# Patient Record
Sex: MALE | Race: WHITE | NOT HISPANIC OR LATINO | Employment: OTHER | URBAN - NONMETROPOLITAN AREA
[De-identification: names, ages, dates, MRNs, and addresses within clinical notes are randomized per-mention and may not be internally consistent; named-entity substitution may affect disease eponyms.]

---

## 2017-01-23 PROBLEM — G35 MULTIPLE SCLEROSIS (HCC): Status: ACTIVE | Noted: 2017-01-23

## 2017-01-23 PROBLEM — D72.819 LEUKOPENIA: Status: ACTIVE | Noted: 2017-01-23

## 2017-02-25 ENCOUNTER — APPOINTMENT (OUTPATIENT)
Dept: GENERAL RADIOLOGY | Facility: HOSPITAL | Age: 52
End: 2017-02-25

## 2017-02-25 ENCOUNTER — APPOINTMENT (OUTPATIENT)
Dept: CT IMAGING | Facility: HOSPITAL | Age: 52
End: 2017-02-25

## 2017-02-25 ENCOUNTER — HOSPITAL ENCOUNTER (EMERGENCY)
Facility: HOSPITAL | Age: 52
Discharge: HOME | End: 2017-02-26
Attending: EMERGENCY MEDICINE | Admitting: EMERGENCY MEDICINE

## 2017-02-25 DIAGNOSIS — G35 MULTIPLE SCLEROSIS EXACERBATION (HCC): ICD-10-CM

## 2017-02-25 DIAGNOSIS — R20.0 NUMBNESS AND TINGLING: Primary | ICD-10-CM

## 2017-02-25 DIAGNOSIS — R20.2 NUMBNESS AND TINGLING: Primary | ICD-10-CM

## 2017-02-25 LAB
ALBUMIN SERPL-MCNC: 4.3 G/DL (ref 3.5–5)
ALBUMIN/GLOB SERPL: 1.6 G/DL (ref 1–2)
ALP SERPL-CCNC: 80 U/L (ref 38–126)
ALT SERPL W P-5'-P-CCNC: 135 U/L (ref 13–69)
ANION GAP SERPL CALCULATED.3IONS-SCNC: 11.9 MMOL/L
AST SERPL-CCNC: 66 U/L (ref 15–46)
BASOPHILS # BLD AUTO: 0 10*3/MM3 (ref 0–0.2)
BASOPHILS NFR BLD AUTO: 0 % (ref 0–2.5)
BILIRUB SERPL-MCNC: 0.6 MG/DL (ref 0.2–1.3)
BUN BLD-MCNC: 20 MG/DL (ref 7–20)
BUN/CREAT SERPL: 14.3 (ref 6.3–21.9)
CALCIUM SPEC-SCNC: 9.6 MG/DL (ref 8.4–10.2)
CHLORIDE SERPL-SCNC: 107 MMOL/L (ref 98–107)
CO2 SERPL-SCNC: 28 MMOL/L (ref 26–30)
CREAT BLD-MCNC: 1.4 MG/DL (ref 0.6–1.3)
DEPRECATED RDW RBC AUTO: 39.4 FL (ref 37–54)
EOSINOPHIL # BLD AUTO: 0.03 10*3/MM3 (ref 0–0.7)
EOSINOPHIL NFR BLD AUTO: 0.8 % (ref 0–7)
ERYTHROCYTE [DISTWIDTH] IN BLOOD BY AUTOMATED COUNT: 12.5 % (ref 11.5–14.5)
GFR SERPL CREATININE-BSD FRML MDRD: 54 ML/MIN/1.73
GLOBULIN UR ELPH-MCNC: 2.7 GM/DL
GLUCOSE BLD-MCNC: 120 MG/DL (ref 74–98)
HCT VFR BLD AUTO: 40.8 % (ref 42–52)
HGB BLD-MCNC: 14 G/DL (ref 14–18)
IMM GRANULOCYTES # BLD: 0.01 10*3/MM3 (ref 0–0.06)
IMM GRANULOCYTES NFR BLD: 0.3 % (ref 0–0.6)
LYMPHOCYTES # BLD AUTO: 0.35 10*3/MM3 (ref 0.6–3.4)
LYMPHOCYTES NFR BLD AUTO: 9.5 % (ref 10–50)
MCH RBC QN AUTO: 29.7 PG (ref 27–31)
MCHC RBC AUTO-ENTMCNC: 34.3 G/DL (ref 30–37)
MCV RBC AUTO: 86.6 FL (ref 80–94)
MONOCYTES # BLD AUTO: 0.35 10*3/MM3 (ref 0–0.9)
MONOCYTES NFR BLD AUTO: 9.5 % (ref 0–12)
NEUTROPHILS # BLD AUTO: 2.93 10*3/MM3 (ref 2–6.9)
NEUTROPHILS NFR BLD AUTO: 79.9 % (ref 37–80)
NRBC BLD MANUAL-RTO: 0 /100 WBC (ref 0–0)
PLATELET # BLD AUTO: 165 10*3/MM3 (ref 130–400)
PMV BLD AUTO: 8.9 FL (ref 6–12)
POTASSIUM BLD-SCNC: 3.9 MMOL/L (ref 3.5–5.1)
PROT SERPL-MCNC: 7 G/DL (ref 6.3–8.2)
RBC # BLD AUTO: 4.71 10*6/MM3 (ref 4.7–6.1)
SODIUM BLD-SCNC: 143 MMOL/L (ref 137–145)
TROPONIN I SERPL-MCNC: 0 NG/ML (ref 0–0.05)
TROPONIN I SERPL-MCNC: <0.012 NG/ML (ref 0–0.03)
WBC NRBC COR # BLD: 3.67 10*3/MM3 (ref 4.8–10.8)

## 2017-02-25 PROCEDURE — 85025 COMPLETE CBC W/AUTO DIFF WBC: CPT | Performed by: EMERGENCY MEDICINE

## 2017-02-25 PROCEDURE — 99284 EMERGENCY DEPT VISIT MOD MDM: CPT

## 2017-02-25 PROCEDURE — 70450 CT HEAD/BRAIN W/O DYE: CPT

## 2017-02-25 PROCEDURE — 71010 HC CHEST PA OR AP: CPT

## 2017-02-25 PROCEDURE — 93005 ELECTROCARDIOGRAM TRACING: CPT | Performed by: EMERGENCY MEDICINE

## 2017-02-25 PROCEDURE — 80053 COMPREHEN METABOLIC PANEL: CPT | Performed by: EMERGENCY MEDICINE

## 2017-02-25 PROCEDURE — 84484 ASSAY OF TROPONIN QUANT: CPT | Performed by: EMERGENCY MEDICINE

## 2017-02-25 RX ORDER — PRAMIPEXOLE DIHYDROCHLORIDE 0.5 MG/1
TABLET ORAL
COMMUNITY
Start: 2015-04-08 | End: 2017-05-15

## 2017-02-25 RX ORDER — CITALOPRAM 20 MG/1
TABLET ORAL DAILY
COMMUNITY
Start: 2015-04-08 | End: 2018-01-03 | Stop reason: SDUPTHER

## 2017-02-25 RX ORDER — ASPIRIN 325 MG
325 TABLET ORAL ONCE
Status: COMPLETED | OUTPATIENT
Start: 2017-02-25 | End: 2017-02-26

## 2017-02-25 RX ORDER — FINGOLIMOD HYDROCHLORIDE 0.5 MG/1
0.5 CAPSULE ORAL DAILY
COMMUNITY
End: 2017-11-06 | Stop reason: SDUPTHER

## 2017-02-25 RX ORDER — TIZANIDINE HYDROCHLORIDE 4 MG/1
CAPSULE, GELATIN COATED ORAL
COMMUNITY
Start: 2015-04-08 | End: 2017-11-29 | Stop reason: SDUPTHER

## 2017-02-26 VITALS
BODY MASS INDEX: 23.86 KG/M2 | DIASTOLIC BLOOD PRESSURE: 72 MMHG | RESPIRATION RATE: 18 BRPM | HEART RATE: 75 BPM | WEIGHT: 180 LBS | TEMPERATURE: 97.8 F | OXYGEN SATURATION: 96 % | HEIGHT: 73 IN | SYSTOLIC BLOOD PRESSURE: 113 MMHG

## 2017-02-26 LAB
HOLD SPECIMEN: NORMAL
WHOLE BLOOD HOLD SPECIMEN: NORMAL

## 2017-02-26 PROCEDURE — 96365 THER/PROPH/DIAG IV INF INIT: CPT

## 2017-02-26 PROCEDURE — 25010000002 METHYLPREDNISOLONE: Performed by: EMERGENCY MEDICINE

## 2017-02-26 PROCEDURE — 96375 TX/PRO/DX INJ NEW DRUG ADDON: CPT

## 2017-02-26 PROCEDURE — 25010000002 ONDANSETRON PER 1 MG: Performed by: EMERGENCY MEDICINE

## 2017-02-26 RX ORDER — METHYLPREDNISOLONE 4 MG/1
TABLET ORAL
Qty: 21 TABLET | Refills: 0 | Status: SHIPPED | OUTPATIENT
Start: 2017-02-26 | End: 2017-05-15

## 2017-02-26 RX ORDER — ONDANSETRON 2 MG/ML
4 INJECTION INTRAMUSCULAR; INTRAVENOUS ONCE
Status: COMPLETED | OUTPATIENT
Start: 2017-02-26 | End: 2017-02-26

## 2017-02-26 RX ADMIN — METHYLPREDNISOLONE SODIUM SUCCINATE 1 G: 1 INJECTION, POWDER, FOR SOLUTION INTRAMUSCULAR; INTRAVENOUS at 02:47

## 2017-02-26 RX ADMIN — ASPIRIN 325 MG ORAL TABLET 325 MG: 325 PILL ORAL at 00:09

## 2017-02-26 RX ADMIN — ONDANSETRON 4 MG: 2 INJECTION INTRAMUSCULAR; INTRAVENOUS at 00:12

## 2017-02-28 ENCOUNTER — TELEPHONE (OUTPATIENT)
Dept: INTERNAL MEDICINE | Facility: CLINIC | Age: 52
End: 2017-02-28

## 2017-02-28 DIAGNOSIS — G35 MULTIPLE SCLEROSIS (HCC): Primary | ICD-10-CM

## 2017-03-16 ENCOUNTER — TELEPHONE (OUTPATIENT)
Dept: INTERNAL MEDICINE | Facility: CLINIC | Age: 52
End: 2017-03-16

## 2017-03-16 NOTE — TELEPHONE ENCOUNTER
[place] [facility] [place] KNOW THAT KEESHA HAS MET HIS GOALS FOR HOME HEALTH AND HE IS BEING DISCHARGED     TYLER [phone]

## 2017-03-21 ENCOUNTER — TRANSCRIBE ORDERS (OUTPATIENT)
Dept: MRI IMAGING | Facility: HOSPITAL | Age: 52
End: 2017-03-21

## 2017-03-21 DIAGNOSIS — G35 MULTIPLE SCLEROSIS (HCC): Primary | ICD-10-CM

## 2017-03-22 ENCOUNTER — HOSPITAL ENCOUNTER (OUTPATIENT)
Dept: MRI IMAGING | Facility: HOSPITAL | Age: 52
Discharge: HOME | End: 2017-03-22

## 2017-03-22 ENCOUNTER — HOSPITAL ENCOUNTER (OUTPATIENT)
Dept: MRI IMAGING | Facility: HOSPITAL | Age: 52
Discharge: HOME | End: 2017-03-22
Admitting: PSYCHIATRY & NEUROLOGY

## 2017-03-22 DIAGNOSIS — G35 MULTIPLE SCLEROSIS (HCC): ICD-10-CM

## 2017-03-22 PROCEDURE — 0 GADOBENATE DIMEGLUMINE 529 MG/ML SOLUTION: Performed by: PSYCHIATRY & NEUROLOGY

## 2017-03-22 PROCEDURE — 72156 MRI NECK SPINE W/O & W/DYE: CPT

## 2017-03-22 PROCEDURE — A9577 INJ MULTIHANCE: HCPCS | Performed by: PSYCHIATRY & NEUROLOGY

## 2017-03-22 PROCEDURE — 70553 MRI BRAIN STEM W/O & W/DYE: CPT

## 2017-03-22 RX ADMIN — GADOBENATE DIMEGLUMINE 15 ML: 529 INJECTION, SOLUTION INTRAVENOUS at 14:30

## 2017-03-22 RX ADMIN — GADOBENATE DIMEGLUMINE 1 ML: 529 INJECTION, SOLUTION INTRAVENOUS at 14:30

## 2017-03-24 ENCOUNTER — OUTSIDE FACILITY SERVICE (OUTPATIENT)
Dept: INTERNAL MEDICINE | Facility: CLINIC | Age: 52
End: 2017-03-24

## 2017-03-28 ENCOUNTER — TELEPHONE (OUTPATIENT)
Dept: ONCOLOGY | Facility: CLINIC | Age: 52
End: 2017-03-28

## 2017-03-28 ENCOUNTER — OFFICE VISIT (OUTPATIENT)
Dept: ONCOLOGY | Facility: CLINIC | Age: 52
End: 2017-03-28

## 2017-03-28 VITALS
SYSTOLIC BLOOD PRESSURE: 136 MMHG | WEIGHT: 197 LBS | BODY MASS INDEX: 25.99 KG/M2 | DIASTOLIC BLOOD PRESSURE: 92 MMHG | HEART RATE: 71 BPM | TEMPERATURE: 97 F | RESPIRATION RATE: 15 BRPM

## 2017-03-28 DIAGNOSIS — D70.8 OTHER NEUTROPENIA (HCC): Primary | ICD-10-CM

## 2017-03-28 PROCEDURE — 99213 OFFICE O/P EST LOW 20 MIN: CPT | Performed by: NURSE PRACTITIONER

## 2017-03-28 NOTE — TELEPHONE ENCOUNTER
----- Message from Ruby Sky sent at 3/28/2017  1:16 PM EDT -----  Regarding: [facility] ORDER  Contact: [phone]  Bianca with [facility] in [place] called and she needs an end date on the standing order the most can use is 6 months this must be on this order. Please fix and fax to [phone].

## 2017-03-28 NOTE — PROGRESS NOTES
PROBLEM LIST:  1. Mild leukopenia.     Subjective     HISTORY OF PRESENT ILLNESS:   Mr. Whitley is here for follow-up evaluation of mild leukopenia.  Approximately 6 months ago he had progression of his multiple sclerosis and Tecfidera was discontinued and he was started on Gilenya.  He is tolerating the new medication relatively well.  About 1 month ago he went to the emergency department for severe headache with left sided weakness.  The headache resolved in the emergency department and the left-sided weakness is slowly improving. He denies any recurring infections or fevers. He continues to have persistent fatigue that is related to his multiple sclerosis.     Past Medical History, Past Surgical History, Social History, Family History have been reviewed and are without significant changes except as mentioned.    Review of Systems   A comprehensive 14 point review of systems was performed and was negative except as mentioned.    Medications:  The current medication list was reviewed in the EMR    ALLERGIES:  No Known Allergies    Objective      /92  Pulse 71  Temp 97 °F (36.1 °C) (Temporal Artery )   Resp 15  Wt 197 lb (89.4 kg)  BMI 25.99 kg/m2         General: well appearing, in no acute distress   HEENT: sclera anicteric, oropharynx clear  Lymphatics: no cervical, supraclavicular, or axillary adenopathy  Cardiovascular: regular rate and rhythm, no murmurs  Lungs: clear to auscultation bilaterally  Abdomen: soft, nontender, nondistended.  No palpable masses or organomegaly  Extremeties: no lower extremity edema, cords or calf tenderness  Skin: no rashes, lesions, bruising, or petechiae    RECENT LABS:  Hematology WBC   Date Value Ref Range Status   02/25/2017 3.67 (L) 4.80 - 10.80 10*3/mm3 Final     Hemoglobin   Date Value Ref Range Status   02/25/2017 14.0 14.0 - 18.0 g/dL Final     Hematocrit   Date Value Ref Range Status   02/25/2017 40.8 (L) 42.0 - 52.0 % Final     MCV   Date Value Ref Range  Status   02/25/2017 86.6 80.0 - 94.0 fL Final     RDW   Date Value Ref Range Status   02/25/2017 12.5 11.5 - 14.5 % Final     MPV   Date Value Ref Range Status   02/25/2017 8.9 6.0 - 12.0 fL Final     Platelets   Date Value Ref Range Status   02/25/2017 165 130 - 400 10*3/mm3 Final     Immature Grans %   Date Value Ref Range Status   02/25/2017 0.3 0.0 - 0.6 % Final     Neutrophils, Absolute   Date Value Ref Range Status   02/25/2017 2.93 2.00 - 6.90 10*3/mm3 Final     Lymphocytes, Absolute   Date Value Ref Range Status   02/25/2017 0.35 (L) 0.60 - 3.40 10*3/mm3 Final     Monocytes, Absolute   Date Value Ref Range Status   02/25/2017 0.35 0.00 - 0.90 10*3/mm3 Final     Eosinophils, Absolute   Date Value Ref Range Status   02/25/2017 0.03 0.00 - 0.70 10*3/mm3 Final     Basophils, Absolute   Date Value Ref Range Status   02/25/2017 0.00 0.00 - 0.20 10*3/mm3 Final     Immature Grans, Absolute   Date Value Ref Range Status   02/25/2017 0.01 0.00 - 0.06 10*3/mm3 Final     nRBC   Date Value Ref Range Status   02/25/2017 0.0 0.0 - 0.0 /100 WBC Final       Glucose   Date Value Ref Range Status   02/25/2017 120 (H) 74 - 98 mg/dL Final     Sodium   Date Value Ref Range Status   02/25/2017 143 137 - 145 mmol/L Final     Potassium   Date Value Ref Range Status   02/25/2017 3.9 3.5 - 5.1 mmol/L Final     CO2   Date Value Ref Range Status   02/25/2017 28.0 26.0 - 30.0 mmol/L Final     Chloride   Date Value Ref Range Status   02/25/2017 107 98 - 107 mmol/L Final     Anion Gap   Date Value Ref Range Status   02/25/2017 11.9 mmol/L Final     Creatinine   Date Value Ref Range Status   02/25/2017 1.40 (H) 0.60 - 1.30 mg/dL Final     BUN   Date Value Ref Range Status   02/25/2017 20 7 - 20 mg/dL Final     BUN/Creatinine Ratio   Date Value Ref Range Status   02/25/2017 14.3 6.3 - 21.9 Final     Calcium   Date Value Ref Range Status   02/25/2017 9.6 8.4 - 10.2 mg/dL Final     eGFR Non  Amer   Date Value Ref Range Status    02/25/2017 54 (L) >60 mL/min/1.73 Final     Alkaline Phosphatase   Date Value Ref Range Status   02/25/2017 80 38 - 126 U/L Final     Total Protein   Date Value Ref Range Status   02/25/2017 7.0 6.3 - 8.2 g/dL Final     ALT (SGPT)   Date Value Ref Range Status   02/25/2017 135 (H) 13 - 69 U/L Final     AST (SGOT)   Date Value Ref Range Status   02/25/2017 66 (H) 15 - 46 U/L Final     Total Bilirubin   Date Value Ref Range Status   02/25/2017 0.6 0.2 - 1.3 mg/dL Final     Albumin   Date Value Ref Range Status   02/25/2017 4.30 3.50 - 5.00 g/dL Final     Globulin   Date Value Ref Range Status   02/25/2017 2.7 gm/dL Final     A/G Ratio   Date Value Ref Range Status   02/25/2017 1.6 1.0 - 2.0 g/dL Final       LDH   Date Value Ref Range Status   11/24/2015 135 120 - 246 Units/L Final          Assessment/Plan   IMPRESSION:   1. Mild leukopenia. Over the past year he has had very mild leukopenia but only once did he have a mild neutropenia with absolute neutrophils only 1.79 in May 2016. His neutrophil count in February 2017 had returned to normal at 2.93.  Most recently his absolute lymphocytes count was 0.35. The Gilenya can cause lymphocytopenia in approximately 4-7% of patients. We will need to monitor him closely while taking Gilenya. If he develops an infection we will need to hold the Gilenya or if his lymphocytes drop further we may need to talk with his neurologist regarding possible dose adjustment. Currently he has no lymphadenopathy or recurring infections to suggest common variable immunodeficiency.      PLAN:  1.  We will monitor his labs closely. We would like for him to have a CBC with diff monthly.   2.  We would like to see him back in 6 months for follow-up evaluation. If we do see a trend or progressive fall in the lymphocytopenia or with neutropenia we can certainly see him sooner. He has been instructed to contact us in the interim if any new symptoms arise.               Yeimi Doyle,  [place]  [facility]    3/28/2017

## 2017-05-15 ENCOUNTER — OFFICE VISIT (OUTPATIENT)
Dept: NEUROLOGY | Facility: CLINIC | Age: 52
End: 2017-05-15

## 2017-05-15 VITALS
DIASTOLIC BLOOD PRESSURE: 76 MMHG | WEIGHT: 195 LBS | HEIGHT: 72 IN | BODY MASS INDEX: 26.41 KG/M2 | HEART RATE: 80 BPM | OXYGEN SATURATION: 98 % | SYSTOLIC BLOOD PRESSURE: 118 MMHG

## 2017-05-15 DIAGNOSIS — G35 MS (MULTIPLE SCLEROSIS) (HCC): Primary | ICD-10-CM

## 2017-05-15 PROCEDURE — 99244 OFF/OP CNSLTJ NEW/EST MOD 40: CPT | Performed by: PSYCHIATRY & NEUROLOGY

## 2017-05-15 RX ORDER — NAPROXEN 500 MG/1
500 TABLET ORAL 2 TIMES DAILY WITH MEALS
COMMUNITY
End: 2018-01-31 | Stop reason: SDUPTHER

## 2017-05-22 PROBLEM — G89.4 CHRONIC PAIN SYNDROME: Status: ACTIVE | Noted: 2017-05-22

## 2017-05-22 PROBLEM — F32.A DEPRESSION: Status: ACTIVE | Noted: 2017-05-22

## 2017-05-22 PROBLEM — M79.7 FIBROMYALGIA: Status: ACTIVE | Noted: 2017-05-22

## 2017-05-22 PROBLEM — E78.5 HYPERLIPIDEMIA: Status: ACTIVE | Noted: 2017-05-22

## 2017-05-22 PROBLEM — G25.81 RESTLESS LEGS SYNDROME (RLS): Status: ACTIVE | Noted: 2017-05-22

## 2017-05-22 PROBLEM — Z87.19 H/O ESOPHAGEAL REFLUX: Status: ACTIVE | Noted: 2017-05-22

## 2017-05-22 PROBLEM — R73.03 PREDIABETES: Status: ACTIVE | Noted: 2017-05-22

## 2017-05-22 PROBLEM — Z86.69: Status: ACTIVE | Noted: 2017-05-22

## 2017-06-28 ENCOUNTER — OFFICE VISIT (OUTPATIENT)
Dept: NEUROLOGY | Facility: CLINIC | Age: 52
End: 2017-06-28

## 2017-06-28 VITALS
HEIGHT: 72 IN | SYSTOLIC BLOOD PRESSURE: 130 MMHG | DIASTOLIC BLOOD PRESSURE: 80 MMHG | OXYGEN SATURATION: 98 % | BODY MASS INDEX: 26.41 KG/M2 | HEART RATE: 88 BPM | WEIGHT: 195 LBS

## 2017-06-28 DIAGNOSIS — R25.2 SPASTICITY: ICD-10-CM

## 2017-06-28 DIAGNOSIS — F32.9 REACTIVE DEPRESSION: ICD-10-CM

## 2017-06-28 DIAGNOSIS — G35 MS (MULTIPLE SCLEROSIS) (HCC): Primary | ICD-10-CM

## 2017-06-28 PROCEDURE — 99214 OFFICE O/P EST MOD 30 MIN: CPT | Performed by: PSYCHIATRY & NEUROLOGY

## 2017-06-28 NOTE — PROGRESS NOTES
[facility] NEUROLOGY [place] PROGRESS NOTE  History of Present Illness     Date: 6/28/2017    Patient Identification  Vinnie Whitley III is a 51 y.o. male.    Patient information was obtained from patient and spouse/SO.  History/Exam limitations: none.    Original consultation requested by: Clifford Kirkland D.O.      Chief Complaint   Multiple Sclerosis (Patient c/o more dizziness per 2 months. )      History of Present Illness   Patient is a delightful 51-year-old gentleman who has been diagnosed in multiple sclerosis patients reported that he was originally started on Avonex.  However patient was unable to tolerate the side effects of flulike symptoms he was subsequently switched to Copaxone.  Patient tolerated Copaxone well however patient have recurrent exacerbation.  He was then switched to Tecfidera.  Patient still continued to have frequent episodes of exacerbation.  He was finally switched to Gilenya.  During the first episode he was monitored closely the cardiology office he does have asymptomatic bradycardic episode.  Recent ophthalmologic evaluation did not show any medical swelling.  Repeat MRI of the brain and cervical spine did not show any active demyelinating lesion.      PMH:   Past Medical History:   Diagnosis Date   • Abdominal pain     H/o   • Bilateral flank pain    • Chest pain     H/o   • Collapse of both lungs    • Constipation     H/o   • Diarrhea     H/o   • Elevated LFTs    • Encounter for long-term (current) use of medications    • Infection of kidney     H/o   • Kidney stones    • MS (multiple sclerosis)    • Nausea     H/o   • Nephrolithiasis     H/o   • Pneumothorax     H/o   • Thoracolumbar back pain    • Varicella     H/o   • Vomiting     H/o       Past Surgical History:   Past Surgical History:   Procedure Laterality Date   • KIDNEY STONE SURGERY  2005    Lithotomy   • LUNG SURGERY         Family Hisotry:   Family History   Problem Relation Age of Onset   • Dementia Mother    •  Liver disease Mother      chronic   • Liver disease Other      Chronic   • Liver disease Other      Chronic   • Kidney disease Other      Renal disorder       Social History:   Social History     Social History   • Marital status:      Spouse name: N/A   • Number of children: N/A   • Years of education: N/A     Occupational History   • Not on file.     Social History Main Topics   • Smoking status: Former Smoker     Packs/day: 1.00     Types: Cigarettes     Quit date: 1/1/2001   • Smokeless tobacco: Never Used   • Alcohol use No   • Drug use: No   • Sexual activity: Not on file     Other Topics Concern   • Not on file     Social History Narrative       Medications:   Current Outpatient Prescriptions   Medication Sig Dispense Refill   • citalopram (CeleXA) 20 MG tablet Take  by mouth Daily.     • fingolimod (GILENYA) 0.5 MG capsule Take 0.5 mg by mouth Daily.     • naproxen (NAPROSYN) 500 MG tablet Take 500 mg by mouth 2 (Two) Times a Day With Meals.     • TiZANidine (ZANAFLEX) 4 MG capsule Take  by mouth.       No current facility-administered medications for this visit.        Allergy: No Known Allergies    Review of Systems:  Review of Systems   Constitutional: Negative for chills and fever.   HENT: Negative for congestion, ear pain, hearing loss, rhinorrhea and sore throat.    Eyes: Positive for visual disturbance. Negative for pain, discharge and redness.   Respiratory: Negative for cough, shortness of breath, wheezing and stridor.    Cardiovascular: Negative for chest pain, palpitations and leg swelling.   Gastrointestinal: Negative for abdominal pain, constipation, nausea and vomiting.   Endocrine: Negative for cold intolerance, heat intolerance and polyphagia.   Genitourinary: Negative for dysuria, flank pain, frequency and urgency.   Musculoskeletal: Positive for gait problem. Negative for joint swelling, myalgias, neck pain and neck stiffness.   Skin: Negative for pallor, rash and wound.  "  Allergic/Immunologic: Negative for environmental allergies.   Neurological: Positive for weakness and numbness. Negative for dizziness, tremors, seizures, syncope, facial asymmetry, speech difficulty, light-headedness and headaches.   Hematological: Negative for adenopathy.   Psychiatric/Behavioral: Negative for confusion and hallucinations. The patient is not nervous/anxious.        Physical Exam     Vitals:    06/28/17 1000   BP: 130/80   Pulse: 88   SpO2: 98%   Weight: 195 lb (88.5 kg)   Height: 72\" (182.9 cm)     GENERAL: Patient is pleasant, cooperative, appears to be stated age.  Body habitus is endomorphic.  SKIN AND EXTREMITIES:  No skin rashes or lesions are noted.  No cyanosis, clubbing or edema of the extremities.    HEAD:  Head is normocephalic and atraumatic.    NECK: Neck are non-tender without thyromegaly or adenopathy.  Carotic upstrokes are 1+/4.  No cranial or cervical bruits.  The neck is supple with a full range of motion.   ENT: palate elevate symmetrically, no evidence of high arch palate, tongue midline erythema in posterior pharynx, Mallampati Classification Class III   CARDIOVASCULAR:  Regular rate and rhythm with normal S1 and S2 without rub or gallop.  RESPIRATORY:  Clear to auscultation without wheezes or crackle   ABDOMEN:  Soft and non-tender, positive bowel sound without hepatosplenomegaly  BACK:  Back is straight without midline defect.    PSYCH:  Higher cortical function/mental status:  The patient is alert.  She is oriented x3 to time, place and person.  Recent and the remote memory appear normal.  The patient has a good fund of knowledge.  There is no visual or auditory hallucination or suicidal or homicidal ideation.  SPEECH:There is no gross evidence of aphasia, dysarthria or agnosia.      CRANIAL NERVES:  Pupils are 4mm, equal round reactive to light, reacting briskly to 2mm without afferent pupillary defect.  Visual fields are intact to confrontation testing.  Fundoscopic " examination reveals sharp disk margins with normal vasculature.  No papilledema, hemorrhages or exudates.  Extraocular movements are full and smooth with normal pursuits and saccades.  No nystagmus noted.  The face is symmetric. palate elevate symmetrically, Tongue midline, positive gag reflex. The remainder of the cranial nerves are intact and symmetrical.    MOTOR: Strength is 5/5 throughout with normal tone and bulk with the following exceptions, 4/5 intrinsic muscles of the hands and feet.  No involuntary movements noted.    Deep Tendon Reflexes: are 2/4 and symmetrical in the upper extremities, 2/4 and symmetrical at the knees and 1/4 and symmetrical at the Achilles tendon.  Plantar responses were down-going bilaterally.    SENSATION:  Intact to pinprick, light touch, vibration and proprioception.  Coordination:  The patient normally performs finger-nose-finger, heel-to-knee-to-shin and rapid alternating movements in symmetrical fashion.    COORDINATION AND GAIT:  The patient walks with a narrow-based gait.  Patient is able to heel-toe and tandem walk forward and backwards without difficulty.  Romberg and monopedal  Romberg are negative.    MUSCULOSKELETAL: Range of motion normal, no clubbing, cyanosis, or edema.  No joint swelling.            Studies: I have personally reviewed the following and discussed with the patient.  Results for orders placed or performed during the hospital encounter of 02/25/17   Comprehensive Metabolic Panel   Result Value Ref Range    Glucose 120 (H) 74 - 98 mg/dL    BUN 20 7 - 20 mg/dL    Creatinine 1.40 (H) 0.60 - 1.30 mg/dL    Sodium 143 137 - 145 mmol/L    Potassium 3.9 3.5 - 5.1 mmol/L    Chloride 107 98 - 107 mmol/L    CO2 28.0 26.0 - 30.0 mmol/L    Calcium 9.6 8.4 - 10.2 mg/dL    Total Protein 7.0 6.3 - 8.2 g/dL    Albumin 4.30 3.50 - 5.00 g/dL    ALT (SGPT) 135 (H) 13 - 69 U/L    AST (SGOT) 66 (H) 15 - 46 U/L    Alkaline Phosphatase 80 38 - 126 U/L    Total Bilirubin 0.6 0.2  - 1.3 mg/dL    eGFR Non African Amer 54 (L) >60 mL/min/1.73    Globulin 2.7 gm/dL    A/G Ratio 1.6 1.0 - 2.0 g/dL    BUN/Creatinine Ratio 14.3 6.3 - 21.9    Anion Gap 11.9 mmol/L   Troponin I-Stat   Result Value Ref Range    Troponin I 0.000 0.000 - 0.050 ng/mL   Troponin   Result Value Ref Range    Troponin I <0.012 0.000 - 0.034 ng/mL   CBC Auto Differential   Result Value Ref Range    WBC 3.67 (L) 4.80 - 10.80 10*3/mm3    RBC 4.71 4.70 - 6.10 10*6/mm3    Hemoglobin 14.0 14.0 - 18.0 g/dL    Hematocrit 40.8 (L) 42.0 - 52.0 %    MCV 86.6 80.0 - 94.0 fL    MCH 29.7 27.0 - 31.0 pg    MCHC 34.3 30.0 - 37.0 g/dL    RDW 12.5 11.5 - 14.5 %    RDW-SD 39.4 37.0 - 54.0 fl    MPV 8.9 6.0 - 12.0 fL    Platelets 165 130 - 400 10*3/mm3    Neutrophil % 79.9 37.0 - 80.0 %    Lymphocyte % 9.5 (L) 10.0 - 50.0 %    Monocyte % 9.5 0.0 - 12.0 %    Eosinophil % 0.8 0.0 - 7.0 %    Basophil % 0.0 0.0 - 2.5 %    Immature Grans % 0.3 0.0 - 0.6 %    Neutrophils, Absolute 2.93 2.00 - 6.90 10*3/mm3    Lymphocytes, Absolute 0.35 (L) 0.60 - 3.40 10*3/mm3    Monocytes, Absolute 0.35 0.00 - 0.90 10*3/mm3    Eosinophils, Absolute 0.03 0.00 - 0.70 10*3/mm3    Basophils, Absolute 0.00 0.00 - 0.20 10*3/mm3    Immature Grans, Absolute 0.01 0.00 - 0.06 10*3/mm3    nRBC 0.0 0.0 - 0.0 /100 WBC   Light Blue Top   Result Value Ref Range    Extra Tube hold for add-on    Gold Top - SST   Result Value Ref Range    Extra Tube Hold for add-ons.      Records Reviewed: I have personally reviewed his previous medical record.    Vinnie was seen today for multiple sclerosis.    Diagnoses and all orders for this visit:    MS (multiple sclerosis)      Treatments:  1.  Continue patient on Gilenya   2.  Counseled patient extensively how Gilenya works on Shingosine receptors  3.  Continue patient on Zanaflex for spasticity  4.  Continue patient on Celexa for depression.    This Document is signed by Alirio Jerez MD, FAAN, FAASM   June 28, 20172:17 PM

## 2017-08-29 ENCOUNTER — LAB (OUTPATIENT)
Dept: LAB | Facility: HOSPITAL | Age: 52
End: 2017-08-29
Attending: PSYCHIATRY & NEUROLOGY

## 2017-08-29 ENCOUNTER — OFFICE VISIT (OUTPATIENT)
Dept: NEUROLOGY | Facility: CLINIC | Age: 52
End: 2017-08-29

## 2017-08-29 ENCOUNTER — HOSPITAL ENCOUNTER (OUTPATIENT)
Dept: INFUSION THERAPY | Facility: HOSPITAL | Age: 52
Setting detail: INFUSION SERIES
Discharge: HOME | End: 2017-08-29

## 2017-08-29 ENCOUNTER — APPOINTMENT (OUTPATIENT)
Dept: INFUSION THERAPY | Facility: HOSPITAL | Age: 52
End: 2017-08-29

## 2017-08-29 VITALS
OXYGEN SATURATION: 99 % | DIASTOLIC BLOOD PRESSURE: 81 MMHG | HEART RATE: 69 BPM | SYSTOLIC BLOOD PRESSURE: 136 MMHG | TEMPERATURE: 96 F | RESPIRATION RATE: 18 BRPM

## 2017-08-29 VITALS
OXYGEN SATURATION: 98 % | DIASTOLIC BLOOD PRESSURE: 82 MMHG | HEART RATE: 74 BPM | SYSTOLIC BLOOD PRESSURE: 134 MMHG | HEIGHT: 72 IN | WEIGHT: 192.2 LBS | BODY MASS INDEX: 26.03 KG/M2

## 2017-08-29 DIAGNOSIS — G35 MULTIPLE SCLEROSIS (HCC): ICD-10-CM

## 2017-08-29 DIAGNOSIS — G35 MS (MULTIPLE SCLEROSIS) (HCC): ICD-10-CM

## 2017-08-29 DIAGNOSIS — G35 MS (MULTIPLE SCLEROSIS) (HCC): Primary | ICD-10-CM

## 2017-08-29 LAB
BASOPHILS # BLD AUTO: 0 10*3/MM3 (ref 0–0.2)
BASOPHILS NFR BLD AUTO: 0 % (ref 0–2.5)
BILIRUB UR QL STRIP: NEGATIVE
CLARITY UR: CLEAR
COLOR UR: YELLOW
DEPRECATED RDW RBC AUTO: 42.8 FL (ref 37–54)
EOSINOPHIL # BLD AUTO: 0.03 10*3/MM3 (ref 0–0.7)
EOSINOPHIL NFR BLD AUTO: 1.4 % (ref 0–7)
ERYTHROCYTE [DISTWIDTH] IN BLOOD BY AUTOMATED COUNT: 13.1 % (ref 11.5–14.5)
GLUCOSE UR STRIP-MCNC: NEGATIVE MG/DL
HCT VFR BLD AUTO: 42.4 % (ref 42–52)
HGB BLD-MCNC: 14.5 G/DL (ref 14–18)
HGB UR QL STRIP.AUTO: NEGATIVE
IMM GRANULOCYTES # BLD: 0.02 10*3/MM3 (ref 0–0.06)
IMM GRANULOCYTES NFR BLD: 0.9 % (ref 0–0.6)
KETONES UR QL STRIP: NEGATIVE
LEUKOCYTE ESTERASE UR QL STRIP.AUTO: NEGATIVE
LYMPHOCYTES # BLD AUTO: 0.23 10*3/MM3 (ref 0.6–3.4)
LYMPHOCYTES NFR BLD AUTO: 10.7 % (ref 10–50)
MCH RBC QN AUTO: 30.5 PG (ref 27–31)
MCHC RBC AUTO-ENTMCNC: 34.2 G/DL (ref 30–37)
MCV RBC AUTO: 89.1 FL (ref 80–94)
MONOCYTES # BLD AUTO: 0.35 10*3/MM3 (ref 0–0.9)
MONOCYTES NFR BLD AUTO: 16.4 % (ref 0–12)
NEUTROPHILS # BLD AUTO: 1.51 10*3/MM3 (ref 2–6.9)
NEUTROPHILS NFR BLD AUTO: 70.6 % (ref 37–80)
NITRITE UR QL STRIP: NEGATIVE
NRBC BLD MANUAL-RTO: 0 /100 WBC (ref 0–0)
PH UR STRIP.AUTO: 5.5 [PH] (ref 5–8)
PLATELET # BLD AUTO: 155 10*3/MM3 (ref 130–400)
PMV BLD AUTO: 8.7 FL (ref 6–12)
PROT UR QL STRIP: NEGATIVE
RBC # BLD AUTO: 4.76 10*6/MM3 (ref 4.7–6.1)
SP GR UR STRIP: >=1.03 (ref 1–1.03)
UROBILINOGEN UR QL STRIP: NORMAL
WBC NRBC COR # BLD: 2.14 10*3/MM3 (ref 4.8–10.8)

## 2017-08-29 PROCEDURE — 99214 OFFICE O/P EST MOD 30 MIN: CPT | Performed by: PSYCHIATRY & NEUROLOGY

## 2017-08-29 PROCEDURE — 81003 URINALYSIS AUTO W/O SCOPE: CPT | Performed by: PSYCHIATRY & NEUROLOGY

## 2017-08-29 PROCEDURE — 36415 COLL VENOUS BLD VENIPUNCTURE: CPT

## 2017-08-29 PROCEDURE — 25010000002 METHYLPREDNISOLONE: Performed by: PSYCHIATRY & NEUROLOGY

## 2017-08-29 PROCEDURE — 85025 COMPLETE CBC W/AUTO DIFF WBC: CPT | Performed by: PSYCHIATRY & NEUROLOGY

## 2017-08-29 PROCEDURE — 96365 THER/PROPH/DIAG IV INF INIT: CPT

## 2017-08-29 RX ORDER — SODIUM CHLORIDE 9 MG/ML
250 INJECTION, SOLUTION INTRAVENOUS ONCE
Status: CANCELLED | OUTPATIENT
Start: 2017-08-30

## 2017-08-29 RX ORDER — SODIUM CHLORIDE 9 MG/ML
250 INJECTION, SOLUTION INTRAVENOUS ONCE
Status: COMPLETED | OUTPATIENT
Start: 2017-08-29 | End: 2017-08-29

## 2017-08-29 RX ADMIN — METHYLPREDNISOLONE SODIUM SUCCINATE 1 G: 1 INJECTION, POWDER, FOR SOLUTION INTRAMUSCULAR; INTRAVENOUS at 13:07

## 2017-08-29 RX ADMIN — SODIUM CHLORIDE 250 ML: 9 INJECTION, SOLUTION INTRAVENOUS at 13:07

## 2017-08-30 ENCOUNTER — HOSPITAL ENCOUNTER (OUTPATIENT)
Dept: INFUSION THERAPY | Facility: HOSPITAL | Age: 52
Setting detail: INFUSION SERIES
Discharge: HOME | End: 2017-08-30

## 2017-08-30 VITALS
OXYGEN SATURATION: 97 % | TEMPERATURE: 97.6 F | HEART RATE: 75 BPM | SYSTOLIC BLOOD PRESSURE: 137 MMHG | RESPIRATION RATE: 18 BRPM | DIASTOLIC BLOOD PRESSURE: 82 MMHG

## 2017-08-30 DIAGNOSIS — G35 MULTIPLE SCLEROSIS (HCC): ICD-10-CM

## 2017-08-30 PROCEDURE — 25010000002 METHYLPREDNISOLONE: Performed by: PSYCHIATRY & NEUROLOGY

## 2017-08-30 PROCEDURE — 96365 THER/PROPH/DIAG IV INF INIT: CPT

## 2017-08-30 RX ORDER — SODIUM CHLORIDE 9 MG/ML
250 INJECTION, SOLUTION INTRAVENOUS ONCE
Status: COMPLETED | OUTPATIENT
Start: 2017-08-30 | End: 2017-08-30

## 2017-08-30 RX ORDER — SODIUM CHLORIDE 9 MG/ML
250 INJECTION, SOLUTION INTRAVENOUS ONCE
Status: CANCELLED | OUTPATIENT
Start: 2017-08-30

## 2017-08-30 RX ADMIN — SODIUM CHLORIDE 250 ML: 900 INJECTION, SOLUTION INTRAVENOUS at 08:51

## 2017-08-30 RX ADMIN — METHYLPREDNISOLONE SODIUM SUCCINATE 1 G: 1 INJECTION, POWDER, FOR SOLUTION INTRAMUSCULAR; INTRAVENOUS at 08:51

## 2017-08-31 ENCOUNTER — HOSPITAL ENCOUNTER (OUTPATIENT)
Dept: INFUSION THERAPY | Facility: HOSPITAL | Age: 52
Setting detail: INFUSION SERIES
Discharge: HOME | End: 2017-08-31

## 2017-08-31 VITALS
HEART RATE: 75 BPM | SYSTOLIC BLOOD PRESSURE: 129 MMHG | RESPIRATION RATE: 18 BRPM | OXYGEN SATURATION: 95 % | DIASTOLIC BLOOD PRESSURE: 80 MMHG | TEMPERATURE: 97.7 F

## 2017-08-31 DIAGNOSIS — G35 MULTIPLE SCLEROSIS (HCC): ICD-10-CM

## 2017-08-31 PROCEDURE — 25010000002 METHYLPREDNISOLONE: Performed by: PSYCHIATRY & NEUROLOGY

## 2017-08-31 PROCEDURE — 96365 THER/PROPH/DIAG IV INF INIT: CPT

## 2017-08-31 RX ORDER — SODIUM CHLORIDE 9 MG/ML
250 INJECTION, SOLUTION INTRAVENOUS ONCE
Status: COMPLETED | OUTPATIENT
Start: 2017-08-31 | End: 2017-08-31

## 2017-08-31 RX ORDER — SODIUM CHLORIDE 9 MG/ML
250 INJECTION, SOLUTION INTRAVENOUS ONCE
Status: CANCELLED | OUTPATIENT
Start: 2017-08-31

## 2017-08-31 RX ADMIN — SODIUM CHLORIDE 250 ML: 900 INJECTION, SOLUTION INTRAVENOUS at 08:46

## 2017-08-31 RX ADMIN — METHYLPREDNISOLONE SODIUM SUCCINATE 1 G: 1 INJECTION, POWDER, FOR SOLUTION INTRAMUSCULAR; INTRAVENOUS at 08:47

## 2017-09-01 ENCOUNTER — APPOINTMENT (OUTPATIENT)
Dept: INFUSION THERAPY | Facility: HOSPITAL | Age: 52
End: 2017-09-01

## 2017-09-02 NOTE — PROGRESS NOTES
[facility] NEUROLOGY [place] PROGRESS NOTE  History of Present Illness     Date:   Patient Identification  Vinnie Whitley III is a 52 y.o. male.    Patient information was obtained from patient.  History/Exam limitations: none.    Original consultation requested by: Clifford Kirkland D.O.      Chief Complaint   Multiple Sclerosis (Pt c/o worsening sx. Pt states he woke up Saturday with numbness digits in left hand, today in Left side of face )      History of Present Illness   Patient is a pleasant 52-year-old gentleman with the diagnosis of Multiple Sclerosis.  Patient presented to the office at [facility] on August 29, 2017 for worsening of left-sided weakness involving face arm and leg over the last 3 days.  Patient denies any dysuria.  Denies any fever or chill shortness of breath cough or any signs of infection.  Patient has been treated with Gilenya for relapsing remitting multiple sclerosis.    PMH:   Past Medical History:   Diagnosis Date   • Abdominal pain     H/o   • Bilateral flank pain    • Chest pain     H/o   • Collapse of both lungs    • Constipation     H/o   • Diarrhea     H/o   • Elevated LFTs    • Encounter for long-term (current) use of medications    • Infection of kidney     H/o   • Kidney stones    • MS (multiple sclerosis)    • Nausea     H/o   • Nephrolithiasis     H/o   • Pneumothorax     H/o   • Thoracolumbar back pain    • Varicella     H/o   • Vomiting     H/o       Past Surgical History:   Past Surgical History:   Procedure Laterality Date   • KIDNEY STONE SURGERY  2005    Lithotomy   • LUNG SURGERY         Family Hisotry:   Family History   Problem Relation Age of Onset   • Dementia Mother    • Liver disease Mother      chronic   • Liver disease Other      Chronic   • Liver disease Other      Chronic   • Kidney disease Other      Renal disorder       Social History:   Social History     Social History   • Marital status:      Spouse name: N/A   • Number  of children: N/A   • Years of education: N/A     Occupational History   • Not on file.     Social History Main Topics   • Smoking status: Former Smoker     Packs/day: 1.00     Types: Cigarettes     Quit date: 1/1/2001   • Smokeless tobacco: Never Used   • Alcohol use No   • Drug use: No   • Sexual activity: Not on file     Other Topics Concern   • Not on file     Social History Narrative       Medications:   Current Outpatient Prescriptions   Medication Sig Dispense Refill   • citalopram (CeleXA) 20 MG tablet Take  by mouth Daily.     • fingolimod (GILENYA) 0.5 MG capsule Take 0.5 mg by mouth Daily.     • naproxen (NAPROSYN) 500 MG tablet Take 500 mg by mouth 2 (Two) Times a Day With Meals.     • TiZANidine (ZANAFLEX) 4 MG capsule Take  by mouth.       No current facility-administered medications for this visit.        Allergy: No Known Allergies    Review of Systems:  Review of Systems   Constitutional: Positive for fatigue. Negative for chills and fever.   HENT: Negative for congestion, ear pain, hearing loss, rhinorrhea and sore throat.    Eyes: Negative for pain, discharge and redness.   Respiratory: Negative for cough, shortness of breath, wheezing and stridor.    Cardiovascular: Negative for chest pain, palpitations and leg swelling.   Gastrointestinal: Negative for abdominal pain, constipation, nausea and vomiting.   Endocrine: Negative for cold intolerance, heat intolerance and polyphagia.   Genitourinary: Negative for dysuria, flank pain, frequency and urgency.   Musculoskeletal: Negative for joint swelling, myalgias, neck pain and neck stiffness.   Skin: Negative for pallor, rash and wound.   Allergic/Immunologic: Negative for environmental allergies.   Neurological: Positive for weakness and numbness. Negative for dizziness, tremors, seizures, syncope, facial asymmetry, speech difficulty, light-headedness and headaches.   Hematological: Negative for adenopathy.   Psychiatric/Behavioral: Positive for sleep  "disturbance. Negative for confusion and hallucinations. The patient is not nervous/anxious.        Physical Exam     Vitals:    08/29/17 1011   BP: 134/82   Pulse: 74   SpO2: 98%   Weight: 192 lb 3.2 oz (87.2 kg)   Height: 72\" (182.9 cm)     GENERAL: Patient is pleasant, cooperative, appears to be stated age.  Body habitus is endomorphic.  SKIN AND EXTREMITIES:  No skin rashes or lesions are noted.  No cyanosis, clubbing or edema of the extremities.    HEAD:  Head is normocephalic and atraumatic.    NECK: Neck are non-tender without thyromegaly or adenopathy.  Carotic upstrokes are 1+/4.  No cranial or cervical bruits.  The neck is supple with a full range of motion.   ENT: palate elevate symmetrically, no evidence of high arch palate, tongue midline erythema in posterior pharynx, Mallampati Classification Class III   CARDIOVASCULAR:  Regular rate and rhythm with normal S1 and S2 without rub or gallop.  RESPIRATORY:  Clear to auscultation without wheezes or crackle   ABDOMEN:  Soft and non-tender, positive bowel sound without hepatosplenomegaly  BACK:  Back is straight without midline defect.    PSYCH:  Higher cortical function/mental status:  The patient is alert.  She is oriented x3 to time, place and person.  Recent and the remote memory appear normal.  The patient has a good fund of knowledge.  There is no visual or auditory hallucination or suicidal or homicidal ideation.  SPEECH:There is no gross evidence of aphasia, dysarthria or agnosia.      CRANIAL NERVES:  Pupils are 4mm, equal round reactive to light, reacting briskly to 2mm without afferent pupillary defect.  Visual fields are intact to confrontation testing.  Fundoscopic examination reveals sharp disk margins with normal vasculature.  No papilledema, hemorrhages or exudates.  Extraocular movements are full and smooth with normal pursuits and saccades.  No nystagmus noted.  Facial weakness involving the upper and lower face on the left. palate elevate " symmetrically, Tongue midline, positive gag reflex. The remainder of the cranial nerves are intact and symmetrical.    MOTOR: 4-5 strength in the left upper and lower extremities Deep Tendon Reflexes: are 2/4 and symmetrical in the upper extremities, 2/4 and symmetrical at the knees and 1/4 and symmetrical at the Achilles tendon.  Plantar responses were down-going bilaterally.    SENSATION: Decreased temperature sensation in the left face arms and leg   Coordination:  The patient normally performs finger-nose-finger, heel-to-knee-to-shin and rapid alternating movements in symmetrical fashion.    COORDINATION AND GAIT:  The patient walks with a narrow-based gait.  Patient is able to heel-toe and tandem walk forward and backwards without difficulty.  Romberg and monopedal  Romberg are negative.    MUSCULOSKELETAL: Range of motion normal, no clubbing, cyanosis, or edema.  No joint swelling.              Records Reviewed: I have personally reviewed his previous medical record.    Vinnie was seen today for multiple sclerosis.    Diagnoses and all orders for this visit:    MS (multiple sclerosis)  -     CBC & Differential; Future  -     Urinalysis; Future  -     MRI Brain With & Without Contrast; Future    Multiple sclerosis  -     Cancel: sodium chloride 0.9 % infusion 250 mL; Infuse 250 mL into a venous catheter 1 (One) Time.  -     Cancel: methylPREDNISolone sodium succinate (SOLU-Medrol) 1,000 mg in sodium chloride 0.9 % 100 mL IVPB; Infuse 1,000 mg into a venous catheter 1 (One) Time.        Treatments:  1.  MS exacerbation I would like to schedule this patient to have IV Solu-Medrol treatment as an outpatient infusion every day for 5 days  2.  We'll check UA CBC an MRI of the brain with and without contrast to evaluate for MS exacerbation versus pseudo-exacerbation secondary to infection  3.  Counseled patient on multiple sclerosis as follow  I have  extensively about the current understanding of the  pathophysiology of Multiple Sclerosis.  I have explained to patient that Multiple Sclerosis is common among young individuals.  It is thought to be due to T-cell entering into the brain through adhesion molecules along the blood brain barrier. T helper cell 1 giving rise to inflammatory response and subsequent demyelinating process taken place in subcortical white matter region resulting in Multiple Sclerosis symptoms.  The management of multiple sclerosis (MS) has been substantially advanced by the availability of the disease-modifying agents glatiramer acetate and interferon beta 1a and 1b, mitoxantrone, and natalizumab. A number of positive outcomes have been demonstrated in people with relapsing disease: reduction in the frequency of relapses ,reduction of brain lesion development, as evidenced by magnetic resonance  imaging (MRI) and the possible reduction of disability progression in patients using  Betaseron; Avonex; Copaxone; Rebif; Novantrone; and Tysabri.     Recently, oral agents have been approved by FDA for the management of relapsing remitting MS, including Gilenya, Aubagio and Tacfidera.  I have explained to patient extensively on the efficacy and side effect profile of each treatment options and patient has expressed understanding.    This Document is signed by Alirio Jerez MD, FAAN, FAASM

## 2017-09-08 ENCOUNTER — HOSPITAL ENCOUNTER (OUTPATIENT)
Dept: MRI IMAGING | Facility: HOSPITAL | Age: 52
Discharge: HOME | End: 2017-09-08
Attending: PSYCHIATRY & NEUROLOGY | Admitting: PSYCHIATRY & NEUROLOGY

## 2017-09-08 DIAGNOSIS — G35 MS (MULTIPLE SCLEROSIS) (HCC): ICD-10-CM

## 2017-09-08 PROCEDURE — A9577 INJ MULTIHANCE: HCPCS | Performed by: PSYCHIATRY & NEUROLOGY

## 2017-09-08 PROCEDURE — 70553 MRI BRAIN STEM W/O & W/DYE: CPT

## 2017-09-08 PROCEDURE — 0 GADOBENATE DIMEGLUMINE 529 MG/ML SOLUTION: Performed by: PSYCHIATRY & NEUROLOGY

## 2017-09-08 RX ADMIN — GADOBENATE DIMEGLUMINE 3 ML: 529 INJECTION, SOLUTION INTRAVENOUS at 13:45

## 2017-09-08 RX ADMIN — GADOBENATE DIMEGLUMINE 15 ML: 529 INJECTION, SOLUTION INTRAVENOUS at 13:45

## 2017-09-14 ENCOUNTER — OFFICE VISIT (OUTPATIENT)
Dept: NEUROLOGY | Facility: CLINIC | Age: 52
End: 2017-09-14

## 2017-09-14 VITALS
OXYGEN SATURATION: 98 % | DIASTOLIC BLOOD PRESSURE: 80 MMHG | SYSTOLIC BLOOD PRESSURE: 136 MMHG | WEIGHT: 192 LBS | HEIGHT: 72 IN | BODY MASS INDEX: 26.01 KG/M2 | HEART RATE: 90 BPM

## 2017-09-14 DIAGNOSIS — G35 MULTIPLE SCLEROSIS (HCC): Primary | ICD-10-CM

## 2017-09-14 DIAGNOSIS — G51.0 BELL PALSY: ICD-10-CM

## 2017-09-14 PROCEDURE — 99213 OFFICE O/P EST LOW 20 MIN: CPT | Performed by: PSYCHIATRY & NEUROLOGY

## 2017-09-14 RX ORDER — PRAMIPEXOLE DIHYDROCHLORIDE 0.5 MG/1
0.5 TABLET ORAL NIGHTLY
COMMUNITY
End: 2017-12-29 | Stop reason: SDUPTHER

## 2017-09-14 RX ORDER — MINERAL OIL AND WHITE PETROLATUM 150; 830 MG/G; MG/G
OINTMENT OPHTHALMIC SEE ADMIN INSTRUCTIONS
Qty: 1 TUBE | Refills: 3 | Status: SHIPPED | OUTPATIENT
Start: 2017-09-14 | End: 2018-03-12

## 2017-09-15 NOTE — PROGRESS NOTES
"[facility] NEUROLOGY [place] PROGRESS NOTE  History of Present Illness     Date: 9/15/2017    Patient Identification  Vinnie Whitley III is a 52 y.o. male.    Patient information was obtained from patient.  History/Exam limitations: none.    Original consultation requested by: Dr. Kirkland      Chief Complaint   Multiple Sclerosis (Pt states sx seemed to be improving while taking steroids, completed infusion therapy. Now that pt is no longer taking them sx are \"about the same\". ) and Results (Pt in office today to review results of MRI)      History of Present Illness   Patient is a delightful 52-year-old gentleman with been followed in neurology clinic for multiple sclerosis interval history since last visit patient underwent MRI of the brain with contrast.  MRI of the brain which show periventricular white matter lucency and there is no contrast enhancement to suggest acute lesion.  Patient continue to have left facial weakness involving upper and lower face patient ambulates with a spastic gait.    PMH:   Past Medical History:   Diagnosis Date   • Abdominal pain     H/o   • Bilateral flank pain    • Chest pain     H/o   • Collapse of both lungs    • Constipation     H/o   • Diarrhea     H/o   • Elevated LFTs    • Encounter for long-term (current) use of medications    • Infection of kidney     H/o   • Kidney stones    • MS (multiple sclerosis)    • Nausea     H/o   • Nephrolithiasis     H/o   • Pneumothorax     H/o   • Thoracolumbar back pain    • Varicella     H/o   • Vomiting     H/o       Past Surgical History:   Past Surgical History:   Procedure Laterality Date   • KIDNEY STONE SURGERY  2005    Lithotomy   • LUNG SURGERY         Family Hisotry:   Family History   Problem Relation Age of Onset   • Dementia Mother    • Liver disease Mother      chronic   • Liver disease Other      Chronic   • Liver disease Other      Chronic   • Kidney disease Other      Renal disorder       Social History:   Social History "     Social History   • Marital status:      Spouse name: N/A   • Number of children: N/A   • Years of education: N/A     Occupational History   • Not on file.     Social History Main Topics   • Smoking status: Former Smoker     Packs/day: 1.00     Types: Cigarettes     Quit date: 1/1/2001   • Smokeless tobacco: Never Used   • Alcohol use No   • Drug use: No   • Sexual activity: Not on file     Other Topics Concern   • Not on file     Social History Narrative       Medications:   Current Outpatient Prescriptions   Medication Sig Dispense Refill   • citalopram (CeleXA) 20 MG tablet Take  by mouth Daily.     • fingolimod (GILENYA) 0.5 MG capsule Take 0.5 mg by mouth Daily.     • naproxen (NAPROSYN) 500 MG tablet Take 500 mg by mouth 2 (Two) Times a Day With Meals.     • pramipexole (MIRAPEX) 0.5 MG tablet Take 0.5 mg by mouth Every Night.     • TiZANidine (ZANAFLEX) 4 MG capsule Take  by mouth.     • artificial tears (LUBRIFRESH P.M.) ointment ophthalmic ointment Administer  into the left eye See Admin Instructions. Apply to left eye QHS 1 tube 3     No current facility-administered medications for this visit.        Allergy: No Known Allergies    Review of Systems:  Review of Systems   Constitutional: Negative for chills and fever.   HENT: Negative for congestion, ear pain, hearing loss, rhinorrhea and sore throat.    Eyes: Negative for pain, discharge and redness.   Respiratory: Negative for cough, shortness of breath, wheezing and stridor.    Cardiovascular: Negative for chest pain, palpitations and leg swelling.   Gastrointestinal: Negative for abdominal pain, constipation, nausea and vomiting.   Endocrine: Negative for cold intolerance, heat intolerance and polyphagia.   Genitourinary: Negative for dysuria, flank pain, frequency and urgency.   Musculoskeletal: Positive for gait problem. Negative for joint swelling, myalgias, neck pain and neck stiffness.   Skin: Negative for pallor, rash and wound.  "  Allergic/Immunologic: Negative for environmental allergies.   Neurological: Positive for facial asymmetry, weakness and numbness. Negative for dizziness, tremors, seizures, syncope, speech difficulty, light-headedness and headaches.   Hematological: Negative for adenopathy.   Psychiatric/Behavioral: Negative for confusion and hallucinations. The patient is not nervous/anxious.        Physical Exam     Vitals:    09/14/17 1202   BP: 136/80   Pulse: 90   SpO2: 98%   Weight: 192 lb (87.1 kg)   Height: 72\" (182.9 cm)     GENERAL: Patient is pleasant, cooperative, appears to be stated age.  Body habitus is endomorphic.  SKIN AND EXTREMITIES:  No skin rashes or lesions are noted.  No cyanosis, clubbing or edema of the extremities.    HEAD:  Head is normocephalic and atraumatic.    NECK: Neck are non-tender without thyromegaly or adenopathy.  Carotic upstrokes are 1+/4.  No cranial or cervical bruits.  The neck is supple with a full range of motion.   ENT: palate elevate symmetrically, no evidence of high arch palate, tongue midline erythema in posterior pharynx, Mallampati Classification Class III   CARDIOVASCULAR:  Regular rate and rhythm with normal S1 and S2 without rub or gallop.  RESPIRATORY:  Clear to auscultation without wheezes or crackle   ABDOMEN:  Soft and non-tender, positive bowel sound without hepatosplenomegaly  BACK:  Back is straight without midline defect.    PSYCH:  Higher cortical function/mental status:  The patient is alert.  She is oriented x3 to time, place and person.  Recent and the remote memory appear normal.  The patient has a good fund of knowledge.  There is no visual or auditory hallucination or suicidal or homicidal ideation.  SPEECH:There is no gross evidence of aphasia, dysarthria or agnosia.      CRANIAL NERVES:  Pupils are 4mm, equal round reactive to light, reacting briskly to 2mm without afferent pupillary defect.  Visual fields are intact to confrontation testing.  Fundoscopic " examination reveals sharp disk margins with normal vasculature.  No papilledema, hemorrhages or exudates.  Extraocular movements are full and smooth with normal pursuits and saccades.  No nystagmus noted.  The face left facial weakness involving upper and lower face. palate elevate symmetrically, Tongue midline, positive gag reflex. The remainder of the cranial nerves are intact and symmetrical.    MOTOR: Strength is 5/5 throughout with normal tone and bulk with the following exceptions, 4/5 intrinsic muscles of the hands and feet.  No involuntary movements noted.    Deep Tendon Reflexes: are 2/4 and symmetrical in the upper extremities, 2/4 and symmetrical at the knees and 1/4 and symmetrical at the Achilles tendon.  Plantar responses were down-going bilaterally.    SENSATION:  Intact to pinprick, light touch, vibration and proprioception.  Coordination:  The patient normally performs finger-nose-finger, heel-to-knee-to-shin and rapid alternating movements in symmetrical fashion.    COORDINATION AND GAIT:  Ambulate with a spastic gait on the left    MUSCULOSKELETAL: Range of motion normal, no clubbing, cyanosis, or edema.  No joint swelling.            Studies: I have personally reviewed the following and discussed with the patient.  Results for orders placed or performed in visit on 08/29/17   Urinalysis   Result Value Ref Range    Color, UA Yellow Yellow, Straw    Appearance, UA Clear Clear    pH, UA 5.5 5.0 - 8.0    Specific Gravity, UA >=1.030 1.005 - 1.030    Glucose, UA Negative Negative    Ketones, UA Negative Negative    Bilirubin, UA Negative Negative    Blood, UA Negative Negative    Protein, UA Negative Negative    Leuk Esterase, UA Negative Negative    Nitrite, UA Negative Negative    Urobilinogen, UA 0.2 E.U./dL 0.2 - 1.0 E.U./dL       Review of Imaging: I have personally reviewed the following images and discussed with the patient.  MRI of the brain showed subcortical white matter lesion but there is  no contrast enhancement noted    Records Reviewed: I have personally reviewed his previous medical record.    Vinnie was seen today for multiple sclerosis and results.    Diagnoses and all orders for this visit:    Multiple sclerosis    Bell palsy    Other orders  -     artificial tears (LUBRIFRESH P.M.) ointment ophthalmic ointment; Administer  into the left eye See Admin Instructions. Apply to left eye QHS      Treatments:  1.  Provide this patient with the artificial tear  2.  We'll provide this patient with Lacri-Lube ointment to protect his cornea  3.  Continue patient on Gilenyia    This Document is signed by Alirio Jerez MD, FAAN, FAASM   September 15, [id number] AM

## 2017-10-03 ENCOUNTER — OFFICE VISIT (OUTPATIENT)
Dept: NEUROLOGY | Facility: CLINIC | Age: 52
End: 2017-10-03

## 2017-10-03 VITALS
SYSTOLIC BLOOD PRESSURE: 128 MMHG | BODY MASS INDEX: 26.01 KG/M2 | OXYGEN SATURATION: 98 % | HEART RATE: 79 BPM | WEIGHT: 192 LBS | DIASTOLIC BLOOD PRESSURE: 88 MMHG | HEIGHT: 72 IN

## 2017-10-03 DIAGNOSIS — G35 MULTIPLE SCLEROSIS (HCC): Primary | ICD-10-CM

## 2017-10-03 PROCEDURE — 99213 OFFICE O/P EST LOW 20 MIN: CPT | Performed by: PSYCHIATRY & NEUROLOGY

## 2017-10-03 RX ORDER — METHYLPREDNISOLONE 4 MG/1
TABLET ORAL
Qty: 1 EACH | Refills: 0 | Status: SHIPPED | OUTPATIENT
Start: 2017-10-03 | End: 2018-01-31

## 2017-10-03 RX ORDER — NAPROXEN 500 MG/1
500 TABLET ORAL 2 TIMES DAILY WITH MEALS
Qty: 180 TABLET | Refills: 1 | Status: SHIPPED | OUTPATIENT
Start: 2017-10-03 | End: 2018-02-26 | Stop reason: SDUPTHER

## 2017-10-04 NOTE — PROGRESS NOTES
[facility] NEUROLOGY [place] PROGRESS NOTE  History of Present Illness     Date: 10/3/2017    Patient Identification  Vinnie Whitley III is a 52 y.o. male.    Patient information was obtained from patient and spouse/SO.  History/Exam limitations: none.    Original consultation requested by: Clifford Kirkland DO      Chief Complaint   Multiple Sclerosis (Pt in office today for 1 month follow up, discuss returning to work ) and Fatigue (Pt c/o fatigue )      History of Present Illness   Patient is a pleasant 52-year-old gentleman referred to [facility] neurology for evaluation of multiple sclerosis (MS).   Patient suffered from Bell palsy.  Patient is complaining of clinically being fatigued from his multiple sclerosis.   We have review MRI of the brain today there is no evidence of acute exacerbation I have explained to the family about pseudo-exacerbation from infection.  They have expressed understanding    PMH:   Past Medical History:   Diagnosis Date   • Abdominal pain     H/o   • Bilateral flank pain    • Chest pain     H/o   • Collapse of both lungs    • Constipation     H/o   • Diarrhea     H/o   • Elevated LFTs    • Encounter for long-term (current) use of medications    • Infection of kidney     H/o   • Kidney stones    • MS (multiple sclerosis)    • Nausea     H/o   • Nephrolithiasis     H/o   • Pneumothorax     H/o   • Thoracolumbar back pain    • Varicella     H/o   • Vomiting     H/o       Past Surgical History:   Past Surgical History:   Procedure Laterality Date   • KIDNEY STONE SURGERY  2005    Lithotomy   • LUNG SURGERY         Family Hisotry:   Family History   Problem Relation Age of Onset   • Dementia Mother    • Liver disease Mother      chronic   • Liver disease Other      Chronic   • Liver disease Other      Chronic   • Kidney disease Other      Renal disorder       Social History:   Social History     Social History   • Marital status:      Spouse name: N/A   • Number of  children: N/A   • Years of education: N/A     Occupational History   • Not on file.     Social History Main Topics   • Smoking status: Former Smoker     Packs/day: 1.00     Types: Cigarettes     Quit date: 1/1/2001   • Smokeless tobacco: Never Used   • Alcohol use No   • Drug use: No   • Sexual activity: Not on file     Other Topics Concern   • Not on file     Social History Narrative       Medications:   Current Outpatient Prescriptions   Medication Sig Dispense Refill   • artificial tears (LUBRIFRESH P.M.) ointment ophthalmic ointment Administer  into the left eye See Admin Instructions. Apply to left eye QHS 1 tube 3   • citalopram (CeleXA) 20 MG tablet Take  by mouth Daily.     • fingolimod (GILENYA) 0.5 MG capsule Take 0.5 mg by mouth Daily.     • naproxen (NAPROSYN) 500 MG tablet Take 500 mg by mouth 2 (Two) Times a Day With Meals.     • pramipexole (MIRAPEX) 0.5 MG tablet Take 0.5 mg by mouth Every Night.     • TiZANidine (ZANAFLEX) 4 MG capsule Take  by mouth.     • MethylPREDNISolone (MEDROL, BAYRON,) 4 MG tablet Take as directed on package instructions. 1 each 0   • naproxen (EC NAPROSYN) 500 MG EC tablet Take 1 tablet by mouth 2 (Two) Times a Day With Meals. 180 tablet 1     No current facility-administered medications for this visit.        Allergy: No Known Allergies    Review of Systems:  Review of Systems   Constitutional: Positive for fatigue. Negative for chills and fever.   HENT: Negative for congestion, ear pain, hearing loss, rhinorrhea and sore throat.    Eyes: Negative for pain, discharge and redness.   Respiratory: Negative for cough, shortness of breath, wheezing and stridor.    Cardiovascular: Negative for chest pain, palpitations and leg swelling.   Gastrointestinal: Negative for abdominal pain, constipation, nausea and vomiting.   Endocrine: Negative for cold intolerance, heat intolerance and polyphagia.   Genitourinary: Negative for dysuria, flank pain, frequency and urgency.  "  Musculoskeletal: Positive for gait problem. Negative for joint swelling, myalgias, neck pain and neck stiffness.   Skin: Negative for pallor, rash and wound.   Allergic/Immunologic: Negative for environmental allergies.   Neurological: Positive for facial asymmetry. Negative for dizziness, tremors, seizures, syncope, speech difficulty, weakness, light-headedness, numbness and headaches.   Hematological: Negative for adenopathy.   Psychiatric/Behavioral: Negative for confusion and hallucinations. The patient is not nervous/anxious.        Physical Exam     Vitals:    10/03/17 1026 10/03/17 1028   BP: 136/86 128/88  Comment: pt wife requesting BP be checked in both arms   Pulse: 79    SpO2: 98%    Weight: 192 lb (87.1 kg)    Height: 72\" (182.9 cm)      GENERAL: Patient is pleasant, cooperative, appears to be stated age.  Body habitus is endomorphic.  SKIN AND EXTREMITIES:  No skin rashes or lesions are noted.  No cyanosis, clubbing or edema of the extremities.    HEAD:  Head is normocephalic and atraumatic.    NECK: Neck are non-tender without thyromegaly or adenopathy.  Carotic upstrokes are 1+/4.  No cranial or cervical bruits.  The neck is supple with a full range of motion.   ENT: palate elevate symmetrically, no evidence of high arch palate, tongue midline erythema in posterior pharynx, Mallampati Classification Class III   CARDIOVASCULAR:  Regular rate and rhythm with normal S1 and S2 without rub or gallop.  RESPIRATORY:  Clear to auscultation without wheezes or crackle   ABDOMEN:  Soft and non-tender, positive bowel sound without hepatosplenomegaly  BACK:  Back is straight without midline defect.    PSYCH:  Higher cortical function/mental status:  The patient is alert.  She is oriented x3 to time, place and person.  Recent and the remote memory appear normal.  The patient has a good fund of knowledge.  There is no visual or auditory hallucination or suicidal or homicidal ideation.  SPEECH:There is no gross " evidence of aphasia, dysarthria or agnosia.      CRANIAL NERVES:  Pupils are 4mm, equal round reactive to light, reacting briskly to 2mm without afferent pupillary defect.  Visual fields are intact to confrontation testing.  Fundoscopic examination reveals sharp disk margins with normal vasculature.  No papilledema, hemorrhages or exudates.  Extraocular movements are full and smooth with normal pursuits and saccades.  No nystagmus noted.  The left face is asymmetric. palate elevate symmetrically, Tongue midline, positive gag reflex. The remainder of the cranial nerves are intact and symmetrical.    MOTOR: Strength is 5/5 throughout with normal tone and bulk with the following exceptions, 4/5 intrinsic muscles of the hands and feet.  No involuntary movements noted.    Deep Tendon Reflexes: are 2/4 and symmetrical in the upper extremities, 2/4 and symmetrical at the knees and 1/4 and symmetrical at the Achilles tendon.  Plantar responses were down-going bilaterally.    SENSATION:  Intact to pinprick, light touch, vibration and proprioception.  Coordination:  The patient normally performs finger-nose-finger, heel-to-knee-to-shin and rapid alternating movements in symmetrical fashion.    COORDINATION AND GAIT:  Ambulates with a spastic gait  MUSCULOSKELETAL: Range of motion normal, no clubbing, cyanosis, or edema.  No joint swelling.            Review of Imaging: I have personally reviewed the following images and discussed with the patient.  MRI of the brain with contrast showed no evidence of acute lesion    Records Reviewed: I have personally reviewed his previous medical record.    Vinnie was seen today for multiple sclerosis and fatigue.    Diagnoses and all orders for this visit:    Multiple sclerosis    Other orders  -     MethylPREDNISolone (MEDROL, BAYRON,) 4 MG tablet; Take as directed on package instructions.  -     naproxen (EC NAPROSYN) 500 MG EC tablet; Take 1 tablet by mouth 2 (Two) Times a Day With  Meals.        Treatments:  1.  We'll provide is patient with a Medrol Dosepak  2.  Provided patient with Naprosyn to use on a when necessary basis for headache  3.  Counseled patient extensively on multiple sclerosis as follow  I have  extensively about the current understanding of the pathophysiology of Multiple Sclerosis.  I have explained to patient that Multiple Sclerosis is common among young individuals.  It is thought to be due to T-cell entering into the brain through adhesion molecules along the blood brain barrier. T helper cell 1 giving rise to inflammatory response and subsequent demyelinating process taken place in subcortical white matter region resulting in Multiple Sclerosis symptoms.  The management of multiple sclerosis (MS) has been substantially advanced by the availability of the disease-modifying agents glatiramer acetate and interferon beta 1a and 1b, mitoxantrone, and natalizumab. A number of positive outcomes have been demonstrated in people with relapsing disease: reduction in the frequency of relapses ,reduction of brain lesion development, as evidenced by magnetic resonance  imaging (MRI) and the possible reduction of disability progression in patients using  Betaseron; Avonex; Copaxone; Rebif; Novantrone; and Tysabri.     Recently, oral agents have been approved by FDA for the management of relapsing remitting MS, including Gilenya, Aubagio and Tacfidera.  I have explained to patient extensively on the efficacy and side effect profile of each treatment options and patient has expressed understanding.      This Document is signed by Alirio Jerez MD, FAAN, FAASM   October 3, 23977:57 PM

## 2017-10-17 ENCOUNTER — OFFICE VISIT (OUTPATIENT)
Dept: NEUROLOGY | Facility: CLINIC | Age: 52
End: 2017-10-17

## 2017-10-17 VITALS
OXYGEN SATURATION: 99 % | HEIGHT: 72 IN | WEIGHT: 192 LBS | HEART RATE: 70 BPM | BODY MASS INDEX: 26.01 KG/M2 | DIASTOLIC BLOOD PRESSURE: 82 MMHG | SYSTOLIC BLOOD PRESSURE: 136 MMHG

## 2017-10-17 DIAGNOSIS — G35 MULTIPLE SCLEROSIS (HCC): ICD-10-CM

## 2017-10-17 DIAGNOSIS — F48.2 PBA (PSEUDOBULBAR AFFECT): Primary | ICD-10-CM

## 2017-10-17 DIAGNOSIS — R26.9 GAIT DIFFICULTY: ICD-10-CM

## 2017-10-17 DIAGNOSIS — R25.2 SPASTICITY: ICD-10-CM

## 2017-10-17 PROCEDURE — 99214 OFFICE O/P EST MOD 30 MIN: CPT | Performed by: PSYCHIATRY & NEUROLOGY

## 2017-10-17 NOTE — PROGRESS NOTES
"[facility] NEUROLOGY [place] PROGRESS NOTE  History of Present Illness     Date: 10/17/2017    Patient Identification  Vinnie Whitley III is a 52 y.o. male.    Patient information was obtained from patient.  History/Exam limitations: none.    Original consultation requested by: Clifford Kirkland      Chief Complaint   Multiple Sclerosis (Pt in office for follow up. Pt states he attempted to try to return to work recently, pt was told by physician at work that he was not ready to return. Pt states he got \"emotional\" when he was told that and work suggested pt be seen by therapist. Pt appt was today, therapist thinks pt reaction was in relation to lesion in brain from past )      History of Present Illness   Patient is a pleasant 52-year-old gentleman with a diagnosis of multiple sclerosis.  He has attempted to return to work recently but his physician at work felt that patient is not ready to return.  Even though patient does not feel depressed but he finds himself having difficulty in controlling his \"exaggerated emotion\".  Patient was referred to a psychologist for evaluation and felt that he does not have depression and his \"exaggerated emotion\" could be related to his MS lesion in the limbic system.    PMH:   Past Medical History:   Diagnosis Date   • Abdominal pain     H/o   • Bilateral flank pain    • Chest pain     H/o   • Collapse of both lungs    • Constipation     H/o   • Diarrhea     H/o   • Elevated LFTs    • Encounter for long-term (current) use of medications    • Infection of kidney     H/o   • Kidney stones    • MS (multiple sclerosis)    • Nausea     H/o   • Nephrolithiasis     H/o   • Pneumothorax     H/o   • Thoracolumbar back pain    • Varicella     H/o   • Vomiting     H/o       Past Surgical History:   Past Surgical History:   Procedure Laterality Date   • KIDNEY STONE SURGERY  2005    Lithotomy   • LUNG SURGERY         Family Hisotry:   Family History   Problem Relation Age of Onset   • " Dementia Mother    • Liver disease Mother      chronic   • Liver disease Other      Chronic   • Liver disease Other      Chronic   • Kidney disease Other      Renal disorder       Social History:   Social History     Social History   • Marital status:      Spouse name: N/A   • Number of children: N/A   • Years of education: N/A     Occupational History   • Not on file.     Social History Main Topics   • Smoking status: Former Smoker     Packs/day: 1.00     Types: Cigarettes     Quit date: 1/1/2001   • Smokeless tobacco: Never Used   • Alcohol use No   • Drug use: No   • Sexual activity: Not on file     Other Topics Concern   • Not on file     Social History Narrative       Medications:   Current Outpatient Prescriptions   Medication Sig Dispense Refill   • artificial tears (LUBRIFRESH P.M.) ointment ophthalmic ointment Administer  into the left eye See Admin Instructions. Apply to left eye QHS 1 tube 3   • citalopram (CeleXA) 20 MG tablet Take  by mouth Daily.     • fingolimod (GILENYA) 0.5 MG capsule Take 0.5 mg by mouth Daily.     • MethylPREDNISolone (MEDROL, BAYRON,) 4 MG tablet Take as directed on package instructions. 1 each 0   • naproxen (EC NAPROSYN) 500 MG EC tablet Take 1 tablet by mouth 2 (Two) Times a Day With Meals. 180 tablet 1   • naproxen (NAPROSYN) 500 MG tablet Take 500 mg by mouth 2 (Two) Times a Day With Meals.     • pramipexole (MIRAPEX) 0.5 MG tablet Take 0.5 mg by mouth Every Night.     • TiZANidine (ZANAFLEX) 4 MG capsule Take  by mouth.     • dextromethorphan-quinidine (NUEDEXTA) 20-10 MG capsule capsule Take 1 capsule by mouth Every 12 (Twelve) Hours. 60 capsule 3     No current facility-administered medications for this visit.        Allergy: No Known Allergies    Review of Systems:  Review of Systems   Constitutional: Positive for fatigue. Negative for chills and fever.   HENT: Negative for congestion, ear pain, hearing loss, rhinorrhea and sore throat.    Eyes: Negative for pain,  "discharge and redness.   Respiratory: Negative for cough, shortness of breath, wheezing and stridor.    Cardiovascular: Negative for chest pain, palpitations and leg swelling.   Gastrointestinal: Negative for abdominal pain, constipation, nausea and vomiting.   Endocrine: Negative for cold intolerance, heat intolerance and polyphagia.   Genitourinary: Negative for dysuria, flank pain, frequency and urgency.   Musculoskeletal: Positive for gait problem. Negative for joint swelling, myalgias, neck pain and neck stiffness.   Skin: Negative for pallor, rash and wound.   Allergic/Immunologic: Negative for environmental allergies.   Neurological: Positive for weakness and numbness. Negative for dizziness, tremors, seizures, syncope, facial asymmetry, speech difficulty, light-headedness and headaches.   Hematological: Negative for adenopathy.   Psychiatric/Behavioral: Positive for dysphoric mood. Negative for confusion and hallucinations. The patient is not nervous/anxious.        Physical Exam     Vitals:    10/17/17 1522   BP: 136/82   Pulse: 70   SpO2: 99%   Weight: 192 lb (87.1 kg)   Height: 72\" (182.9 cm)     GENERAL: Patient is pleasant, but easily tear up despite the fact that patient denied having depression,  cooperative, appears to be stated age.  Body habitus is endomorphic.  SKIN AND EXTREMITIES:  No skin rashes or lesions are noted.  No cyanosis, clubbing or edema of the extremities.    HEAD:  Head is normocephalic and atraumatic.    NECK: Neck are non-tender without thyromegaly or adenopathy.  Carotic upstrokes are 1+/4.  No cranial or cervical bruits.  The neck is supple with a full range of motion.   ENT: palate elevate symmetrically, no evidence of high arch palate, tongue midline erythema in posterior pharynx, Mallampati Classification Class III   CARDIOVASCULAR:  Regular rate and rhythm with normal S1 and S2 without rub or gallop.  RESPIRATORY:  Clear to auscultation without wheezes or crackle   ABDOMEN: "  Soft and non-tender, positive bowel sound without hepatosplenomegaly  BACK:  Back is straight without midline defect.    PSYCH:  Higher cortical function/mental status:  The patient is alert.  She is oriented x3 to time, place and person.  Recent and the remote memory appear normal.  The patient has a good fund of knowledge.  There is no visual or auditory hallucination or suicidal or homicidal ideation.  SPEECH:There is no gross evidence of aphasia, dysarthria or agnosia.      CRANIAL NERVES:  Pupils are 4mm, equal round reactive to light, reacting briskly to 2mm without afferent pupillary defect.  Visual fields are intact to confrontation testing.  Fundoscopic examination reveals sharp disk margins with normal vasculature.  No papilledema, hemorrhages or exudates.  Extraocular movements are full and smooth with normal pursuits and saccades.  No nystagmus noted.  Left facial droop involving upper and lower face. palate elevate symmetrically, Tongue midline, positive gag reflex. The remainder of the cranial nerves are intact and symmetrical.    MOTOR: Pronation on Barré testing in his left upper extremities and spasticity in his left lower extremities   Deep Tendon Reflexes: are 2/4 and symmetrical in the upper extremities, 2/4 and symmetrical at the knees and 1/4 and symmetrical at the Achilles tendon.  Plantar responses were down-going bilaterally.    SENSATION:  Intact to pinprick, light touch, vibration and proprioception.  Coordination:  The patient normally performs finger-nose-finger, heel-to-knee-to-shin and rapid alternating movements in symmetrical fashion.    COORDINATION AND GAIT:  Patient ambulated with a spastic gait on the left    MUSCULOSKELETAL: Range of motion normal, no clubbing, cyanosis, or edema.  No joint swelling.              Records Reviewed: I have personally reviewed his previous medical record.    Vinnie was seen today for multiple sclerosis.    Diagnoses and all orders for this  visit:    PBA (pseudobulbar affect)    Multiple sclerosis    Gait difficulty    Spasticity    Other orders  -     dextromethorphan-quinidine (NUEDEXTA) 20-10 MG capsule capsule; Take 1 capsule by mouth Every 12 (Twelve) Hours.        Treatments:  1.  Counseled patient extensively on pseudobulbar affect  2.  I would like to started patient on Nuedexta  3.  And I'll schedule patient to follow visit in 3 month  4.  Continue physical therapy for gait training    This Document is signed by Alirio Jerez MD, FAAN, FAASM   October 17, 20176:12 PM

## 2017-11-06 RX ORDER — FINGOLIMOD HYDROCHLORIDE 0.5 MG/1
0.5 CAPSULE ORAL DAILY
Qty: 90 CAPSULE | Refills: 1 | Status: SHIPPED | OUTPATIENT
Start: 2017-11-06 | End: 2017-11-07 | Stop reason: SDUPTHER

## 2017-11-06 NOTE — TELEPHONE ENCOUNTER
Patient wife called stating that Gilenya Prescription is not going to be shipped until our office contacts them .    [phone] Pharmacy Number ( [facility] )     Patient has 1 week left of the medicine.

## 2017-11-07 RX ORDER — FINGOLIMOD HYDROCHLORIDE 0.5 MG/1
0.5 CAPSULE ORAL DAILY
Qty: 90 CAPSULE | Refills: 1 | Status: SHIPPED | OUTPATIENT
Start: 2017-11-07 | End: 2018-01-31 | Stop reason: SDUPTHER

## 2017-11-29 NOTE — TELEPHONE ENCOUNTER
Pt called requesting refill on Zanaflex 4mg. Pt uses [facility] but is out. He would like to have a 2 wk supply called in to [facility] and 90 day sent to [facility].

## 2017-11-30 RX ORDER — TIZANIDINE HYDROCHLORIDE 4 MG/1
4 CAPSULE, GELATIN COATED ORAL 3 TIMES DAILY PRN
Qty: 90 CAPSULE | Refills: 1 | Status: SHIPPED | OUTPATIENT
Start: 2017-11-30 | End: 2018-01-31 | Stop reason: SDUPTHER

## 2017-12-29 RX ORDER — PRAMIPEXOLE DIHYDROCHLORIDE 0.5 MG/1
0.5 TABLET ORAL NIGHTLY
Qty: 90 TABLET | Refills: 2 | Status: SHIPPED | OUTPATIENT
Start: 2017-12-29 | End: 2018-01-03 | Stop reason: SDUPTHER

## 2017-12-29 NOTE — TELEPHONE ENCOUNTER
PT wife called requesting a refill on Mirapex for a 90 day supply.  She also states he is completely out of this medication.     Is is possible to call in a short term amount into [facility] in [place] to hold him until his prescription could be delivered.

## 2018-01-02 RX ORDER — PRAMIPEXOLE DIHYDROCHLORIDE 0.5 MG/1
0.5 TABLET ORAL DAILY
Qty: 14 TABLET | Refills: 0 | Status: SHIPPED | OUTPATIENT
Start: 2018-01-02 | End: 2018-01-03 | Stop reason: SDUPTHER

## 2018-01-03 RX ORDER — CITALOPRAM 20 MG/1
20 TABLET ORAL DAILY
Qty: 90 TABLET | Refills: 0 | Status: SHIPPED | OUTPATIENT
Start: 2018-01-03 | End: 2018-01-11 | Stop reason: SDUPTHER

## 2018-01-03 RX ORDER — PRAMIPEXOLE DIHYDROCHLORIDE 0.5 MG/1
0.5 TABLET ORAL 2 TIMES DAILY
Qty: 90 TABLET | Refills: 2
Start: 2018-01-03 | End: 2018-01-11 | Stop reason: SDUPTHER

## 2018-01-11 RX ORDER — PRAMIPEXOLE DIHYDROCHLORIDE 0.5 MG/1
0.5 TABLET ORAL 2 TIMES DAILY
Qty: 90 TABLET | Refills: 2
Start: 2018-01-11 | End: 2018-01-31 | Stop reason: SDUPTHER

## 2018-01-11 RX ORDER — CITALOPRAM 20 MG/1
20 TABLET ORAL DAILY
Qty: 90 TABLET | Refills: 0 | Status: SHIPPED | OUTPATIENT
Start: 2018-01-11 | End: 2018-01-31 | Stop reason: SDUPTHER

## 2018-01-31 ENCOUNTER — OFFICE VISIT (OUTPATIENT)
Dept: NEUROLOGY | Facility: CLINIC | Age: 53
End: 2018-01-31

## 2018-01-31 VITALS
BODY MASS INDEX: 26.01 KG/M2 | HEART RATE: 76 BPM | WEIGHT: 192 LBS | OXYGEN SATURATION: 96 % | DIASTOLIC BLOOD PRESSURE: 74 MMHG | HEIGHT: 72 IN | SYSTOLIC BLOOD PRESSURE: 128 MMHG

## 2018-01-31 DIAGNOSIS — G47.61 PERIODIC LIMB MOVEMENT DISORDER (PLMD): ICD-10-CM

## 2018-01-31 DIAGNOSIS — R25.2 SPASTICITY: ICD-10-CM

## 2018-01-31 DIAGNOSIS — F32.9 REACTIVE DEPRESSION: ICD-10-CM

## 2018-01-31 DIAGNOSIS — G25.81 RESTLESS LEGS SYNDROME (RLS): ICD-10-CM

## 2018-01-31 DIAGNOSIS — G35 MS (MULTIPLE SCLEROSIS) (HCC): Primary | ICD-10-CM

## 2018-01-31 DIAGNOSIS — F48.2 PBA (PSEUDOBULBAR AFFECT): ICD-10-CM

## 2018-01-31 PROCEDURE — 99214 OFFICE O/P EST MOD 30 MIN: CPT | Performed by: PSYCHIATRY & NEUROLOGY

## 2018-01-31 RX ORDER — PRAMIPEXOLE DIHYDROCHLORIDE 0.5 MG/1
0.5 TABLET ORAL 2 TIMES DAILY
Qty: 180 TABLET | Refills: 1
Start: 2018-01-31 | End: 2018-09-30 | Stop reason: SDUPTHER

## 2018-01-31 RX ORDER — CITALOPRAM 20 MG/1
20 TABLET ORAL DAILY
Qty: 90 TABLET | Refills: 1 | Status: SHIPPED | OUTPATIENT
Start: 2018-01-31 | End: 2018-08-29 | Stop reason: SDUPTHER

## 2018-01-31 RX ORDER — TIZANIDINE HYDROCHLORIDE 4 MG/1
4 CAPSULE, GELATIN COATED ORAL 3 TIMES DAILY PRN
Qty: 180 CAPSULE | Refills: 0 | Status: SHIPPED | OUTPATIENT
Start: 2018-01-31 | End: 2018-06-14 | Stop reason: SDUPTHER

## 2018-01-31 RX ORDER — FINGOLIMOD HYDROCHLORIDE 0.5 MG/1
0.5 CAPSULE ORAL DAILY
Qty: 90 CAPSULE | Refills: 1 | Status: SHIPPED | OUTPATIENT
Start: 2018-01-31 | End: 2018-07-12 | Stop reason: SDUPTHER

## 2018-02-14 LAB
ALBUMIN SERPL-MCNC: 4.3 G/DL (ref 3.5–5)
ALBUMIN/GLOB SERPL: 1.7 G/DL (ref 1–2)
ALP SERPL-CCNC: 86 U/L (ref 38–126)
ALT SERPL-CCNC: 108 U/L (ref 13–69)
AMBIG ABBREV CMP14 DEFAULT: NORMAL
AST SERPL-CCNC: 49 U/L (ref 15–46)
BILIRUB SERPL-MCNC: 0.6 MG/DL (ref 0.2–1.3)
BUN SERPL-MCNC: 20 MG/DL (ref 7–20)
BUN/CREAT SERPL: 16.7 (ref 6.3–21.9)
CALCIUM SERPL-MCNC: 9.9 MG/DL (ref 8.4–10.2)
CHLORIDE SERPL-SCNC: 107 MMOL/L (ref 98–107)
CO2 SERPL-SCNC: 28 MMOL/L (ref 26–30)
CREAT SERPL-MCNC: 1.2 MG/DL (ref 0.6–1.3)
GFR SERPLBLD CREATININE-BSD FMLA CKD-EPI: 64 ML/MIN/1.73
GFR SERPLBLD CREATININE-BSD FMLA CKD-EPI: 78 ML/MIN/1.73
GLOBULIN SER CALC-MCNC: 2.5 GM/DL
GLUCOSE SERPL-MCNC: 135 MG/DL (ref 74–98)
POTASSIUM SERPL-SCNC: 4.6 MMOL/L (ref 3.5–5.1)
PROT SERPL-MCNC: 6.8 G/DL (ref 6.3–8.2)
SODIUM SERPL-SCNC: 146 MMOL/L (ref 137–145)

## 2018-03-12 ENCOUNTER — OFFICE VISIT (OUTPATIENT)
Dept: INTERNAL MEDICINE | Facility: CLINIC | Age: 53
End: 2018-03-12

## 2018-03-12 VITALS
OXYGEN SATURATION: 98 % | BODY MASS INDEX: 27.11 KG/M2 | SYSTOLIC BLOOD PRESSURE: 124 MMHG | DIASTOLIC BLOOD PRESSURE: 80 MMHG | TEMPERATURE: 97.4 F | WEIGHT: 200.19 LBS | RESPIRATION RATE: 14 BRPM | HEIGHT: 72 IN | HEART RATE: 82 BPM

## 2018-03-12 DIAGNOSIS — R10.9 FLANK PAIN: ICD-10-CM

## 2018-03-12 DIAGNOSIS — J06.9 ACUTE URI: Primary | ICD-10-CM

## 2018-03-12 DIAGNOSIS — Z87.442 HISTORY OF RENAL STONE: ICD-10-CM

## 2018-03-12 DIAGNOSIS — Z00.00 HEALTH CARE MAINTENANCE: ICD-10-CM

## 2018-03-12 PROBLEM — R79.89 ABNORMAL LFTS: Status: ACTIVE | Noted: 2018-03-12

## 2018-03-12 PROBLEM — M54.9 BACK ACHE: Status: ACTIVE | Noted: 2018-03-12

## 2018-03-12 LAB — HBA1C MFR BLD: 5.4 %

## 2018-03-12 PROCEDURE — 83036 HEMOGLOBIN GLYCOSYLATED A1C: CPT | Performed by: NURSE PRACTITIONER

## 2018-03-12 PROCEDURE — 99213 OFFICE O/P EST LOW 20 MIN: CPT | Performed by: NURSE PRACTITIONER

## 2018-03-12 RX ORDER — AMOXICILLIN AND CLAVULANATE POTASSIUM 875; 125 MG/1; MG/1
1 TABLET, FILM COATED ORAL 2 TIMES DAILY
Qty: 20 TABLET | Refills: 0 | Status: SHIPPED | OUTPATIENT
Start: 2018-03-12 | End: 2018-03-22

## 2018-03-12 NOTE — PROGRESS NOTES
Chief Complaint / Reason:      Chief Complaint   Patient presents with   • Earache     onset about Sat. after passing a kidney stone on Friday. Afebrile.   • Sore Throat   • Cough   • Back Pain     nausea, back is hurting worse than normal.        Subjective     HPI patient presents today with complaints of earache, sore throat, cough, back pain and feeling very fatigued.  He states that symptoms started around Saturday after he passed a kidney stone on Friday and now his back is hurting worse than normal.  A CT scan from 5/19/16 showed a stone.  Therefore he may still have another stone.  Denies blood in urine or stool Denies chest pain, shortness of breath or heart palpitations.  He has tried increasing water intake with minimal relief. Medical history includes multiple sclerosis and he states that he does not drink enough fluids.  Patient states that he has had increased thirst and increased urination.  He states he was prediabetic in the past will recheck hemoglobin A1c.  History taken from: patient    PMH/FH/Social History were reviewed and updated appropriately in the electronic medical record.     Review of Systems:   Review of Systems   Constitutional: Positive for fatigue.   HENT: Positive for congestion, sinus pain, sinus pressure and sore throat.    Respiratory: Positive for cough.    Cardiovascular: Negative.    Gastrointestinal: Negative.    Genitourinary: Positive for flank pain.   Musculoskeletal: Positive for back pain.   Skin: Negative.    Neurological: Positive for headaches.   Hematological: Negative for adenopathy.     All other systems were reviewed and are negative.  Exceptions are noted in the subjective or above.      Objective     Vital Signs  Vitals:    03/12/18 1625   BP: 124/80   Pulse: 82   Resp: 14   Temp: 97.4 °F (36.3 °C)   SpO2: 98%       Body mass index is 27.15 kg/m².    Physical Exam   Constitutional: He is oriented to person, place, and time. He appears well-developed and  well-nourished.   HENT:   Head: Normocephalic.   Right Ear: External ear normal. Tympanic membrane is erythematous and bulging.   Left Ear: External ear normal. Tympanic membrane is erythematous and bulging.   Nose: Right sinus exhibits maxillary sinus tenderness and frontal sinus tenderness. Left sinus exhibits maxillary sinus tenderness and frontal sinus tenderness.   Mouth/Throat: Mucous membranes are dry. Posterior oropharyngeal erythema present.   Cardiovascular: Normal rate, regular rhythm, normal heart sounds and intact distal pulses.    Pulmonary/Chest: Effort normal and breath sounds normal. He exhibits no tenderness.   Abdominal: Soft. Bowel sounds are normal. There is tenderness. There is CVA tenderness.   Musculoskeletal:        Lumbar back: He exhibits tenderness and pain.   Lymphadenopathy:     He has no cervical adenopathy.   Neurological: He is alert and oriented to person, place, and time.   Skin: Skin is warm and dry.   Psychiatric: He has a normal mood and affect. His behavior is normal. Judgment and thought content normal.   Nursing note and vitals reviewed.       Results Review:    I reviewed the patient's new clinical results.   Office Visit on 03/12/2018   Component Date Value Ref Range Status   • Hemoglobin A1C 03/12/2018 5.4  % Final         Medication Review:     Current Outpatient Prescriptions:   •  citalopram (CeleXA) 20 MG tablet, Take 1 tablet by mouth Daily., Disp: 90 tablet, Rfl: 1  •  dextromethorphan-quinidine (NUEDEXTA) 20-10 MG capsule capsule, Take 1 capsule by mouth Every 12 (Twelve) Hours., Disp: 180 capsule, Rfl: 1  •  fingolimod (GILENYA) 0.5 MG capsule, Take 1 capsule by mouth Daily., Disp: 90 capsule, Rfl: 1  •  NAPROXEN  MG EC tablet, TAKE 1 TABLET TWICE A DAY WITH MEALS, Disp: 180 tablet, Rfl: 1  •  pramipexole (MIRAPEX) 0.5 MG tablet, Take 1 tablet by mouth 2 (Two) Times a Day., Disp: 180 tablet, Rfl: 1  •  TiZANidine (ZANAFLEX) 4 MG capsule, Take 1 capsule by  mouth 3 (Three) Times a Day As Needed for Muscle Spasms., Disp: 180 capsule, Rfl: 0  •  amoxicillin-clavulanate (AUGMENTIN) 875-125 MG per tablet, Take 1 tablet by mouth 2 (Two) Times a Day for 10 days., Disp: 20 tablet, Rfl: 0    Assessment/Plan   Vinnie was seen today for earache, sore throat, cough and back pain.    Diagnoses and all orders for this visit:    Acute URI       amoxicillin-clavulanate (AUGMENTIN) 875-125 MG per tablet; Take 1 tablet by mouth 2 (Two) Times a Day for 10 days.  Health care maintenance  -     POC Glycosylated Hemoglobin (Hb A1C)    History of renal stone  Discussed referral to urology and patient will contact once he decides who he wants to go to  Flank pain  Increase water intake and discuss kidney stone prevention  If symptoms persist will repeat CT scan          Return in about 4 weeks (around 4/9/2018), or if symptoms worsen or fail to improve, for follow up with Dr. Richards.    Ernestina Beebe, APRN  03/12/2018

## 2018-03-14 ENCOUNTER — TELEPHONE (OUTPATIENT)
Dept: INTERNAL MEDICINE | Facility: CLINIC | Age: 53
End: 2018-03-14

## 2018-03-14 DIAGNOSIS — Z87.442 HISTORY OF RENAL STONE: Primary | ICD-10-CM

## 2018-03-14 NOTE — TELEPHONE ENCOUNTER
Patient's wife is calling concerning patient possibly having a kidney stone. He forgot to request a referral to Dr. Nina when he was in on Monday. He has seen Dr. Nina before, but due to it being over 2 years, he needs a new referral.    Angella is requesting a return call if this can be put in for him.  [phone]

## 2018-03-14 NOTE — TELEPHONE ENCOUNTER
SPOKE WITH PATIENT - HE WAS SEEN ON 3/12 AND MENTIONED THAT HE THOUGHT HE MAY HAVE PASSED A KIDNEY STONE.    ARE YOU OK WITH ORDERING AN ULTRASOUND AND GIVING A REFERRAL?

## 2018-03-14 NOTE — TELEPHONE ENCOUNTER
LEFT DETAILED VM LETTING WM KNOW WE WILL NEED TO SEE KEESHA IN OUR OFFICE - UPDATE IMAGING - AND THEN SET UP THE REFERRAL

## 2018-03-19 ENCOUNTER — HOSPITAL ENCOUNTER (OUTPATIENT)
Dept: CT IMAGING | Facility: HOSPITAL | Age: 53
Discharge: HOME | End: 2018-03-19
Attending: NURSE PRACTITIONER | Admitting: NURSE PRACTITIONER

## 2018-03-19 PROCEDURE — 74176 CT ABD & PELVIS W/O CONTRAST: CPT

## 2018-03-19 NOTE — PROGRESS NOTES
Please contact patient and let him know CT abdomen and pelvis results.  Inform him they show no acute intra abdominal abnormality

## 2018-03-21 ENCOUNTER — TELEPHONE (OUTPATIENT)
Dept: INTERNAL MEDICINE | Facility: CLINIC | Age: 53
End: 2018-03-21

## 2018-03-22 ENCOUNTER — OFFICE VISIT (OUTPATIENT)
Dept: INTERNAL MEDICINE | Facility: CLINIC | Age: 53
End: 2018-03-22

## 2018-03-22 VITALS
TEMPERATURE: 97.9 F | SYSTOLIC BLOOD PRESSURE: 124 MMHG | DIASTOLIC BLOOD PRESSURE: 70 MMHG | HEIGHT: 72 IN | HEART RATE: 78 BPM | RESPIRATION RATE: 14 BRPM | OXYGEN SATURATION: 98 % | BODY MASS INDEX: 27.22 KG/M2 | WEIGHT: 201 LBS

## 2018-03-22 DIAGNOSIS — R04.2 HEMOPTYSIS: Primary | ICD-10-CM

## 2018-03-22 LAB
BASOPHILS # BLD AUTO: 0 10*3/MM3 (ref 0–0.2)
BASOPHILS NFR BLD AUTO: 0 % (ref 0–2.5)
EOSINOPHIL # BLD AUTO: 0.04 10*3/MM3 (ref 0–0.7)
EOSINOPHIL NFR BLD AUTO: 1.2 % (ref 0–7)
ERYTHROCYTE [DISTWIDTH] IN BLOOD BY AUTOMATED COUNT: 12.8 % (ref 11.5–14.5)
HCT VFR BLD AUTO: 41.6 % (ref 42–52)
HGB BLD-MCNC: 14.4 G/DL (ref 14–18)
IMM GRANULOCYTES # BLD: 0.02 10*3/MM3 (ref 0–0.06)
IMM GRANULOCYTES NFR BLD: 0.6 % (ref 0–0.6)
LYMPHOCYTES # BLD AUTO: 0.2 10*3/MM3 (ref 0.6–3.4)
LYMPHOCYTES NFR BLD AUTO: 6 % (ref 10–50)
MCH RBC QN AUTO: 30.6 PG (ref 27–31)
MCHC RBC AUTO-ENTMCNC: 34.6 G/DL (ref 30–37)
MCV RBC AUTO: 88.3 FL (ref 80–94)
MONOCYTES # BLD AUTO: 0.4 10*3/MM3 (ref 0–0.9)
MONOCYTES NFR BLD AUTO: 12 % (ref 0–12)
NEUTROPHILS # BLD AUTO: 2.68 10*3/MM3 (ref 2–6.9)
NEUTROPHILS NFR BLD AUTO: 80.2 % (ref 37–80)
NRBC BLD AUTO-RTO: 0 /100 WBC (ref 0–0)
PLATELET # BLD AUTO: 246 10*3/MM3 (ref 130–400)
RBC # BLD AUTO: 4.71 10*6/MM3 (ref 4.7–6.1)
WBC # BLD AUTO: 3.34 10*3/MM3 (ref 4.8–10.8)

## 2018-03-22 PROCEDURE — 99213 OFFICE O/P EST LOW 20 MIN: CPT | Performed by: INTERNAL MEDICINE

## 2018-03-22 NOTE — PROGRESS NOTES
Subjective   Vinnie Whitley III is a 52 y.o. male.     Chief Complaint   Patient presents with   • Coughing Up Blood     ongoing for a few months        Cough   This is a new problem. The current episode started more than 1 month ago. The problem has been waxing and waning. The cough is productive of bloody sputum. Associated symptoms include chest pain, hemoptysis, nasal congestion, rhinorrhea and a sore throat. Pertinent negatives include no chills, ear pain, fever, shortness of breath, weight loss or wheezing. Nothing aggravates the symptoms. The treatment provided no relief. spontaneous pneumothorax          Current Outpatient Prescriptions:   •  citalopram (CeleXA) 20 MG tablet, Take 1 tablet by mouth Daily., Disp: 90 tablet, Rfl: 1  •  dextromethorphan-quinidine (NUEDEXTA) 20-10 MG capsule capsule, Take 1 capsule by mouth Every 12 (Twelve) Hours., Disp: 180 capsule, Rfl: 1  •  fingolimod (GILENYA) 0.5 MG capsule, Take 1 capsule by mouth Daily., Disp: 90 capsule, Rfl: 1  •  NAPROXEN  MG EC tablet, TAKE 1 TABLET TWICE A DAY WITH MEALS, Disp: 180 tablet, Rfl: 1  •  pramipexole (MIRAPEX) 0.5 MG tablet, Take 1 tablet by mouth 2 (Two) Times a Day., Disp: 180 tablet, Rfl: 1  •  TiZANidine (ZANAFLEX) 4 MG capsule, Take 1 capsule by mouth 3 (Three) Times a Day As Needed for Muscle Spasms., Disp: 180 capsule, Rfl: 0    The following portions of the patient's history were reviewed and updated as appropriate: allergies, current medications, past family history, past medical history, past social history, past surgical history and problem list.    Review of Systems   Constitutional: Negative.  Negative for chills, fever and weight loss.   HENT: Positive for rhinorrhea and sore throat. Negative for ear pain.    Respiratory: Positive for cough and hemoptysis. Negative for shortness of breath and wheezing.    Cardiovascular: Positive for chest pain.   Gastrointestinal: Negative.    Musculoskeletal: Negative.    Skin:  Negative.    Neurological: Negative.    Psychiatric/Behavioral: Negative.        Objective   Physical Exam   Constitutional: He is oriented to person, place, and time. He appears well-developed and well-nourished.   Neck: Neck supple.   Cardiovascular: Normal rate, regular rhythm and normal heart sounds.    Pulmonary/Chest: Effort normal and breath sounds normal.   Abdominal: Bowel sounds are normal.   Neurological: He is alert and oriented to person, place, and time.   Skin: Skin is warm.   Psychiatric: He has a normal mood and affect.       All tests have been reviewed.    Assessment/Plan   Diagnoses and all orders for this visit:    Hemoptysis  -     CT Chest Without Contrast; Future  -     CBC & Differential             follow up with PCP after lab

## 2018-03-27 ENCOUNTER — HOSPITAL ENCOUNTER (OUTPATIENT)
Dept: CT IMAGING | Facility: HOSPITAL | Age: 53
Discharge: HOME | End: 2018-03-27
Attending: INTERNAL MEDICINE | Admitting: INTERNAL MEDICINE

## 2018-03-27 DIAGNOSIS — R04.2 HEMOPTYSIS: ICD-10-CM

## 2018-03-27 PROCEDURE — 71250 CT THORAX DX C-: CPT

## 2018-04-25 ENCOUNTER — OFFICE VISIT (OUTPATIENT)
Dept: INTERNAL MEDICINE | Facility: CLINIC | Age: 53
End: 2018-04-25

## 2018-04-25 VITALS
TEMPERATURE: 98.1 F | HEIGHT: 72 IN | BODY MASS INDEX: 27.77 KG/M2 | DIASTOLIC BLOOD PRESSURE: 80 MMHG | OXYGEN SATURATION: 97 % | HEART RATE: 87 BPM | SYSTOLIC BLOOD PRESSURE: 114 MMHG | WEIGHT: 205 LBS

## 2018-04-25 DIAGNOSIS — J32.0 CHRONIC MAXILLARY SINUSITIS: ICD-10-CM

## 2018-04-25 DIAGNOSIS — J33.9 NASAL POLYPS: Primary | ICD-10-CM

## 2018-04-25 DIAGNOSIS — R04.0 RIGHT-SIDED NOSEBLEED: ICD-10-CM

## 2018-04-25 DIAGNOSIS — G35 RELAPSING REMITTING MULTIPLE SCLEROSIS (HCC): ICD-10-CM

## 2018-04-25 DIAGNOSIS — R06.09 DYSPNEA ON EXERTION: ICD-10-CM

## 2018-04-25 DIAGNOSIS — J41.1 MUCOPURULENT CHRONIC BRONCHITIS (HCC): ICD-10-CM

## 2018-04-25 PROBLEM — F48.2 PBA (PSEUDOBULBAR AFFECT): Status: ACTIVE | Noted: 2018-04-25

## 2018-04-25 PROCEDURE — 99214 OFFICE O/P EST MOD 30 MIN: CPT | Performed by: FAMILY MEDICINE

## 2018-04-25 RX ORDER — FLUTICASONE PROPIONATE 50 MCG
2 SPRAY, SUSPENSION (ML) NASAL DAILY
Qty: 30 ML | Refills: 5 | Status: SHIPPED | OUTPATIENT
Start: 2018-04-25 | End: 2018-07-11

## 2018-04-30 ENCOUNTER — HOSPITAL ENCOUNTER (OUTPATIENT)
Dept: PULMONOLOGY | Facility: HOSPITAL | Age: 53
Discharge: HOME | End: 2018-04-30
Admitting: FAMILY MEDICINE

## 2018-04-30 DIAGNOSIS — R06.09 DYSPNEA ON EXERTION: ICD-10-CM

## 2018-04-30 DIAGNOSIS — J41.1 MUCOPURULENT CHRONIC BRONCHITIS (HCC): ICD-10-CM

## 2018-04-30 PROCEDURE — 94729 DIFFUSING CAPACITY: CPT

## 2018-04-30 PROCEDURE — 94640 AIRWAY INHALATION TREATMENT: CPT

## 2018-04-30 PROCEDURE — 94726 PLETHYSMOGRAPHY LUNG VOLUMES: CPT

## 2018-04-30 PROCEDURE — 94060 EVALUATION OF WHEEZING: CPT

## 2018-04-30 RX ORDER — ALBUTEROL SULFATE 2.5 MG/3ML
2.5 SOLUTION RESPIRATORY (INHALATION) ONCE
Status: COMPLETED | OUTPATIENT
Start: 2018-04-30 | End: 2018-04-30

## 2018-04-30 RX ADMIN — ALBUTEROL SULFATE 2.5 MG: 2.5 SOLUTION RESPIRATORY (INHALATION) at 10:05

## 2018-05-14 ENCOUNTER — HOSPITAL ENCOUNTER (OUTPATIENT)
Dept: CT IMAGING | Facility: HOSPITAL | Age: 53
Discharge: HOME | End: 2018-05-14
Attending: FAMILY MEDICINE | Admitting: FAMILY MEDICINE

## 2018-05-14 DIAGNOSIS — J32.0 CHRONIC MAXILLARY SINUSITIS: ICD-10-CM

## 2018-05-14 DIAGNOSIS — J33.9 NASAL POLYPS: ICD-10-CM

## 2018-05-14 DIAGNOSIS — R04.0 RIGHT-SIDED NOSEBLEED: ICD-10-CM

## 2018-05-14 PROCEDURE — 70486 CT MAXILLOFACIAL W/O DYE: CPT

## 2018-06-14 RX ORDER — TIZANIDINE 4 MG/1
4 TABLET ORAL 3 TIMES DAILY PRN
Qty: 42 TABLET | Refills: 0 | Status: SHIPPED | OUTPATIENT
Start: 2018-06-14 | End: 2018-06-21 | Stop reason: SDUPTHER

## 2018-06-14 RX ORDER — TIZANIDINE HYDROCHLORIDE 4 MG/1
4 CAPSULE, GELATIN COATED ORAL 3 TIMES DAILY PRN
Qty: 180 CAPSULE | Refills: 1 | Status: SHIPPED | OUTPATIENT
Start: 2018-06-14 | End: 2019-01-29

## 2018-06-21 ENCOUNTER — OFFICE VISIT (OUTPATIENT)
Dept: NEUROLOGY | Facility: CLINIC | Age: 53
End: 2018-06-21

## 2018-06-21 ENCOUNTER — LAB (OUTPATIENT)
Dept: LAB | Facility: HOSPITAL | Age: 53
End: 2018-06-21
Attending: PSYCHIATRY & NEUROLOGY

## 2018-06-21 VITALS
HEART RATE: 72 BPM | WEIGHT: 205 LBS | BODY MASS INDEX: 27.77 KG/M2 | SYSTOLIC BLOOD PRESSURE: 120 MMHG | OXYGEN SATURATION: 99 % | DIASTOLIC BLOOD PRESSURE: 84 MMHG | HEIGHT: 72 IN

## 2018-06-21 DIAGNOSIS — G35 MS (MULTIPLE SCLEROSIS) (HCC): Primary | ICD-10-CM

## 2018-06-21 DIAGNOSIS — G35 MS (MULTIPLE SCLEROSIS) (HCC): ICD-10-CM

## 2018-06-21 LAB
BASOPHILS # BLD AUTO: 0 10*3/MM3 (ref 0–0.2)
BASOPHILS NFR BLD AUTO: 0 % (ref 0–2.5)
BILIRUB UR QL STRIP: ABNORMAL
CLARITY UR: CLEAR
COLOR UR: YELLOW
DEPRECATED RDW RBC AUTO: 41.8 FL (ref 37–54)
EOSINOPHIL # BLD AUTO: 0.05 10*3/MM3 (ref 0–0.7)
EOSINOPHIL NFR BLD AUTO: 1.7 % (ref 0–7)
ERYTHROCYTE [DISTWIDTH] IN BLOOD BY AUTOMATED COUNT: 12.8 % (ref 11.5–14.5)
GLUCOSE UR STRIP-MCNC: NEGATIVE MG/DL
HCT VFR BLD AUTO: 41.5 % (ref 42–52)
HGB BLD-MCNC: 14.4 G/DL (ref 14–18)
HGB UR QL STRIP.AUTO: NEGATIVE
IMM GRANULOCYTES # BLD: 0.02 10*3/MM3 (ref 0–0.06)
IMM GRANULOCYTES NFR BLD: 0.7 % (ref 0–0.6)
KETONES UR QL STRIP: NEGATIVE
LEUKOCYTE ESTERASE UR QL STRIP.AUTO: NEGATIVE
LYMPHOCYTES # BLD AUTO: 0.26 10*3/MM3 (ref 0.6–3.4)
LYMPHOCYTES NFR BLD AUTO: 8.6 % (ref 10–50)
MCH RBC QN AUTO: 30.8 PG (ref 27–31)
MCHC RBC AUTO-ENTMCNC: 34.7 G/DL (ref 30–37)
MCV RBC AUTO: 88.7 FL (ref 80–94)
MONOCYTES # BLD AUTO: 0.36 10*3/MM3 (ref 0–0.9)
MONOCYTES NFR BLD AUTO: 11.9 % (ref 0–12)
NEUTROPHILS # BLD AUTO: 2.34 10*3/MM3 (ref 2–6.9)
NEUTROPHILS NFR BLD AUTO: 77.1 % (ref 37–80)
NITRITE UR QL STRIP: NEGATIVE
NRBC BLD MANUAL-RTO: 0 /100 WBC (ref 0–0)
PH UR STRIP.AUTO: 5.5 [PH] (ref 5–8)
PLATELET # BLD AUTO: 165 10*3/MM3 (ref 130–400)
PMV BLD AUTO: 8.6 FL (ref 6–12)
PROT UR QL STRIP: NEGATIVE
RBC # BLD AUTO: 4.68 10*6/MM3 (ref 4.7–6.1)
SP GR UR STRIP: >=1.03 (ref 1–1.03)
UROBILINOGEN UR QL STRIP: ABNORMAL
WBC NRBC COR # BLD: 3.03 10*3/MM3 (ref 4.8–10.8)

## 2018-06-21 PROCEDURE — 99214 OFFICE O/P EST MOD 30 MIN: CPT | Performed by: PSYCHIATRY & NEUROLOGY

## 2018-06-21 PROCEDURE — 81003 URINALYSIS AUTO W/O SCOPE: CPT

## 2018-06-21 PROCEDURE — 36415 COLL VENOUS BLD VENIPUNCTURE: CPT

## 2018-06-21 PROCEDURE — 85025 COMPLETE CBC W/AUTO DIFF WBC: CPT

## 2018-06-21 RX ORDER — SODIUM CHLORIDE 9 MG/ML
250 INJECTION, SOLUTION INTRAVENOUS ONCE
Status: CANCELLED | OUTPATIENT
Start: 2018-06-21

## 2018-06-21 NOTE — PROGRESS NOTES
[facility] NEUROLOGY [place] PROGRESS NOTE  History of Present Illness     Date: 6/21/2018    Patient Identification  Vinnie Whitley III is a 52 y.o. male.    Patient information was obtained from patient.  History/Exam limitations: none.    Original consultation requested by: Marlen Richards MD      Chief Complaint   Multiple Sclerosis (Pt in office today for follow up )      History of Present Illness   No visual problem, back pain and spasticity in knee in both of his legs.  Patient reported that he is more fatigue and less stamina and he feel that his multiple sclerosis has gotten worse despite Gilenya.    I have inquired today if patient follow-up regularly with his ophthalmologist and patient reported that he has not been seen by his ophthalmologist recently.  Patient does not have OCT testing.  I will refer this patient for ophthalmology evaluation.  We'll schedule this patient to have IV Solumedrol infusion.  I  will check CBC and patient already have recent liver function test.      PMH:   Past Medical History:   Diagnosis Date   • Infection of kidney     H/o   • Kidney stones    • Nephrolithiasis     H/o   • Pneumothorax     H/o       Past Surgical History:   Past Surgical History:   Procedure Laterality Date   • KIDNEY STONE SURGERY  2005    Lithotomy   • LUNG SURGERY         Family Hisotry:   Family History   Problem Relation Age of Onset   • Polycystic kidney disease Mother    • Dementia Mother    • Kidney disease Mother    • No Known Problems Sister    • No Known Problems Maternal Uncle    • Kidney disease Maternal Grandmother    • Polycystic kidney disease Maternal Grandmother    • Early death Maternal Grandfather        Social History:   Social History     Social History   • Marital status:      Spouse name: N/A   • Number of children: N/A   • Years of education: N/A     Occupational History   • Not on file.     Social History Main Topics   • Smoking status: Former Smoker     Packs/day:  0.50     Years: 20.00     Types: Cigarettes     Start date: 1981     Quit date: 1/1/2001   • Smokeless tobacco: Never Used   • Alcohol use No   • Drug use: No   • Sexual activity: Yes     Partners: Female     Other Topics Concern   • Not on file     Social History Narrative   • No narrative on file       Medications:   Current Outpatient Prescriptions   Medication Sig Dispense Refill   • citalopram (CeleXA) 20 MG tablet Take 1 tablet by mouth Daily. 90 tablet 1   • dextromethorphan-quinidine (NUEDEXTA) 20-10 MG capsule capsule Take 1 capsule by mouth Every 12 (Twelve) Hours. 180 capsule 1   • fingolimod (GILENYA) 0.5 MG capsule Take 1 capsule by mouth Daily. 90 capsule 1   • fluticasone (FLONASE) 50 MCG/ACT nasal spray 2 sprays into each nostril Daily. 30 mL 5   • NAPROXEN  MG EC tablet TAKE 1 TABLET TWICE A DAY WITH MEALS 180 tablet 1   • pramipexole (MIRAPEX) 0.5 MG tablet Take 1 tablet by mouth 2 (Two) Times a Day. 180 tablet 1   • TiZANidine (ZANAFLEX) 4 MG capsule Take 1 capsule by mouth 3 (Three) Times a Day As Needed for Muscle Spasms. 180 capsule 1     No current facility-administered medications for this visit.        Allergy: No Known Allergies    Review of Systems:  Review of Systems   Constitutional: Positive for fatigue. Negative for chills and fever.   HENT: Negative for congestion, ear pain, hearing loss, rhinorrhea and sore throat.    Eyes: Negative for pain, discharge and redness.   Respiratory: Negative for cough, shortness of breath, wheezing and stridor.    Cardiovascular: Negative for chest pain, palpitations and leg swelling.   Gastrointestinal: Negative for abdominal pain, constipation, nausea and vomiting.   Endocrine: Negative for cold intolerance, heat intolerance and polyphagia.   Genitourinary: Negative for dysuria, flank pain, frequency and urgency.   Musculoskeletal: Positive for gait problem and myalgias. Negative for joint swelling, neck pain and neck stiffness.   Skin: Negative  "for pallor, rash and wound.   Allergic/Immunologic: Negative for environmental allergies.   Neurological: Positive for speech difficulty and weakness. Negative for dizziness, tremors, seizures, syncope, facial asymmetry, light-headedness, numbness and headaches.   Hematological: Negative for adenopathy.   Psychiatric/Behavioral: Negative for confusion and hallucinations. The patient is not nervous/anxious.        Physical Exam     Vitals:    06/21/18 0910   BP: 120/84   Pulse: 72   SpO2: 99%   Weight: 93 kg (205 lb)   Height: 182.9 cm (72\")     GENERAL: Patient is pleasant, cooperative, appears to be stated age.  Body habitus is endomorphic.  SKIN AND EXTREMITIES:  No skin rashes or lesions are noted.  No cyanosis, clubbing or edema of the extremities.    HEAD:  Head is normocephalic and atraumatic.    NECK: Neck are non-tender without thyromegaly or adenopathy.  Carotic upstrokes are 1+/4.  No cranial or cervical bruits.  The neck is supple with a full range of motion.   ENT: palate elevate symmetrically, no evidence of high arch palate, tongue midline erythema in posterior pharynx, Mallampati Classification Class III   CARDIOVASCULAR:  Regular rate and rhythm with normal S1 and S2 without rub or gallop.  RESPIRATORY:  Clear to auscultation without wheezes or crackle   ABDOMEN:  Soft and non-tender, positive bowel sound without hepatosplenomegaly  BACK:  Back is straight without midline defect.    PSYCH:  Higher cortical function/mental status:  The patient is alert.  She is oriented x3 to time, place and person.  Recent and the remote memory appear normal.  The patient has a good fund of knowledge.  There is no visual or auditory hallucination or suicidal or homicidal ideation.  SPEECH:There is no gross evidence of aphasia, dysarthria or agnosia.      CRANIAL NERVES:  Pupils are 4mm, equal round reactive to light, reacting briskly to 2mm without afferent pupillary defect.  Visual fields are intact to confrontation " testing.  Fundoscopic examination reveals sharp disk margins with normal vasculature.  No papilledema, hemorrhages or exudates.  Extraocular movements are full and smooth with normal pursuits and saccades.  No nystagmus noted.  The face is symmetric. palate elevate symmetrically, Tongue midline, positive gag reflex. The remainder of the cranial nerves are intact and symmetrical.    MOTOR: Strength is 5/5 throughout with normal tone and bulk with the following exceptions, 4/5 intrinsic muscles of the hands and feet.  No involuntary movements noted.    Deep Tendon Reflexes: are 2/4 and symmetrical in the upper extremities, 2/4 and symmetrical at the knees and 1/4 and symmetrical at the Achilles tendon.  Plantar responses were down-going bilaterally.    SENSATION:  Intact to pinprick, light touch, vibration and proprioception.  Coordination:  The patient normally performs finger-nose-finger, heel-to-knee-to-shin and rapid alternating movements in symmetrical fashion.    COORDINATION AND GAIT:  The patient walks with a narrow-based gait.  Patient is able to heel-toe and tandem walk forward and backwards without difficulty.  Romberg and monopedal  Romberg are negative.    MUSCULOSKELETAL: Range of motion normal, no clubbing, cyanosis, or edema.  No joint swelling.            Studies: I have personally reviewed the following and discussed with the patient.  Results for orders placed or performed in visit on 03/22/18   CBC & Differential   Result Value Ref Range    WBC 3.34 (L) 4.80 - 10.80 10*3/mm3    RBC 4.71 4.70 - 6.10 10*6/mm3    Hemoglobin 14.4 14.0 - 18.0 g/dL    Hematocrit 41.6 (L) 42.0 - 52.0 %    MCV 88.3 80.0 - 94.0 fL    MCH 30.6 27.0 - 31.0 pg    MCHC 34.6 30.0 - 37.0 g/dL    RDW 12.8 11.5 - 14.5 %    Platelets 246 130 - 400 10*3/mm3    Neutrophil Rel % 80.2 (H) 37.0 - 80.0 %    Lymphocyte Rel % 6.0 (L) 10.0 - 50.0 %    Monocyte Rel % 12.0 0.0 - 12.0 %    Eosinophil Rel % 1.2 0.0 - 7.0 %    Basophil Rel % 0.0  0.0 - 2.5 %    Neutrophils Absolute 2.68 2.00 - 6.90 10*3/mm3    Lymphocytes Absolute 0.20 (L) 0.60 - 3.40 10*3/mm3    Monocytes Absolute 0.40 0.00 - 0.90 10*3/mm3    Eosinophils Absolute 0.04 0.00 - 0.70 10*3/mm3    Basophils Absolute 0.00 0.00 - 0.20 10*3/mm3    Immature Granulocyte Rel % 0.6 0.0 - 0.6 %    Immature Grans Absolute 0.02 0.00 - 0.06 10*3/mm3    nRBC 0.0 0.0 - 0.0 /100 WBC       Records Reviewed: I have personally reviewed his previous medical record.    Vinnie was seen today for multiple sclerosis.    Diagnoses and all orders for this visit:    MS (multiple sclerosis)  -     Ambulatory Referral to Ophthalmology  -     Urinalysis - Urine, Clean Catch; Future  -     CBC & Differential; Future  -     Ambulatory Referral to [facility] For Infusion Treatment    Other orders  -     sodium chloride 0.9 % infusion 250 mL; Infuse 250 mL into a venous catheter 1 (One) Time.  -     methylPREDNISolone sodium succinate (SOLU-Medrol) 1,000 mg in sodium chloride 0.9 % 100 mL IVPB; Infuse 1,000 mg into a venous catheter 1 (One) Time.        Treatments:  I have inquired today if patient follow-up regularly with his ophthalmologist and patient reported that he has not been seen by his ophthalmologist recently.  Patient does not have OCT testing.  I will refer this patient for ophthalmology evaluation.  We'll schedule this patient to have IV Solumedrol infusion. I will check CBC and patient already have recent liver function test.      Discussion:  I have  extensively about the current understanding of the pathophysiology of Multiple Sclerosis.  I have explained to patient that Multiple Sclerosis is common among young individuals.  It is thought to be due to T-cell entering into the brain through adhesion molecules along the blood brain barrier. T helper cell 1 giving rise to inflammatory response and subsequent demyelinating process taken place in subcortical white matter region resulting in Multiple Sclerosis  symptoms.  The management of multiple sclerosis (MS) has been substantially advanced by the availability of the disease-modifying agents glatiramer acetate and interferon beta 1a and 1b, mitoxantrone, and natalizumab. A number of positive outcomes have been demonstrated in people with relapsing disease: reduction in the frequency of relapses ,reduction of brain lesion development, as evidenced by magnetic resonance  imaging (MRI) and the possible reduction of disability progression in patients using  Betaseron; Avonex; Copaxone; Rebif; Novantrone; and Tysabri.     Recently, oral agents have been approved by FDA for the management of relapsing remitting MS, including Gilenya, Aubagio and Tacfidera.  I have explained to patient extensively on the efficacy and side effect profile of each treatment options and patient has expressed understanding.    This Document is signed by Alirio Jerez MD, FAAN, FAASM   June 21, 20187:53 PM

## 2018-06-26 ENCOUNTER — TELEPHONE (OUTPATIENT)
Dept: NEUROLOGY | Facility: CLINIC | Age: 53
End: 2018-06-26

## 2018-06-26 ENCOUNTER — HOSPITAL ENCOUNTER (OUTPATIENT)
Dept: INFUSION THERAPY | Facility: HOSPITAL | Age: 53
Setting detail: INFUSION SERIES
Discharge: HOME | End: 2018-06-26
Attending: PSYCHIATRY & NEUROLOGY

## 2018-06-26 VITALS
DIASTOLIC BLOOD PRESSURE: 81 MMHG | RESPIRATION RATE: 18 BRPM | OXYGEN SATURATION: 98 % | HEART RATE: 71 BPM | SYSTOLIC BLOOD PRESSURE: 121 MMHG | TEMPERATURE: 98.7 F

## 2018-06-26 DIAGNOSIS — G35 RELAPSING REMITTING MULTIPLE SCLEROSIS (HCC): ICD-10-CM

## 2018-06-26 PROCEDURE — 96365 THER/PROPH/DIAG IV INF INIT: CPT

## 2018-06-26 PROCEDURE — 25010000002 METHYLPREDNISOLONE: Performed by: PSYCHIATRY & NEUROLOGY

## 2018-06-26 RX ORDER — SODIUM CHLORIDE 9 MG/ML
250 INJECTION, SOLUTION INTRAVENOUS ONCE
Status: DISCONTINUED | OUTPATIENT
Start: 2018-06-26 | End: 2018-06-28 | Stop reason: HOSPADM

## 2018-06-26 RX ORDER — SODIUM CHLORIDE 9 MG/ML
250 INJECTION, SOLUTION INTRAVENOUS ONCE
Status: CANCELLED | OUTPATIENT
Start: 2018-06-26

## 2018-06-26 RX ADMIN — METHYLPREDNISOLONE SODIUM SUCCINATE 1 G: 1 INJECTION, POWDER, FOR SOLUTION INTRAMUSCULAR; INTRAVENOUS at 08:44

## 2018-06-27 ENCOUNTER — HOSPITAL ENCOUNTER (OUTPATIENT)
Dept: INFUSION THERAPY | Facility: HOSPITAL | Age: 53
Setting detail: INFUSION SERIES
Discharge: HOME | End: 2018-06-27
Attending: PSYCHIATRY & NEUROLOGY

## 2018-06-27 VITALS
SYSTOLIC BLOOD PRESSURE: 132 MMHG | HEART RATE: 98 BPM | OXYGEN SATURATION: 98 % | TEMPERATURE: 97.9 F | DIASTOLIC BLOOD PRESSURE: 82 MMHG

## 2018-06-27 DIAGNOSIS — G35 RELAPSING REMITTING MULTIPLE SCLEROSIS (HCC): ICD-10-CM

## 2018-06-27 PROCEDURE — 96365 THER/PROPH/DIAG IV INF INIT: CPT

## 2018-06-27 PROCEDURE — 25010000002 METHYLPREDNISOLONE: Performed by: PSYCHIATRY & NEUROLOGY

## 2018-06-27 RX ORDER — SODIUM CHLORIDE 9 MG/ML
250 INJECTION, SOLUTION INTRAVENOUS ONCE
Status: DISCONTINUED | OUTPATIENT
Start: 2018-06-27 | End: 2018-06-29 | Stop reason: HOSPADM

## 2018-06-27 RX ORDER — SODIUM CHLORIDE 9 MG/ML
250 INJECTION, SOLUTION INTRAVENOUS ONCE
Status: CANCELLED | OUTPATIENT
Start: 2018-06-27

## 2018-06-27 RX ADMIN — METHYLPREDNISOLONE SODIUM SUCCINATE 1 G: 1 INJECTION, POWDER, FOR SOLUTION INTRAMUSCULAR; INTRAVENOUS at 09:19

## 2018-06-28 ENCOUNTER — HOSPITAL ENCOUNTER (OUTPATIENT)
Dept: INFUSION THERAPY | Facility: HOSPITAL | Age: 53
Setting detail: INFUSION SERIES
Discharge: HOME | End: 2018-06-28
Attending: PSYCHIATRY & NEUROLOGY

## 2018-06-28 VITALS
RESPIRATION RATE: 16 BRPM | HEART RATE: 76 BPM | OXYGEN SATURATION: 97 % | SYSTOLIC BLOOD PRESSURE: 118 MMHG | DIASTOLIC BLOOD PRESSURE: 73 MMHG | TEMPERATURE: 97.8 F

## 2018-06-28 DIAGNOSIS — G35 RELAPSING REMITTING MULTIPLE SCLEROSIS (HCC): ICD-10-CM

## 2018-06-28 PROCEDURE — 96365 THER/PROPH/DIAG IV INF INIT: CPT

## 2018-06-28 PROCEDURE — 25010000002 METHYLPREDNISOLONE: Performed by: PSYCHIATRY & NEUROLOGY

## 2018-06-28 RX ORDER — SODIUM CHLORIDE 9 MG/ML
250 INJECTION, SOLUTION INTRAVENOUS ONCE
Status: DISCONTINUED | OUTPATIENT
Start: 2018-06-28 | End: 2018-06-30 | Stop reason: HOSPADM

## 2018-06-28 RX ORDER — SODIUM CHLORIDE 9 MG/ML
250 INJECTION, SOLUTION INTRAVENOUS ONCE
Status: CANCELLED | OUTPATIENT
Start: 2018-06-28

## 2018-06-28 RX ADMIN — METHYLPREDNISOLONE SODIUM SUCCINATE 1 G: 1 INJECTION, POWDER, FOR SOLUTION INTRAMUSCULAR; INTRAVENOUS at 09:47

## 2018-07-13 RX ORDER — FINGOLIMOD HCL 0.5 MG/1
CAPSULE ORAL
Qty: 90 CAPSULE | Refills: 1 | Status: SHIPPED | OUTPATIENT
Start: 2018-07-13 | End: 2019-01-29

## 2018-07-20 ENCOUNTER — TELEPHONE (OUTPATIENT)
Dept: INTERNAL MEDICINE | Facility: CLINIC | Age: 53
End: 2018-07-20

## 2018-07-20 ENCOUNTER — OFFICE VISIT (OUTPATIENT)
Dept: NEUROLOGY | Facility: CLINIC | Age: 53
End: 2018-07-20

## 2018-07-20 VITALS
SYSTOLIC BLOOD PRESSURE: 130 MMHG | HEIGHT: 72 IN | HEART RATE: 86 BPM | BODY MASS INDEX: 26.68 KG/M2 | OXYGEN SATURATION: 99 % | WEIGHT: 197 LBS | DIASTOLIC BLOOD PRESSURE: 82 MMHG

## 2018-07-20 DIAGNOSIS — G35 RELAPSING REMITTING MULTIPLE SCLEROSIS (HCC): Primary | ICD-10-CM

## 2018-07-20 DIAGNOSIS — Q67.0 FACIAL ASYMMETRY: ICD-10-CM

## 2018-07-20 DIAGNOSIS — R26.9 GAIT DIFFICULTY: ICD-10-CM

## 2018-07-20 PROCEDURE — 99214 OFFICE O/P EST MOD 30 MIN: CPT | Performed by: PSYCHIATRY & NEUROLOGY

## 2018-07-20 NOTE — TELEPHONE ENCOUNTER
Patient's wife called stating that patient needed a follow up scheduled, with Dr. Richards before she leaves. Is this something you'd like to work in, there were no openings for a follow up available to schedule.

## 2018-07-21 NOTE — PROGRESS NOTES
[facility] NEUROLOGY [place] PROGRESS NOTE  History of Present Illness     Date: 7/22/2018    Patient Identification  Vinnie Whitley III is a 52 y.o. male.    Patient information was obtained from patient.  History/Exam limitations: none.    Original consultation requested by: Marlen Richards MD      Chief Complaint   Multiple Sclerosis (Pt in office today for follow up, discuss returning to work )      History of Present Illness   Patient is a pleasant 52-year-old gentleman referred to [facility] neurology [place] for evaluation and management of multiple sclerosis.  Patient has extensive evaluation including MRI and spinal fluid and he was told that all of his finding is most consistent with multiple sclerosis.  Patient was initially treated with Avonex however patient was unable to tolerate flulike symptom.  He was socially switch to Copaxone.  He was able to tolerate the medication well however patient developed multiple recurrent exacerbation.  He was then switched to Tecfidera.  Patient still continue to have recurrent exacerbation.  He was finally switched to gently near.  Patient has been followed closely with his ophthalmology to ruled out macula edema.  Patient has been tolerating medication well without any apparent side effects however patient developed another acute exacerbation after 1 year treatment of Gilenya .  The episode left him with left face weakness and gait difficulty.  Patient presented office today to discuss other treatment options.      We have counseled patient extensively on other treatment options including Tysabri, Lemtrada, Rituxan and Ocrevus.  We have discussed the side effects of various treatment options.  We have also discussed about the regiment of the infusion.  We have also discussed the risk of PML with Tysabri and Ocrevus.  We have also discussed with the patient that Ocrevus has been approved for primary progressive type of multiple sclerosis.  We have also  discussed about how the medication can lower his immune system and the importance of watching for infection.  I will refer this patient to [facility] for infusion.    We'll continue to hold patient out of work for now.      PMH:   Past Medical History:   Diagnosis Date   • Infection of kidney     H/o   • Kidney stones    • Nephrolithiasis     H/o   • Pneumothorax     H/o       Past Surgical History:   Past Surgical History:   Procedure Laterality Date   • KIDNEY STONE SURGERY  2005    Lithotomy   • LUNG SURGERY         Family Hisotry:   Family History   Problem Relation Age of Onset   • Polycystic kidney disease Mother    • Dementia Mother    • Kidney disease Mother    • No Known Problems Sister    • No Known Problems Maternal Uncle    • Kidney disease Maternal Grandmother    • Polycystic kidney disease Maternal Grandmother    • Early death Maternal Grandfather        Social History:   Social History     Social History   • Marital status:      Spouse name: N/A   • Number of children: N/A   • Years of education: N/A     Occupational History   • Not on file.     Social History Main Topics   • Smoking status: Former Smoker     Packs/day: 0.50     Years: 20.00     Types: Cigarettes     Start date: 1981     Quit date: 1/1/2001   • Smokeless tobacco: Never Used   • Alcohol use No   • Drug use: No   • Sexual activity: Yes     Partners: Female     Other Topics Concern   • Not on file     Social History Narrative   • No narrative on file       Medications:   Current Outpatient Prescriptions   Medication Sig Dispense Refill   • citalopram (CeleXA) 20 MG tablet Take 1 tablet by mouth Daily. 90 tablet 1   • dextromethorphan-quinidine (NUEDEXTA) 20-10 MG capsule capsule Take 1 capsule by mouth Every 12 (Twelve) Hours. 180 capsule 1   • GILENYA 0.5 MG capsule TAKE 1 CAPSULE DAILY 90 capsule 1   • NAPROXEN  MG EC tablet TAKE 1 TABLET TWICE A DAY WITH MEALS 180 tablet 1   • pramipexole (MIRAPEX) 0.5 MG tablet Take 1  "tablet by mouth 2 (Two) Times a Day. 180 tablet 1   • TiZANidine (ZANAFLEX) 4 MG capsule Take 1 capsule by mouth 3 (Three) Times a Day As Needed for Muscle Spasms. 180 capsule 1     No current facility-administered medications for this visit.        Allergy: No Known Allergies    Review of Systems:  Review of Systems   Constitutional: Positive for fatigue. Negative for chills and fever.   HENT: Negative for congestion, ear pain, hearing loss, rhinorrhea and sore throat.    Eyes: Negative for pain, discharge and redness.   Respiratory: Negative for cough, shortness of breath, wheezing and stridor.    Cardiovascular: Negative for chest pain, palpitations and leg swelling.   Gastrointestinal: Negative for abdominal pain, constipation, nausea and vomiting.   Endocrine: Negative for cold intolerance, heat intolerance and polyphagia.   Genitourinary: Negative for dysuria, flank pain, frequency and urgency.   Musculoskeletal: Positive for gait problem. Negative for joint swelling, myalgias, neck pain and neck stiffness.   Skin: Negative for pallor, rash and wound.   Allergic/Immunologic: Negative for environmental allergies.   Neurological: Positive for facial asymmetry, speech difficulty, weakness and numbness. Negative for dizziness, tremors, seizures, syncope, light-headedness and headaches.   Hematological: Negative for adenopathy.   Psychiatric/Behavioral: Positive for decreased concentration and sleep disturbance. Negative for confusion and hallucinations. The patient is not nervous/anxious.        Physical Exam     Vitals:    07/20/18 1410   BP: 130/82   Pulse: 86   SpO2: 99%   Weight: 89.4 kg (197 lb)   Height: 182.9 cm (72\")     GENERAL: Patient is pleasant, cooperative, appears to be stated age.  Body habitus is endomorphic.  SKIN AND EXTREMITIES:  No skin rashes or lesions are noted.  No cyanosis, clubbing or edema of the extremities.    HEAD:  Head is normocephalic and atraumatic.    NECK: Neck are non-tender " without thyromegaly or adenopathy.  Carotic upstrokes are 1+/4.  No cranial or cervical bruits.  The neck is supple with a full range of motion.   ENT: palate elevate symmetrically, no evidence of high arch palate, tongue midline erythema in posterior pharynx, Mallampati Classification Class III   CARDIOVASCULAR:  Regular rate and rhythm with normal S1 and S2 without rub or gallop.  RESPIRATORY:  Clear to auscultation without wheezes or crackle   ABDOMEN:  Soft and non-tender, positive bowel sound without hepatosplenomegaly  BACK:  Back is straight without midline defect.    PSYCH:  Higher cortical function/mental status:  The patient is alert.  She is oriented x3 to time, place and person.  Recent and the remote memory appear normal.  The patient has a good fund of knowledge.  There is no visual or auditory hallucination or suicidal or homicidal ideation.  SPEECH:There is no gross evidence of aphasia, dysarthria or agnosia.      CRANIAL NERVES:  Pupils are 4mm, equal round reactive to light, reacting briskly to 2mm without afferent pupillary defect.  Visual fields are intact to confrontation testing.  Fundoscopic examination reveals sharp disk margins with normal vasculature.  No papilledema, hemorrhages or exudates.  Extraocular movements are full and smooth with normal pursuits and saccades.  No nystagmus noted.  Left facial weakness with asymmetry. palate elevate symmetrically, Tongue midline, positive gag reflex. The remainder of the cranial nerves are intact and symmetrical.    MOTOR: Strength is 5/5 throughout with normal  bulk with the following exceptions, 4/5 intrinsic muscles of the hands and feet.  Inreased tone is noted in his left lower extremities No involuntary movements noted.    Deep Tendon Reflexes: are 2/4 and symmetrical in the upper extremities, 2/4 and symmetrical at the knees and 1/4 and symmetrical at the Achilles tendon.  Plantar responses were down-going bilaterally.    SENSATION:  Intact  to pinprick, light touch, vibration and proprioception.  Coordination:  The patient normally performs finger-nose-finger, heel-to-knee-to-shin and rapid alternating movements in symmetrical fashion.    COORDINATION AND GAIT:  Patient ambulated with a spastic gait    MUSCULOSKELETAL: Range of motion normal, no clubbing, cyanosis, or edema.  No joint swelling.            Studies: I have personally reviewed the following and discussed with the patient.  Results for orders placed or performed during the hospital encounter of 07/11/18   POC Urinalysis Dipstick, Multipro (Automated dipstick)   Result Value Ref Range    Color Yellow Yellow, Straw, Dark Yellow, Afia    Clarity, UA Clear Clear    Glucose, UA Negative Negative, 1000 mg/dL (3+) mg/dL    Bilirubin Negative Negative    Ketones, UA Negative Negative    Specific Gravity  1.030 1.005 - 1.030    Blood, UA Negative Negative    pH, Urine 6.0 5.0 - 8.0    Protein, POC Negative Negative mg/dL    Urobilinogen, UA Normal Normal    Nitrite, UA Negative Negative    Leukocytes Negative Negative         Records Reviewed: I have personally reviewed his previous medical record.    Vinnie was seen today for multiple sclerosis.    Diagnoses and all orders for this visit:    Relapsing remitting multiple sclerosis (CMS/HCC)    Gait difficulty    Facial asymmetry        Treatments:  1. We have counseled patient extensively on other treatment options including Tysabri, Lemtrada, Rituxan and Ocrevus.    2. We have discussed the side effects of various treatment options.    3. We have also discussed about the regiment of the infusion and the frequency.    4. We have also discussed the risk of PML with Tysabri and Ocrevus.    5. We have also discussed with the patient that Ocrevus has been approved for primary progressive type of multiple sclerosis.  6. We have also discussed about how the medication can lower his immune system and the importance of watching for infection.    7. I will  refer this patient to [facility] in [place] for infusion.  8. We'll continue to hold patient out of work for now.      Discussion:  I have counseled patients extensively about Multiple Sclerosis.  Multiple Sclerosis is quite common approximately 400,000 patients in the [place].  Women are 3 times more common than male.  Individual with a first-degree relative with MS have a 5-10 fold increase risk for developing the disease.  While monozygotic twin have a 25% concordance rate for MS compared to 0.1% risk in the general population.  However, environmental risk factors, especially before age 15, including tobacco exposure , infectious agents (EBV) and greater distance from the equator (may be due to decreased light exposure or vitamin D concentration) have been implicated.    I have also discussed at length about the diagnosis of multiple sclerosis requires detail clinical history and examination demonstrating neurologic symptoms persisted for at least 24 hours (such as cerebellar symptoms, Ophthalmologic findings, sensory motor and neurocognitive involvement or spinal cord symptoms) dissemination in space and time.  Magnetic resonance imaging, spinal fluid studies, and neuroelectrophysiology studies are useful tools in conjunction with clinical history in diagnosing and managing patient with MS.  I have also explained to the patient the four clinical phenotypes of multiple sclerosis:  1. Clinically isolated syndrome  2. Relapsing Remitting multiple sclerosis (RR)  3. Primary progressive disease  4. Progressive after initial relapsing course which occur in 50% of RR patient after 10-15 years (90% in 25 years)  Histopathologic process that leads to MS plaques including inflammation, myelin breakdown, astrogliosis oligodendrocyte injury, remyelination, axonal loss, and neuro degeneration.      Patient with radiologically isolated syndrome (RIS) has a 34% risk of developing first clinical event over 5  years and nearly 80% by 15 years.  Factors that increase that risk include:   1. Young age (<37 years old)  2. Male gender  3. Gadolinium enhancement  4. Spinal cord lesion (increase to 55% risk of developing first clinical event in 5 years)  5. Presence of oligoclonal band in children  The highest risk group (men, <37 years old with enhancing spinal cord lesion) has a 90% risk of developing first clinical event by 3 years  The management of multiple sclerosis (MS) has been substantially advanced by the availability of the disease-modifying treatments (DMT):   1. Injectable agents: including,  glatiramer acetate and interferon beta 1a and 1b. We have discussed their route of administration, Clinical Outcome measures (COM) with annual relapse rate  (ARR) reduced by 30% and their MRI Outcome Measure (MOM) of 33-83% reduction in contrast enhanced lesions (NASREEN) and their adverse effect and monitoring parameters  2. Oral agents: Including Tecfidera, Gilenya and Aubagio.  We have discussed each of their COM and annual relapse rate (ARR) by 50% and their MOM showed an 80% reduction in NASREEN  3. Infusion treatments: Including   a. Tysabri : Monthly infusion with a 70% reduction in ARR and a 90% reduction in NASREEN.  Also discussed the risk for PML and the importance of compliance and REMS monitoring  b. Ocrevus: Every 6 month infusion with a 46% reduction in ARR and 94% reduction in NASREEN.  Also discussed the risk for infusion reaction and reactivation of hepatitis B.  c. Lemtrade: IV infusion daily for 5 days followed by daily infusion for 3 more days 1 year after initial treatment.  With a 50% reduction in ARR and 63% reduction in NASREEN.  We have also discussed the risk for thyroid disease ITP Anti GBM and infusion reaction.  With discussed the importance of REMS monitoring  I have also stress that it is non-scientifically valid to compared therapeutic across trials given difference is inpatient populations, diagnostic criteria  and other variables.  I have also counseled patient extensively on lifestyle/nonpharmacologic and pharmacologic approaches in the management of MS symptoms including physical therapy, nutritional supplement, Botox injection for spasticity Ditropan and Detrol to increase bladder capacity and voiding control, amantadine and modafinil for fatigue and SSRI for depression and TCA and antiepileptic drug for pain management.    I do appreciate your kind referral and allowing me to participate in the care of this challenging and interesting patient.  If you have any question please feel free to contact me.        This Document is signed by Alirio Jerez MD, FAAN, FAASM   July 22, 20189:08 PM

## 2018-07-27 ENCOUNTER — OFFICE VISIT (OUTPATIENT)
Dept: INTERNAL MEDICINE | Facility: CLINIC | Age: 53
End: 2018-07-27

## 2018-07-27 VITALS
HEART RATE: 82 BPM | SYSTOLIC BLOOD PRESSURE: 130 MMHG | OXYGEN SATURATION: 98 % | DIASTOLIC BLOOD PRESSURE: 84 MMHG | HEIGHT: 72 IN | BODY MASS INDEX: 26.58 KG/M2 | TEMPERATURE: 98 F | WEIGHT: 196.25 LBS

## 2018-07-27 DIAGNOSIS — G47.33 OSA ON CPAP: Primary | ICD-10-CM

## 2018-07-27 DIAGNOSIS — G35 RELAPSING REMITTING MULTIPLE SCLEROSIS (HCC): ICD-10-CM

## 2018-07-27 DIAGNOSIS — Z23 NEED FOR SHINGLES VACCINE: ICD-10-CM

## 2018-07-27 DIAGNOSIS — Z99.89 OSA ON CPAP: Primary | ICD-10-CM

## 2018-07-27 PROCEDURE — 99213 OFFICE O/P EST LOW 20 MIN: CPT | Performed by: FAMILY MEDICINE

## 2018-07-27 RX ORDER — TEMAZEPAM 15 MG/1
CAPSULE ORAL
Qty: 60 CAPSULE | Refills: 1 | Status: SHIPPED | OUTPATIENT
Start: 2018-07-27 | End: 2019-01-29

## 2018-07-30 ENCOUNTER — TELEPHONE (OUTPATIENT)
Dept: NEUROLOGY | Facility: CLINIC | Age: 53
End: 2018-07-30

## 2018-07-30 NOTE — TELEPHONE ENCOUNTER
PT called concerning paperwork that [facility] faxed over to confirm the dates Dr Jerez put him off of work.     Please Advise

## 2018-08-29 RX ORDER — CITALOPRAM 20 MG/1
TABLET ORAL
Qty: 90 TABLET | Refills: 1 | Status: SHIPPED | OUTPATIENT
Start: 2018-08-29 | End: 2019-02-25 | Stop reason: SDUPTHER

## 2018-10-01 RX ORDER — PRAMIPEXOLE DIHYDROCHLORIDE 0.5 MG/1
TABLET ORAL
Qty: 180 TABLET | Refills: 2 | Status: SHIPPED | OUTPATIENT
Start: 2018-10-01 | End: 2019-06-29 | Stop reason: SDUPTHER

## 2018-10-16 ENCOUNTER — OFFICE VISIT (OUTPATIENT)
Dept: NEUROLOGY | Facility: CLINIC | Age: 53
End: 2018-10-16

## 2018-10-16 VITALS
HEIGHT: 72 IN | SYSTOLIC BLOOD PRESSURE: 132 MMHG | OXYGEN SATURATION: 98 % | DIASTOLIC BLOOD PRESSURE: 88 MMHG | HEART RATE: 90 BPM

## 2018-10-16 DIAGNOSIS — G35 MS (MULTIPLE SCLEROSIS) (HCC): ICD-10-CM

## 2018-10-16 DIAGNOSIS — Q23.2 MS (CONGENITAL MITRAL STENOSIS): Primary | ICD-10-CM

## 2018-10-16 PROCEDURE — 99214 OFFICE O/P EST MOD 30 MIN: CPT | Performed by: PSYCHIATRY & NEUROLOGY

## 2018-10-22 NOTE — PROGRESS NOTES
[facility] NEUROLOGY [place] PROGRESS NOTE  History of Present Illness     Date: October 16 2018  Patient Identification  Vinnie Whitley III is a 53 y.o. male.    Patient information was obtained from patient.  History/Exam limitations: none.    Original consultation requested by: Dr. Richards      Chief Complaint   Multiple Sclerosis (Pt in office today with c/o worsening sx, states he is unsure if he ever got over last MS relapse. Pt states he underwent infusion 10/12 at , having numbness in Left side)      History of Present Illness   Patient is a pleasant 53-year-old gentleman referred to [facility] neurology [place] for evaluation of relapsing remitting multiple sclerosis patient reported that since his last infusion.  there is no new lesion or symptoms patient reported that he still have residual weakness and numbness from his last exacerbation. .      PMH:   Past Medical History:   Diagnosis Date   • Infection of kidney     H/o   • Kidney stones    • Nephrolithiasis     H/o   • Pneumothorax     H/o       Past Surgical History:   Past Surgical History:   Procedure Laterality Date   • KIDNEY STONE SURGERY  2005    Lithotomy   • LUNG SURGERY         Family Hisotry:   Family History   Problem Relation Age of Onset   • Polycystic kidney disease Mother    • Dementia Mother    • Kidney disease Mother    • No Known Problems Sister    • No Known Problems Maternal Uncle    • Kidney disease Maternal Grandmother    • Polycystic kidney disease Maternal Grandmother    • Early death Maternal Grandfather        Social History:   Social History     Social History   • Marital status:      Spouse name: N/A   • Number of children: N/A   • Years of education: N/A     Occupational History   • Not on file.     Social History Main Topics   • Smoking status: Former Smoker     Packs/day: 0.50     Years: 20.00     Types: Cigarettes     Start date: 1981     Quit date: 1/1/2001   • Smokeless tobacco: Never Used   •  Alcohol use No   • Drug use: No   • Sexual activity: Yes     Partners: Female     Other Topics Concern   • Not on file     Social History Narrative   • No narrative on file       Medications:   Current Outpatient Prescriptions   Medication Sig Dispense Refill   • citalopram (CeleXA) 20 MG tablet TAKE 1 TABLET DAILY 90 tablet 1   • dextromethorphan-quinidine (NUEDEXTA) 20-10 MG capsule capsule Take 1 capsule by mouth Every 12 (Twelve) Hours. 180 capsule 1   • NAPROXEN  MG EC tablet TAKE 1 TABLET TWICE A DAY WITH MEALS 180 tablet 1   • pramipexole (MIRAPEX) 0.5 MG tablet TAKE 1 TABLET TWO TIMES A  tablet 2   • temazepam (RESTORIL) 15 MG capsule Take 1-2 nightly as needed for sleep 60 capsule 1   • TiZANidine (ZANAFLEX) 4 MG capsule Take 1 capsule by mouth 3 (Three) Times a Day As Needed for Muscle Spasms. 180 capsule 1   • GILENYA 0.5 MG capsule TAKE 1 CAPSULE DAILY 90 capsule 1     No current facility-administered medications for this visit.        Allergy: No Known Allergies    Review of Systems:  Review of Systems   Constitutional: Positive for fatigue. Negative for chills and fever.   HENT: Negative for congestion, ear pain, hearing loss, rhinorrhea and sore throat.    Eyes: Negative for pain, discharge and redness.   Respiratory: Negative for cough, shortness of breath, wheezing and stridor.    Cardiovascular: Negative for chest pain, palpitations and leg swelling.   Gastrointestinal: Negative for abdominal pain, constipation, nausea and vomiting.   Endocrine: Negative for cold intolerance, heat intolerance and polyphagia.   Genitourinary: Negative for dysuria, flank pain, frequency and urgency.   Musculoskeletal: Negative for joint swelling, myalgias, neck pain and neck stiffness.   Skin: Negative for pallor, rash and wound.   Allergic/Immunologic: Negative for environmental allergies.   Neurological: Positive for weakness and numbness. Negative for dizziness, tremors, seizures, syncope, facial  "asymmetry, speech difficulty, light-headedness and headaches.   Hematological: Negative for adenopathy.   Psychiatric/Behavioral: Negative for confusion and hallucinations. The patient is not nervous/anxious.        Physical Exam     Vitals:    10/16/18 1458   BP: 132/88   Pulse: 90   SpO2: 98%   Height: 182.9 cm (72\")     GENERAL: Patient is pleasant, cooperative, appears to be stated age.  Body habitus is endomorphic.  SKIN AND EXTREMITIES:  No skin rashes or lesions are noted.  No cyanosis, clubbing or edema of the extremities.    HEAD:  Head is normocephalic and atraumatic.    NECK: Neck are non-tender without thyromegaly or adenopathy.  Carotic upstrokes are 1+/4.  No cranial or cervical bruits.  The neck is supple with a full range of motion.   ENT: palate elevate symmetrically, no evidence of high arch palate, tongue midline erythema in posterior pharynx, Mallampati Classification Class III   CARDIOVASCULAR:  Regular rate and rhythm with normal S1 and S2 without rub or gallop.  RESPIRATORY:  Clear to auscultation without wheezes or crackle   ABDOMEN:  Soft and non-tender, positive bowel sound without hepatosplenomegaly  BACK:  Back is straight without midline defect.    PSYCH:  Higher cortical function/mental status:  The patient is alert.  She is oriented x3 to time, place and person.  Recent and the remote memory appear normal.  The patient has a good fund of knowledge.  There is no visual or auditory hallucination or suicidal or homicidal ideation.  SPEECH:There is no gross evidence of aphasia, dysarthria or agnosia.      CRANIAL NERVES:  Pupils are 4mm, equal round reactive to light, reacting briskly to 2mm without afferent pupillary defect.  Visual fields are intact to confrontation testing.  Fundoscopic examination reveals sharp disk margins with normal vasculature.  No papilledema, hemorrhages or exudates.  Extraocular movements are full and smooth with normal pursuits and saccades.  No nystagmus " noted.  The face left facial weakness. palate elevate symmetrically, Tongue midline, positive gag reflex. The remainder of the cranial nerves are intact and symmetrical.   MOTOR: Strength is 5/5 throughout with normal tone and bulk with the following exceptions, 4/5 intrinsic muscles of the hands and feet.  No involuntary movements noted.    Deep Tendon Reflexes: are 2/4 and symmetrical in the upper extremities, 2/4 and symmetrical at the knees and 1/4 and symmetrical at the Achilles tendon.  Plantar responses were down-going bilaterally.    SENSATION:  Intact to pinprick, light touch, vibration and proprioception.  Coordination:  The patient normally performs finger-nose-finger, heel-to-knee-to-shin and rapid alternating movements in symmetrical fashion.    COORDINATION AND GAIT: Patient ambulate with a spastic gait on the left  SKELETAL: Range of motion normal, no clubbing, cyanosis, or edema.  No joint swelling.              Records Reviewed: I have personally reviewed his previous medical record.    Vinnie was seen today for multiple sclerosis.    Diagnoses and all orders for this visit:    MS (congenital mitral stenosis)    MS (multiple sclerosis) (CMS/[facility])  -     MRI Brain With & Without Contrast; Future        Treatments:  1.  Obtain MRI of the brain with and without contrast  2.  Continue patient with infusion with Ocrevus  Discussion:  I have  extensively about the current understanding of the pathophysiology of Multiple Sclerosis.  I have explained to patient that Multiple Sclerosis is common among young individuals.  It is thought to be due to T-cell entering into the brain through adhesion molecules along the blood brain barrier. T helper cell 1 giving rise to inflammatory response and subsequent demyelinating process taken place in subcortical white matter region resulting in Multiple Sclerosis symptoms.  The management of multiple sclerosis (MS) has been substantially advanced by the availability  of the disease-modifying agents glatiramer acetate and interferon beta 1a and 1b, mitoxantrone, and natalizumab. A number of positive outcomes have been demonstrated in people with relapsing disease: reduction in the frequency of relapses ,reduction of brain lesion development, as evidenced by magnetic resonance  imaging (MRI) and the possible reduction of disability progression in patients using  Betaseron; Avonex; Copaxone; Rebif; Novantrone; and Tysabri.     Recently, oral agents have been approved by FDA for the management of relapsing remitting MS, including Gilenya, Aubagio and Tacfidera.  I have explained to patient extensively on the efficacy and side effect profile of each treatment options and patient has expressed understanding.    This Document is signed by Alirio Jerez MD, FAAN, FAASM   October 21, 201810:05 PM

## 2018-10-23 ENCOUNTER — HOSPITAL ENCOUNTER (OUTPATIENT)
Dept: MRI IMAGING | Facility: HOSPITAL | Age: 53
Discharge: HOME | End: 2018-10-23
Attending: PSYCHIATRY & NEUROLOGY | Admitting: PSYCHIATRY & NEUROLOGY

## 2018-10-23 DIAGNOSIS — G35 MS (MULTIPLE SCLEROSIS) (HCC): ICD-10-CM

## 2018-10-23 LAB — CREAT BLDA-MCNC: 1.2 MG/DL (ref 0.6–1.3)

## 2018-10-23 PROCEDURE — A9577 INJ MULTIHANCE: HCPCS | Performed by: PSYCHIATRY & NEUROLOGY

## 2018-10-23 PROCEDURE — 0 GADOBENATE DIMEGLUMINE 529 MG/ML SOLUTION: Performed by: PSYCHIATRY & NEUROLOGY

## 2018-10-23 PROCEDURE — 70553 MRI BRAIN STEM W/O & W/DYE: CPT

## 2018-10-23 PROCEDURE — 82565 ASSAY OF CREATININE: CPT

## 2018-10-23 RX ADMIN — GADOBENATE DIMEGLUMINE 15 ML: 529 INJECTION, SOLUTION INTRAVENOUS at 15:05

## 2018-10-30 RX ORDER — DEXTROMETHORPHAN HYDROBROMIDE AND QUINIDINE SULFATE 20; 10 MG/1; MG/1
CAPSULE, GELATIN COATED ORAL
Qty: 180 CAPSULE | Refills: 1 | Status: SHIPPED | OUTPATIENT
Start: 2018-10-30 | End: 2019-01-29

## 2018-10-31 ENCOUNTER — OFFICE VISIT (OUTPATIENT)
Dept: NEUROLOGY | Facility: CLINIC | Age: 53
End: 2018-10-31

## 2018-10-31 VITALS
DIASTOLIC BLOOD PRESSURE: 88 MMHG | OXYGEN SATURATION: 99 % | HEART RATE: 96 BPM | SYSTOLIC BLOOD PRESSURE: 128 MMHG | HEIGHT: 72 IN

## 2018-10-31 DIAGNOSIS — G35 MULTIPLE SCLEROSIS (HCC): Primary | ICD-10-CM

## 2018-10-31 PROCEDURE — 99214 OFFICE O/P EST MOD 30 MIN: CPT | Performed by: PSYCHIATRY & NEUROLOGY

## 2018-10-31 RX ORDER — BACLOFEN 10 MG/1
10 TABLET ORAL NIGHTLY
Qty: 30 TABLET | Refills: 5 | Status: SHIPPED | OUTPATIENT
Start: 2018-10-31 | End: 2019-01-29

## 2018-10-31 NOTE — PROGRESS NOTES
[facility] NEUROLOGY [place] PROGRESS NOTE  History of Present Illness     Date: 10/31/2018    Patient Identification  Vinnei Whitley III is a 53 y.o. male.    Patient information was obtained from patient and spouse/SO.  History/Exam limitations: none.    Original consultation requested by: Dr. Richards      Chief Complaint   Multiple Sclerosis (Pt in office today for follow up)      History of Present Illness     This is a 52yo male who is here for follow-up regarding his relapsing-remitting MS. He is present here today with his wife.     The patient was diagnosed in 2001. His first symptoms included left sided facial paralysis. He started taking Ocervus 3 weeks ago. He says that he has been experiencing fatigue, left sided weakness, and right-sided facial spasms. The patient says that this happened on Gilenya, Avonex, Copaxone, Tecfadera.     The patient also had a relapse 2-3 months ago. He experienced generalized weakness, spasms, and confusion. He denies any vision changes, bladder incontinence, or bowel issues.     PMH:   Past Medical History:   Diagnosis Date   • Infection of kidney     H/o   • Kidney stones    • Nephrolithiasis     H/o   • Pneumothorax     H/o       Past Surgical History:   Past Surgical History:   Procedure Laterality Date   • KIDNEY STONE SURGERY  2005    Lithotomy   • LUNG SURGERY         Family Hisotry:   Family History   Problem Relation Age of Onset   • Polycystic kidney disease Mother    • Dementia Mother    • Kidney disease Mother    • No Known Problems Sister    • No Known Problems Maternal Uncle    • Kidney disease Maternal Grandmother    • Polycystic kidney disease Maternal Grandmother    • Early death Maternal Grandfather        Social History:   Social History     Social History   • Marital status:      Spouse name: N/A   • Number of children: N/A   • Years of education: N/A     Occupational History   • Not on file.     Social History Main Topics   • Smoking status:  Former Smoker     Packs/day: 0.50     Years: 20.00     Types: Cigarettes     Start date: 1981     Quit date: 1/1/2001   • Smokeless tobacco: Never Used   • Alcohol use No   • Drug use: No   • Sexual activity: Yes     Partners: Female     Other Topics Concern   • Not on file     Social History Narrative   • No narrative on file       Medications:   Current Outpatient Prescriptions   Medication Sig Dispense Refill   • citalopram (CeleXA) 20 MG tablet TAKE 1 TABLET DAILY 90 tablet 1   • GILENYA 0.5 MG capsule TAKE 1 CAPSULE DAILY 90 capsule 1   • NAPROXEN  MG EC tablet TAKE 1 TABLET TWICE A DAY WITH MEALS 180 tablet 1   • NUEDEXTA 20-10 MG capsule capsule TAKE 1 CAPSULE EVERY 12 HOURS 180 capsule 1   • pramipexole (MIRAPEX) 0.5 MG tablet TAKE 1 TABLET TWO TIMES A  tablet 2   • temazepam (RESTORIL) 15 MG capsule Take 1-2 nightly as needed for sleep 60 capsule 1   • TiZANidine (ZANAFLEX) 4 MG capsule Take 1 capsule by mouth 3 (Three) Times a Day As Needed for Muscle Spasms. 180 capsule 1   • baclofen (LIORESAL) 10 MG tablet Take 1 tablet by mouth Every Night. 30 tablet 5     No current facility-administered medications for this visit.        Allergy: No Known Allergies    Review of Systems:  Review of Systems   Constitutional: Positive for fatigue. Negative for chills and fever.   HENT: Negative for congestion, ear pain, hearing loss, rhinorrhea and sore throat.    Eyes: Negative for pain, discharge and redness.   Respiratory: Negative for cough, shortness of breath, wheezing and stridor.    Cardiovascular: Negative for chest pain, palpitations and leg swelling.   Gastrointestinal: Negative for abdominal pain, constipation, nausea and vomiting.   Endocrine: Negative for cold intolerance, heat intolerance and polyphagia.   Genitourinary: Negative for dysuria, flank pain, frequency and urgency.   Musculoskeletal: Positive for gait problem. Negative for joint swelling, myalgias, neck pain and neck stiffness.  "  Skin: Negative for pallor, rash and wound.   Allergic/Immunologic: Negative for environmental allergies.   Neurological: Positive for facial asymmetry, speech difficulty, weakness and numbness. Negative for dizziness, tremors, seizures, syncope, light-headedness and headaches.   Hematological: Negative for adenopathy.   Psychiatric/Behavioral: Positive for sleep disturbance. Negative for confusion and hallucinations. The patient is not nervous/anxious.        Physical Exam     Vitals:    10/31/18 1103   BP: 128/88   Pulse: 96   SpO2: 99%   Height: 182.9 cm (72\")     GENERAL: Patient is pleasant, cooperative, appears to be stated age.  Body habitus is endomorphic.  SKIN AND EXTREMITIES:  No skin rashes or lesions are noted.  No cyanosis, clubbing or edema of the extremities.    HEAD:  Head is normocephalic and atraumatic.    NECK: Neck are non-tender without thyromegaly or adenopathy.  Carotic upstrokes are 1+/4.  No cranial or cervical bruits.  The neck is supple with a full range of motion.   ENT: palate elevate symmetrically, no evidence of high arch palate, tongue midline erythema in posterior pharynx, Mallampati Classification Class III   CARDIOVASCULAR:  Regular rate and rhythm with normal S1 and S2 without rub or gallop.  RESPIRATORY:  Clear to auscultation without wheezes or crackle   ABDOMEN:  Soft and non-tender, positive bowel sound without hepatosplenomegaly  BACK:  Back is straight without midline defect.    PSYCH:  Higher cortical function/mental status:  The patient is alert.  She is oriented x3 to time, place and person.  Recent and the remote memory appear normal.  The patient has a good fund of knowledge.  There is no visual or auditory hallucination or suicidal or homicidal ideation.  SPEECH:There is no gross evidence of aphasia, dysarthria or agnosia.      CRANIAL NERVES:  Pupils are 4mm, equal round reactive to light, reacting briskly to 2mm without afferent pupillary defect.  Visual fields are " intact to confrontation testing.  Fundoscopic examination reveals sharp disk margins with normal vasculature.  No papilledema, hemorrhages or exudates.  Extraocular movements are full and smooth with normal pursuits and saccades.  No nystagmus noted.  The face is asymmetric. palate elevate symmetrically, Tongue midline, positive gag reflex. The remainder of the cranial nerves are intact and symmetrical.    MOTOR: Strength is 5/5 throughout with normal tone and bulk with the following exceptions, 4/5 intrinsic muscles of the hands and feet.  No involuntary movements noted.    Deep Tendon Reflexes: are 2/4 and symmetrical in the upper extremities, 2/4 and symmetrical at the knees and 1/4 and symmetrical at the Achilles tendon.  Plantar responses were down-going bilaterally.    SENSATION:  Intact to pinprick, light touch, vibration and proprioception.  Coordination:  The patient normally performs finger-nose-finger, heel-to-knee-to-shin and rapid alternating movements in symmetrical fashion.    COORDINATION AND GAIT:  The patient walks with a narrow-based gait.  Patient is able to heel-toe and tandem walk forward and backwards without difficulty.  Romberg and monopedal  Romberg are negative.    MUSCULOSKELETAL: Range of motion normal, no clubbing, cyanosis, or edema.  No joint swelling.              Records Reviewed: I have personally reviewed his previous medical record.    Vinnie was seen today for multiple sclerosis.    Diagnoses and all orders for this visit:    Multiple sclerosis (CMS/HCC)    Other orders  -     baclofen (LIORESAL) 10 MG tablet; Take 1 tablet by mouth Every Night.        Treatments:    Discussion:  I have  extensively about the current understanding of the pathophysiology of Multiple Sclerosis.  I have explained to patient that Multiple Sclerosis is common among young individuals.  It is thought to be due to T-cell entering into the brain through adhesion molecules along the blood brain  barrier. T helper cell 1 giving rise to inflammatory response and subsequent demyelinating process taken place in subcortical white matter region resulting in Multiple Sclerosis symptoms.  The management of multiple sclerosis (MS) has been substantially advanced by the availability of the disease-modifying agents glatiramer acetate and interferon beta 1a and 1b, mitoxantrone, and natalizumab. A number of positive outcomes have been demonstrated in people with relapsing disease: reduction in the frequency of relapses ,reduction of brain lesion development, as evidenced by magnetic resonance  imaging (MRI) and the possible reduction of disability progression in patients using  Betaseron; Avonex; Copaxone; Rebif; Novantrone; and Tysabri.     Recently, oral agents have been approved by FDA for the management of relapsing remitting MS, including Gilenya, Aubagio and Tacfidera.  I have explained to patient extensively on the efficacy and side effect profile of each treatment options and patient has expressed understanding.    This Document is signed by Alirio Jerez MD, FAAN, FAASM   October 31, 20185:52 PM

## 2019-01-29 ENCOUNTER — OFFICE VISIT (OUTPATIENT)
Dept: NEUROLOGY | Facility: CLINIC | Age: 54
End: 2019-01-29

## 2019-01-29 VITALS — HEART RATE: 82 BPM | OXYGEN SATURATION: 98 % | SYSTOLIC BLOOD PRESSURE: 118 MMHG | DIASTOLIC BLOOD PRESSURE: 88 MMHG

## 2019-01-29 DIAGNOSIS — R26.9 GAIT DIFFICULTY: ICD-10-CM

## 2019-01-29 DIAGNOSIS — M19.90 ARTHRITIS: ICD-10-CM

## 2019-01-29 DIAGNOSIS — R25.2 SPASTICITY: ICD-10-CM

## 2019-01-29 DIAGNOSIS — G35 MULTIPLE SCLEROSIS (HCC): Primary | ICD-10-CM

## 2019-01-29 DIAGNOSIS — R29.6 FALLS FREQUENTLY: ICD-10-CM

## 2019-01-29 PROCEDURE — 99214 OFFICE O/P EST MOD 30 MIN: CPT | Performed by: PSYCHIATRY & NEUROLOGY

## 2019-01-29 RX ORDER — TIZANIDINE 4 MG/1
4 TABLET ORAL EVERY 8 HOURS PRN
Qty: 90 TABLET | Refills: 5 | Status: SHIPPED | OUTPATIENT
Start: 2019-01-29 | End: 2019-05-22 | Stop reason: SDUPTHER

## 2019-01-29 RX ORDER — TIZANIDINE 4 MG/1
4 TABLET ORAL NIGHTLY PRN
COMMUNITY
End: 2019-01-29 | Stop reason: SDUPTHER

## 2019-01-29 NOTE — PROGRESS NOTES
[facility] NEUROLOGY [place] CONSULTATION   History of Present Illness     Date: 1/29/2019    Patient Identification  Vinnie Whitley III is a 53 y.o. male.    Patient information was obtained from patient.  History/Exam limitations: none.    CONSULTATION requested by: Marlen Richards MD    Chief Complaint   Multiple Sclerosis (Pt arrives today for a follow up on MS, Pt states that MS is getting worse, Pt states that he is losing balance more and is having more pain in joints.)      History of Present Illness   Patient is a pleasant 53-year-old presents for follow-up on his diagnosis of Multiple Sclerosis.  Is currently on Ocrevus infusion.  He says today he feels that his balance is getting worse, his legs feel clumsy, and he will lose balance and fall occasionally.  He notes that he feels like his depth perception is off.  Spasticity is worsen and he would like to increase his tizanidine to 4 mg 3 times a day    He has memory problems which include losing his directions when driving.    He has joint pain in his upper and lower extremities, specifically at the elbows and knees.  He notes that whenever he exerts these muscles, it will elicit pain at the insertion sites of these muscles.  He also notes some needle-like sensations in his feet and legs that fluctuate, which has always been present but he feels is getting worse.    PMH:   Past Medical History:   Diagnosis Date   • Infection of kidney     H/o   • Kidney stones    • Nephrolithiasis     H/o   • Pneumothorax     H/o       Past Surgical History:   Past Surgical History:   Procedure Laterality Date   • KIDNEY STONE SURGERY  2005    Lithotomy   • LUNG SURGERY         Family Hisotry:   Family History   Problem Relation Age of Onset   • Polycystic kidney disease Mother    • Dementia Mother    • Kidney disease Mother    • No Known Problems Sister    • No Known Problems Maternal Uncle    • Kidney disease Maternal Grandmother    • Polycystic kidney disease  Maternal Grandmother    • Early death Maternal Grandfather        Social History:   Social History     Socioeconomic History   • Marital status:      Spouse name: Not on file   • Number of children: Not on file   • Years of education: Not on file   • Highest education level: Not on file   Social Needs   • Financial resource strain: Not on file   • Food insecurity - worry: Not on file   • Food insecurity - inability: Not on file   • Transportation needs - medical: Not on file   • Transportation needs - non-medical: Not on file   Occupational History   • Not on file   Tobacco Use   • Smoking status: Former Smoker     Packs/day: 0.50     Years: 20.00     Pack years: 10.00     Types: Cigarettes     Start date:      Last attempt to quit: 2001     Years since quittin.0   • Smokeless tobacco: Never Used   Substance and Sexual Activity   • Alcohol use: No   • Drug use: No   • Sexual activity: Yes     Partners: Female   Other Topics Concern   • Not on file   Social History Narrative   • Not on file       Medications:   Current Outpatient Medications   Medication Sig Dispense Refill   • citalopram (CeleXA) 20 MG tablet TAKE 1 TABLET DAILY 90 tablet 1   • dextromethorphan-quinidine (NUEDEXTA) 20-10 MG capsule capsule Take  by mouth Daily.     • NAPROXEN  MG EC tablet TAKE 1 TABLET TWICE A DAY WITH MEALS 180 tablet 1   • pramipexole (MIRAPEX) 0.5 MG tablet TAKE 1 TABLET TWO TIMES A  tablet 2   • tiZANidine (ZANAFLEX) 4 MG tablet Take 1 tablet by mouth Every 8 (Eight) Hours As Needed for Muscle Spasms. 90 tablet 5     No current facility-administered medications for this visit.        Allergy: No Known Allergies    Review of Systems:  Review of Systems   Constitutional: Positive for fatigue.   Eyes: Positive for visual disturbance.   Respiratory: Negative.  Negative for cough and shortness of breath.    Cardiovascular: Negative.  Negative for chest pain and palpitations.   Gastrointestinal:  Negative.  Negative for diarrhea, nausea and vomiting.   Endocrine: Negative.  Negative for cold intolerance and heat intolerance.   Genitourinary: Positive for urgency.   Musculoskeletal: Positive for arthralgias and gait problem.   Skin:        Easy bruising   Neurological: Positive for speech difficulty, weakness and numbness.   Psychiatric/Behavioral: Positive for confusion, decreased concentration, dysphoric mood and sleep disturbance. The patient is nervous/anxious.      Patient mentions some easy bruising since being on Naproxen.  He otherwise denies any headache, changes in vision, dysphagia, chest pain, shortness of breath, abdominal pain, nausea/vomiting, changes in bowel/bladder function, myalgias, arthralgias, or neuropathy.    Physical Exam     Vitals:    01/29/19 1558   BP: 118/88   Pulse: 82   SpO2: 98%     GENERAL: Patient is pleasant, cooperative, appears to be stated age.  Body habitus is endomorphic.  SKIN AND EXTREMITIES:  No skin rashes or lesions are noted.  No cyanosis, clubbing or edema of the extremities.    HEAD:  Head is normocephalic and atraumatic.    NECK: Nontender without thyromegaly or adenopathy.  Carotid upstrokes are 1+/4.  No cranial or cervical bruits.  The neck is supple with a full range of motion.   ENT: Palate elevates symmetrically.  No evidence of high arch palate.  Tongue midline.  No erythema in posterior pharynx.  Mallampati Classification Class III   CARDIOVASCULAR:  Regular rate and rhythm with normal S1 and S2 without rub or gallop.  RESPIRATORY:  Clear to auscultation without wheezes or crackle   ABDOMEN:  Soft and nontender, positive bowel sound without hepatosplenomegaly  BACK:  Back is straight without midline defect.    PSYCH:  Higher cortical function/mental status:  The patient is alert.  The patient is oriented x3 to time, place and person.  Recent and the remote memory appear normal.  The patient has a good fund of knowledge.  There is no visual or auditory  hallucination or suicidal or homicidal ideation.  SPEECH:There is no gross evidence of aphasia, dysarthria or agnosia.      CRANIAL NERVES:  Pupils are 4mm, equal round reactive to light, reacting briskly to 2mm without afferent pupillary defect.  Visual fields are intact to confrontation testing.  Extraocular movements are full and smooth with normal pursuits and saccades.  No nystagmus noted.  Some left sided weakness is noted in eye movements.  The face is symmetric with decreased sensation to light touch on the left side in the region of the trigeminal nerve. Palate elevates symmetrically, Tongue midline, positive gag reflex. The remainder of the cranial nerves are intact and symmetrical.    MOTOR: Strength is 5/5 throughout with normal tone and bulk with the following exceptions, 3/5 in the left lower extremity.  No involuntary movements noted.    SENSATION:  Coordination:  The patient normally performs finger-nose-finger, heel-to-knee-to-shin and rapid alternating movements in symmetrical fashion.    COORDINATION AND GAIT:  The patient walks with a narrow-based gait.  Patient is able to heel-toe and tandem walk forward and backwards with minimal difficulty.  Romberg and monopedal are negative.    MUSCULOSKELETAL: Range of motion normal, no clubbing, cyanosis, or edema.  No joint swelling.            Records Reviewed: I have personally reviewed his previous medical record.    Vinnie was seen today for multiple sclerosis.    Diagnoses and all orders for this visit:    Multiple sclerosis (CMS/HCC)    Gait difficulty    Falls frequently    Arthritis    Spasticity    Other orders  -     tiZANidine (ZANAFLEX) 4 MG tablet; Take 1 tablet by mouth Every 8 (Eight) Hours As Needed for Muscle Spasms.      Discussion:  In summary, 53-year-old presents for follow-up on his diagnosis of Multiple Sclerosis.  Is currently on Ocrevus infusion.  He says today he feels that his balance is getting worse, his legs feel clumsy, and he  will lose balance and fall occasionally.  He notes that he feels like his depth perception is off.  Spasticity is worsen and he would like to increase his tizanidine to 4 mg 3 times a day    He has memory problems which include losing his directions when driving.    This Document is signed by Alirio Jerez MD, FAAN, FAASM January 29, 201911:14 PM

## 2019-02-25 RX ORDER — CITALOPRAM 20 MG/1
TABLET ORAL
Qty: 90 TABLET | Refills: 1 | Status: SHIPPED | OUTPATIENT
Start: 2019-02-25

## 2019-03-12 ENCOUNTER — OFFICE VISIT (OUTPATIENT)
Dept: NEUROLOGY | Facility: CLINIC | Age: 54
End: 2019-03-12

## 2019-03-12 VITALS
SYSTOLIC BLOOD PRESSURE: 126 MMHG | HEIGHT: 72 IN | HEART RATE: 91 BPM | OXYGEN SATURATION: 99 % | BODY MASS INDEX: 26.62 KG/M2 | DIASTOLIC BLOOD PRESSURE: 80 MMHG

## 2019-03-12 DIAGNOSIS — G35 MULTIPLE SCLEROSIS (HCC): Primary | ICD-10-CM

## 2019-03-12 DIAGNOSIS — Q67.0 FACIAL ASYMMETRY: ICD-10-CM

## 2019-03-12 DIAGNOSIS — R26.9 GAIT DIFFICULTY: ICD-10-CM

## 2019-03-12 PROCEDURE — 99213 OFFICE O/P EST LOW 20 MIN: CPT | Performed by: PSYCHIATRY & NEUROLOGY

## 2019-03-12 RX ORDER — MODAFINIL 100 MG/1
100 TABLET ORAL DAILY
Qty: 30 TABLET | Refills: 2 | Status: SHIPPED | OUTPATIENT
Start: 2019-03-12 | End: 2019-05-10 | Stop reason: SDUPTHER

## 2019-03-12 NOTE — PROGRESS NOTES
[facility] NEUROLOGY [place] CONSULTATION   History of Present Illness     Date: 3/14/2019    Patient Identification  Vinnie Whitley III is a 53 y.o. male.    Patient information was obtained from patient.  History/Exam limitations: none.    CONSULTATION requested by: Dr. Richards    Chief Complaint   Multiple Sclerosis (Pt in office today for follow up appt. Pt has not been taking Nuedexta due to cost of medication )      History of Present Illness   Patient is a pleasant 53-year-old gentleman referred to [facility] neurology [place] for multiple sclerosis follow-up.  Patient is currently on Ocrevus infusion.  Patient still continued to have balance difficulty.  He still have difficulty with depth perception.  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      PMH:   Past Medical History:   Diagnosis Date   • Infection of kidney     H/o   • Kidney stones    • Nephrolithiasis     H/o   • Pneumothorax     H/o       Past Surgical History:   Past Surgical History:   Procedure Laterality Date   • KIDNEY STONE SURGERY  2005    Lithotomy   • LUNG SURGERY         Family Hisotry:   Family History   Problem Relation Age of Onset   • Polycystic kidney disease Mother    • Dementia Mother    • Kidney disease Mother    • No Known Problems Sister    • No Known Problems Maternal Uncle    • Kidney disease Maternal Grandmother    • Polycystic kidney disease Maternal Grandmother    • Early death Maternal Grandfather        Social History:   Social History     Socioeconomic History   • Marital status:      Spouse name: Not on file   • Number of children: Not on file   • Years of education: Not on file   • Highest education level: Not on file   Social Needs   • Financial resource strain: Not on file   • Food insecurity - worry: Not on file   • Food insecurity - inability: Not on file   • Transportation needs - medical: Not on file   • Transportation needs - non-medical: Not on file    Occupational History   • Not on file   Tobacco Use   • Smoking status: Former Smoker     Packs/day: 0.50     Years: 20.00     Pack years: 10.00     Types: Cigarettes     Start date:      Last attempt to quit: 2001     Years since quittin.2   • Smokeless tobacco: Never Used   Substance and Sexual Activity   • Alcohol use: No   • Drug use: No   • Sexual activity: Yes     Partners: Female   Other Topics Concern   • Not on file   Social History Narrative   • Not on file       Medications:   Current Outpatient Medications   Medication Sig Dispense Refill   • citalopram (CeleXA) 20 MG tablet TAKE 1 TABLET DAILY 90 tablet 1   • NAPROXEN  MG EC tablet TAKE 1 TABLET TWICE A DAY WITH MEALS 180 tablet 1   • pramipexole (MIRAPEX) 0.5 MG tablet TAKE 1 TABLET TWO TIMES A  tablet 2   • tiZANidine (ZANAFLEX) 4 MG tablet Take 1 tablet by mouth Every 8 (Eight) Hours As Needed for Muscle Spasms. 90 tablet 5   • dextromethorphan-quinidine (NUEDEXTA) 20-10 MG capsule capsule Take 1 capsule by mouth Daily. 90 capsule 1   • modafinil (PROVIGIL) 100 MG tablet Take 1 tablet by mouth Daily. 30 tablet 2     No current facility-administered medications for this visit.        Allergy: No Known Allergies    Review of Systems:  Review of Systems   Constitutional: Positive for fatigue. Negative for chills and fever.   HENT: Negative for congestion, ear pain, hearing loss, rhinorrhea and sore throat.    Eyes: Negative for pain, discharge and redness.   Respiratory: Negative for cough, shortness of breath, wheezing and stridor.    Cardiovascular: Negative for chest pain, palpitations and leg swelling.   Gastrointestinal: Negative for abdominal pain, constipation, nausea and vomiting.   Endocrine: Negative for cold intolerance, heat intolerance and polyphagia.   Genitourinary: Negative for dysuria, flank pain, frequency and urgency.   Musculoskeletal: Positive for gait problem. Negative for joint swelling, myalgias, neck  "pain and neck stiffness.   Skin: Negative for pallor, rash and wound.   Allergic/Immunologic: Negative for environmental allergies.   Neurological: Positive for dizziness, facial asymmetry, weakness and numbness. Negative for tremors, seizures, syncope, speech difficulty, light-headedness and headaches.   Hematological: Negative for adenopathy.   Psychiatric/Behavioral: Positive for sleep disturbance. Negative for confusion and hallucinations. The patient is not nervous/anxious.        Physical Exam     Vitals:    03/12/19 1018   BP: 126/80   Pulse: 91   SpO2: 99%   Height: 182.9 cm (72\")     GENERAL: Patient is pleasant, cooperative, appears to be stated age.  Body habitus is endomorphic.  SKIN AND EXTREMITIES:  No skin rashes or lesions are noted.  No cyanosis, clubbing or edema of the extremities.    HEAD:  Head is normocephalic and atraumatic.    NECK: Nontender without thyromegaly or adenopathy.  Carotid upstrokes are 1+/4.  No cranial or cervical bruits.  The neck is supple with a full range of motion.   ENT: Palate elevates symmetrically.  No evidence of high arch palate.  Tongue midline.  No erythema in posterior pharynx.  Mallampati Classification Class III   CARDIOVASCULAR:  Regular rate and rhythm with normal S1 and S2 without rub or gallop.  RESPIRATORY:  Clear to auscultation without wheezes or crackle   ABDOMEN:  Soft and nontender, positive bowel sound without hepatosplenomegaly  BACK:  Back is straight without midline defect.           Records Reviewed: I have personally reviewed his previous medical record.    Vinnie was seen today for multiple sclerosis.    Diagnoses and all orders for this visit:    Multiple sclerosis (CMS/HCC)    Gait difficulty    Facial asymmetry    Other orders  -     Discontinue: dextromethorphan-quinidine (NUEDEXTA) 20-10 MG capsule capsule; Take 1 capsule by mouth Daily.  -     modafinil (PROVIGIL) 100 MG tablet; Take 1 tablet by mouth Daily.  -     Discontinue: " dextromethorphan-quinidine (NUEDEXTA) 20-10 MG capsule capsule; Take 1 capsule by mouth Every 12 (Twelve) Hours.  -     dextromethorphan-quinidine (NUEDEXTA) 20-10 MG capsule capsule; Take 1 capsule by mouth Daily.      Discussion:  In summary, 53-year-old gentleman referred to [facility] neurology [place] for multiple sclerosis follow-up.  Patient is currently on Ocrevus infusion.  Patient still continued to have balance difficulty.  He still have difficulty with depth perception.  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      I have counseled patients extensively about Multiple Sclerosis.  Multiple Sclerosis is quite common approximately 400,000 patients in the [place].  Women are 3 times more common than male.  Individual with a first-degree relative with MS have a 5-10 fold increase risk for developing the disease.  While monozygotic twin have a 25% concordance rate for MS compared to 0.1% risk in the general population.  However, environmental risk factors, especially before age 15, including tobacco exposure , infectious agents (EBV) and greater distance from the equator (may be due to decreased light exposure or vitamin D concentration) have been implicated.    I have also discussed at length about the diagnosis of multiple sclerosis requires detail clinical history and examination demonstrating neurologic symptoms persisted for at least 24 hours (such as cerebellar symptoms, Ophthalmologic findings, sensory motor and neurocognitive involvement or spinal cord symptoms) dissemination in space and time.  Magnetic resonance imaging, spinal fluid studies, and neuroelectrophysiology studies are useful tools in conjunction with clinical history in diagnosing and managing patient with MS.  I have also explained to the patient the four clinical phenotypes of multiple sclerosis:  1. Clinically isolated syndrome  2. Relapsing Remitting multiple sclerosis (RR)  3. Primary  progressive disease  4. Progressive after initial relapsing course which occur in 50% of RR patient after 10-15 years (90% in 25 years)  Histopathologic process that leads to MS plaques including inflammation, myelin breakdown, astrogliosis oligodendrocyte injury, remyelination, axonal loss, and neuro degeneration.      Patient with radiologically isolated syndrome (RIS) has a 34% risk of developing first clinical event over 5 years and nearly 80% by 15 years.  Factors that increase that risk include:   1. Young age (<37 years old)  2. Male gender  3. Gadolinium enhancement  4. Spinal cord lesion (increase to 55% risk of developing first clinical event in 5 years)  5. Presence of oligoclonal band in children  The highest risk group (men, <37 years old with enhancing spinal cord lesion) has a 90% risk of developing first clinical event by 3 years  The management of multiple sclerosis (MS) has been substantially advanced by the availability of the disease-modifying treatments (DMT):   1. Injectable agents: including,  glatiramer acetate and interferon beta 1a and 1b. We have discussed their route of administration, Clinical Outcome measures (COM) with annual relapse rate  (ARR) reduced by 30% and their MRI Outcome Measure (MOM) of 33-83% reduction in contrast enhanced lesions (NASREEN) and their adverse effect and monitoring parameters  2. Oral agents: Including Tecfidera, Gilenya and Aubagio.  We have discussed each of their COM and annual relapse rate (ARR) by 50% and their MOM showed an 80% reduction in NASREEN  3. Infusion treatments: Including   a. Tysabri : Monthly infusion with a 70% reduction in ARR and a 90% reduction in NASREEN.  Also discussed the risk for PML and the importance of compliance and REMS monitoring  b. Ocrevus: Every 6 month infusion with a 46% reduction in ARR and 94% reduction in NASREEN.  Also discussed the risk for infusion reaction and reactivation of hepatitis B.  c. Lemtrade: IV infusion daily for 5  days followed by daily infusion for 3 more days 1 year after initial treatment.  With a 50% reduction in ARR and 63% reduction in NASREEN.  We have also discussed the risk for thyroid disease ITP Anti GBM and infusion reaction.  With discussed the importance of REMS monitoring  I have also stress that it is non-scientifically valid to compared therapeutic across trials given difference is inpatient populations, diagnostic criteria and other variables.  I have also counseled patient extensively on lifestyle/nonpharmacologic and pharmacologic approaches in the management of MS symptoms including physical therapy, nutritional supplement, Botox injection for spasticity Ditropan and Detrol to increase bladder capacity and voiding control, amantadine and modafinil for fatigue and SSRI for depression and TCA and antiepileptic drug for pain management.    I do appreciate your kind referral and allowing me to participate in the care of this challenging and interesting patient.  If you have any question please feel free to contact me.      This Document is signed by Alirio Jerez MD, FAAN, FAASM March 14, 20198:46 PM

## 2019-05-10 ENCOUNTER — OFFICE VISIT (OUTPATIENT)
Dept: NEUROLOGY | Facility: CLINIC | Age: 54
End: 2019-05-10

## 2019-05-10 VITALS
HEIGHT: 72 IN | BODY MASS INDEX: 26.62 KG/M2 | DIASTOLIC BLOOD PRESSURE: 82 MMHG | SYSTOLIC BLOOD PRESSURE: 120 MMHG | OXYGEN SATURATION: 99 % | HEART RATE: 88 BPM

## 2019-05-10 DIAGNOSIS — G35 MS (MULTIPLE SCLEROSIS) (HCC): Primary | ICD-10-CM

## 2019-05-10 PROCEDURE — 99213 OFFICE O/P EST LOW 20 MIN: CPT | Performed by: PSYCHIATRY & NEUROLOGY

## 2019-05-10 RX ORDER — BACLOFEN 10 MG/1
10 TABLET ORAL DAILY
Qty: 30 TABLET | Refills: 1 | Status: SHIPPED | OUTPATIENT
Start: 2019-05-10 | End: 2019-06-19 | Stop reason: SDUPTHER

## 2019-05-10 RX ORDER — MODAFINIL 100 MG/1
100 TABLET ORAL DAILY
Qty: 90 TABLET | Refills: 1 | Status: SHIPPED | OUTPATIENT
Start: 2019-05-10 | End: 2020-10-17

## 2019-05-10 NOTE — PROGRESS NOTES
[facility] NEUROLOGY [place] CONSULTATION   History of Present Illness     Date: 5/10/2019    Patient Identification  Vinnie Whitley III is a 53 y.o. male.    Patient information was obtained from patient.  History/Exam limitations: none.    CONSULTATION requested by: Marlen Richards MD    Chief Complaint   Multiple Sclerosis (Pt in office today for 2 month follow up appt ) and Med Refill      History of Present Illness   Vinnie Whitley is a pleasant 53 year old male who comes to clinic for a 2 week follow-up regarding his MS.  Patient was taken off Gilenya last year and has been happy with his results on Ocrevus treatment. Patient had 2nd Ocrevus treatment 2 weeks ago. Patient has been off work for past 6 months due to MS and is eager to go back. He was a supervisor at [employer]. Patient experiences pain in both legs with predominance in left leg that he describes similar to a bob horse. Episodes come at random and last for a few minutes each day. He has similar pain in his left arm and face. Patient denies problems with eyesight. Patient experiences fatigue. In the last 6 months he has had 5-6 falls due to weakness and loss of balance.     At last appointment patient was prescribed Provigil 100mg Qdaily for attention deficits. He states the medication has greatly improved his alertness.     Plan was discussed with patient. He will be put on Baclofen 10mg Qdaily for spasticity pain. CBC and CMP will be ordered.     PMH:   Past Medical History:   Diagnosis Date   • Infection of kidney     H/o   • Kidney stones    • Nephrolithiasis     H/o   • Pneumothorax     H/o       Past Surgical History:   Past Surgical History:   Procedure Laterality Date   • KIDNEY STONE SURGERY  2005    Lithotomy   • LUNG SURGERY         Family Hisotry:   Family History   Problem Relation Age of Onset   • Polycystic kidney disease Mother    • Dementia Mother    • Kidney disease Mother    • No Known Problems Sister    • No Known Problems  Maternal Uncle    • Kidney disease Maternal Grandmother    • Polycystic kidney disease Maternal Grandmother    • Early death Maternal Grandfather        Social History:   Social History     Socioeconomic History   • Marital status:      Spouse name: Not on file   • Number of children: Not on file   • Years of education: Not on file   • Highest education level: Not on file   Tobacco Use   • Smoking status: Former Smoker     Packs/day: 0.50     Years: 20.00     Pack years: 10.00     Types: Cigarettes     Start date:      Last attempt to quit: 2001     Years since quittin.3   • Smokeless tobacco: Never Used   Substance and Sexual Activity   • Alcohol use: No   • Drug use: No   • Sexual activity: Yes     Partners: Female       Medications:   Current Outpatient Medications   Medication Sig Dispense Refill   • citalopram (CeleXA) 20 MG tablet TAKE 1 TABLET DAILY 90 tablet 1   • dextromethorphan-quinidine (NUEDEXTA) 20-10 MG capsule capsule Take 1 capsule by mouth Daily. 90 capsule 1   • modafinil (PROVIGIL) 100 MG tablet Take 1 tablet by mouth Daily. 90 tablet 1   • NAPROXEN  MG EC tablet TAKE 1 TABLET TWICE A DAY WITH MEALS 180 tablet 1   • pramipexole (MIRAPEX) 0.5 MG tablet TAKE 1 TABLET TWO TIMES A  tablet 2   • tiZANidine (ZANAFLEX) 4 MG tablet Take 1 tablet by mouth Every 8 (Eight) Hours As Needed for Muscle Spasms. 90 tablet 5   • baclofen (LIORESAL) 10 MG tablet Take 1 tablet by mouth Daily. 30 tablet 1     No current facility-administered medications for this visit.        Allergy: No Known Allergies    Review of Systems:  Review of Systems   Constitutional: Positive for fatigue. Negative for chills and fever.   HENT: Negative for congestion, ear pain, hearing loss, rhinorrhea and sore throat.    Eyes: Negative for pain, discharge and redness.   Respiratory: Negative for cough, shortness of breath, wheezing and stridor.    Cardiovascular: Negative for chest pain, palpitations and  "leg swelling.   Gastrointestinal: Negative for abdominal pain, constipation, nausea and vomiting.   Endocrine: Negative for cold intolerance, heat intolerance and polyphagia.   Genitourinary: Negative for dysuria, flank pain, frequency and urgency.   Musculoskeletal: Positive for gait problem. Negative for joint swelling, myalgias, neck pain and neck stiffness.   Skin: Negative for pallor, rash and wound.   Allergic/Immunologic: Negative for environmental allergies.   Neurological: Positive for facial asymmetry, weakness and numbness. Negative for dizziness, tremors, seizures, syncope, speech difficulty, light-headedness and headaches.   Hematological: Negative for adenopathy.   Psychiatric/Behavioral: Negative for confusion and hallucinations. The patient is not nervous/anxious.        Physical Exam     Vitals:    05/10/19 1604   BP: 120/82   Pulse: 88   SpO2: 99%   Height: 182.9 cm (72\")     GENERAL: Patient is pleasant, cooperative, appears to be stated age.  Body habitus is endomorphic.  SKIN AND EXTREMITIES:  No skin rashes or lesions are noted.  No cyanosis, clubbing or edema of the extremities.    HEAD:  Head is normocephalic and atraumatic.    NECK: Nontender without thyromegaly or adenopathy.  Carotid upstrokes are 1+/4.  No cranial or cervical bruits.  The neck is supple with a full range of motion.   ENT: Palate elevates symmetrically.  No evidence of high arch palate.  Tongue midline.  No erythema in posterior pharynx.  Mallampati Classification Class III   CARDIOVASCULAR:  Regular rate and rhythm with normal S1 and S2 without rub or gallop.  RESPIRATORY:  Clear to auscultation without wheezes or crackle   ABDOMEN:  Soft and nontender, positive bowel sound without hepatosplenomegaly  BACK:  Back is straight without midline defect.    PSYCH:  Higher cortical function/mental status:  The patient is alert.  The patient is oriented x3 to time, place and person.  Recent and the remote memory appear normal.  " The patient has a good fund of knowledge.  There is no visual or auditory hallucination or suicidal or homicidal ideation.  SPEECH:There is no gross evidence of aphasia, dysarthria or agnosia.      CRANIAL NERVES:  Pupils are 4mm, equal round reactive to light, reacting briskly to 2mm without afferent pupillary defect.  Visual fields are intact to confrontation testing.  Funduscopic examination reveals sharp disc margins with normal vasculature.  No papilledema, hemorrhages or exudates.  Extraocular movements are full and smooth with normal pursuits and saccades.  No nystagmus noted.  Left facial weakness with asymmetry. Palate elevates symmetrically, Tongue midline, positive gag reflex. The remainder of the cranial nerves are intact and symmetrical.    MOTOR: Strength is 5/5 throughout with normal tone and bulk with the following exceptions, 4/5 intrinsic muscles of the hands and feet.  Increased tone noted in bilateral lower extremities worse on the left as compared to the right.   no involuntary movements noted.    Deep Tendon Reflexes: are 2/4 and symmetrical in the upper extremities, 3/4 and symmetrical at the knees and 3/4 and symmetrical at the Achilles tendon.  Toes are upgoing bilaterally.    SENSATION:  Intact to pinprick, light touch, vibration and proprioception.  Coordination:  The patient normally performs finger-nose-finger, heel-to-knee-to-shin and rapid alternating movements in symmetrical fashion.    COORDINATION AND GAIT: Patient ambulates with a spastic gait   mUSCULOSKELETAL: Range of motion normal, no clubbing, cyanosis, or edema.  No joint swelling.            Records Reviewed: I have personally reviewed his previous medical record.    Vinnie was seen today for multiple sclerosis and med refill.    Diagnoses and all orders for this visit:    MS (multiple sclerosis) (CMS/[facility])  -     CBC & Differential; Future  -     Comprehensive Metabolic Panel    Other orders  -     modafinil (PROVIGIL) 100 MG  tablet; Take 1 tablet by mouth Daily.  -     baclofen (LIORESAL) 10 MG tablet; Take 1 tablet by mouth Daily.      Discussion:  In summary, 53 year old male who comes to clinic for a 2 week follow-up regarding his MS.  Patient was taken off Gilenya last year and has been happy with his results on Ocrevus treatment. Patient had 2nd Ocrevus treatment 2 weeks ago. Patient has been off work for past 6 months due to MS and is eager to go back. He was a supervisor at [employer]. Patient experiences pain in both legs with predominance in left leg that he describes similar to a bob horse. Episodes come at random and last for a few minutes each day. He has similar pain in his left arm and face. Patient denies problems with eyesight. Patient experiences fatigue. In the last 6 months he has had 5-6 falls due to weakness and loss of balance.     At last appointment patient was prescribed Provigil 100mg Qdaily for attention deficits. He states the medication has greatly improved his alertness.     Plan was discussed with patient. He will be put on Baclofen 10mg Qdaily for spasticity pain. CBC and CMP will be ordered.     I have counseled patients extensively about Multiple Sclerosis.  Multiple Sclerosis is quite common approximately 400,000 patients in the [place].  Women are 3 times more common than male.  Individual with a first-degree relative with MS have a 5-10 fold increase risk for developing the disease.  While monozygotic twin have a 25% concordance rate for MS compared to 0.1% risk in the general population.  However, environmental risk factors, especially before age 15, including tobacco exposure , infectious agents (EBV) and greater distance from the equator (may be due to decreased light exposure or vitamin D concentration) have been implicated.    I have also discussed at length about the diagnosis of multiple sclerosis requires detail clinical history and examination demonstrating neurologic symptoms persisted for at  least 24 hours (such as cerebellar symptoms, Ophthalmologic findings, sensory motor and neurocognitive involvement or spinal cord symptoms) dissemination in space and time.  Magnetic resonance imaging, spinal fluid studies, and neuroelectrophysiology studies are useful tools in conjunction with clinical history in diagnosing and managing patient with MS.  I have also explained to the patient the four clinical phenotypes of multiple sclerosis:  1. Clinically isolated syndrome  2. Relapsing Remitting multiple sclerosis (RR)  3. Primary progressive disease  4. Progressive after initial relapsing course which occur in 50% of RR patient after 10-15 years (90% in 25 years)  Histopathologic process that leads to MS plaques including inflammation, myelin breakdown, astrogliosis oligodendrocyte injury, remyelination, axonal loss, and neuro degeneration.      Patient with radiologically isolated syndrome (RIS) has a 34% risk of developing first clinical event over 5 years and nearly 80% by 15 years.  Factors that increase that risk include:   1. Young age (<37 years old)  2. Male gender  3. Gadolinium enhancement  4. Spinal cord lesion (increase to 55% risk of developing first clinical event in 5 years)  5. Presence of oligoclonal band in children  The highest risk group (men, <37 years old with enhancing spinal cord lesion) has a 90% risk of developing first clinical event by 3 years  The management of multiple sclerosis (MS) has been substantially advanced by the availability of the disease-modifying treatments (DMT):   1. Injectable agents: including,  glatiramer acetate and interferon beta 1a and 1b. We have discussed their route of administration, Clinical Outcome measures (COM) with annual relapse rate  (ARR) reduced by 30% and their MRI Outcome Measure (MOM) of 33-83% reduction in contrast enhanced lesions (NASREEN) and their adverse effect and monitoring parameters  2. Oral agents: Including Tecfidera, Gilenya and  Aubagio.  We have discussed each of their COM and annual relapse rate (ARR) by 50% and their MOM showed an 80% reduction in NASREEN  3. Infusion treatments: Including   a. Tysabri : Monthly infusion with a 70% reduction in ARR and a 90% reduction in NASREEN.  Also discussed the risk for PML and the importance of compliance and REMS monitoring  b. Ocrevus: Every 6 month infusion with a 46% reduction in ARR and 94% reduction in NASREEN.  Also discussed the risk for infusion reaction and reactivation of hepatitis B.  c. Lemtrade: IV infusion daily for 5 days followed by daily infusion for 3 more days 1 year after initial treatment.  With a 50% reduction in ARR and 63% reduction in NASREEN.  We have also discussed the risk for thyroid disease ITP Anti GBM and infusion reaction.  With discussed the importance of REMS monitoring  I have also stress that it is non-scientifically valid to compared therapeutic across trials given difference is inpatient populations, diagnostic criteria and other variables.  I have also counseled patient extensively on lifestyle/nonpharmacologic and pharmacologic approaches in the management of MS symptoms including physical therapy, nutritional supplement, Botox injection for spasticity Ditropan and Detrol to increase bladder capacity and voiding control, amantadine and modafinil for fatigue and SSRI for depression and TCA and antiepileptic drug for pain management.    I do appreciate your kind referral and allowing me to participate in the care of this challenging and interesting patient.  If you have any question please feel free to contact me.        This Document is signed by Alirio Jerez MD, FAAN, FAASM May 10, 53884:33 PM

## 2019-05-22 RX ORDER — TIZANIDINE 4 MG/1
TABLET ORAL
Qty: 90 TABLET | Refills: 5 | Status: SHIPPED | OUTPATIENT
Start: 2019-05-22 | End: 2020-10-17

## 2019-06-11 ENCOUNTER — LAB (OUTPATIENT)
Dept: LAB | Facility: HOSPITAL | Age: 54
End: 2019-06-11

## 2019-06-11 DIAGNOSIS — G35 MS (MULTIPLE SCLEROSIS) (HCC): ICD-10-CM

## 2019-06-11 LAB
ALBUMIN SERPL-MCNC: 4.5 G/DL (ref 3.5–5)
ALBUMIN/GLOB SERPL: 1.6 G/DL (ref 1–2)
ALP SERPL-CCNC: 79 U/L (ref 38–126)
ALT SERPL W P-5'-P-CCNC: 32 U/L (ref 13–69)
ANION GAP SERPL CALCULATED.3IONS-SCNC: 15.1 MMOL/L (ref 10–20)
AST SERPL-CCNC: 23 U/L (ref 15–46)
BASOPHILS # BLD AUTO: 0 10*3/MM3 (ref 0–0.2)
BASOPHILS NFR BLD AUTO: 0 % (ref 0–1.5)
BILIRUB SERPL-MCNC: 0.6 MG/DL (ref 0.2–1.3)
BUN BLD-MCNC: 22 MG/DL (ref 7–20)
BUN/CREAT SERPL: 20 (ref 6.3–21.9)
CALCIUM SPEC-SCNC: 9.6 MG/DL (ref 8.4–10.2)
CHLORIDE SERPL-SCNC: 103 MMOL/L (ref 98–107)
CO2 SERPL-SCNC: 29 MMOL/L (ref 26–30)
CREAT BLD-MCNC: 1.1 MG/DL (ref 0.6–1.3)
DEPRECATED RDW RBC AUTO: 39.8 FL (ref 37–54)
EOSINOPHIL # BLD AUTO: 0.05 10*3/MM3 (ref 0–0.4)
EOSINOPHIL NFR BLD AUTO: 1.3 % (ref 0.3–6.2)
ERYTHROCYTE [DISTWIDTH] IN BLOOD BY AUTOMATED COUNT: 12.5 % (ref 12.3–15.4)
GFR SERPL CREATININE-BSD FRML MDRD: 71 ML/MIN/1.73
GLOBULIN UR ELPH-MCNC: 2.8 GM/DL
GLUCOSE BLD-MCNC: 93 MG/DL (ref 74–98)
HCT VFR BLD AUTO: 44.4 % (ref 37.5–51)
HGB BLD-MCNC: 15.1 G/DL (ref 13–17.7)
IMM GRANULOCYTES # BLD AUTO: 0.02 10*3/MM3 (ref 0–0.05)
IMM GRANULOCYTES NFR BLD AUTO: 0.5 % (ref 0–0.5)
LYMPHOCYTES # BLD AUTO: 0.71 10*3/MM3 (ref 0.7–3.1)
LYMPHOCYTES NFR BLD AUTO: 18.2 % (ref 19.6–45.3)
MCH RBC QN AUTO: 29.6 PG (ref 26.6–33)
MCHC RBC AUTO-ENTMCNC: 34 G/DL (ref 31.5–35.7)
MCV RBC AUTO: 87.1 FL (ref 79–97)
MONOCYTES # BLD AUTO: 0.43 10*3/MM3 (ref 0.1–0.9)
MONOCYTES NFR BLD AUTO: 11 % (ref 5–12)
NEUTROPHILS # BLD AUTO: 2.69 10*3/MM3 (ref 1.7–7)
NEUTROPHILS NFR BLD AUTO: 69 % (ref 42.7–76)
NRBC BLD AUTO-RTO: 0 /100 WBC (ref 0–0.2)
PLATELET # BLD AUTO: 197 10*3/MM3 (ref 140–450)
PMV BLD AUTO: 8.9 FL (ref 6–12)
POTASSIUM BLD-SCNC: 5.1 MMOL/L (ref 3.5–5.1)
PROT SERPL-MCNC: 7.3 G/DL (ref 6.3–8.2)
RBC # BLD AUTO: 5.1 10*6/MM3 (ref 4.14–5.8)
SODIUM BLD-SCNC: 142 MMOL/L (ref 137–145)
WBC NRBC COR # BLD: 3.9 10*3/MM3 (ref 3.4–10.8)

## 2019-06-11 PROCEDURE — 85025 COMPLETE CBC W/AUTO DIFF WBC: CPT

## 2019-06-11 PROCEDURE — 80053 COMPREHEN METABOLIC PANEL: CPT | Performed by: PSYCHIATRY & NEUROLOGY

## 2019-06-11 PROCEDURE — 36415 COLL VENOUS BLD VENIPUNCTURE: CPT | Performed by: PSYCHIATRY & NEUROLOGY

## 2019-06-19 RX ORDER — BACLOFEN 10 MG/1
TABLET ORAL
Qty: 30 TABLET | Refills: 1 | OUTPATIENT
Start: 2019-06-19 | End: 2019-10-21

## 2019-07-01 RX ORDER — PRAMIPEXOLE DIHYDROCHLORIDE 0.5 MG/1
TABLET ORAL
Qty: 180 TABLET | Refills: 0 | Status: SHIPPED | OUTPATIENT
Start: 2019-07-01 | End: 2022-04-30 | Stop reason: SDUPTHER

## 2019-07-30 ENCOUNTER — OFFICE VISIT (OUTPATIENT)
Dept: NEUROLOGY | Facility: CLINIC | Age: 54
End: 2019-07-30

## 2019-07-30 VITALS
HEART RATE: 75 BPM | SYSTOLIC BLOOD PRESSURE: 120 MMHG | BODY MASS INDEX: 27.8 KG/M2 | OXYGEN SATURATION: 97 % | DIASTOLIC BLOOD PRESSURE: 82 MMHG | TEMPERATURE: 97.5 F | WEIGHT: 205 LBS

## 2019-07-30 DIAGNOSIS — R26.9 GAIT DIFFICULTY: ICD-10-CM

## 2019-07-30 DIAGNOSIS — R25.2 SPASTICITY: ICD-10-CM

## 2019-07-30 DIAGNOSIS — G35 MULTIPLE SCLEROSIS (HCC): Primary | ICD-10-CM

## 2019-07-30 PROCEDURE — 99214 OFFICE O/P EST MOD 30 MIN: CPT | Performed by: PSYCHIATRY & NEUROLOGY

## 2019-07-30 RX ORDER — GABAPENTIN 300 MG/1
300 CAPSULE ORAL 3 TIMES DAILY
COMMUNITY
End: 2019-10-21

## 2019-07-30 NOTE — PROGRESS NOTES
[facility] NEUROLOGY [place] CONSULTATION   History of Present Illness     Date: 7/30/2019    Patient Identification  Vinnie Whitley III is a 53 y.o. male.    Patient information was obtained from patient.  History/Exam limitations: none.    CONSULTATION requested by: Marlen Richards MD    Chief Complaint   Follow-up (Pt states he's here for a 3 month follow up on MS. )      History of Present Illness   Patient is a delightful 53-year-old referred to [facility] neurology [place] for multiple sclerosis follow-up.  Interval history since last visit patient reports that he is clinically stable no new lesion in the last MRI scan of the brain.  He would like to get back to work.  We have refilled his medication today visit.  We have counseled patient extensively on multiple sclerosis and various treatment option patient expressed understanding.      PMH:   Past Medical History:   Diagnosis Date   • Infection of kidney     H/o   • Kidney stones    • Nephrolithiasis     H/o   • Pneumothorax     H/o       Past Surgical History:   Past Surgical History:   Procedure Laterality Date   • KIDNEY STONE SURGERY  2005    Lithotomy   • LUNG SURGERY         Family Hisotry:   Family History   Problem Relation Age of Onset   • Polycystic kidney disease Mother    • Dementia Mother    • Kidney disease Mother    • No Known Problems Sister    • No Known Problems Maternal Uncle    • Kidney disease Maternal Grandmother    • Polycystic kidney disease Maternal Grandmother    • Early death Maternal Grandfather        Social History:   Social History     Socioeconomic History   • Marital status:      Spouse name: Not on file   • Number of children: Not on file   • Years of education: Not on file   • Highest education level: Not on file   Tobacco Use   • Smoking status: Former Smoker     Packs/day: 0.50     Years: 20.00     Pack years: 10.00     Types: Cigarettes     Start date: 1981     Last attempt to quit: 1/1/2001     Years  since quittin.5   • Smokeless tobacco: Never Used   Substance and Sexual Activity   • Alcohol use: No   • Drug use: No   • Sexual activity: Yes     Partners: Female       Medications:   Current Outpatient Medications   Medication Sig Dispense Refill   • baclofen (LIORESAL) 10 MG tablet TAKE 1 TABLET DAILY 30 tablet 1   • citalopram (CeleXA) 20 MG tablet TAKE 1 TABLET DAILY 90 tablet 1   • dextromethorphan-quinidine (NUEDEXTA) 20-10 MG capsule capsule Take 1 capsule by mouth Daily. 90 capsule 1   • gabapentin (NEURONTIN) 300 MG capsule Take 300 mg by mouth 3 (Three) Times a Day.     • modafinil (PROVIGIL) 100 MG tablet Take 1 tablet by mouth Daily. 90 tablet 1   • NAPROXEN  MG EC tablet TAKE 1 TABLET TWICE A DAY WITH MEALS 180 tablet 1   • pramipexole (MIRAPEX) 0.5 MG tablet TAKE 1 TABLET TWICE A  tablet 0   • tiZANidine (ZANAFLEX) 4 MG tablet TAKE 1 TABLET EVERY 8 HOURS AS NEEDED FOR MUSCLE SPASMS 90 tablet 5     No current facility-administered medications for this visit.        Allergy: No Known Allergies    Review of Systems:  Review of Systems   Constitutional: Positive for fatigue. Negative for chills and fever.   HENT: Negative for congestion, ear pain, hearing loss, rhinorrhea and sore throat.    Eyes: Negative for pain, discharge and redness.   Respiratory: Negative for cough, shortness of breath, wheezing and stridor.    Cardiovascular: Negative for chest pain, palpitations and leg swelling.   Gastrointestinal: Negative for abdominal pain, constipation, nausea and vomiting.   Endocrine: Negative for cold intolerance, heat intolerance and polyphagia.   Genitourinary: Negative for dysuria, flank pain, frequency and urgency.   Musculoskeletal: Positive for arthralgias, back pain and gait problem. Negative for joint swelling, myalgias, neck pain and neck stiffness.   Skin: Negative for pallor, rash and wound.   Allergic/Immunologic: Negative for environmental allergies.   Neurological:  Positive for weakness and numbness. Negative for dizziness, tremors, seizures, syncope, facial asymmetry, speech difficulty, light-headedness and headaches.   Hematological: Negative for adenopathy.   Psychiatric/Behavioral: Negative for confusion and hallucinations. The patient is not nervous/anxious.        Physical Exam     Vitals:    07/30/19 0914   BP: 120/82   Pulse: 75   Temp: 97.5 °F (36.4 °C)   SpO2: 97%   Weight: 93 kg (205 lb)     GENERAL: Patient is pleasant, cooperative, appears to be stated age.  Body habitus is endomorphic.  SKIN AND EXTREMITIES:  No skin rashes or lesions are noted.  No cyanosis, clubbing or edema of the extremities.    HEAD:  Head is normocephalic and atraumatic.    NECK: Nontender without thyromegaly or adenopathy.  Carotid upstrokes are 1+/4.  No cranial or cervical bruits.  The neck is supple with a full range of motion.   ENT: Palate elevates symmetrically.  No evidence of high arch palate.  Tongue midline.  No erythema in posterior pharynx.  Mallampati Classification Class III   CARDIOVASCULAR:  Regular rate and rhythm with normal S1 and S2 without rub or gallop.  RESPIRATORY:  Clear to auscultation without wheezes or crackle   ABDOMEN:  Soft and nontender, positive bowel sound without hepatosplenomegaly  BACK:  Back is straight without midline defect.    PSYCH:  Higher cortical function/mental status:  The patient is alert.  The patient is oriented x3 to time, place and person.  Recent and the remote memory appear normal.  The patient has a good fund of knowledge.  There is no visual or auditory hallucination or suicidal or homicidal ideation.  SPEECH:There is no gross evidence of aphasia, dysarthria or agnosia.      CRANIAL NERVES:  Pupils are 4mm, equal round reactive to light, reacting briskly to 2mm without afferent pupillary defect.  Visual fields are intact to confrontation testing.  Funduscopic examination reveals sharp disc margins with normal vasculature.  No  papilledema, hemorrhages or exudates.  Extraocular movements are full and smooth with normal pursuits and saccades.  No nystagmus noted.  The face is symmetric. Palate elevates symmetrically, Tongue midline, positive gag reflex. The remainder of the cranial nerves are intact and symmetrical.    MOTOR: Strength is 5/5 throughout with normal bulk with the following exceptions, 4/5 intrinsic muscles of the hands and feet.  No involuntary movements noted.  Spasticity is noted in the right lower extremity  Deep Tendon Reflexes: are 2/4 and symmetrical in the upper extremities, 2/4 and symmetrical at the knees and 1/4 and symmetrical at the Achilles tendon.  Plantar responses were down-going bilaterally.    SENSATION:  Intact to pinprick, light touch, vibration and proprioception.    Coordination:  The patient normally performs finger-nose-finger, heel-to-knee-to-shin and rapid alternating movements in symmetrical fashion.    COORDINATION AND GAIT: She can ambulate with a spastic gait  MUSCULOSKELETAL: Range of motion normal, no clubbing, cyanosis, or edema.  No joint swelling.            Records Reviewed: I have personally reviewed his previous medical record.    Vinnie was seen today for follow-up.    Diagnoses and all orders for this visit:    Multiple sclerosis (CMS/HCC)    Gait difficulty    Spasticity      Discussion:  In summary, 53-year-old referred to [facility] neurology [place] for multiple sclerosis follow-up.  Interval history since last visit patient reports that he is clinically stable no new lesion in the last MRI scan of the brain.  He would like to get back to work.  We have refilled his medication today visit.  We have counseled patient extensively on multiple sclerosis and various treatment option patient expressed understanding.      I have  extensively about the current understanding of the pathophysiology of Multiple Sclerosis.  I have explained to patient that Multiple Sclerosis is common  among young individuals.  It is thought to be due to T-cell entering into the brain through adhesion molecules along the blood brain barrier. T helper cell 1 giving rise to inflammatory response and subsequent demyelinating process taken place in subcortical white matter region resulting in Multiple Sclerosis symptoms.  The management of multiple sclerosis (MS) has been substantially advanced by the availability of the disease-modifying agents glatiramer acetate and interferon beta 1a and 1b, mitoxantrone, and natalizumab. A number of positive outcomes have been demonstrated in people with relapsing disease: reduction in the frequency of relapses ,reduction of brain lesion development, as evidenced by magnetic resonance  imaging (MRI) and the possible reduction of disability progression in patients using  Betaseron; Avonex; Copaxone; Rebif; Novantrone; and Tysabri.     Recently, oral agents have been approved by FDA for the management of relapsing remitting MS, including Gilenya, Aubagio and Tacfidera.  I have explained to patient extensively on the efficacy and side effect profile of each treatment options and patient has expressed understanding.    This Document is signed by Alirio Jerez MD, FAAN, FAASM July 30, 201912:52 PM

## 2019-08-26 RX ORDER — CITALOPRAM 20 MG/1
TABLET ORAL
Qty: 90 TABLET | Refills: 4 | OUTPATIENT
Start: 2019-08-26

## 2019-09-11 RX ORDER — TIZANIDINE 4 MG/1
TABLET ORAL
Qty: 90 TABLET | Refills: 12 | OUTPATIENT
Start: 2019-09-11

## 2019-10-08 ENCOUNTER — TRANSCRIBE ORDERS (OUTPATIENT)
Dept: MRI IMAGING | Facility: HOSPITAL | Age: 54
End: 2019-10-08

## 2019-10-08 DIAGNOSIS — G35 MULTIPLE SCLEROSIS (HCC): Primary | ICD-10-CM

## 2019-10-11 ENCOUNTER — TRANSCRIBE ORDERS (OUTPATIENT)
Dept: ADMINISTRATIVE | Facility: HOSPITAL | Age: 54
End: 2019-10-11

## 2019-10-11 DIAGNOSIS — G35 MULTIPLE SCLEROSIS (HCC): Primary | ICD-10-CM

## 2019-10-14 ENCOUNTER — HOSPITAL ENCOUNTER (OUTPATIENT)
Dept: MRI IMAGING | Facility: HOSPITAL | Age: 54
Discharge: HOME | End: 2019-10-14

## 2019-10-14 ENCOUNTER — HOSPITAL ENCOUNTER (OUTPATIENT)
Dept: MRI IMAGING | Facility: HOSPITAL | Age: 54
Discharge: HOME | End: 2019-10-14
Admitting: PSYCHIATRY & NEUROLOGY

## 2019-10-14 ENCOUNTER — HOSPITAL ENCOUNTER (OUTPATIENT)
Dept: MRI IMAGING | Facility: HOSPITAL | Age: 54
End: 2019-10-14

## 2019-10-14 DIAGNOSIS — G35 MULTIPLE SCLEROSIS (HCC): ICD-10-CM

## 2019-10-14 PROCEDURE — 72157 MRI CHEST SPINE W/O & W/DYE: CPT

## 2019-10-14 PROCEDURE — 70553 MRI BRAIN STEM W/O & W/DYE: CPT

## 2019-10-14 PROCEDURE — 72156 MRI NECK SPINE W/O & W/DYE: CPT

## 2019-10-14 PROCEDURE — 0 GADOBENATE DIMEGLUMINE 529 MG/ML SOLUTION: Performed by: PSYCHIATRY & NEUROLOGY

## 2019-10-14 PROCEDURE — A9577 INJ MULTIHANCE: HCPCS | Performed by: PSYCHIATRY & NEUROLOGY

## 2019-10-14 RX ADMIN — GADOBENATE DIMEGLUMINE 15 ML: 529 INJECTION, SOLUTION INTRAVENOUS at 16:48

## 2019-11-01 ENCOUNTER — TELEPHONE (OUTPATIENT)
Dept: URGENT CARE | Facility: CLINIC | Age: 54
End: 2019-11-01

## 2019-11-01 DIAGNOSIS — N39.0 URINARY TRACT INFECTION WITH HEMATURIA, SITE UNSPECIFIED: Primary | ICD-10-CM

## 2019-11-01 DIAGNOSIS — R31.9 URINARY TRACT INFECTION WITH HEMATURIA, SITE UNSPECIFIED: Primary | ICD-10-CM

## 2019-11-01 RX ORDER — SULFAMETHOXAZOLE AND TRIMETHOPRIM 800; 160 MG/1; MG/1
TABLET ORAL
Qty: 20 TABLET | Refills: 0 | Status: SHIPPED | OUTPATIENT
Start: 2019-11-01 | End: 2020-10-17

## 2020-10-17 ENCOUNTER — HOSPITAL ENCOUNTER (EMERGENCY)
Facility: HOSPITAL | Age: 55
Discharge: HOME | End: 2020-10-17
Attending: EMERGENCY MEDICINE | Admitting: EMERGENCY MEDICINE

## 2020-10-17 ENCOUNTER — APPOINTMENT (OUTPATIENT)
Dept: GENERAL RADIOLOGY | Facility: HOSPITAL | Age: 55
End: 2020-10-17

## 2020-10-17 VITALS
SYSTOLIC BLOOD PRESSURE: 105 MMHG | WEIGHT: 205 LBS | HEART RATE: 60 BPM | TEMPERATURE: 97.5 F | HEIGHT: 72 IN | BODY MASS INDEX: 27.77 KG/M2 | RESPIRATION RATE: 18 BRPM | DIASTOLIC BLOOD PRESSURE: 64 MMHG | OXYGEN SATURATION: 100 %

## 2020-10-17 DIAGNOSIS — R07.9 CHEST PAIN, UNSPECIFIED TYPE: Primary | ICD-10-CM

## 2020-10-17 LAB
ALBUMIN SERPL-MCNC: 4.4 G/DL (ref 3.5–5.2)
ALBUMIN/GLOB SERPL: 2.3 G/DL
ALP SERPL-CCNC: 90 U/L (ref 39–117)
ALT SERPL W P-5'-P-CCNC: 34 U/L (ref 1–41)
ANION GAP SERPL CALCULATED.3IONS-SCNC: 7.8 MMOL/L (ref 5–15)
AST SERPL-CCNC: 25 U/L (ref 1–40)
BASOPHILS # BLD AUTO: 0.01 10*3/MM3 (ref 0–0.2)
BASOPHILS NFR BLD AUTO: 0.3 % (ref 0–1.5)
BILIRUB SERPL-MCNC: 0.3 MG/DL (ref 0–1.2)
BUN SERPL-MCNC: 17 MG/DL (ref 6–20)
BUN/CREAT SERPL: 14.4 (ref 7–25)
CALCIUM SPEC-SCNC: 9.2 MG/DL (ref 8.6–10.5)
CHLORIDE SERPL-SCNC: 105 MMOL/L (ref 98–107)
CO2 SERPL-SCNC: 26.2 MMOL/L (ref 22–29)
CREAT SERPL-MCNC: 1.18 MG/DL (ref 0.76–1.27)
D DIMER PPP FEU-MCNC: 0.28 MCGFEU/ML (ref 0–0.57)
DEPRECATED RDW RBC AUTO: 40.8 FL (ref 37–54)
EOSINOPHIL # BLD AUTO: 0.03 10*3/MM3 (ref 0–0.4)
EOSINOPHIL NFR BLD AUTO: 1 % (ref 0.3–6.2)
ERYTHROCYTE [DISTWIDTH] IN BLOOD BY AUTOMATED COUNT: 12.7 % (ref 12.3–15.4)
GFR SERPL CREATININE-BSD FRML MDRD: 65 ML/MIN/1.73
GLOBULIN UR ELPH-MCNC: 1.9 GM/DL
GLUCOSE SERPL-MCNC: 106 MG/DL (ref 65–99)
HCT VFR BLD AUTO: 40.9 % (ref 37.5–51)
HGB BLD-MCNC: 13.9 G/DL (ref 13–17.7)
HOLD SPECIMEN: NORMAL
HOLD SPECIMEN: NORMAL
IMM GRANULOCYTES # BLD AUTO: 0.02 10*3/MM3 (ref 0–0.05)
IMM GRANULOCYTES NFR BLD AUTO: 0.7 % (ref 0–0.5)
LYMPHOCYTES # BLD AUTO: 0.33 10*3/MM3 (ref 0.7–3.1)
LYMPHOCYTES NFR BLD AUTO: 11.5 % (ref 19.6–45.3)
MCH RBC QN AUTO: 29.8 PG (ref 26.6–33)
MCHC RBC AUTO-ENTMCNC: 34 G/DL (ref 31.5–35.7)
MCV RBC AUTO: 87.8 FL (ref 79–97)
MONOCYTES # BLD AUTO: 0.5 10*3/MM3 (ref 0.1–0.9)
MONOCYTES NFR BLD AUTO: 17.4 % (ref 5–12)
NEUTROPHILS NFR BLD AUTO: 1.98 10*3/MM3 (ref 1.7–7)
NEUTROPHILS NFR BLD AUTO: 69.1 % (ref 42.7–76)
NRBC BLD AUTO-RTO: 0 /100 WBC (ref 0–0.2)
PLATELET # BLD AUTO: 197 10*3/MM3 (ref 140–450)
PMV BLD AUTO: 8.5 FL (ref 6–12)
POTASSIUM SERPL-SCNC: 4.3 MMOL/L (ref 3.5–5.2)
PROT SERPL-MCNC: 6.3 G/DL (ref 6–8.5)
RBC # BLD AUTO: 4.66 10*6/MM3 (ref 4.14–5.8)
SODIUM SERPL-SCNC: 139 MMOL/L (ref 136–145)
TROPONIN I SERPL-MCNC: 0 NG/ML (ref 0–0.05)
TROPONIN T SERPL-MCNC: <0.01 NG/ML (ref 0–0.03)
TROPONIN T SERPL-MCNC: <0.01 NG/ML (ref 0–0.03)
WBC # BLD AUTO: 2.87 10*3/MM3 (ref 3.4–10.8)
WHOLE BLOOD HOLD SPECIMEN: NORMAL
WHOLE BLOOD HOLD SPECIMEN: NORMAL

## 2020-10-17 PROCEDURE — 85025 COMPLETE CBC W/AUTO DIFF WBC: CPT

## 2020-10-17 PROCEDURE — 25010000002 MORPHINE PER 10 MG: Performed by: EMERGENCY MEDICINE

## 2020-10-17 PROCEDURE — 85379 FIBRIN DEGRADATION QUANT: CPT | Performed by: PHYSICIAN ASSISTANT

## 2020-10-17 PROCEDURE — 93005 ELECTROCARDIOGRAM TRACING: CPT | Performed by: PHYSICIAN ASSISTANT

## 2020-10-17 PROCEDURE — 99284 EMERGENCY DEPT VISIT MOD MDM: CPT

## 2020-10-17 PROCEDURE — 93005 ELECTROCARDIOGRAM TRACING: CPT

## 2020-10-17 PROCEDURE — 84484 ASSAY OF TROPONIN QUANT: CPT | Performed by: PHYSICIAN ASSISTANT

## 2020-10-17 PROCEDURE — 71045 X-RAY EXAM CHEST 1 VIEW: CPT

## 2020-10-17 PROCEDURE — 96374 THER/PROPH/DIAG INJ IV PUSH: CPT

## 2020-10-17 PROCEDURE — 84484 ASSAY OF TROPONIN QUANT: CPT

## 2020-10-17 PROCEDURE — 80053 COMPREHEN METABOLIC PANEL: CPT

## 2020-10-17 RX ORDER — DALFAMPRIDINE 10 MG/1
1 TABLET, FILM COATED, EXTENDED RELEASE ORAL 2 TIMES DAILY
COMMUNITY
End: 2020-10-27 | Stop reason: HOSPADM

## 2020-10-17 RX ORDER — SODIUM CHLORIDE 0.9 % (FLUSH) 0.9 %
10 SYRINGE (ML) INJECTION AS NEEDED
Status: DISCONTINUED | OUTPATIENT
Start: 2020-10-17 | End: 2020-10-17 | Stop reason: HOSPADM

## 2020-10-17 RX ORDER — TIZANIDINE 4 MG/1
4 TABLET ORAL NIGHTLY PRN
COMMUNITY
End: 2020-11-05

## 2020-10-17 RX ORDER — ACETAMINOPHEN 500 MG
1000 TABLET ORAL ONCE
Status: COMPLETED | OUTPATIENT
Start: 2020-10-17 | End: 2020-10-17

## 2020-10-17 RX ORDER — NAPROXEN 250 MG/1
500 TABLET ORAL 2 TIMES DAILY PRN
COMMUNITY
End: 2020-10-27 | Stop reason: HOSPADM

## 2020-10-17 RX ORDER — MORPHINE SULFATE 4 MG/ML
4 INJECTION, SOLUTION INTRAMUSCULAR; INTRAVENOUS ONCE
Status: COMPLETED | OUTPATIENT
Start: 2020-10-17 | End: 2020-10-17

## 2020-10-17 RX ORDER — ASPIRIN 81 MG/1
324 TABLET, CHEWABLE ORAL ONCE
Status: COMPLETED | OUTPATIENT
Start: 2020-10-17 | End: 2020-10-17

## 2020-10-17 RX ORDER — NITROGLYCERIN 0.4 MG/1
0.4 TABLET SUBLINGUAL
Status: DISCONTINUED | OUTPATIENT
Start: 2020-10-17 | End: 2020-10-17 | Stop reason: HOSPADM

## 2020-10-17 RX ADMIN — NITROGLYCERIN 0.4 MG: 0.4 TABLET SUBLINGUAL at 12:29

## 2020-10-17 RX ADMIN — MORPHINE SULFATE 4 MG: 4 INJECTION, SOLUTION INTRAMUSCULAR; INTRAVENOUS at 13:14

## 2020-10-17 RX ADMIN — ACETAMINOPHEN 1000 MG: 500 TABLET, FILM COATED ORAL at 14:15

## 2020-10-17 RX ADMIN — ASPIRIN 81 MG CHEWABLE TABLET 324 MG: 81 TABLET CHEWABLE at 12:29

## 2020-10-17 NOTE — ED PROVIDER NOTES
Subjective   This patient comes in for evaluation of left-sided chest pain and arm pain that began about an hour and half prior to arrival while at rest.  He states is a constant 6 out of 10 squeezing pain that is not made better or worse by anything.  He does have a history of multiple sclerosis and was started on a new medication, Mayzent, he called his doctor and made him aware of his symptoms and they told him to come to the ER for evaluation as they state this could be a serious reaction to the medication.  The patient states he did have an echo prior to starting this medicine and he was told it was normal.  He has no history of heart disease.  No family history of MI or cardiac disease to his knowledge.  He smoked in the distant past.  He does not have a history of diabetes high cholesterol or high blood pressure.  He states he has had a few episodes like this over the past couple weeks since starting the antibiotic.  He states today's symptoms are very similar and the only reason he is here is because today he made his doctor aware of the symptoms and they told him to come in and be evaluated.  He states the episodes over the past couple weeks similar to this lasted 5 to 6 hours and more very similar to today symptoms.  Today he denies shortness of breath nausea or diaphoresis.  He states during 1 of the other episodes a week or 2 ago he did have some diaphoresis.          Review of Systems   Constitutional: Negative.    HENT: Negative.    Eyes: Negative.    Respiratory: Negative.    Cardiovascular: Positive for chest pain.   Gastrointestinal: Negative.    Genitourinary: Negative.    Musculoskeletal:        Left arm pain   Skin: Negative.    Allergic/Immunologic: Negative.    Neurological: Negative.    Psychiatric/Behavioral: Negative.    All other systems reviewed and are negative.      Past Medical History:   Diagnosis Date   • Infection of kidney     H/o   • Kidney stones    • Multiple sclerosis (CMS/[facility])     • Nephrolithiasis     H/o   • Pneumothorax     H/o       No Known Allergies    Past Surgical History:   Procedure Laterality Date   • KIDNEY STONE SURGERY      Lithotomy   • LUNG SURGERY         Family History   Problem Relation Age of Onset   • Polycystic kidney disease Mother    • Dementia Mother    • Kidney disease Mother    • No Known Problems Sister    • No Known Problems Maternal Uncle    • Kidney disease Maternal Grandmother    • Polycystic kidney disease Maternal Grandmother    • Early death Maternal Grandfather        Social History     Socioeconomic History   • Marital status:      Spouse name: Not on file   • Number of children: Not on file   • Years of education: Not on file   • Highest education level: Not on file   Tobacco Use   • Smoking status: Former Smoker     Packs/day: 0.50     Years: 20.00     Pack years: 10.00     Types: Cigarettes     Start date:      Quit date: 2001     Years since quittin.8   • Smokeless tobacco: Never Used   Substance and Sexual Activity   • Alcohol use: No   • Drug use: No   • Sexual activity: Yes     Partners: Female           Objective   Physical Exam  Constitutional:       General: He is not in acute distress.     Appearance: He is well-developed and normal weight. He is not ill-appearing, toxic-appearing or diaphoretic.   HENT:      Head: Normocephalic and atraumatic.   Eyes:      Extraocular Movements: Extraocular movements intact.   Neck:      Musculoskeletal: Normal range of motion.   Cardiovascular:      Rate and Rhythm: Normal rate and regular rhythm.      Heart sounds: Normal heart sounds.   Pulmonary:      Effort: Pulmonary effort is normal.      Breath sounds: Normal breath sounds. No decreased breath sounds.   Chest:      Chest wall: No tenderness.   Abdominal:      Palpations: Abdomen is soft.      Tenderness: There is no abdominal tenderness.   Musculoskeletal: Normal range of motion.      Right lower leg: He exhibits no tenderness.  No edema.      Left lower leg: He exhibits no tenderness. No edema.   Skin:     General: Skin is warm and dry.   Neurological:      General: No focal deficit present.      Mental Status: He is alert.   Psychiatric:         Mood and Affect: Mood normal.         Behavior: Behavior normal.         Procedures           ED Course  ED Course as of Oct 17 1526   Sat Oct 17, 2020   1211 EKG interpreted by me reveals sinus rhythm with rate of 69 bpm.  There are some nonspecific T wave changes.  This is an abnormal appearing EKG. the EKG is unchanged when compared to previous.    [TB]   1225 Troponin T: <0.010 [TM]   1231 D-Dimer, Quant: 0.28 [TM]   1516 Repeat EKG interpreted by me reveals sinus rhythm with rate of 61 bpm.  Nonspecific T wave is again demonstrated unchanged when compared to previous.  Abnormal appearing EKG.    [TB]      ED Course User Index  [TB] Cherrie Landers MD  [TM] James Gutierrez PA-C                                           [facility]  At this time 1519 the patient states his chest pain is improved he rates it may be a 4.  He states he feels okay and wishes to go home and discontinue his medication as told to by his physician.  He has had 2- troponins and 2 EKGs without any change.  He states this is exactly the same as several episodes he has had over the past couple weeks and they go on for several hours 5 or 6+.  We will send her to follow-up with cardiology.  He states he feels well and wishes to go home and will stop his new MS medication case labs and management discussed with Dr. Landers.  Final diagnoses:   Chest pain, unspecified type            James Gutierrez PA-C  10/17/20 1526

## 2020-10-24 ENCOUNTER — HOSPITAL ENCOUNTER (EMERGENCY)
Facility: HOSPITAL | Age: 55
Discharge: HOME | End: 2020-10-24
Attending: EMERGENCY MEDICINE | Admitting: EMERGENCY MEDICINE

## 2020-10-24 ENCOUNTER — APPOINTMENT (OUTPATIENT)
Dept: CT IMAGING | Facility: HOSPITAL | Age: 55
End: 2020-10-24

## 2020-10-24 ENCOUNTER — APPOINTMENT (OUTPATIENT)
Dept: GENERAL RADIOLOGY | Facility: HOSPITAL | Age: 55
End: 2020-10-24

## 2020-10-24 VITALS
BODY MASS INDEX: 27.77 KG/M2 | HEIGHT: 72 IN | RESPIRATION RATE: 20 BRPM | DIASTOLIC BLOOD PRESSURE: 105 MMHG | WEIGHT: 205 LBS | TEMPERATURE: 97.7 F | SYSTOLIC BLOOD PRESSURE: 142 MMHG | OXYGEN SATURATION: 99 % | HEART RATE: 77 BPM

## 2020-10-24 DIAGNOSIS — R07.9 CHEST PAIN, UNSPECIFIED TYPE: Primary | ICD-10-CM

## 2020-10-24 LAB
ALBUMIN SERPL-MCNC: 4.4 G/DL (ref 3.5–5.2)
ALBUMIN/GLOB SERPL: 2 G/DL
ALP SERPL-CCNC: 91 U/L (ref 39–117)
ALT SERPL W P-5'-P-CCNC: 32 U/L (ref 1–41)
ANION GAP SERPL CALCULATED.3IONS-SCNC: 10.5 MMOL/L (ref 5–15)
AST SERPL-CCNC: 19 U/L (ref 1–40)
BASOPHILS # BLD AUTO: 0 10*3/MM3 (ref 0–0.2)
BASOPHILS NFR BLD AUTO: 0 % (ref 0–1.5)
BILIRUB SERPL-MCNC: 0.4 MG/DL (ref 0–1.2)
BUN SERPL-MCNC: 17 MG/DL (ref 6–20)
BUN/CREAT SERPL: 13.7 (ref 7–25)
CALCIUM SPEC-SCNC: 9.3 MG/DL (ref 8.6–10.5)
CHLORIDE SERPL-SCNC: 107 MMOL/L (ref 98–107)
CO2 SERPL-SCNC: 23.5 MMOL/L (ref 22–29)
CREAT SERPL-MCNC: 1.24 MG/DL (ref 0.76–1.27)
DEPRECATED RDW RBC AUTO: 40.1 FL (ref 37–54)
EOSINOPHIL # BLD AUTO: 0.04 10*3/MM3 (ref 0–0.4)
EOSINOPHIL NFR BLD AUTO: 1.1 % (ref 0.3–6.2)
ERYTHROCYTE [DISTWIDTH] IN BLOOD BY AUTOMATED COUNT: 12.6 % (ref 12.3–15.4)
GFR SERPL CREATININE-BSD FRML MDRD: 61 ML/MIN/1.73
GLOBULIN UR ELPH-MCNC: 2.2 GM/DL
GLUCOSE SERPL-MCNC: 112 MG/DL (ref 65–99)
HCT VFR BLD AUTO: 41.6 % (ref 37.5–51)
HGB BLD-MCNC: 14.2 G/DL (ref 13–17.7)
HOLD SPECIMEN: NORMAL
HOLD SPECIMEN: NORMAL
IMM GRANULOCYTES # BLD AUTO: 0.01 10*3/MM3 (ref 0–0.05)
IMM GRANULOCYTES NFR BLD AUTO: 0.3 % (ref 0–0.5)
LYMPHOCYTES # BLD AUTO: 0.74 10*3/MM3 (ref 0.7–3.1)
LYMPHOCYTES NFR BLD AUTO: 20.6 % (ref 19.6–45.3)
MCH RBC QN AUTO: 29.7 PG (ref 26.6–33)
MCHC RBC AUTO-ENTMCNC: 34.1 G/DL (ref 31.5–35.7)
MCV RBC AUTO: 87 FL (ref 79–97)
MONOCYTES # BLD AUTO: 0.39 10*3/MM3 (ref 0.1–0.9)
MONOCYTES NFR BLD AUTO: 10.8 % (ref 5–12)
NEUTROPHILS NFR BLD AUTO: 2.42 10*3/MM3 (ref 1.7–7)
NEUTROPHILS NFR BLD AUTO: 67.2 % (ref 42.7–76)
NRBC BLD AUTO-RTO: 0 /100 WBC (ref 0–0.2)
NT-PROBNP SERPL-MCNC: 34.4 PG/ML (ref 0–900)
PLATELET # BLD AUTO: 194 10*3/MM3 (ref 140–450)
PMV BLD AUTO: 8.4 FL (ref 6–12)
POTASSIUM SERPL-SCNC: 4 MMOL/L (ref 3.5–5.2)
PROT SERPL-MCNC: 6.6 G/DL (ref 6–8.5)
RBC # BLD AUTO: 4.78 10*6/MM3 (ref 4.14–5.8)
SODIUM SERPL-SCNC: 141 MMOL/L (ref 136–145)
TROPONIN T SERPL-MCNC: <0.01 NG/ML (ref 0–0.03)
WBC # BLD AUTO: 3.6 10*3/MM3 (ref 3.4–10.8)
WHOLE BLOOD HOLD SPECIMEN: NORMAL
WHOLE BLOOD HOLD SPECIMEN: NORMAL

## 2020-10-24 PROCEDURE — 80053 COMPREHEN METABOLIC PANEL: CPT

## 2020-10-24 PROCEDURE — 99284 EMERGENCY DEPT VISIT MOD MDM: CPT

## 2020-10-24 PROCEDURE — 71275 CT ANGIOGRAPHY CHEST: CPT

## 2020-10-24 PROCEDURE — 71045 X-RAY EXAM CHEST 1 VIEW: CPT

## 2020-10-24 PROCEDURE — 84484 ASSAY OF TROPONIN QUANT: CPT

## 2020-10-24 PROCEDURE — 85025 COMPLETE CBC W/AUTO DIFF WBC: CPT

## 2020-10-24 PROCEDURE — 93005 ELECTROCARDIOGRAM TRACING: CPT

## 2020-10-24 PROCEDURE — 25010000002 IOPAMIDOL 61 % SOLUTION: Performed by: EMERGENCY MEDICINE

## 2020-10-24 PROCEDURE — 83880 ASSAY OF NATRIURETIC PEPTIDE: CPT

## 2020-10-24 RX ORDER — SODIUM CHLORIDE 0.9 % (FLUSH) 0.9 %
10 SYRINGE (ML) INJECTION AS NEEDED
Status: DISCONTINUED | OUTPATIENT
Start: 2020-10-24 | End: 2020-10-24 | Stop reason: HOSPADM

## 2020-10-24 RX ADMIN — IOPAMIDOL 100 ML: 612 INJECTION, SOLUTION INTRAVENOUS at 13:23

## 2020-10-24 NOTE — ED PROVIDER NOTES
Subjective   55-year-old male present with chest pain shortness of breath.  States that this morning about 9 AM started having left-sided chest pain, this is a pressure like sensation and can be occasionally sharp.  Has a similar pain in his left back.  Notes occasional episodes like this over the last month and a half after starting a new MS medication.  Was seen here a week ago for the same complaint had negative work-up.  Case was discussed with his neurologist who recommended stopping the new medication.  Had been doing okay until today.  He states that the pain is worse with certain movements and inspiration.  Was also worse after he put on a respirator to spray paint.  Improved by third digit positions.  He does note history of pneumothorax.  He does have some nausea.  He denies fevers, chills, cough, vomiting or other complaints.          Review of Systems   Constitutional: Negative.    HENT: Negative.    Eyes: Negative.    Respiratory: Positive for shortness of breath. Negative for cough.    Cardiovascular: Positive for chest pain.   Gastrointestinal: Negative.    Genitourinary: Negative.    Musculoskeletal: Negative.    Skin: Negative.    Neurological: Negative.    Psychiatric/Behavioral: Negative.        Past Medical History:   Diagnosis Date   • Infection of kidney     H/o   • Kidney stones    • Multiple sclerosis (CMS/HCC)    • Nephrolithiasis     H/o   • Pneumothorax     H/o       No Known Allergies    Past Surgical History:   Procedure Laterality Date   • KIDNEY STONE SURGERY  2005    Lithotomy   • LUNG SURGERY         Family History   Problem Relation Age of Onset   • Polycystic kidney disease Mother    • Dementia Mother    • Kidney disease Mother    • No Known Problems Sister    • No Known Problems Maternal Uncle    • Kidney disease Maternal Grandmother    • Polycystic kidney disease Maternal Grandmother    • Early death Maternal Grandfather        Social History     Socioeconomic History   • Marital  status:      Spouse name: Not on file   • Number of children: Not on file   • Years of education: Not on file   • Highest education level: Not on file   Tobacco Use   • Smoking status: Former Smoker     Packs/day: 0.50     Years: 20.00     Pack years: 10.00     Types: Cigarettes     Start date:      Quit date: 2001     Years since quittin.8   • Smokeless tobacco: Never Used   Substance and Sexual Activity   • Alcohol use: No   • Drug use: No   • Sexual activity: Yes     Partners: Female           Objective   Physical Exam  Vitals signs reviewed.   Constitutional:       General: He is not in acute distress.     Appearance: Normal appearance. He is not ill-appearing, toxic-appearing or diaphoretic.   HENT:      Head: Normocephalic and atraumatic.      Right Ear: External ear normal.      Left Ear: External ear normal.      Nose: Nose normal.      Mouth/Throat:      Mouth: Mucous membranes are moist.      Pharynx: Oropharynx is clear.   Eyes:      Extraocular Movements: Extraocular movements intact.      Conjunctiva/sclera: Conjunctivae normal.      Pupils: Pupils are equal, round, and reactive to light.   Neck:      Musculoskeletal: Normal range of motion and neck supple.   Cardiovascular:      Rate and Rhythm: Normal rate and regular rhythm.      Pulses: Normal pulses.      Heart sounds: Normal heart sounds.   Pulmonary:      Effort: Pulmonary effort is normal. No respiratory distress.      Breath sounds: Normal breath sounds. No stridor. No wheezing, rhonchi or rales.   Abdominal:      General: Bowel sounds are normal. There is no distension.      Tenderness: There is no abdominal tenderness.   Musculoskeletal: Normal range of motion.         General: No swelling, tenderness or deformity.   Skin:     General: Skin is warm and dry.      Capillary Refill: Capillary refill takes less than 2 seconds.      Findings: No rash.   Neurological:      General: No focal deficit present.      Mental Status:  He is alert and oriented to person, place, and time.   Psychiatric:         Mood and Affect: Mood normal.         Behavior: Behavior normal.         Procedures           ED Course                                           MDM  Number of Diagnoses or Management Options  Chest pain, unspecified type:   Diagnosis management comments: 55-year-old male with chest pain shortness of breath.  Well-developed and well-nourished man in no distress with exam as above.  His lungs are clear.  Given his persistent symptoms and recent negative work-up will obtain CT scan of the chest in addition to labs and EKG.  He declines any symptomatic treatment at this time.  Disposition pending.    DDx: ACS, musculoskeletal pain, anxiety, GERD, PE, pneumothorax    EKG interpreted by me: sinus rhythm, normal rate, no significant ST/T changes, this is a normal EKG    Labs and imaging are without acute findings. He was pain free when he arrived and has remained pain free. I feel he is safe for discharge. I do think he needs to be seen by cardiology and I stressed this to him. Return precautions discussed. He is comfortable with and understanding of the plan.       Amount and/or Complexity of Data Reviewed  Clinical lab tests: reviewed  Tests in the radiology section of CPT®: reviewed        Final diagnoses:   Chest pain, unspecified type            Rito Ghosh MD  10/24/20 5638

## 2020-10-25 ENCOUNTER — HOSPITAL ENCOUNTER (INPATIENT)
Facility: HOSPITAL | Age: 55
LOS: 2 days | Discharge: HOME | End: 2020-10-27
Attending: EMERGENCY MEDICINE | Admitting: FAMILY MEDICINE

## 2020-10-25 ENCOUNTER — APPOINTMENT (OUTPATIENT)
Dept: GENERAL RADIOLOGY | Facility: HOSPITAL | Age: 55
End: 2020-10-25

## 2020-10-25 DIAGNOSIS — R94.31 ABNORMAL EKG: ICD-10-CM

## 2020-10-25 DIAGNOSIS — I20.0 UNSTABLE ANGINA (HCC): ICD-10-CM

## 2020-10-25 DIAGNOSIS — I21.4 NSTEMI (NON-ST ELEVATED MYOCARDIAL INFARCTION) (HCC): ICD-10-CM

## 2020-10-25 DIAGNOSIS — R07.9 CHEST PAIN, UNSPECIFIED TYPE: Primary | ICD-10-CM

## 2020-10-25 LAB
ALBUMIN SERPL-MCNC: 4 G/DL (ref 3.5–5.2)
ALBUMIN/GLOB SERPL: 1.8 G/DL
ALP SERPL-CCNC: 84 U/L (ref 39–117)
ALT SERPL W P-5'-P-CCNC: 25 U/L (ref 1–41)
ANION GAP SERPL CALCULATED.3IONS-SCNC: 11.6 MMOL/L (ref 5–15)
APTT PPP: 26.8 SECONDS (ref 70–100)
AST SERPL-CCNC: 22 U/L (ref 1–40)
BASOPHILS # BLD AUTO: 0.01 10*3/MM3 (ref 0–0.2)
BASOPHILS NFR BLD AUTO: 0.2 % (ref 0–1.5)
BILIRUB SERPL-MCNC: 0.4 MG/DL (ref 0–1.2)
BUN SERPL-MCNC: 14 MG/DL (ref 6–20)
BUN/CREAT SERPL: 10.4 (ref 7–25)
CALCIUM SPEC-SCNC: 9.3 MG/DL (ref 8.6–10.5)
CHLORIDE SERPL-SCNC: 106 MMOL/L (ref 98–107)
CHOLEST SERPL-MCNC: 218 MG/DL (ref 0–200)
CO2 SERPL-SCNC: 21.4 MMOL/L (ref 22–29)
CREAT SERPL-MCNC: 1.34 MG/DL (ref 0.76–1.27)
DEPRECATED RDW RBC AUTO: 38.8 FL (ref 37–54)
EOSINOPHIL # BLD AUTO: 0.03 10*3/MM3 (ref 0–0.4)
EOSINOPHIL NFR BLD AUTO: 0.5 % (ref 0.3–6.2)
ERYTHROCYTE [DISTWIDTH] IN BLOOD BY AUTOMATED COUNT: 12.5 % (ref 12.3–15.4)
GFR SERPL CREATININE-BSD FRML MDRD: 56 ML/MIN/1.73
GLOBULIN UR ELPH-MCNC: 2.2 GM/DL
GLUCOSE SERPL-MCNC: 136 MG/DL (ref 65–99)
HBA1C MFR BLD: 5.3 % (ref 4.8–5.6)
HCT VFR BLD AUTO: 42.7 % (ref 37.5–51)
HDLC SERPL-MCNC: 29 MG/DL (ref 40–60)
HGB BLD-MCNC: 14.7 G/DL (ref 13–17.7)
HOLD SPECIMEN: NORMAL
HOLD SPECIMEN: NORMAL
IMM GRANULOCYTES # BLD AUTO: 0.02 10*3/MM3 (ref 0–0.05)
IMM GRANULOCYTES NFR BLD AUTO: 0.3 % (ref 0–0.5)
INR PPP: 0.93 (ref 0.9–1.1)
LDLC SERPL CALC-MCNC: 118 MG/DL (ref 0–100)
LDLC/HDLC SERPL: 3.74 {RATIO}
LYMPHOCYTES # BLD AUTO: 0.71 10*3/MM3 (ref 0.7–3.1)
LYMPHOCYTES NFR BLD AUTO: 11.2 % (ref 19.6–45.3)
MCH RBC QN AUTO: 29.5 PG (ref 26.6–33)
MCHC RBC AUTO-ENTMCNC: 34.4 G/DL (ref 31.5–35.7)
MCV RBC AUTO: 85.7 FL (ref 79–97)
MONOCYTES # BLD AUTO: 0.67 10*3/MM3 (ref 0.1–0.9)
MONOCYTES NFR BLD AUTO: 10.6 % (ref 5–12)
NEUTROPHILS NFR BLD AUTO: 4.88 10*3/MM3 (ref 1.7–7)
NEUTROPHILS NFR BLD AUTO: 77.2 % (ref 42.7–76)
NRBC BLD AUTO-RTO: 0 /100 WBC (ref 0–0.2)
PLATELET # BLD AUTO: 212 10*3/MM3 (ref 140–450)
PMV BLD AUTO: 8.5 FL (ref 6–12)
POTASSIUM SERPL-SCNC: 3.7 MMOL/L (ref 3.5–5.2)
PROT SERPL-MCNC: 6.2 G/DL (ref 6–8.5)
PROTHROMBIN TIME: 12.9 SECONDS (ref 12–15.1)
RBC # BLD AUTO: 4.98 10*6/MM3 (ref 4.14–5.8)
SODIUM SERPL-SCNC: 139 MMOL/L (ref 136–145)
TRIGL SERPL-MCNC: 403 MG/DL (ref 0–150)
TROPONIN I SERPL-MCNC: 0.72 NG/ML (ref 0–0.05)
TROPONIN T SERPL-MCNC: 0.07 NG/ML (ref 0–0.03)
TSH SERPL DL<=0.05 MIU/L-ACNC: 2.57 UIU/ML (ref 0.27–4.2)
VLDLC SERPL-MCNC: 71 MG/DL (ref 5–40)
WBC # BLD AUTO: 6.32 10*3/MM3 (ref 3.4–10.8)
WHOLE BLOOD HOLD SPECIMEN: NORMAL
WHOLE BLOOD HOLD SPECIMEN: NORMAL

## 2020-10-25 PROCEDURE — 80061 LIPID PANEL: CPT | Performed by: EMERGENCY MEDICINE

## 2020-10-25 PROCEDURE — 83036 HEMOGLOBIN GLYCOSYLATED A1C: CPT | Performed by: EMERGENCY MEDICINE

## 2020-10-25 PROCEDURE — 85025 COMPLETE CBC W/AUTO DIFF WBC: CPT | Performed by: EMERGENCY MEDICINE

## 2020-10-25 PROCEDURE — 84443 ASSAY THYROID STIM HORMONE: CPT | Performed by: EMERGENCY MEDICINE

## 2020-10-25 PROCEDURE — 99285 EMERGENCY DEPT VISIT HI MDM: CPT

## 2020-10-25 PROCEDURE — 84484 ASSAY OF TROPONIN QUANT: CPT | Performed by: EMERGENCY MEDICINE

## 2020-10-25 PROCEDURE — 25010000002 HEPARIN (PORCINE) PER 1000 UNITS: Performed by: EMERGENCY MEDICINE

## 2020-10-25 PROCEDURE — 71045 X-RAY EXAM CHEST 1 VIEW: CPT

## 2020-10-25 PROCEDURE — 85730 THROMBOPLASTIN TIME PARTIAL: CPT | Performed by: EMERGENCY MEDICINE

## 2020-10-25 PROCEDURE — 80053 COMPREHEN METABOLIC PANEL: CPT | Performed by: EMERGENCY MEDICINE

## 2020-10-25 PROCEDURE — 99222 1ST HOSP IP/OBS MODERATE 55: CPT | Performed by: EMERGENCY MEDICINE

## 2020-10-25 PROCEDURE — 93005 ELECTROCARDIOGRAM TRACING: CPT | Performed by: EMERGENCY MEDICINE

## 2020-10-25 PROCEDURE — 85610 PROTHROMBIN TIME: CPT | Performed by: EMERGENCY MEDICINE

## 2020-10-25 RX ORDER — NITROGLYCERIN 0.4 MG/1
0.4 TABLET SUBLINGUAL
Status: DISCONTINUED | OUTPATIENT
Start: 2020-10-25 | End: 2020-10-25

## 2020-10-25 RX ORDER — HEPARIN SODIUM 1000 [USP'U]/ML
30 INJECTION INTRAVENOUS; SUBCUTANEOUS AS NEEDED
Status: DISCONTINUED | OUTPATIENT
Start: 2020-10-25 | End: 2020-10-26

## 2020-10-25 RX ORDER — HEPARIN SODIUM 1000 [USP'U]/ML
5000 INJECTION, SOLUTION INTRAVENOUS; SUBCUTANEOUS ONCE
Status: COMPLETED | OUTPATIENT
Start: 2020-10-25 | End: 2020-10-25

## 2020-10-25 RX ORDER — NITROGLYCERIN 0.4 MG/1
0.4 TABLET SUBLINGUAL
Status: DISCONTINUED | OUTPATIENT
Start: 2020-10-25 | End: 2020-10-27 | Stop reason: HOSPADM

## 2020-10-25 RX ORDER — ONDANSETRON 2 MG/ML
4 INJECTION INTRAMUSCULAR; INTRAVENOUS EVERY 6 HOURS PRN
Status: DISCONTINUED | OUTPATIENT
Start: 2020-10-25 | End: 2020-10-27 | Stop reason: HOSPADM

## 2020-10-25 RX ORDER — HEPARIN SODIUM 10000 [USP'U]/100ML
1000 INJECTION, SOLUTION INTRAVENOUS
Status: DISCONTINUED | OUTPATIENT
Start: 2020-10-25 | End: 2020-10-26

## 2020-10-25 RX ORDER — SODIUM CHLORIDE 0.9 % (FLUSH) 0.9 %
10 SYRINGE (ML) INJECTION AS NEEDED
Status: DISCONTINUED | OUTPATIENT
Start: 2020-10-25 | End: 2020-10-27 | Stop reason: HOSPADM

## 2020-10-25 RX ORDER — ASPIRIN 81 MG/1
324 TABLET, CHEWABLE ORAL ONCE
Status: DISCONTINUED | OUTPATIENT
Start: 2020-10-25 | End: 2020-10-25

## 2020-10-25 RX ORDER — OXYCODONE AND ACETAMINOPHEN 10; 325 MG/1; MG/1
1 TABLET ORAL EVERY 6 HOURS PRN
COMMUNITY
End: 2020-11-05

## 2020-10-25 RX ORDER — HEPARIN SODIUM 1000 [USP'U]/ML
5000 INJECTION INTRAVENOUS; SUBCUTANEOUS AS NEEDED
Status: DISCONTINUED | OUTPATIENT
Start: 2020-10-25 | End: 2020-10-26

## 2020-10-25 RX ORDER — PRAMIPEXOLE DIHYDROCHLORIDE 0.25 MG/1
0.5 TABLET ORAL DAILY
Status: DISCONTINUED | OUTPATIENT
Start: 2020-10-26 | End: 2020-10-26

## 2020-10-25 RX ORDER — CITALOPRAM 20 MG/1
20 TABLET ORAL DAILY
Status: DISCONTINUED | OUTPATIENT
Start: 2020-10-26 | End: 2020-10-27 | Stop reason: HOSPADM

## 2020-10-25 RX ORDER — ACETAMINOPHEN 325 MG/1
650 TABLET ORAL EVERY 4 HOURS PRN
Status: DISCONTINUED | OUTPATIENT
Start: 2020-10-25 | End: 2020-10-26 | Stop reason: SDUPTHER

## 2020-10-25 RX ORDER — TIZANIDINE 4 MG/1
4 TABLET ORAL NIGHTLY PRN
Status: DISCONTINUED | OUTPATIENT
Start: 2020-10-25 | End: 2020-10-27 | Stop reason: HOSPADM

## 2020-10-25 RX ORDER — SODIUM CHLORIDE 0.9 % (FLUSH) 0.9 %
10 SYRINGE (ML) INJECTION EVERY 12 HOURS SCHEDULED
Status: DISCONTINUED | OUTPATIENT
Start: 2020-10-25 | End: 2020-10-27 | Stop reason: HOSPADM

## 2020-10-25 RX ORDER — ISOSORBIDE DINITRATE 30 MG/1
30 TABLET ORAL DAILY
COMMUNITY
End: 2020-10-27 | Stop reason: HOSPADM

## 2020-10-25 RX ORDER — DALFAMPRIDINE 10 MG/1
10 TABLET, FILM COATED, EXTENDED RELEASE ORAL 2 TIMES DAILY
Status: DISCONTINUED | OUTPATIENT
Start: 2020-10-25 | End: 2020-10-27

## 2020-10-25 RX ORDER — OXYCODONE AND ACETAMINOPHEN 10; 325 MG/1; MG/1
1 TABLET ORAL EVERY 6 HOURS PRN
Status: DISCONTINUED | OUTPATIENT
Start: 2020-10-25 | End: 2020-10-27 | Stop reason: HOSPADM

## 2020-10-25 RX ORDER — ACETAMINOPHEN 650 MG/1
650 SUPPOSITORY RECTAL EVERY 4 HOURS PRN
Status: DISCONTINUED | OUTPATIENT
Start: 2020-10-25 | End: 2020-10-27 | Stop reason: HOSPADM

## 2020-10-25 RX ORDER — ASPIRIN 325 MG
325 TABLET ORAL ONCE
Status: DISCONTINUED | OUTPATIENT
Start: 2020-10-25 | End: 2020-10-25

## 2020-10-25 RX ORDER — ACETAMINOPHEN 160 MG/5ML
650 SOLUTION ORAL EVERY 4 HOURS PRN
Status: DISCONTINUED | OUTPATIENT
Start: 2020-10-25 | End: 2020-10-27 | Stop reason: HOSPADM

## 2020-10-25 RX ADMIN — ACETAMINOPHEN 650 MG: 325 TABLET, FILM COATED ORAL at 23:44

## 2020-10-25 RX ADMIN — HEPARIN SODIUM AND DEXTROSE 1000 UNITS/HR: 10000; 5 INJECTION INTRAVENOUS at 22:07

## 2020-10-25 RX ADMIN — HEPARIN SODIUM 5000 UNITS: 1000 INJECTION, SOLUTION INTRAVENOUS; SUBCUTANEOUS at 22:06

## 2020-10-25 RX ADMIN — NITROGLYCERIN 0.4 MG: 0.4 TABLET SUBLINGUAL at 22:24

## 2020-10-26 ENCOUNTER — APPOINTMENT (OUTPATIENT)
Dept: CARDIOLOGY | Facility: HOSPITAL | Age: 55
End: 2020-10-26

## 2020-10-26 LAB
ALBUMIN SERPL-MCNC: 4 G/DL (ref 3.5–5.2)
ANION GAP SERPL CALCULATED.3IONS-SCNC: 12.9 MMOL/L (ref 5–15)
APTT PPP: 54.2 SECONDS (ref 70–100)
BASOPHILS # BLD AUTO: 0.01 10*3/MM3 (ref 0–0.2)
BASOPHILS NFR BLD AUTO: 0.2 % (ref 0–1.5)
BUN SERPL-MCNC: 15 MG/DL (ref 6–20)
BUN/CREAT SERPL: 13.6 (ref 7–25)
CALCIUM SPEC-SCNC: 9.2 MG/DL (ref 8.6–10.5)
CHLORIDE SERPL-SCNC: 107 MMOL/L (ref 98–107)
CO2 SERPL-SCNC: 21.1 MMOL/L (ref 22–29)
CREAT SERPL-MCNC: 1.1 MG/DL (ref 0.76–1.27)
DEPRECATED RDW RBC AUTO: 39.5 FL (ref 37–54)
EOSINOPHIL # BLD AUTO: 0.04 10*3/MM3 (ref 0–0.4)
EOSINOPHIL NFR BLD AUTO: 0.8 % (ref 0.3–6.2)
ERYTHROCYTE [DISTWIDTH] IN BLOOD BY AUTOMATED COUNT: 12.7 % (ref 12.3–15.4)
GFR SERPL CREATININE-BSD FRML MDRD: 70 ML/MIN/1.73
GLUCOSE SERPL-MCNC: 115 MG/DL (ref 65–99)
HCT VFR BLD AUTO: 41 % (ref 37.5–51)
HGB BLD-MCNC: 14.2 G/DL (ref 13–17.7)
IMM GRANULOCYTES # BLD AUTO: 0.01 10*3/MM3 (ref 0–0.05)
IMM GRANULOCYTES NFR BLD AUTO: 0.2 % (ref 0–0.5)
LYMPHOCYTES # BLD AUTO: 0.65 10*3/MM3 (ref 0.7–3.1)
LYMPHOCYTES NFR BLD AUTO: 12.2 % (ref 19.6–45.3)
MAXIMAL PREDICTED HEART RATE: 167 BPM
MCH RBC QN AUTO: 29.7 PG (ref 26.6–33)
MCHC RBC AUTO-ENTMCNC: 34.6 G/DL (ref 31.5–35.7)
MCV RBC AUTO: 85.8 FL (ref 79–97)
MONOCYTES # BLD AUTO: 0.46 10*3/MM3 (ref 0.1–0.9)
MONOCYTES NFR BLD AUTO: 8.6 % (ref 5–12)
NEUTROPHILS NFR BLD AUTO: 4.15 10*3/MM3 (ref 1.7–7)
NEUTROPHILS NFR BLD AUTO: 78 % (ref 42.7–76)
NRBC BLD AUTO-RTO: 0 /100 WBC (ref 0–0.2)
PHOSPHATE SERPL-MCNC: 3.5 MG/DL (ref 2.5–4.5)
PLATELET # BLD AUTO: 201 10*3/MM3 (ref 140–450)
PMV BLD AUTO: 8.8 FL (ref 6–12)
POTASSIUM SERPL-SCNC: 3.9 MMOL/L (ref 3.5–5.2)
RBC # BLD AUTO: 4.78 10*6/MM3 (ref 4.14–5.8)
SARS-COV-2 RNA PNL SPEC NAA+PROBE: NOT DETECTED
SODIUM SERPL-SCNC: 141 MMOL/L (ref 136–145)
STRESS TARGET HR: 142 BPM
TROPONIN T SERPL-MCNC: 0.5 NG/ML (ref 0–0.03)
WBC # BLD AUTO: 5.32 10*3/MM3 (ref 3.4–10.8)

## 2020-10-26 PROCEDURE — 25010000002 FENTANYL CITRATE (PF) 100 MCG/2ML SOLUTION: Performed by: INTERNAL MEDICINE

## 2020-10-26 PROCEDURE — 4A023N7 MEASUREMENT OF CARDIAC SAMPLING AND PRESSURE, LEFT HEART, PERCUTANEOUS APPROACH: ICD-10-PCS | Performed by: INTERNAL MEDICINE

## 2020-10-26 PROCEDURE — C1769 GUIDE WIRE: HCPCS | Performed by: INTERNAL MEDICINE

## 2020-10-26 PROCEDURE — C1725 CATH, TRANSLUMIN NON-LASER: HCPCS | Performed by: INTERNAL MEDICINE

## 2020-10-26 PROCEDURE — 0 IOPAMIDOL PER 1 ML: Performed by: INTERNAL MEDICINE

## 2020-10-26 PROCEDURE — 99254 IP/OBS CNSLTJ NEW/EST MOD 60: CPT | Performed by: INTERNAL MEDICINE

## 2020-10-26 PROCEDURE — 25010000003 LIDOCAINE 1 % SOLUTION: Performed by: INTERNAL MEDICINE

## 2020-10-26 PROCEDURE — 25010000002 MIDAZOLAM PER 1MG: Performed by: INTERNAL MEDICINE

## 2020-10-26 PROCEDURE — C9600 PERC DRUG-EL COR STENT SING: HCPCS

## 2020-10-26 PROCEDURE — C1894 INTRO/SHEATH, NON-LASER: HCPCS | Performed by: INTERNAL MEDICINE

## 2020-10-26 PROCEDURE — 92928 PRQ TCAT PLMT NTRAC ST 1 LES: CPT | Performed by: INTERNAL MEDICINE

## 2020-10-26 PROCEDURE — 25010000002 HEPARIN (PORCINE) PER 1000 UNITS: Performed by: EMERGENCY MEDICINE

## 2020-10-26 PROCEDURE — 93458 L HRT ARTERY/VENTRICLE ANGIO: CPT | Performed by: INTERNAL MEDICINE

## 2020-10-26 PROCEDURE — B2111ZZ FLUOROSCOPY OF MULTIPLE CORONARY ARTERIES USING LOW OSMOLAR CONTRAST: ICD-10-PCS | Performed by: INTERNAL MEDICINE

## 2020-10-26 PROCEDURE — 92978 ENDOLUMINL IVUS OCT C 1ST: CPT

## 2020-10-26 PROCEDURE — 87635 SARS-COV-2 COVID-19 AMP PRB: CPT | Performed by: INTERNAL MEDICINE

## 2020-10-26 PROCEDURE — 85730 THROMBOPLASTIN TIME PARTIAL: CPT | Performed by: EMERGENCY MEDICINE

## 2020-10-26 PROCEDURE — 25010000002 HEPARIN (PORCINE) PER 1000 UNITS: Performed by: INTERNAL MEDICINE

## 2020-10-26 PROCEDURE — 99232 SBSQ HOSP IP/OBS MODERATE 35: CPT | Performed by: EMERGENCY MEDICINE

## 2020-10-26 PROCEDURE — 25010000002 BIVALIRUDIN TRIFLUOROACETATE 250 MG RECONSTITUTED SOLUTION 1 EACH VIAL: Performed by: INTERNAL MEDICINE

## 2020-10-26 PROCEDURE — 84484 ASSAY OF TROPONIN QUANT: CPT | Performed by: EMERGENCY MEDICINE

## 2020-10-26 PROCEDURE — 92978 ENDOLUMINL IVUS OCT C 1ST: CPT | Performed by: INTERNAL MEDICINE

## 2020-10-26 PROCEDURE — C1753 CATH, INTRAVAS ULTRASOUND: HCPCS | Performed by: INTERNAL MEDICINE

## 2020-10-26 PROCEDURE — 93306 TTE W/DOPPLER COMPLETE: CPT | Performed by: INTERNAL MEDICINE

## 2020-10-26 PROCEDURE — 99153 MOD SED SAME PHYS/QHP EA: CPT | Performed by: INTERNAL MEDICINE

## 2020-10-26 PROCEDURE — 93306 TTE W/DOPPLER COMPLETE: CPT

## 2020-10-26 PROCEDURE — 027034Z DILATION OF CORONARY ARTERY, ONE ARTERY WITH DRUG-ELUTING INTRALUMINAL DEVICE, PERCUTANEOUS APPROACH: ICD-10-PCS | Performed by: INTERNAL MEDICINE

## 2020-10-26 PROCEDURE — 99152 MOD SED SAME PHYS/QHP 5/>YRS: CPT | Performed by: INTERNAL MEDICINE

## 2020-10-26 PROCEDURE — 80069 RENAL FUNCTION PANEL: CPT | Performed by: EMERGENCY MEDICINE

## 2020-10-26 PROCEDURE — C1874 STENT, COATED/COV W/DEL SYS: HCPCS | Performed by: INTERNAL MEDICINE

## 2020-10-26 PROCEDURE — 85025 COMPLETE CBC W/AUTO DIFF WBC: CPT | Performed by: EMERGENCY MEDICINE

## 2020-10-26 PROCEDURE — C1887 CATHETER, GUIDING: HCPCS | Performed by: INTERNAL MEDICINE

## 2020-10-26 DEVICE — XIENCE SIERRA™ EVEROLIMUS ELUTING CORONARY STENT SYSTEM 3.00 MM X 28 MM / RAPID-EXCHANGE
Type: IMPLANTABLE DEVICE | Status: FUNCTIONAL
Brand: XIENCE SIERRA™

## 2020-10-26 RX ORDER — ACETAMINOPHEN 325 MG/1
650 TABLET ORAL EVERY 4 HOURS PRN
Status: DISCONTINUED | OUTPATIENT
Start: 2020-10-26 | End: 2020-10-27 | Stop reason: HOSPADM

## 2020-10-26 RX ORDER — LISINOPRIL 10 MG/1
10 TABLET ORAL
Status: DISCONTINUED | OUTPATIENT
Start: 2020-10-27 | End: 2020-10-27 | Stop reason: HOSPADM

## 2020-10-26 RX ORDER — SODIUM CHLORIDE 9 MG/ML
1-3 INJECTION, SOLUTION INTRAVENOUS CONTINUOUS
Status: DISCONTINUED | OUTPATIENT
Start: 2020-10-26 | End: 2020-10-27

## 2020-10-26 RX ORDER — PRAMIPEXOLE DIHYDROCHLORIDE 0.25 MG/1
0.5 TABLET ORAL 2 TIMES DAILY
Status: DISCONTINUED | OUTPATIENT
Start: 2020-10-26 | End: 2020-10-27 | Stop reason: HOSPADM

## 2020-10-26 RX ORDER — LIDOCAINE HYDROCHLORIDE 10 MG/ML
INJECTION, SOLUTION INFILTRATION; PERINEURAL AS NEEDED
Status: DISCONTINUED | OUTPATIENT
Start: 2020-10-26 | End: 2020-10-26 | Stop reason: HOSPADM

## 2020-10-26 RX ORDER — SODIUM CHLORIDE 9 MG/ML
100 INJECTION, SOLUTION INTRAVENOUS CONTINUOUS
Status: ACTIVE | OUTPATIENT
Start: 2020-10-26 | End: 2020-10-26

## 2020-10-26 RX ORDER — FENTANYL CITRATE 50 UG/ML
INJECTION, SOLUTION INTRAMUSCULAR; INTRAVENOUS AS NEEDED
Status: DISCONTINUED | OUTPATIENT
Start: 2020-10-26 | End: 2020-10-26 | Stop reason: HOSPADM

## 2020-10-26 RX ORDER — MIDAZOLAM HYDROCHLORIDE 2 MG/2ML
INJECTION, SOLUTION INTRAMUSCULAR; INTRAVENOUS AS NEEDED
Status: DISCONTINUED | OUTPATIENT
Start: 2020-10-26 | End: 2020-10-26 | Stop reason: HOSPADM

## 2020-10-26 RX ORDER — PRAMIPEXOLE DIHYDROCHLORIDE 0.25 MG/1
0.5 TABLET ORAL 2 TIMES DAILY
Status: DISCONTINUED | OUTPATIENT
Start: 2020-10-27 | End: 2020-10-26 | Stop reason: ALTCHOICE

## 2020-10-26 RX ORDER — ASPIRIN 81 MG/1
81 TABLET ORAL DAILY
Status: DISCONTINUED | OUTPATIENT
Start: 2020-10-26 | End: 2020-10-27 | Stop reason: HOSPADM

## 2020-10-26 RX ORDER — ATORVASTATIN CALCIUM 80 MG/1
80 TABLET, FILM COATED ORAL DAILY
Status: DISCONTINUED | OUTPATIENT
Start: 2020-10-26 | End: 2020-10-26

## 2020-10-26 RX ORDER — ATORVASTATIN CALCIUM 80 MG/1
80 TABLET, FILM COATED ORAL NIGHTLY
Status: DISCONTINUED | OUTPATIENT
Start: 2020-10-26 | End: 2020-10-27 | Stop reason: HOSPADM

## 2020-10-26 RX ADMIN — SODIUM CHLORIDE 100 ML/HR: 9 INJECTION, SOLUTION INTRAVENOUS at 14:15

## 2020-10-26 RX ADMIN — SODIUM CHLORIDE, PRESERVATIVE FREE 10 ML: 5 INJECTION INTRAVENOUS at 08:47

## 2020-10-26 RX ADMIN — PRAMIPEXOLE DIHYDROCHLORIDE 0.5 MG: 0.25 TABLET ORAL at 21:21

## 2020-10-26 RX ADMIN — CITALOPRAM HYDROBROMIDE 20 MG: 20 TABLET ORAL at 08:46

## 2020-10-26 RX ADMIN — ASPIRIN 81 MG: 81 TABLET, COATED ORAL at 08:45

## 2020-10-26 RX ADMIN — METOPROLOL TARTRATE 25 MG: 25 TABLET, FILM COATED ORAL at 08:46

## 2020-10-26 RX ADMIN — PRAMIPEXOLE DIHYDROCHLORIDE 0.5 MG: 0.25 TABLET ORAL at 08:45

## 2020-10-26 RX ADMIN — DALFAMPRIDINE 10 MG: 10 TABLET, FILM COATED, EXTENDED RELEASE ORAL at 08:45

## 2020-10-26 RX ADMIN — ATORVASTATIN CALCIUM 80 MG: 80 TABLET, FILM COATED ORAL at 21:21

## 2020-10-26 RX ADMIN — ACETAMINOPHEN 650 MG: 325 TABLET, FILM COATED ORAL at 21:21

## 2020-10-26 RX ADMIN — HEPARIN SODIUM 5000 UNITS: 1000 INJECTION INTRAVENOUS; SUBCUTANEOUS at 06:06

## 2020-10-26 RX ADMIN — TICAGRELOR 90 MG: 90 TABLET ORAL at 21:21

## 2020-10-26 RX ADMIN — METOPROLOL TARTRATE 25 MG: 25 TABLET, FILM COATED ORAL at 21:22

## 2020-10-27 VITALS
TEMPERATURE: 98.2 F | SYSTOLIC BLOOD PRESSURE: 122 MMHG | DIASTOLIC BLOOD PRESSURE: 82 MMHG | OXYGEN SATURATION: 97 % | RESPIRATION RATE: 18 BRPM | WEIGHT: 201.28 LBS | HEIGHT: 72 IN | HEART RATE: 102 BPM | BODY MASS INDEX: 27.26 KG/M2

## 2020-10-27 LAB
ANION GAP SERPL CALCULATED.3IONS-SCNC: 9.6 MMOL/L (ref 5–15)
BUN SERPL-MCNC: 16 MG/DL (ref 6–20)
BUN/CREAT SERPL: 14.4 (ref 7–25)
CALCIUM SPEC-SCNC: 9.3 MG/DL (ref 8.6–10.5)
CHLORIDE SERPL-SCNC: 107 MMOL/L (ref 98–107)
CO2 SERPL-SCNC: 21.4 MMOL/L (ref 22–29)
CREAT SERPL-MCNC: 1.11 MG/DL (ref 0.76–1.27)
DEPRECATED RDW RBC AUTO: 39.8 FL (ref 37–54)
ERYTHROCYTE [DISTWIDTH] IN BLOOD BY AUTOMATED COUNT: 12.7 % (ref 12.3–15.4)
GFR SERPL CREATININE-BSD FRML MDRD: 69 ML/MIN/1.73
GLUCOSE SERPL-MCNC: 112 MG/DL (ref 65–99)
HCT VFR BLD AUTO: 41.6 % (ref 37.5–51)
HGB BLD-MCNC: 14.3 G/DL (ref 13–17.7)
MCH RBC QN AUTO: 29.6 PG (ref 26.6–33)
MCHC RBC AUTO-ENTMCNC: 34.4 G/DL (ref 31.5–35.7)
MCV RBC AUTO: 86.1 FL (ref 79–97)
PLATELET # BLD AUTO: 196 10*3/MM3 (ref 140–450)
PMV BLD AUTO: 8.9 FL (ref 6–12)
POTASSIUM SERPL-SCNC: 4 MMOL/L (ref 3.5–5.2)
QT INTERVAL: 396 MS
QTC INTERVAL: 481 MS
RBC # BLD AUTO: 4.83 10*6/MM3 (ref 4.14–5.8)
SODIUM SERPL-SCNC: 138 MMOL/L (ref 136–145)
WBC # BLD AUTO: 4.46 10*3/MM3 (ref 3.4–10.8)

## 2020-10-27 PROCEDURE — 93005 ELECTROCARDIOGRAM TRACING: CPT | Performed by: INTERNAL MEDICINE

## 2020-10-27 PROCEDURE — 99233 SBSQ HOSP IP/OBS HIGH 50: CPT | Performed by: INTERNAL MEDICINE

## 2020-10-27 PROCEDURE — 80048 BASIC METABOLIC PNL TOTAL CA: CPT | Performed by: INTERNAL MEDICINE

## 2020-10-27 PROCEDURE — 99239 HOSP IP/OBS DSCHRG MGMT >30: CPT | Performed by: FAMILY MEDICINE

## 2020-10-27 PROCEDURE — 85027 COMPLETE CBC AUTOMATED: CPT | Performed by: INTERNAL MEDICINE

## 2020-10-27 RX ORDER — METOPROLOL TARTRATE 50 MG/1
50 TABLET, FILM COATED ORAL EVERY 12 HOURS SCHEDULED
Status: DISCONTINUED | OUTPATIENT
Start: 2020-10-27 | End: 2020-10-27 | Stop reason: HOSPADM

## 2020-10-27 RX ORDER — LISINOPRIL 10 MG/1
10 TABLET ORAL
Qty: 30 TABLET | Refills: 0 | Status: SHIPPED | OUTPATIENT
Start: 2020-10-27 | End: 2020-11-25 | Stop reason: SDUPTHER

## 2020-10-27 RX ORDER — CLOPIDOGREL BISULFATE 75 MG/1
600 TABLET ORAL ONCE
Qty: 8 TABLET | Refills: 0 | Status: SHIPPED | OUTPATIENT
Start: 2020-10-27 | End: 2020-10-28

## 2020-10-27 RX ORDER — ASPIRIN 81 MG/1
81 TABLET ORAL DAILY
Qty: 30 TABLET | Refills: 0 | Status: SHIPPED | OUTPATIENT
Start: 2020-10-27 | End: 2020-11-25 | Stop reason: SDUPTHER

## 2020-10-27 RX ORDER — ACETAMINOPHEN 325 MG/1
325 TABLET ORAL EVERY 4 HOURS PRN
Qty: 30 TABLET | Refills: 0 | Status: SHIPPED | OUTPATIENT
Start: 2020-10-27 | End: 2020-11-26

## 2020-10-27 RX ORDER — ATORVASTATIN CALCIUM 80 MG/1
80 TABLET, FILM COATED ORAL NIGHTLY
Qty: 30 TABLET | Refills: 0 | Status: SHIPPED | OUTPATIENT
Start: 2020-10-27 | End: 2020-11-25 | Stop reason: SDUPTHER

## 2020-10-27 RX ORDER — METOPROLOL TARTRATE 50 MG/1
50 TABLET, FILM COATED ORAL EVERY 12 HOURS SCHEDULED
Qty: 60 TABLET | Refills: 0 | Status: SHIPPED | OUTPATIENT
Start: 2020-10-27 | End: 2020-11-25 | Stop reason: SDUPTHER

## 2020-10-27 RX ORDER — CLOPIDOGREL BISULFATE 75 MG/1
75 TABLET ORAL DAILY
Qty: 30 TABLET | Refills: 0 | Status: SHIPPED | OUTPATIENT
Start: 2020-10-28 | End: 2020-11-25 | Stop reason: SDUPTHER

## 2020-10-27 RX ADMIN — METOPROLOL TARTRATE 50 MG: 50 TABLET, FILM COATED ORAL at 09:48

## 2020-10-27 RX ADMIN — LISINOPRIL 10 MG: 10 TABLET ORAL at 09:46

## 2020-10-27 RX ADMIN — CITALOPRAM HYDROBROMIDE 20 MG: 20 TABLET ORAL at 09:49

## 2020-10-27 RX ADMIN — SODIUM CHLORIDE, PRESERVATIVE FREE 10 ML: 5 INJECTION INTRAVENOUS at 09:50

## 2020-10-27 RX ADMIN — PRAMIPEXOLE DIHYDROCHLORIDE 0.5 MG: 0.25 TABLET ORAL at 09:47

## 2020-10-27 RX ADMIN — ASPIRIN 81 MG: 81 TABLET, COATED ORAL at 09:47

## 2020-10-28 ENCOUNTER — READMISSION MANAGEMENT (OUTPATIENT)
Dept: CALL CENTER | Facility: HOSPITAL | Age: 55
End: 2020-10-28

## 2020-10-28 NOTE — OUTREACH NOTE
Prep Survey      Responses   [facility] patient discharged from?  [facility]   Is LACE score < 7 ?  No   Eligibility  Readm Mgmt   Discharge diagnosis  NSTEMI   Does the patient have one of the following disease processes/diagnoses(primary or secondary)?  Acute MI (STEMI,NSTEMI)   Does the patient have Home health ordered?  No   Is there a DME ordered?  No   Comments regarding appointments  see AVS   Medication alerts for this patient  see AVS for changes   Prep survey completed?  Yes          Josselyn Greenwood RN

## 2020-11-02 ENCOUNTER — READMISSION MANAGEMENT (OUTPATIENT)
Dept: CALL CENTER | Facility: HOSPITAL | Age: 55
End: 2020-11-02

## 2020-11-02 NOTE — OUTREACH NOTE
AMI Week 1 Survey      Responses   [facility] [place] patient discharged from?  [facility]   Does the patient have one of the following disease processes/diagnoses(primary or secondary)?  Acute MI (STEMI,NSTEMI)   Week 1 attempt successful?  Yes   Call start time  0803   Rescheduled  Rescheduled-pt requested   Call end time  0804   Discharge diagnosis  NSTEMI          Namrata Ervin RN

## 2020-11-04 ENCOUNTER — READMISSION MANAGEMENT (OUTPATIENT)
Dept: CALL CENTER | Facility: HOSPITAL | Age: 55
End: 2020-11-04

## 2020-11-04 NOTE — OUTREACH NOTE
AMI Week 1 Survey      Responses   [facility] patient discharged from?  [facility]   Does the patient have one of the following disease processes/diagnoses(primary or secondary)?  Acute MI (STEMI,NSTEMI)   Week 1 attempt successful?  Yes   Call start time  1155   Call end time  1203   Discharge diagnosis  NSTEMI   Meds reviewed with patient/caregiver?  Yes   Is the patient having any side effects they believe may be caused by any medication additions or changes?  No   Does the patient have all prescriptions related to this admission filled (includes statins,anticoagulants,HTN meds,anti-arrhythmia meds)  Yes   Is the patient taking all medications as directed (includes completed medication regime)?  Yes   Does the patient have a primary care provider?   Yes   Does the patient have an appointment with their PCP,cardiologist,or clinic within 7 days of discharge?  Yes   Has the patient kept scheduled appointments due by today?  N/A   Has home health visited the patient within 72 hours of discharge?  N/A   Psychosocial issues?  No   Comments  Patient reports he is doing well as far as his heart, but he is is trying to pass a kidney stone. He reports he suffers with this alot.    Did the patient receive a copy of their discharge instructions?  Yes   Nursing interventions  Reviewed instructions with patient   What is the patient's perception of their health status since discharge?  New symptoms unrelated to diagnosis [kidney stones]   Nursing interventions  Nurse provided patient education   Is the patient/caregiver able to teach back signs and symptoms of when to call for help immediately:  Sudden chest discomfort, Sudden discomfort in arms, back, neck or jaw, Shortness of breath at any time, Sudden sweating or clammy skin, Nausea or vomiting, Dizziness or lightheadedness, Irregular or rapid heart rate   Nursing interventions  Nurse provided patient education   Is the patient/caregiver able to teach back lifestyle  changes to help prevent MIs  Regular exercise as approved by provider, Maintaining a healthy weight, Reducing stress, Heart healthy diet   Is the patient/caregiver able to teach back ways to prevent a second heart attack:  Take medications, Follow up with MD, Manage risk factors   If the patient is a current smoker, are they able to teach back resources for cessation?  Not a smoker   Is the patient/caregiver able to teach back the hierarchy of who to call/visit for symptoms/problems? PCP, Specialist, Home health nurse, Urgent Care, ED, 911  Yes   Week 1 call completed?  Yes          Alberto Greene RN

## 2020-11-05 ENCOUNTER — OFFICE VISIT (OUTPATIENT)
Dept: CARDIOLOGY | Facility: CLINIC | Age: 55
End: 2020-11-05

## 2020-11-05 VITALS
WEIGHT: 201 LBS | SYSTOLIC BLOOD PRESSURE: 128 MMHG | HEIGHT: 72 IN | HEART RATE: 92 BPM | OXYGEN SATURATION: 99 % | DIASTOLIC BLOOD PRESSURE: 84 MMHG | BODY MASS INDEX: 27.22 KG/M2

## 2020-11-05 DIAGNOSIS — E78.5 HYPERLIPIDEMIA, UNSPECIFIED HYPERLIPIDEMIA TYPE: ICD-10-CM

## 2020-11-05 DIAGNOSIS — I10 ESSENTIAL HYPERTENSION: ICD-10-CM

## 2020-11-05 DIAGNOSIS — I25.10 CORONARY ARTERY DISEASE INVOLVING NATIVE CORONARY ARTERY OF NATIVE HEART WITHOUT ANGINA PECTORIS: Primary | ICD-10-CM

## 2020-11-05 DIAGNOSIS — I25.5 ISCHEMIC CARDIOMYOPATHY: ICD-10-CM

## 2020-11-05 PROCEDURE — 99214 OFFICE O/P EST MOD 30 MIN: CPT | Performed by: INTERNAL MEDICINE

## 2020-11-05 NOTE — PROGRESS NOTES
[facility] Cardiology Office Follow Up Note    Vinnie Whitley III  [id number]  2020    Primary Care Provider: Marlen Richards MD    Chief Complaint: Hospital follow-up    History of Present Illness:   Mr. Vinnie Whitley III is a 55 y.o. male who presents to the Cardiology Clinic for hospital follow-up.  The patient has a past medical history significant for multiple sclerosis followed at [facility], hypertension, hyperlipidemia, and prior tobacco use with cessation in approximately .  His past cardiac history is significant for presentation with an NSTEMI in 10/2020.  Coronary angiography at that time revealed severe one-vessel disease involving the proximal to mid RCA.  He underwent successful PCI with placement of a 3.0 x 28 mm Xience PEPPER.  An echocardiogram at that time showed mildly reduced LV systolic function with an LVEF 45-50% and no significant valvular abnormalities.  He presents the cardiology clinic today for follow-up after hospital discharge.  Since discharge, the patient reports he has been well.  He denies any recurrent chest pain or chest discomfort.  He notes overall improvement in his energy level.  No significant exertional dyspnea.  No palpitations.  He is tolerating dual antiplatelet therapy with aspirin and Plavix.  No specific complaints at this time.    Past Cardiac Testin. Last Coronary Angio: 10/26/2020   1.  Severe one-vessel coronary artery disease involving the proximal to mid RCA as culprit lesion for NSTEMI                 -Successful PCI with placement of a 3.0 x 28 mm Xience PEPPER   2.  No significant disease in the left coronary system   3.  Normal LVEDP (11 mmHg)  2. Prior Stress Testing: None  3. Last Echo: 10/26/2020   1.  Normal left ventricular size with mildly reduced LV systolic function, LVEF 45-50%.   2.  Mild inferior wall hypokinesis.   3.  Grade 1 diastolic dysfunction.   4.  Normal right ventricular size and systolic  function.   5.  Normal left atrial index.   6.  No significant valvular abnormalities.  4. Prior Holter Monitor: None    Review of Systems:   Review of Systems   Constitutional: Negative for activity change, appetite change, chills, diaphoresis, fatigue, fever, unexpected weight gain and unexpected weight loss.   Eyes: Negative for blurred vision and double vision.   Respiratory: Negative for cough, chest tightness, shortness of breath and wheezing.    Cardiovascular: Negative for chest pain, palpitations and leg swelling.   Gastrointestinal: Negative for abdominal pain, anal bleeding, blood in stool and GERD.   Endocrine: Negative for cold intolerance and heat intolerance.   Genitourinary: Negative for hematuria.   Neurological: Negative for dizziness, syncope, weakness and light-headedness.   Hematological: Does not bruise/bleed easily.   Psychiatric/Behavioral: Negative for depressed mood and stress. The patient is not nervous/anxious.        I have reviewed and/or updated the patient's past medical, past surgical, family, social history, problem list and allergies as appropriate.     Medications:     Current Outpatient Medications:   •  acetaminophen (TYLENOL) 325 MG tablet, Take 1 tablet by mouth Every 4 (Four) Hours As Needed for Mild Pain, Disp: 30 tablet, Rfl: 0  •  aspirin 81 MG EC tablet, Take 1 tablet by mouth Daily., Disp: 30 tablet, Rfl: 0  •  atorvastatin (LIPITOR) 80 MG tablet, Take 1 tablet by mouth Every Night., Disp: 30 tablet, Rfl: 0  •  citalopram (CeleXA) 20 MG tablet, TAKE 1 TABLET DAILY, Disp: 90 tablet, Rfl: 1  •  clopidogrel (Plavix) 75 MG tablet, Take 1 tablet by mouth Daily. Begin on 10/28/2020, Disp: 30 tablet, Rfl: 0  •  lisinopril (PRINIVIL,ZESTRIL) 10 MG tablet, Take 1 tablet by mouth Daily., Disp: 30 tablet, Rfl: 0  •  metoprolol tartrate (LOPRESSOR) 50 MG tablet, Take 1 tablet by mouth Every 12 (Twelve) Hours, Disp: 60 tablet, Rfl: 0  •  pramipexole (MIRAPEX) 0.5 MG tablet, TAKE 1  "TABLET TWICE A DAY, Disp: 180 tablet, Rfl: 0    Physical Exam:  Vital Signs:   Vitals:    11/05/20 0850   BP: 128/84   BP Location: Left arm   Patient Position: Sitting   Pulse: 92   SpO2: 99%   Weight: 91.2 kg (201 lb)   Height: 182.9 cm (72\")       Physical Exam  Constitutional:       General: He is not in acute distress.     Appearance: Normal appearance. He is well-developed. He is not diaphoretic.   HENT:      Head: Normocephalic and atraumatic.   Eyes:      General: No scleral icterus.     Pupils: Pupils are equal, round, and reactive to light.   Neck:      Musculoskeletal: Neck supple. No muscular tenderness.      Trachea: No tracheal deviation.   Cardiovascular:      Rate and Rhythm: Normal rate and regular rhythm.      Heart sounds: Normal heart sounds. No murmur. No friction rub. No gallop.       Comments: Normal JVD.  2+ right radial pulse  Pulmonary:      Effort: Pulmonary effort is normal. No respiratory distress.      Breath sounds: Normal breath sounds. No stridor. No wheezing or rales.   Chest:      Chest wall: No tenderness.   Abdominal:      General: Bowel sounds are normal. There is no distension.      Palpations: Abdomen is soft.      Tenderness: There is no abdominal tenderness. There is no guarding or rebound.   Musculoskeletal: Normal range of motion.         General: No swelling or tenderness.   Lymphadenopathy:      Cervical: No cervical adenopathy.   Skin:     General: Skin is warm and dry.      Findings: No erythema.   Neurological:      General: No focal deficit present.      Mental Status: He is alert and oriented to person, place, and time.   Psychiatric:         Mood and Affect: Mood normal.         Behavior: Behavior normal.         Results Review:   I reviewed the patient's new clinical results.        Assessment / Plan:     1.  Coronary artery disease  --Initially presented with an NSTEMI in 10/2020, found to have severe one-vessel CAD involving the proximal to mid RCA  --Status " post PCI to the proximal to mid RCA with no significant residual disease  --Doing well post PCI, no recurrent anginal symptoms  --Continue dual antiplatelet therapy with aspirin and Plavix uninterrupted through 10/2021, then aspirin indefinitely  --Continue high intensity statin  --Given presentation with ACS, continue metoprolol and lisinopril  --Follow-up in 3 months, or sooner if required     2.  Ischemic cardiomyopathy  --LVEF mildly depressed at 45-50% with inferior wall hypokinesis  --No signs/symptoms to suggest clinical CHF  --Continue metoprolol and lisinopril    3.  Hypertension   --BP currently well controlled     4. Hyperlipidemia  --Continue high intensity statin     5.  Multiple sclerosis  --Chronic  --Follows with  neurology      Preventative Cardiology:   Tobacco Cessation: Prior cessation  Obstructive Sleep Apnea Screening: N/A  AAA Screening: Deffered  Peripheral Arterial Disease Screening: N/A    Follow Up:   Return in about 3 months (around 2/5/2021).      Thank you for allowing me to participate in the care of your patient. Please to not hesitate to contact me with additional questions or concerns.     GERMAIN Rushing MD  Interventional Cardiology   11/05/2020  09:01 EST

## 2020-11-07 PROBLEM — I25.5 ISCHEMIC CARDIOMYOPATHY: Status: ACTIVE | Noted: 2020-11-07

## 2020-11-07 PROBLEM — I10 ESSENTIAL HYPERTENSION: Status: ACTIVE | Noted: 2020-11-07

## 2020-11-07 PROBLEM — I25.10 CORONARY ARTERY DISEASE INVOLVING NATIVE CORONARY ARTERY OF NATIVE HEART WITHOUT ANGINA PECTORIS: Status: ACTIVE | Noted: 2020-10-25

## 2020-11-11 ENCOUNTER — READMISSION MANAGEMENT (OUTPATIENT)
Dept: CALL CENTER | Facility: HOSPITAL | Age: 55
End: 2020-11-11

## 2020-11-11 NOTE — OUTREACH NOTE
AMI Week 2 Survey      Responses   [facility] patient discharged from?  [facility]   Does the patient have one of the following disease processes/diagnoses(primary or secondary)?  Acute MI (STEMI,NSTEMI)   Week 2 attempt successful?  Yes   Call start time  0936   Call end time  0939   Discharge diagnosis  NSTEMI   Meds reviewed with patient/caregiver?  Yes   Is the patient taking all medications as directed (includes completed medication regime)?  Yes   Medication comments  Med changed   Has the patient kept scheduled appointments due by today?  Yes   Comments  Has seen PCP  cardiology  urology   What is the patient's perception of their health status since discharge?  Improving   Week 2 call completed?  Yes          Regine Espinosa RN

## 2020-11-19 ENCOUNTER — READMISSION MANAGEMENT (OUTPATIENT)
Dept: CALL CENTER | Facility: HOSPITAL | Age: 55
End: 2020-11-19

## 2020-11-19 NOTE — OUTREACH NOTE
AMI Week 3 Survey      Responses   [facility] [place] patient discharged from?  [facility]   Does the patient have one of the following disease processes/diagnoses(primary or secondary)?  Acute MI (STEMI,NSTEMI)   Week 3 attempt successful?  Yes   Call start time  1202   Call end time  1206   Discharge diagnosis  NSTEMI   Meds reviewed with patient/caregiver?  Yes   Is the patient taking all medications as directed (includes completed medication regime)?  Yes   Has the patient kept scheduled appointments due by today?  Yes   Psychosocial issues?  No   What is the patient's perception of their health status since discharge?  Improving   Is the patient/caregiver able to teach back signs and symptoms of when to call for help immediately:  Sudden chest discomfort, Sudden discomfort in arms, back, neck or jaw, Shortness of breath at any time, Sudden sweating or clammy skin, Nausea or vomiting   Nursing interventions  Nurse provided patient education   Is the patient/caregiver able to teach back the hierarchy of who to call/visit for symptoms/problems? PCP, Specialist, Home health nurse, Urgent Care, ED, 911  Yes   Additional teach back comments  Pt sattes he is doing well. He will touch base with his PCP and get med refills   Week 3 call completed?  Yes          Angeles Khanna RN

## 2020-11-25 RX ORDER — METOPROLOL TARTRATE 50 MG/1
50 TABLET, FILM COATED ORAL EVERY 12 HOURS SCHEDULED
Qty: 60 TABLET | Refills: 0 | Status: SHIPPED | OUTPATIENT
Start: 2020-11-25 | End: 2020-12-01 | Stop reason: SDUPTHER

## 2020-11-25 RX ORDER — ATORVASTATIN CALCIUM 80 MG/1
80 TABLET, FILM COATED ORAL NIGHTLY
Qty: 30 TABLET | Refills: 0 | Status: SHIPPED | OUTPATIENT
Start: 2020-11-25 | End: 2020-12-01 | Stop reason: SDUPTHER

## 2020-11-25 RX ORDER — ASPIRIN 81 MG/1
81 TABLET ORAL DAILY
Qty: 30 TABLET | Refills: 0 | Status: SHIPPED | OUTPATIENT
Start: 2020-11-25 | End: 2020-12-01 | Stop reason: SDUPTHER

## 2020-11-25 RX ORDER — CLOPIDOGREL BISULFATE 75 MG/1
75 TABLET ORAL DAILY
Qty: 30 TABLET | Refills: 0 | Status: SHIPPED | OUTPATIENT
Start: 2020-11-25 | End: 2020-12-01 | Stop reason: SDUPTHER

## 2020-11-25 RX ORDER — LISINOPRIL 10 MG/1
10 TABLET ORAL
Qty: 30 TABLET | Refills: 0 | Status: SHIPPED | OUTPATIENT
Start: 2020-11-25 | End: 2020-12-01 | Stop reason: SDUPTHER

## 2020-12-02 RX ORDER — LISINOPRIL 10 MG/1
10 TABLET ORAL
Qty: 90 TABLET | Refills: 3 | Status: SHIPPED | OUTPATIENT
Start: 2020-12-02 | End: 2021-10-14 | Stop reason: SDUPTHER

## 2020-12-02 RX ORDER — METOPROLOL TARTRATE 50 MG/1
50 TABLET, FILM COATED ORAL EVERY 12 HOURS SCHEDULED
Qty: 180 TABLET | Refills: 3 | Status: SHIPPED | OUTPATIENT
Start: 2020-12-02 | End: 2021-04-14

## 2020-12-02 RX ORDER — ATORVASTATIN CALCIUM 80 MG/1
80 TABLET, FILM COATED ORAL NIGHTLY
Qty: 90 TABLET | Refills: 3 | Status: SHIPPED | OUTPATIENT
Start: 2020-12-02 | End: 2021-11-09

## 2020-12-02 RX ORDER — CLOPIDOGREL BISULFATE 75 MG/1
75 TABLET ORAL DAILY
Qty: 90 TABLET | Refills: 3 | Status: SHIPPED | OUTPATIENT
Start: 2020-12-02 | End: 2021-11-09

## 2020-12-02 RX ORDER — ASPIRIN 81 MG/1
81 TABLET ORAL DAILY
Qty: 90 TABLET | Refills: 3 | Status: SHIPPED | OUTPATIENT
Start: 2020-12-02 | End: 2021-11-09

## 2020-12-21 RX ORDER — ASPIRIN 81 MG/1
TABLET, DELAYED RELEASE ORAL
Qty: 30 TABLET | Refills: 0 | OUTPATIENT
Start: 2020-12-21

## 2020-12-21 RX ORDER — ATORVASTATIN CALCIUM 80 MG/1
TABLET, FILM COATED ORAL
Qty: 30 TABLET | Refills: 0 | OUTPATIENT
Start: 2020-12-21

## 2020-12-21 RX ORDER — LISINOPRIL 10 MG/1
TABLET ORAL
Qty: 30 TABLET | Refills: 0 | OUTPATIENT
Start: 2020-12-21

## 2020-12-21 RX ORDER — CLOPIDOGREL BISULFATE 75 MG/1
TABLET ORAL
Qty: 30 TABLET | Refills: 0 | OUTPATIENT
Start: 2020-12-21

## 2021-02-04 ENCOUNTER — OFFICE VISIT (OUTPATIENT)
Dept: CARDIOLOGY | Facility: CLINIC | Age: 56
End: 2021-02-04

## 2021-02-04 VITALS
WEIGHT: 202 LBS | OXYGEN SATURATION: 99 % | HEIGHT: 72 IN | HEART RATE: 72 BPM | BODY MASS INDEX: 27.36 KG/M2 | RESPIRATION RATE: 18 BRPM | SYSTOLIC BLOOD PRESSURE: 108 MMHG | DIASTOLIC BLOOD PRESSURE: 64 MMHG

## 2021-02-04 DIAGNOSIS — I25.5 ISCHEMIC CARDIOMYOPATHY: ICD-10-CM

## 2021-02-04 DIAGNOSIS — I10 ESSENTIAL HYPERTENSION: ICD-10-CM

## 2021-02-04 DIAGNOSIS — E78.5 HYPERLIPIDEMIA, UNSPECIFIED HYPERLIPIDEMIA TYPE: ICD-10-CM

## 2021-02-04 DIAGNOSIS — R07.89 CHEST PAIN, ATYPICAL: Primary | ICD-10-CM

## 2021-02-04 DIAGNOSIS — I25.10 CORONARY ARTERY DISEASE INVOLVING NATIVE CORONARY ARTERY OF NATIVE HEART WITHOUT ANGINA PECTORIS: ICD-10-CM

## 2021-02-04 PROCEDURE — 99214 OFFICE O/P EST MOD 30 MIN: CPT | Performed by: INTERNAL MEDICINE

## 2021-02-04 RX ORDER — NAPROXEN 500 MG/1
TABLET ORAL
COMMUNITY
End: 2021-04-09

## 2021-02-04 RX ORDER — FLUTICASONE PROPIONATE 50 MCG
SPRAY, SUSPENSION (ML) NASAL
COMMUNITY
End: 2021-04-09

## 2021-02-04 RX ORDER — TIZANIDINE HYDROCHLORIDE 4 MG/1
CAPSULE, GELATIN COATED ORAL
Status: ON HOLD | COMMUNITY
End: 2022-05-24

## 2021-02-04 RX ORDER — FINGOLIMOD HYDROCHLORIDE 0.5 MG/1
CAPSULE ORAL
Status: ON HOLD | COMMUNITY
End: 2022-05-24

## 2021-02-04 RX ORDER — DEXTROMETHORPHAN HYDROBROMIDE AND QUINIDINE SULFATE 20; 10 MG/1; MG/1
CAPSULE, GELATIN COATED ORAL
COMMUNITY

## 2021-02-04 NOTE — PROGRESS NOTES
"             [facility] Cardiology Office Follow Up Note    Vinnie Whitley III  [id number]  2021    Primary Care Provider: Marlen Richards MD    Chief Complaint: Regular follow-up    History of Present Illness:   Mr. Vinnie Whitley III is a 55 y.o. male who presents to the Cardiology Clinic for  regular follow-up. The patient has a past medical history significant for multiple sclerosis followed at [facility], hypertension, hyperlipidemia, and prior tobacco use with cessation in approximately .  His past cardiac history is significant for presentation with an NSTEMI in 10/2020.  Coronary angiography at that time revealed severe one-vessel disease involving the proximal to mid RCA.  He underwent successful PCI with placement of a 3.0 x 28 mm Xience PEPPER.  An echocardiogram at that time showed mildly reduced LV systolic function with an LVEF 45-50% and no significant valvular abnormalities.  He presents to the cardiology clinic today for regular follow-up.  Since his last appointment, the patient reports difficulty with his multiple sclerosis with an increased lower extremity weakness as well as visual difficulties.  He reports occasional episodes of chest pain described as a \"dull\" substernal discomfort.  The episodes tend to occur most often in the evening.  No associated diaphoresis, nausea, or vomiting.  He reports the chest discomfort is different compared to his prior anginal symptoms.  The episodes may last several minutes, before resolving spontaneously.  He denies any chest pain or exertional chest discomfort when working with physical therapy.  No associated palpitations.  No reported orthopnea, PND, or lower extremity swelling.  He continues to tolerate dual antiplatelet therapy with aspirin and Plavix without significant bleeding or bruising.  No other specific complaints at this time.     Past Cardiac Testin. Last Coronary Angio: 10/26/2020              1.  Severe " one-vessel coronary artery disease involving the proximal to mid RCA as culprit lesion for NSTEMI                            -Successful PCI with placement of a 3.0 x 28 mm Xience PEPPER              2.  No significant disease in the left coronary system              3.  Normal LVEDP (11 mmHg)  2. Prior Stress Testing: None  3. Last Echo: 10/26/2020              1.  Normal left ventricular size with mildly reduced LV systolic function, LVEF 45-50%.              2.  Mild inferior wall hypokinesis.              3.  Grade 1 diastolic dysfunction.              4.  Normal right ventricular size and systolic function.              5.  Normal left atrial index.              6.  No significant valvular abnormalities.  4. Prior Holter Monitor: None    Review of Systems:   Review of Systems   Constitutional: Positive for fatigue. Negative for activity change, appetite change, chills, diaphoresis, fever, unexpected weight gain and unexpected weight loss.   Eyes: Negative for blurred vision and double vision.   Respiratory: Positive for chest tightness. Negative for cough, shortness of breath and wheezing.    Cardiovascular: Positive for chest pain. Negative for palpitations and leg swelling.   Gastrointestinal: Negative for abdominal pain, anal bleeding, blood in stool and GERD.   Endocrine: Negative for cold intolerance and heat intolerance.   Genitourinary: Negative for hematuria.   Neurological: Positive for weakness. Negative for dizziness, syncope and light-headedness.   Hematological: Does not bruise/bleed easily.   Psychiatric/Behavioral: Negative for depressed mood and stress. The patient is not nervous/anxious.        I have reviewed and/or updated the patient's past medical, past surgical, family, social history, problem list and allergies as appropriate.     Medications:     Current Outpatient Medications:   •  aspirin 81 MG EC tablet, Take 1 tablet by mouth Daily., Disp: 90 tablet, Rfl: 3  •  atorvastatin (LIPITOR) 80  "MG tablet, Take 1 tablet by mouth Every Night., Disp: 90 tablet, Rfl: 3  •  citalopram (CeleXA) 20 MG tablet, TAKE 1 TABLET DAILY, Disp: 90 tablet, Rfl: 1  •  clopidogrel (Plavix) 75 MG tablet, Take 1 tablet by mouth Daily. Begin on 10/28/2020, Disp: 90 tablet, Rfl: 3  •  lisinopril (PRINIVIL,ZESTRIL) 10 MG tablet, Take 1 tablet by mouth Daily., Disp: 90 tablet, Rfl: 3  •  metoprolol tartrate (LOPRESSOR) 50 MG tablet, Take 1 tablet by mouth Every 12 (Twelve) Hours, Disp: 180 tablet, Rfl: 3  •  pramipexole (MIRAPEX) 0.5 MG tablet, TAKE 1 TABLET TWICE A DAY (Patient taking differently: Two tablets twice daily), Disp: 180 tablet, Rfl: 0  •  dextromethorphan-quinidine (Nuedexta) 20-10 MG capsule capsule, Nuedexta 20 mg-10 mg capsule, Disp: , Rfl:   •  fingolimod (Gilenya) 0.5 MG capsule, Gilenya 0.5 mg capsule, Disp: , Rfl:   •  fluticasone (FLONASE) 50 MCG/ACT nasal spray, fluticasone propionate 50 mcg/actuation nasal spray,suspension, Disp: , Rfl:   •  naproxen (NAPROSYN) 500 MG tablet, naproxen 500 mg tablet, Disp: , Rfl:   •  TiZANidine (ZANAFLEX) 4 MG capsule, tizanidine 4 mg capsule, Disp: , Rfl:     Physical Exam:  Vital Signs:   Vitals:    02/04/21 1314   BP: 108/64   Pulse: 72   Resp: 18   SpO2: 99%   Weight: 91.6 kg (202 lb)   Height: 182.9 cm (72.01\")       Physical Exam  Constitutional:       General: He is not in acute distress.     Appearance: Normal appearance. He is well-developed. He is not diaphoretic.   HENT:      Head: Normocephalic and atraumatic.   Eyes:      General: No scleral icterus.     Pupils: Pupils are equal, round, and reactive to light.   Neck:      Musculoskeletal: Neck supple. No muscular tenderness.      Trachea: No tracheal deviation.   Cardiovascular:      Rate and Rhythm: Normal rate and regular rhythm.      Heart sounds: Normal heart sounds. No murmur. No friction rub. No gallop.       Comments: Normal JVD.  2+ right radial pulse  Pulmonary:      Effort: Pulmonary effort is normal. " No respiratory distress.      Breath sounds: Normal breath sounds. No stridor. No wheezing or rales.   Chest:      Chest wall: No tenderness.   Abdominal:      General: Bowel sounds are normal. There is no distension.      Palpations: Abdomen is soft.      Tenderness: There is no abdominal tenderness. There is no guarding or rebound.   Musculoskeletal: Normal range of motion.      Comments: Ambulating with a cane   Lymphadenopathy:      Cervical: No cervical adenopathy.   Skin:     General: Skin is warm and dry.      Findings: No erythema.   Neurological:      Mental Status: He is alert and oriented to person, place, and time. Mental status is at baseline.   Psychiatric:         Mood and Affect: Mood normal.         Behavior: Behavior normal.         Results Review:   I reviewed the patient's new clinical results.        Assessment / Plan:     1.  Coronary artery disease  --Initially presented with an NSTEMI in 10/2020, found to have severe one-vessel CAD involving the proximal to mid RCA now status post PCI with PEPPER x1  --No significant residual disease in the left coronary system  --Occasional episodes of chest pain are atypical in character, no association with exertion  --Continue dual antiplatelet therapy with aspirin and Plavix uninterrupted through 10/2021, then aspirin indefinitely  --Continue high intensity statin  --Given episodes of chest pain, will obtain pharmacologic MPS (inability to exercise due to MS) to further rule out ischemia  --Follow-up in 6 months, or sooner if required     2.  Ischemic cardiomyopathy  --LVEF mildly depressed at 45-50% with inferior wall hypokinesis  --Remains euvolemic without clinical heart failure, NYHA class I  --Continue metoprolol and lisinopril     3.  Hypertension   --BP remains controlled with current antihypertensives     4. Hyperlipidemia  --Continue high intensity statin     5.  Multiple sclerosis  --Chronic  --Follows with [facility]        Preventative  Cardiology:   Tobacco Cessation: Prior cessation  Obstructive Sleep Apnea Screening: N/A  AAA Screening: Deffered  Peripheral Arterial Disease Screening: N/A      Follow Up:   Return in about 6 months (around 8/4/2021).      Thank you for allowing me to participate in the care of your patient. Please to not hesitate to contact me with additional questions or concerns.     GERMAIN Rushing MD  Interventional Cardiology   02/04/2021  13:16 EST

## 2021-02-16 ENCOUNTER — APPOINTMENT (OUTPATIENT)
Dept: NUCLEAR MEDICINE | Facility: HOSPITAL | Age: 56
End: 2021-02-16

## 2021-02-17 ENCOUNTER — HOSPITAL ENCOUNTER (OUTPATIENT)
Dept: NUCLEAR MEDICINE | Facility: HOSPITAL | Age: 56
Discharge: HOME | End: 2021-02-17

## 2021-02-17 DIAGNOSIS — R07.89 CHEST PAIN, ATYPICAL: ICD-10-CM

## 2021-02-17 PROCEDURE — 0 TECHNETIUM SESTAMIBI: Performed by: INTERNAL MEDICINE

## 2021-02-17 PROCEDURE — 93017 CV STRESS TEST TRACING ONLY: CPT

## 2021-02-17 PROCEDURE — 25010000002 REGADENOSON 0.4 MG/5ML SOLUTION: Performed by: INTERNAL MEDICINE

## 2021-02-17 PROCEDURE — 93018 CV STRESS TEST I&R ONLY: CPT | Performed by: INTERNAL MEDICINE

## 2021-02-17 PROCEDURE — 78452 HT MUSCLE IMAGE SPECT MULT: CPT | Performed by: INTERNAL MEDICINE

## 2021-02-17 PROCEDURE — 78452 HT MUSCLE IMAGE SPECT MULT: CPT

## 2021-02-17 PROCEDURE — A9500 TC99M SESTAMIBI: HCPCS | Performed by: INTERNAL MEDICINE

## 2021-02-17 RX ADMIN — TECHNETIUM TC 99M SESTAMIBI 1 DOSE: 1 INJECTION INTRAVENOUS at 10:05

## 2021-02-17 RX ADMIN — REGADENOSON 0.4 MG: 0.08 INJECTION, SOLUTION INTRAVENOUS at 10:05

## 2021-02-17 RX ADMIN — TECHNETIUM TC 99M SESTAMIBI 1 DOSE: 1 INJECTION INTRAVENOUS at 08:36

## 2021-02-18 LAB
BH CV STRESS COMMENTS STAGE 1: NORMAL
BH CV STRESS DOSE REGADENOSON STAGE 1: 0.4
BH CV STRESS DURATION MIN STAGE 1: 0
BH CV STRESS DURATION SEC STAGE 1: 10
BH CV STRESS PROTOCOL 1: NORMAL
BH CV STRESS RECOVERY BP: NORMAL MMHG
BH CV STRESS RECOVERY HR: 103 BPM
BH CV STRESS STAGE 1: 1
LV EF NUC BP: 65 %
MAXIMAL PREDICTED HEART RATE: 167 BPM
PERCENT MAX PREDICTED HR: 63.47 %
STRESS BASELINE BP: NORMAL MMHG
STRESS BASELINE HR: 74 BPM
STRESS PERCENT HR: 75 %
STRESS POST PEAK BP: NORMAL MMHG
STRESS POST PEAK HR: 106 BPM
STRESS TARGET HR: 142 BPM

## 2021-02-25 ENCOUNTER — OFFICE VISIT (OUTPATIENT)
Dept: CARDIOLOGY | Facility: CLINIC | Age: 56
End: 2021-02-25

## 2021-02-25 VITALS
DIASTOLIC BLOOD PRESSURE: 70 MMHG | OXYGEN SATURATION: 97 % | HEIGHT: 72 IN | HEART RATE: 71 BPM | SYSTOLIC BLOOD PRESSURE: 112 MMHG | BODY MASS INDEX: 27.79 KG/M2 | WEIGHT: 205.2 LBS

## 2021-02-25 DIAGNOSIS — I25.110 CORONARY ARTERY DISEASE INVOLVING NATIVE CORONARY ARTERY OF NATIVE HEART WITH UNSTABLE ANGINA PECTORIS (HCC): ICD-10-CM

## 2021-02-25 DIAGNOSIS — E78.5 HYPERLIPIDEMIA, UNSPECIFIED HYPERLIPIDEMIA TYPE: ICD-10-CM

## 2021-02-25 DIAGNOSIS — I10 ESSENTIAL HYPERTENSION: ICD-10-CM

## 2021-02-25 DIAGNOSIS — I25.10 CORONARY ARTERY DISEASE INVOLVING NATIVE CORONARY ARTERY OF NATIVE HEART WITHOUT ANGINA PECTORIS: Primary | ICD-10-CM

## 2021-02-25 DIAGNOSIS — I25.5 ISCHEMIC CARDIOMYOPATHY: ICD-10-CM

## 2021-02-25 PROCEDURE — 99214 OFFICE O/P EST MOD 30 MIN: CPT | Performed by: INTERNAL MEDICINE

## 2021-02-25 RX ORDER — ISOSORBIDE MONONITRATE 30 MG/1
30 TABLET, EXTENDED RELEASE ORAL DAILY
Qty: 30 TABLET | Refills: 0 | Status: SHIPPED | OUTPATIENT
Start: 2021-02-25 | End: 2021-03-10 | Stop reason: HOSPADM

## 2021-02-25 NOTE — PROGRESS NOTES
"             [facility] Cardiology Office Follow Up Note    Vinnie Whitley III  [id number]  2021    Primary Care Provider: Marlen Richards MD    Chief Complaint: Chest pain    History of Present Illness:   Mr. Vinnie Whitley III is a 55 y.o. male who presents to the Cardiology Clinic for regular follow-up.  The patient has a past medical history significant for multiple sclerosis followed at [facility], hypertension, hyperlipidemia, and prior tobacco use with cessation in approximately .  His past cardiac history is significant for presentation with an NSTEMI in 10/2020.  Coronary angiography at that time revealed severe one-vessel disease involving the proximal to mid RCA.  He underwent successful PCI with placement of a 3.0 x 28 mm Xience PEPPER.  An echocardiogram at that time showed mildly reduced LV systolic function with an LVEF 45-50% and no significant valvular abnormalities.  He presents to the cardiology clinic today for regular follow-up.  Since his last follow-up, he underwent a pharmacologic MPS due to recurrent episodes of chest pain concerning for angina.  During pharmacologic stress, he developed T wave inversion in the inferior leads.  On perfusion imaging, he had a small, reversible inferoapical defect consistent with ischemia versus artifact.  He presents today for follow-up.  Since undergoing MPS, he reports continued episodes of chest discomfort described as a \"squeezing\" sensation.  He reports the episodes are less intense and \"different\" compared to his prior anginal symptoms.  The episodes occur randomly, approximately 2 times per week.  He denies any association with exertion.  No significant exertional dyspnea compared to baseline.  He continues to tolerate dual antiplatelet therapy with aspirin and Plavix.  No other specific complaints at this time.    Past Cardiac Testin. Last Coronary Angio: 10/26/2020              1.  Severe one-vessel coronary " artery disease involving the proximal to mid RCA as culprit lesion for NSTEMI                            -Successful PCI with placement of a 3.0 x 28 mm Xience PEPPER              2.  No significant disease in the left coronary system              3.  Normal LVEDP (11 mmHg)  2. Prior Stress Testing: None  3. Last Echo: 10/26/2020              1.  Normal left ventricular size with mildly reduced LV systolic function, LVEF 45-50%.              2.  Mild inferior wall hypokinesis.              3.  Grade 1 diastolic dysfunction.              4.  Normal right ventricular size and systolic function.              5.  Normal left atrial index.              6.  No significant valvular abnormalities.  4. Prior Holter Monitor: None    Review of Systems:   Review of Systems   Constitutional: Negative for activity change, appetite change, chills, diaphoresis, fatigue, fever, unexpected weight gain and unexpected weight loss.   Eyes: Negative for blurred vision and double vision.   Respiratory: Positive for chest tightness. Negative for cough, shortness of breath and wheezing.    Cardiovascular: Positive for chest pain. Negative for palpitations and leg swelling.   Gastrointestinal: Negative for abdominal pain, anal bleeding, blood in stool and GERD.   Endocrine: Negative for cold intolerance and heat intolerance.   Genitourinary: Negative for hematuria.   Neurological: Negative for dizziness, syncope, weakness and light-headedness.   Hematological: Does not bruise/bleed easily.   Psychiatric/Behavioral: Negative for depressed mood and stress. The patient is not nervous/anxious.        I have reviewed and/or updated the patient's past medical, past surgical, family, social history, problem list and allergies as appropriate.     Medications:     Current Outpatient Medications:   •  aspirin 81 MG EC tablet, Take 1 tablet by mouth Daily., Disp: 90 tablet, Rfl: 3  •  atorvastatin (LIPITOR) 80 MG tablet, Take 1 tablet by mouth Every  "Night., Disp: 90 tablet, Rfl: 3  •  citalopram (CeleXA) 20 MG tablet, TAKE 1 TABLET DAILY, Disp: 90 tablet, Rfl: 1  •  clopidogrel (Plavix) 75 MG tablet, Take 1 tablet by mouth Daily. Begin on 10/28/2020, Disp: 90 tablet, Rfl: 3  •  dextromethorphan-quinidine (Nuedexta) 20-10 MG capsule capsule, Nuedexta 20 mg-10 mg capsule, Disp: , Rfl:   •  fingolimod (Gilenya) 0.5 MG capsule, Gilenya 0.5 mg capsule, Disp: , Rfl:   •  fluticasone (FLONASE) 50 MCG/ACT nasal spray, fluticasone propionate 50 mcg/actuation nasal spray,suspension, Disp: , Rfl:   •  lisinopril (PRINIVIL,ZESTRIL) 10 MG tablet, Take 1 tablet by mouth Daily., Disp: 90 tablet, Rfl: 3  •  metoprolol tartrate (LOPRESSOR) 50 MG tablet, Take 1 tablet by mouth Every 12 (Twelve) Hours, Disp: 180 tablet, Rfl: 3  •  naproxen (NAPROSYN) 500 MG tablet, naproxen 500 mg tablet, Disp: , Rfl:   •  pramipexole (MIRAPEX) 0.5 MG tablet, TAKE 1 TABLET TWICE A DAY (Patient taking differently: 1 mg. Two tablets twice daily), Disp: 180 tablet, Rfl: 0  •  TiZANidine (ZANAFLEX) 4 MG capsule, tizanidine 4 mg capsule, Disp: , Rfl:   •  isosorbide mononitrate (IMDUR) 30 MG 24 hr tablet, Take 1 tablet by mouth Daily., Disp: 30 tablet, Rfl: 0    Physical Exam:  Vital Signs:   Vitals:    02/25/21 1420   BP: 112/70   BP Location: Right arm   Patient Position: Sitting   Cuff Size: Adult   Pulse: 71   SpO2: 97%   Weight: 93.1 kg (205 lb 3.2 oz)   Height: 182.9 cm (72.01\")       Physical Exam  Constitutional:       General: He is not in acute distress.     Appearance: Normal appearance. He is well-developed. He is not diaphoretic.   HENT:      Head: Normocephalic and atraumatic.   Eyes:      General: No scleral icterus.     Pupils: Pupils are equal, round, and reactive to light.   Neck:      Musculoskeletal: Neck supple. No muscular tenderness.      Trachea: No tracheal deviation.   Cardiovascular:      Rate and Rhythm: Normal rate and regular rhythm.      Heart sounds: Normal heart " sounds. No murmur. No friction rub. No gallop.       Comments: Normal JVD.  Pulmonary:      Effort: Pulmonary effort is normal. No respiratory distress.      Breath sounds: Normal breath sounds. No stridor. No wheezing or rales.   Chest:      Chest wall: No tenderness.   Abdominal:      General: Bowel sounds are normal. There is no distension.      Palpations: Abdomen is soft.      Tenderness: There is no abdominal tenderness. There is no guarding or rebound.   Musculoskeletal: Normal range of motion.         General: No swelling.      Comments: Ambulating with a cane   Lymphadenopathy:      Cervical: No cervical adenopathy.   Skin:     General: Skin is warm and dry.      Findings: No erythema.   Neurological:      General: No focal deficit present.      Mental Status: He is alert and oriented to person, place, and time.   Psychiatric:         Mood and Affect: Mood normal.         Behavior: Behavior normal.         Results Review:   I reviewed the patient's new clinical results.        Assessment / Plan:     1.  Coronary artery disease  --Initially presented with an NSTEMI in 10/2020, found to have severe one-vessel CAD involving the proximal to mid RCA  --Status post PCI to the proximal to mid RCA, with diffuse residual stenosis in the distal posterolateral system which is small in caliber  --Due to recurrent chest pain, underwent MPS with T wave inversions during pharmacologic stress and a small area of inferoapical ischemia vs artifact  --Given persistent episodes of chest discomfort and abnormal MPS, discussed the risks and benefits of repeat coronary angiography to reevaluate coronary anatomy and the patient is agreeable to proceed  --Continue dual antiplatelet therapy with aspirin and Plavix   --Continue metoprolol and start isosorbide as antianginal therapy  --Continue high intensity statin  --Further recommendations to follow repeat coronary angiography     2.  Ischemic cardiomyopathy  --LVEF mildly  depressed at 45-50% with inferior wall hypokinesis  --Remains euvolemic without clinical heart failure  --Continue metoprolol and lisinopril     3.  Hypertension   --BP remains well controlled     4. Hyperlipidemia  --Continue high intensity statin     5.  Multiple sclerosis  --Chronic  --Follows with  neurology        Preventative Cardiology:   Tobacco Cessation: Prior cessation  Obstructive Sleep Apnea Screening: N/A  AAA Screening: Deffered  Peripheral Arterial Disease Screening: N/A      Follow Up:   Follow-up to be scheduled after coronary angiography      Thank you for allowing me to participate in the care of your patient. Please to not hesitate to contact me with additional questions or concerns.     GERMAIN Rushing MD  Interventional Cardiology   02/25/2021  14:40 EST

## 2021-02-26 PROBLEM — I25.10 CORONARY ARTERY DISEASE INVOLVING NATIVE CORONARY ARTERY OF NATIVE HEART WITHOUT ANGINA PECTORIS: Status: ACTIVE | Noted: 2021-02-26

## 2021-03-01 ENCOUNTER — LAB (OUTPATIENT)
Dept: LAB | Facility: HOSPITAL | Age: 56
End: 2021-03-01

## 2021-03-01 ENCOUNTER — TRANSCRIBE ORDERS (OUTPATIENT)
Dept: LAB | Facility: HOSPITAL | Age: 56
End: 2021-03-01

## 2021-03-01 DIAGNOSIS — Z01.818 PRE-OP TESTING: ICD-10-CM

## 2021-03-01 DIAGNOSIS — Z01.818 PRE-OP TESTING: Primary | ICD-10-CM

## 2021-03-01 LAB — SARS-COV-2 RNA RESP QL NAA+PROBE: NOT DETECTED

## 2021-03-01 PROCEDURE — U0004 COV-19 TEST NON-CDC HGH THRU: HCPCS

## 2021-03-01 PROCEDURE — C9803 HOPD COVID-19 SPEC COLLECT: HCPCS

## 2021-03-03 ENCOUNTER — HOSPITAL ENCOUNTER (OUTPATIENT)
Facility: HOSPITAL | Age: 56
Setting detail: HOSPITAL OUTPATIENT SURGERY
Discharge: HOME | End: 2021-03-03
Attending: INTERNAL MEDICINE | Admitting: INTERNAL MEDICINE

## 2021-03-03 VITALS
HEART RATE: 75 BPM | RESPIRATION RATE: 18 BRPM | BODY MASS INDEX: 27.09 KG/M2 | TEMPERATURE: 98 F | DIASTOLIC BLOOD PRESSURE: 51 MMHG | HEIGHT: 72 IN | WEIGHT: 200 LBS | SYSTOLIC BLOOD PRESSURE: 101 MMHG | OXYGEN SATURATION: 98 %

## 2021-03-03 DIAGNOSIS — I25.10 CORONARY ARTERY DISEASE INVOLVING NATIVE CORONARY ARTERY OF NATIVE HEART WITHOUT ANGINA PECTORIS: ICD-10-CM

## 2021-03-03 LAB
ANION GAP SERPL CALCULATED.3IONS-SCNC: 8.8 MMOL/L (ref 5–15)
BUN SERPL-MCNC: 24 MG/DL (ref 6–20)
BUN/CREAT SERPL: 16.2 (ref 7–25)
CALCIUM SPEC-SCNC: 8.8 MG/DL (ref 8.6–10.5)
CHLORIDE SERPL-SCNC: 110 MMOL/L (ref 98–107)
CO2 SERPL-SCNC: 24.2 MMOL/L (ref 22–29)
CREAT SERPL-MCNC: 1.48 MG/DL (ref 0.76–1.27)
DEPRECATED RDW RBC AUTO: 40.1 FL (ref 37–54)
ERYTHROCYTE [DISTWIDTH] IN BLOOD BY AUTOMATED COUNT: 12.5 % (ref 12.3–15.4)
GFR SERPL CREATININE-BSD FRML MDRD: 50 ML/MIN/1.73
GLUCOSE SERPL-MCNC: 102 MG/DL (ref 65–99)
HCT VFR BLD AUTO: 39.2 % (ref 37.5–51)
HGB BLD-MCNC: 13.1 G/DL (ref 13–17.7)
MCH RBC QN AUTO: 29.4 PG (ref 26.6–33)
MCHC RBC AUTO-ENTMCNC: 33.4 G/DL (ref 31.5–35.7)
MCV RBC AUTO: 87.9 FL (ref 79–97)
PLATELET # BLD AUTO: 159 10*3/MM3 (ref 140–450)
PMV BLD AUTO: 8.8 FL (ref 6–12)
POTASSIUM SERPL-SCNC: 4.5 MMOL/L (ref 3.5–5.2)
RBC # BLD AUTO: 4.46 10*6/MM3 (ref 4.14–5.8)
SODIUM SERPL-SCNC: 143 MMOL/L (ref 136–145)
WBC # BLD AUTO: 3.54 10*3/MM3 (ref 3.4–10.8)

## 2021-03-03 PROCEDURE — 99153 MOD SED SAME PHYS/QHP EA: CPT | Performed by: INTERNAL MEDICINE

## 2021-03-03 PROCEDURE — 25010000002 MIDAZOLAM PER 1MG: Performed by: INTERNAL MEDICINE

## 2021-03-03 PROCEDURE — 0 IOPAMIDOL PER 1 ML: Performed by: INTERNAL MEDICINE

## 2021-03-03 PROCEDURE — 93458 L HRT ARTERY/VENTRICLE ANGIO: CPT | Performed by: INTERNAL MEDICINE

## 2021-03-03 PROCEDURE — 25010000003 LIDOCAINE 1 % SOLUTION: Performed by: INTERNAL MEDICINE

## 2021-03-03 PROCEDURE — 25010000002 HEPARIN (PORCINE) PER 1000 UNITS: Performed by: INTERNAL MEDICINE

## 2021-03-03 PROCEDURE — 99152 MOD SED SAME PHYS/QHP 5/>YRS: CPT | Performed by: INTERNAL MEDICINE

## 2021-03-03 PROCEDURE — 93005 ELECTROCARDIOGRAM TRACING: CPT | Performed by: INTERNAL MEDICINE

## 2021-03-03 PROCEDURE — C1894 INTRO/SHEATH, NON-LASER: HCPCS | Performed by: INTERNAL MEDICINE

## 2021-03-03 PROCEDURE — 25010000002 FENTANYL CITRATE (PF) 100 MCG/2ML SOLUTION: Performed by: INTERNAL MEDICINE

## 2021-03-03 PROCEDURE — 80048 BASIC METABOLIC PNL TOTAL CA: CPT | Performed by: INTERNAL MEDICINE

## 2021-03-03 PROCEDURE — C1769 GUIDE WIRE: HCPCS | Performed by: INTERNAL MEDICINE

## 2021-03-03 PROCEDURE — 85027 COMPLETE CBC AUTOMATED: CPT | Performed by: INTERNAL MEDICINE

## 2021-03-03 RX ORDER — MIDAZOLAM HYDROCHLORIDE 2 MG/2ML
INJECTION, SOLUTION INTRAMUSCULAR; INTRAVENOUS AS NEEDED
Status: DISCONTINUED | OUTPATIENT
Start: 2021-03-03 | End: 2021-03-03 | Stop reason: HOSPADM

## 2021-03-03 RX ORDER — LIDOCAINE HYDROCHLORIDE 10 MG/ML
0.1 INJECTION, SOLUTION EPIDURAL; INFILTRATION; INTRACAUDAL; PERINEURAL ONCE AS NEEDED
Status: DISCONTINUED | OUTPATIENT
Start: 2021-03-03 | End: 2021-03-10 | Stop reason: HOSPADM

## 2021-03-03 RX ORDER — ACETAMINOPHEN 325 MG/1
650 TABLET ORAL EVERY 4 HOURS PRN
Status: DISCONTINUED | OUTPATIENT
Start: 2021-03-03 | End: 2021-03-10 | Stop reason: HOSPADM

## 2021-03-03 RX ORDER — LIDOCAINE HYDROCHLORIDE 10 MG/ML
INJECTION, SOLUTION INFILTRATION; PERINEURAL AS NEEDED
Status: DISCONTINUED | OUTPATIENT
Start: 2021-03-03 | End: 2021-03-03 | Stop reason: HOSPADM

## 2021-03-03 RX ORDER — SODIUM CHLORIDE 9 MG/ML
1-3 INJECTION, SOLUTION INTRAVENOUS CONTINUOUS
Status: DISCONTINUED | OUTPATIENT
Start: 2021-03-03 | End: 2021-03-10 | Stop reason: HOSPADM

## 2021-03-03 RX ORDER — FENTANYL CITRATE 50 UG/ML
INJECTION, SOLUTION INTRAMUSCULAR; INTRAVENOUS AS NEEDED
Status: DISCONTINUED | OUTPATIENT
Start: 2021-03-03 | End: 2021-03-03 | Stop reason: HOSPADM

## 2021-03-03 RX ORDER — RANOLAZINE 500 MG/1
500 TABLET, EXTENDED RELEASE ORAL 2 TIMES DAILY
Qty: 60 TABLET | Refills: 3 | Status: SHIPPED | OUTPATIENT
Start: 2021-03-03 | End: 2021-04-07 | Stop reason: SDUPTHER

## 2021-03-03 RX ORDER — SODIUM CHLORIDE 9 MG/ML
3 INJECTION, SOLUTION INTRAVENOUS CONTINUOUS
Status: DISCONTINUED | OUTPATIENT
Start: 2021-03-03 | End: 2021-03-03 | Stop reason: HOSPADM

## 2021-03-03 RX ORDER — SODIUM CHLORIDE 0.9 % (FLUSH) 0.9 %
3 SYRINGE (ML) INJECTION EVERY 12 HOURS SCHEDULED
Status: DISCONTINUED | OUTPATIENT
Start: 2021-03-03 | End: 2021-03-10 | Stop reason: HOSPADM

## 2021-03-03 RX ORDER — SODIUM CHLORIDE 0.9 % (FLUSH) 0.9 %
10 SYRINGE (ML) INJECTION AS NEEDED
Status: DISCONTINUED | OUTPATIENT
Start: 2021-03-03 | End: 2021-03-10 | Stop reason: HOSPADM

## 2021-03-03 RX ADMIN — SODIUM CHLORIDE 3 ML/KG/HR: 9 INJECTION, SOLUTION INTRAVENOUS at 09:00

## 2021-03-03 NOTE — DISCHARGE INSTRUCTIONS
Radial Artery Coronary Angiogram After Care    Refer to this sheet in the next few weeks. These instructions provide you with information on caring for yourself after your procedure. Your health care provider may also give you more specific instructions. Your treatment has been planned according to current medical practices, but problems sometimes occur. Call your health care provider if you have any problems or questions after your procedure.       Home Care Instructions:  · You may shower the day after the procedure. Remove the bandage (dressing) and gently wash the site with plain soap and water. Gently pat the site dry. You may apply a band aid daily for 2 days if desired.    · Do not apply powder or lotion to the site.  · Do not submerge the affected site in water for 3 to 5 days or until the site is completely healed.   · Do not flex or bend the affected arm for 24 hours.  · Do not lift, push or pull anything over 10 pounds for 2 days after your procedure.  · Do not operate machinery or power tools for 24 hours.  · Inspect the site at least twice daily. You may notice some bruising at the site and it may be tender for 1 to 2 weeks.     · Increase your fluid intake for the next 2 days.    · Keep arm elevated for 24 hours.  For the remainder of the day, keep your arm in the “Pledge of Allegiance” position when up and about.    · Limit your activity for the next 48 hours and avoid strenuous activity as directed by your physician.   · Cardiac Rehab may or may not be ordered.  Please consult with your physician  · You may drive 24 hours after the procedure unless otherwise instructed by your caregiver.  · A responsible adult should be with you for the first 24 hours after you arrive home.   · Do not make any important legal decisions or sign legal papers for 24 hours. Do not drink alcohol for 24 hours.    · Take medicines only as directed by your health care provider.  Blood thinners may be prescribed after your  procedure to improve blood flow through the stent.    · Metformin or any medications containing Metformin should not be taken for 48 hours after your procedure.    · Eat a heart-healthy diet. This should include plenty of fresh fruits and vegetables. Meat should be lean cuts. Avoid the following types of food:    ¨ Food that is high in salt.    ¨ Canned or highly processed food.    ¨ Food that is high in saturated fat or sugar.    ¨ Fried food.    · Make any other lifestyle changes recommended by your health care provider. This may include:    ¨ Not using any tobacco products including cigarettes, chewing tobacco, or electronic cigarettes.   ¨ Managing your weight.    ¨ Getting regular exercise.    ¨ Managing your blood pressure.    ¨ Limiting your alcohol intake.    ¨ Managing other health problems, such as diabetes.    · Keep all follow-up visits as directed by your health care provider.      Call Your Doctor If:  You have unusual pain at the radial/ulnar (wrist) site.ranola  Moderate Conscious Sedation, Adult, Care After  These instructions provide you with information about caring for yourself after your procedure. Your health care provider may also give you more specific instructions. Your treatment has been planned according to current medical practices, but problems sometimes occur. Call your health care provider if you have any problems or questions after your procedure.  What can I expect after the procedure?  After your procedure, it is common:  To feel sleepy for several hours.  To feel clumsy and have poor balance for several hours.  To have poor judgment for several hours.  To vomit if you eat too soon.  Follow these instructions at home:  For at least 24 hours after the procedure:    Do not:  Participate in activities where you could fall or become injured.  Drive.  Use heavy machinery.  Drink alcohol.  Take sleeping pills or medicines that cause drowsiness.  Make important decisions or sign legal  documents.  Take care of children on your own.  Rest.  Eating and drinking  Follow the diet recommended by your health care provider.  If you vomit:  Drink water, juice, or soup when you can drink without vomiting.  Make sure you have little or no nausea before eating solid foods.  General instructions  Have a responsible adult stay with you until you are awake and alert.  Take over-the-counter and prescription medicines only as told by your health care provider.  If you smoke, do not smoke without supervision.  Keep all follow-up visits as told by your health care provider. This is important.  Contact a health care provider if:  You keep feeling nauseous or you keep vomiting.  You feel light-headed.  You develop a rash.  You have a fever.  Get help right away if:  You have trouble breathing.  This information is not intended to replace advice given to you by your health care provider. Make sure you discuss any questions you have with your health care provider.  Document Revised: 11/30/2018 Document Reviewed: 04/08/2017  [employer] Patient Education © 2020 [employer]  Ranolazine tablets, extended release  What is this medicine?  RANOLAZINE (ra KORI la erasmoen) is a heart medicine. It is used to treat chronic chest pain (angina). This medicine must be taken regularly. It will not relieve an acute episode of chest pain.  This medicine may be used for other purposes; ask your health care provider or pharmacist if you have questions.  COMMON BRAND NAME(S): Ranexa  What should I tell my health care provider before I take this medicine?  They need to know if you have any of these conditions:  heart disease  irregular heartbeat  kidney disease  liver disease  low levels of potassium or magnesium in the blood  an unusual or allergic reaction to ranolazine, other medicines, foods, dyes, or preservatives  pregnant or trying to get pregnant  breast-feeding  How should I use this medicine?  Take this medicine by mouth with a glass  of water. Follow the directions on the prescription label. Do not cut, crush, or chew this medicine. Take with or without food. Do not take this medication with grapefruit juice. Take your doses at regular intervals. Do not take your medicine more often then directed.  Talk to your pediatrician regarding the use of this medicine in children. Special care may be needed.  Overdosage: If you think you have taken too much of this medicine contact a poison control center or emergency room at once.  NOTE: This medicine is only for you. Do not share this medicine with others.  What if I miss a dose?  If you miss a dose, take it as soon as you can. If it is almost time for your next dose, take only that dose. Do not take double or extra doses.  What may interact with this medicine?  Do not take this medicine with any of the following medications:  antivirals for HIV or AIDS  cerivastatin  certain antibiotics like chloramphenicol, clarithromycin, dalfopristin; quinupristin, isoniazid, rifabutin, rifampin, rifapentine  certain medicines used for cancer like imatinib, nilotinib  certain medicines for fungal infections like fluconazole, itraconazole, ketoconazole, posaconazole, voriconazole  certain medicines for irregular heart beat like dronedarone  certain medicines for seizures like carbamazepine, fosphenytoin, oxcarbazepine, phenobarbital, phenytoin  cisapride  conivaptan  cyclosporine  grapefruit or grapefruit juice  lumacaftor; ivacaftor  nefazodone  pimozide  quinacrine  Maura's wort  thioridazine  This medicine may also interact with the following medications:  alfuzosin  certain medicines for depression, anxiety, or psychotic disturbances like bupropion, citalopram, fluoxetine, fluphenazine, paroxetine, perphenazine, risperidone, sertraline, trifluoperazine  certain medicines for cholesterol like atorvastatin, lovastatin, simvastatin  certain medicines for stomach problems like octreotide, palonosetron,  prochlorperazine  eplerenone  ergot alkaloids like dihydroergotamine, ergonovine, ergotamine, methylergonovine  metformin  nicardipine  other medicines that prolong the QT interval (cause an abnormal heart rhythm) like dofetilide, ziprasidone  sirolimus  tacrolimus  This list may not describe all possible interactions. Give your health care provider a list of all the medicines, herbs, non-prescription drugs, or dietary supplements you use. Also tell them if you smoke, drink alcohol, or use illegal drugs. Some items may interact with your medicine.  What should I watch for while using this medicine?  Visit your doctor for regular check ups. Tell your doctor or healthcare professional if your symptoms do not start to get better or if they get worse. This medicine will not relieve an acute attack of angina or chest pain.  This medicine can change your heart rhythm. Your health care provider may check your heart rhythm by ordering an electrocardiogram (ECG) while you are taking this medicine.  You may get drowsy or dizzy. Do not drive, use machinery, or do anything that needs mental alertness until you know how this medicine affects you. Do not stand or sit up quickly, especially if you are an older patient. This reduces the risk of dizzy or fainting spells. Alcohol may interfere with the effect of this medicine. Avoid alcoholic drinks.  If you are scheduled for any medical or dental procedure, tell your healthcare provider that you are taking this medicine. This medicine can interact with other medicines used during surgery.  What side effects may I notice from receiving this medicine?  Side effects that you should report to your doctor or health care professional as soon as possible:  allergic reactions like skin rash, itching or hives, swelling of the face, lips, or tongue  breathing problems  changes in vision  fast, irregular or pounding heartbeat  feeling faint or lightheaded, falls  low or high blood  pressure  numbness or tingling feelings  ringing in the ears  tremor or shakiness  slow heartbeat (fewer than 50 beats per minute)  swelling of the legs or feet  Side effects that usually do not require medical attention (report to your doctor or health care professional if they continue or are bothersome):  constipation  drowsy  dry mouth  headache  nausea or vomiting  stomach upset  This list may not describe all possible side effects. Call your doctor for medical advice about side effects. You may report side effects to FDA at 6-943-WDN-5726.  Where should I keep my medicine?  Keep out of the reach of children.  Store at room temperature between 15 and 30 degrees C (59 and 86 degrees F). Throw away any unused medicine after the expiration date.  NOTE: This sheet is a summary. It may not cover all possible information. If you have questions about this medicine, talk to your doctor, pharmacist, or health care provider.  © 2021 [employer]vier/Gold Standard (2019-12-10 09:18:49)  ·   · You have redness, warmth, swelling, or pain at the radial/ulnar (wrist) site.  · You have drainage (other than a small amount of blood on the dressing).  · `You have chills or a fever > 101.  · Your arm becomes pale or dark, cool, tingly, or numb.  · You develop chest pain, shortness of breath, feel faint or pass out.    · You have heavy bleeding from the site, hold pressure on the site for 20 minutes.  If the bleeding stops, apply a fresh bandage and call your cardiologist.  However, if you continue to have bleeding, call 911.

## 2021-03-05 LAB
QT INTERVAL: 438 MS
QTC INTERVAL: 429 MS

## 2021-04-07 RX ORDER — RANOLAZINE 500 MG/1
500 TABLET, EXTENDED RELEASE ORAL 2 TIMES DAILY
Qty: 180 TABLET | Refills: 3 | Status: SHIPPED | OUTPATIENT
Start: 2021-04-07 | End: 2022-03-15

## 2021-04-07 NOTE — TELEPHONE ENCOUNTER
Patient is out of medication and needs rx sent to [facility]. Also called in 1 month supply to [facility] in [place].

## 2021-04-14 ENCOUNTER — OFFICE VISIT (OUTPATIENT)
Dept: CARDIOLOGY | Facility: CLINIC | Age: 56
End: 2021-04-14

## 2021-04-14 VITALS
HEART RATE: 68 BPM | HEIGHT: 72 IN | BODY MASS INDEX: 26.01 KG/M2 | OXYGEN SATURATION: 98 % | RESPIRATION RATE: 18 BRPM | DIASTOLIC BLOOD PRESSURE: 78 MMHG | WEIGHT: 192 LBS | SYSTOLIC BLOOD PRESSURE: 118 MMHG

## 2021-04-14 DIAGNOSIS — I25.5 ISCHEMIC CARDIOMYOPATHY: ICD-10-CM

## 2021-04-14 DIAGNOSIS — I10 ESSENTIAL HYPERTENSION: ICD-10-CM

## 2021-04-14 DIAGNOSIS — E78.5 HYPERLIPIDEMIA, UNSPECIFIED HYPERLIPIDEMIA TYPE: Primary | ICD-10-CM

## 2021-04-14 PROCEDURE — 99214 OFFICE O/P EST MOD 30 MIN: CPT | Performed by: INTERNAL MEDICINE

## 2021-04-14 NOTE — PROGRESS NOTES
[facility] Cardiology Office Follow Up Note    Vinnie Whitley III  [id number]  2021    Primary Care Provider: Marlen Richards MD    Chief Complaint: Regular follow-up    History of Present Illness:   Mr. Vinnie Whitley III is a 55 y.o. male who presents to the Cardiology Clinic for regular follow-up.  The patient has a past medical history significant for multiple sclerosis followed at [facility], hypertension, hyperlipidemia, and prior tobacco use with cessation in approximately .  His past cardiac history is significant for presentation with an NSTEMI in 10/2020.  Coronary angiography at that time revealed severe one-vessel disease involving the proximal to mid RCA.  He underwent successful PCI with placement of a 3.0 x 28 mm Xience PEPPER.  An echocardiogram at that time showed mildly reduced LV systolic function with an LVEF 45-50% and no significant valvular abnormalities.   Due to recurrent episodes of chest pain, he underwent repeat coronary angiography in 3/21, with a widely patent stent extending from the proximal to mid RCA and no significant left coronary lesions.  He was, however, noted to have sluggish flow throughout the coronary system.  He presents today for follow-up.  Since his last appointment, the patient reports continued episodes of substernal chest discomfort.  The episodes occur randomly, and are not associated with exertion.  No clear aggravating or alleviating factors.  He was started on a trial of Ranexa, with no significant improvement in his symptoms.  No associated nausea, vomiting, or diaphoresis.  No other specific complaints at this time.      Past Cardiac Testin. Last Coronary Angio:    1.  10/26/2020               1.  Severe one-vessel coronary artery disease involving the proximal to mid RCA as culprit lesion for NSTEMI                             -Successful PCI with placement of a 3.0 x 28 mm Xience PEPPER               2.  No  significant disease in the left coronary system               3.  Normal LVEDP (11 mmHg)   2.  3/3/2021    1.  Widely patent stent in the proximal to mid RCA with mild diffuse disease in the distal posteriolateral system at which point the vessel is small in caliber.    2.  No significant obstructive disease in the left coronary system    3.  Sluggish, EAMON II flow throughout the LAD.    4.  Mildly elevated LVEDP, 22 mmHg.  2. Prior Stress Testing: None  3. Last Echo: 10/26/2020              1.  Normal left ventricular size with mildly reduced LV systolic function, LVEF 45-50%.              2.  Mild inferior wall hypokinesis.              3.  Grade 1 diastolic dysfunction.              4.  Normal right ventricular size and systolic function.              5.  Normal left atrial index.              6.  No significant valvular abnormalities.  4. Prior Holter Monitor: None    Review of Systems:   Review of Systems   Constitutional: Positive for fatigue. Negative for activity change, appetite change, chills, diaphoresis, fever, unexpected weight gain and unexpected weight loss.   Eyes: Negative for blurred vision and double vision.   Respiratory: Positive for chest tightness. Negative for cough, shortness of breath and wheezing.    Cardiovascular: Negative for chest pain, palpitations and leg swelling.   Gastrointestinal: Negative for abdominal pain, anal bleeding, blood in stool and GERD.   Endocrine: Negative for cold intolerance and heat intolerance.   Genitourinary: Negative for hematuria.   Neurological: Negative for dizziness, syncope, weakness and light-headedness.   Hematological: Does not bruise/bleed easily.   Psychiatric/Behavioral: Negative for depressed mood and stress. The patient is not nervous/anxious.        I have reviewed and/or updated the patient's past medical, past surgical, family, social history, problem list and allergies as appropriate.     Medications:     Current Outpatient Medications:   •   "aspirin 81 MG EC tablet, Take 1 tablet by mouth Daily., Disp: 90 tablet, Rfl: 3  •  atorvastatin (LIPITOR) 80 MG tablet, Take 1 tablet by mouth Every Night., Disp: 90 tablet, Rfl: 3  •  citalopram (CeleXA) 20 MG tablet, TAKE 1 TABLET DAILY, Disp: 90 tablet, Rfl: 1  •  clopidogrel (Plavix) 75 MG tablet, Take 1 tablet by mouth Daily. Begin on 10/28/2020, Disp: 90 tablet, Rfl: 3  •  lisinopril (PRINIVIL,ZESTRIL) 10 MG tablet, Take 1 tablet by mouth Daily., Disp: 90 tablet, Rfl: 3  •  metoprolol tartrate (LOPRESSOR) 25 MG tablet, Take 1 tablet by mouth Every 12 (Twelve) Hours for 360 days., Disp: 180 tablet, Rfl: 3  •  pramipexole (MIRAPEX) 0.5 MG tablet, TAKE 1 TABLET TWICE A DAY (Patient taking differently: 1 mg. Two tablets twice daily), Disp: 180 tablet, Rfl: 0  •  ranolazine (Ranexa) 500 MG 12 hr tablet, Take 1 tablet by mouth 2 (Two) Times a Day., Disp: 180 tablet, Rfl: 3  •  dextromethorphan-quinidine (Nuedexta) 20-10 MG capsule capsule, Nuedexta 20 mg-10 mg capsule, Disp: , Rfl:   •  fingolimod (Gilenya) 0.5 MG capsule, Gilenya 0.5 mg capsule, Disp: , Rfl:   •  TiZANidine (ZANAFLEX) 4 MG capsule, tizanidine 4 mg capsule, Disp: , Rfl:     Physical Exam:  Vital Signs:   Vitals:    04/14/21 1408   BP: 118/78   Pulse: 68   Resp: 18   SpO2: 98%   Weight: 87.1 kg (192 lb)   Height: 182.9 cm (72\")       Physical Exam  Constitutional:       General: He is not in acute distress.     Appearance: Normal appearance. He is well-developed. He is not diaphoretic.   HENT:      Head: Normocephalic and atraumatic.   Eyes:      General: No scleral icterus.     Pupils: Pupils are equal, round, and reactive to light.   Neck:      Trachea: No tracheal deviation.   Cardiovascular:      Rate and Rhythm: Normal rate and regular rhythm.      Heart sounds: Normal heart sounds. No murmur heard.   No friction rub. No gallop.       Comments: Normal JVD.  Pulmonary:      Effort: Pulmonary effort is normal. No respiratory distress.      Breath " sounds: Normal breath sounds. No stridor. No wheezing or rales.   Chest:      Chest wall: No tenderness.   Abdominal:      General: Bowel sounds are normal. There is no distension.      Palpations: Abdomen is soft.      Tenderness: There is no abdominal tenderness. There is no guarding or rebound.   Musculoskeletal:         General: No swelling. Normal range of motion.      Cervical back: Neck supple. No tenderness.   Lymphadenopathy:      Cervical: No cervical adenopathy.   Skin:     General: Skin is warm and dry.      Findings: No erythema.   Neurological:      General: No focal deficit present.      Mental Status: He is alert and oriented to person, place, and time.   Psychiatric:         Mood and Affect: Mood normal.         Behavior: Behavior normal.         Results Review:   I reviewed the patient's new clinical results.      Assessment / Plan:     1.  Coronary artery disease  --Initially presented with an NSTEMI in 10/2020, found to have severe one-vessel CAD involving the proximal to mid RCA, status post PCI with PEPPER to the proximal to mid RCA  --Underwent repeat coronary angiogram in 3/21 due to recurrent episodes of chest pain, with widely patent RCA stents and no obstructive disease in the left coronary system  --The patient was noted to have sluggish flow throughout the coronary system, possibly related to large caliber of his coronary arteries however microvascular dysfunction is also a possibility  --Current chest pain remains atypical in character, with no evidence of epicardial disease on repeat coronary angiogram  --Continue dual antiplatelet therapy with aspirin and Plavix   --Continue metoprolol and lisinopril given presentation with NSTEMI  --Continue high intensity statin  --Mild microvascular ischemia somewhat less likely, given sluggish flow throughout the coronary system will start a trial of low-dose isosorbide  --Follow-up in 6 months, or sooner if required     2.  Ischemic  cardiomyopathy  --LVEF mildly depressed at 45-50% with inferior wall hypokinesis  --Remains euvolemic on exam, NYHA class I  --Continue metoprolol and lisinopril     3.  Hypertension   --BP remains well controlled with current antihypertensives     4. Hyperlipidemia  --Continue high intensity statin     5.  Multiple sclerosis  --Chronic  --Follows with  neurology        Preventative Cardiology:   Tobacco Cessation: Prior cessation  Obstructive Sleep Apnea Screening: N/A  AAA Screening: Deffered  Peripheral Arterial Disease Screening: N/A      Follow Up:   Return in about 6 months (around 10/14/2021).      Thank you for allowing me to participate in the care of your patient. Please to not hesitate to contact me with additional questions or concerns.     GERMAIN Rushing MD  Interventional Cardiology   04/14/2021  14:07 EDT

## 2021-09-09 ENCOUNTER — TELEPHONE (OUTPATIENT)
Dept: CARDIOLOGY | Facility: CLINIC | Age: 56
End: 2021-09-09

## 2021-09-09 NOTE — TELEPHONE ENCOUNTER
Ml with [facility] faxed an order for an EKG due to patient starting new medications. Spoke with patient, EKG has been scheduled, order has been entered. Once read and completed, results will be faxed to  to [facility].    Office phone number: [phone].

## 2021-09-10 ENCOUNTER — CLINICAL SUPPORT (OUTPATIENT)
Dept: CARDIOLOGY | Facility: CLINIC | Age: 56
End: 2021-09-10

## 2021-09-10 DIAGNOSIS — I25.5 ISCHEMIC CARDIOMYOPATHY: Primary | ICD-10-CM

## 2021-09-23 ENCOUNTER — APPOINTMENT (OUTPATIENT)
Dept: INFUSION THERAPY | Facility: HOSPITAL | Age: 56
End: 2021-09-23

## 2021-10-14 ENCOUNTER — OFFICE VISIT (OUTPATIENT)
Dept: CARDIOLOGY | Facility: CLINIC | Age: 56
End: 2021-10-14

## 2021-10-14 VITALS
BODY MASS INDEX: 25.73 KG/M2 | DIASTOLIC BLOOD PRESSURE: 60 MMHG | WEIGHT: 190 LBS | HEIGHT: 72 IN | SYSTOLIC BLOOD PRESSURE: 82 MMHG | HEART RATE: 68 BPM | OXYGEN SATURATION: 98 % | RESPIRATION RATE: 18 BRPM

## 2021-10-14 DIAGNOSIS — I25.5 ISCHEMIC CARDIOMYOPATHY: ICD-10-CM

## 2021-10-14 DIAGNOSIS — E78.5 HYPERLIPIDEMIA, UNSPECIFIED HYPERLIPIDEMIA TYPE: ICD-10-CM

## 2021-10-14 DIAGNOSIS — I10 ESSENTIAL HYPERTENSION: ICD-10-CM

## 2021-10-14 DIAGNOSIS — I25.10 CORONARY ARTERY DISEASE INVOLVING NATIVE CORONARY ARTERY OF NATIVE HEART WITHOUT ANGINA PECTORIS: Primary | ICD-10-CM

## 2021-10-14 PROCEDURE — 99214 OFFICE O/P EST MOD 30 MIN: CPT | Performed by: INTERNAL MEDICINE

## 2021-10-14 RX ORDER — OFATUMUMAB 20 MG/.4ML
20 INJECTION, SOLUTION SUBCUTANEOUS
Status: ON HOLD | COMMUNITY
Start: 2021-06-09 | End: 2022-05-24

## 2021-10-14 RX ORDER — METOPROLOL SUCCINATE 25 MG/1
25 TABLET, EXTENDED RELEASE ORAL DAILY
Qty: 90 TABLET | Refills: 3 | Status: SHIPPED | OUTPATIENT
Start: 2021-10-14

## 2021-10-14 RX ORDER — LISINOPRIL 5 MG/1
10 TABLET ORAL
Qty: 90 TABLET | Refills: 3 | Status: SHIPPED | OUTPATIENT
Start: 2021-10-14 | End: 2021-10-14 | Stop reason: SDUPTHER

## 2021-10-14 RX ORDER — LISINOPRIL 5 MG/1
5 TABLET ORAL
Qty: 90 TABLET | Refills: 3 | Status: SHIPPED | OUTPATIENT
Start: 2021-10-14 | End: 2022-05-11

## 2021-10-14 RX ORDER — DONEPEZIL HYDROCHLORIDE 5 MG/1
5 TABLET, FILM COATED ORAL NIGHTLY
COMMUNITY
Start: 2021-08-19 | End: 2022-12-12

## 2021-10-14 NOTE — PROGRESS NOTES
[facility] Cardiology Office Follow Up Note    Vinnie Whitley III  [id number]  10/14/2021    Primary Care Provider: Marlen Richards MD    Chief Complaint: Routine follow-up    History of Present Illness:   Mr. Vinnie Whitley III is a 56 y.o. male who presents to the Cardiology Clinic for regular follow-up.  The patient has a past medical history significant for multiple sclerosis followed at [facility], hypertension, hyperlipidemia, and prior tobacco use with cessation in approximately 2001.  His past cardiac history is significant for presentation with an NSTEMI in 10/2020.  Coronary angiography at that time revealed severe one-vessel disease involving the proximal to mid RCA.  He underwent successful PCI with placement of a 3.0 x 28 mm Xience PEPPER.  An echocardiogram at that time showed mildly reduced LV systolic function with an LVEF 45-50% and no significant valvular abnormalities.   Due to recurrent episodes of chest pain, he underwent repeat coronary angiography in 3/21, with a widely patent stent extending from the proximal to mid RCA and no significant left coronary lesions.  He was, however, noted to have sluggish flow throughout the coronary system.    He returns to cardiology clinic today for routine follow-up.  Since his last appointment, the patient reports he was diagnosed with COVID-19.  It appears he tolerated the infection well.  He reports difficulty with his multiple sclerosis, and is starting a new medication to hopefully help with memory issues.  He reports rare episodes of chest discomfort, however they are not associated with exertion and resolve quickly.  Following initiation of Ranexa, he reports overall improvement in his symptoms.  He continues to tolerate DAPT with aspirin and Plavix without significant bleeding or bruising.  He is hypotensive on BP check today, and reports occasional episodes of mild dizziness and lightheadedness.  No other specific  complaints today.    Past Cardiac Testin. Last Coronary Angio:               1.  10/26/2020                          1.  Severe one-vessel coronary artery disease involving the proximal to mid RCA as culprit lesion for NSTEMI                                      -Successful PCI with placement of a 3.0 x 28 mm Xience PEPPER                          2.  No significant disease in the left coronary system                          3.  Normal LVEDP (11 mmHg)              2.  3/3/2021                          1.  Widely patent stent in the proximal to mid RCA with mild diffuse disease in the distal posteriolateral system at which point the vessel is small in caliber.                          2.  No significant obstructive disease in the left coronary system                          3.  Sluggish, EAMON II flow throughout the LAD.                          4.  Mildly elevated LVEDP, 22 mmHg.  2. Prior Stress Testing: None  3. Last Echo: 10/26/2020              1.  Normal left ventricular size with mildly reduced LV systolic function, LVEF 45-50%.              2.  Mild inferior wall hypokinesis.              3.  Grade 1 diastolic dysfunction.              4.  Normal right ventricular size and systolic function.              5.  Normal left atrial index.              6.  No significant valvular abnormalities.  4. Prior Holter Monitor: None    Review of Systems:   Review of Systems   Constitutional: Negative for activity change, appetite change, chills, diaphoresis, fatigue, fever, unexpected weight gain and unexpected weight loss.   Eyes: Negative for blurred vision and double vision.   Respiratory: Negative for cough, chest tightness, shortness of breath and wheezing.    Cardiovascular: Negative for chest pain, palpitations and leg swelling.   Gastrointestinal: Negative for abdominal pain, anal bleeding, blood in stool and GERD.   Endocrine: Negative for cold intolerance and heat intolerance.   Genitourinary: Negative for  hematuria.   Neurological: Positive for dizziness, light-headedness and memory problem. Negative for syncope and weakness.   Hematological: Does not bruise/bleed easily.   Psychiatric/Behavioral: Negative for depressed mood and stress. The patient is not nervous/anxious.        I have reviewed and/or updated the patient's past medical, past surgical, family, social history, problem list and allergies as appropriate.     Medications:     Current Outpatient Medications:   •  aspirin 81 MG EC tablet, Take 1 tablet by mouth Daily., Disp: 90 tablet, Rfl: 3  •  atorvastatin (LIPITOR) 80 MG tablet, Take 1 tablet by mouth Every Night., Disp: 90 tablet, Rfl: 3  •  citalopram (CeleXA) 20 MG tablet, TAKE 1 TABLET DAILY, Disp: 90 tablet, Rfl: 1  •  clopidogrel (Plavix) 75 MG tablet, Take 1 tablet by mouth Daily. Begin on 10/28/2020, Disp: 90 tablet, Rfl: 3  •  dextromethorphan-quinidine (Nuedexta) 20-10 MG capsule capsule, Nuedexta 20 mg-10 mg capsule, Disp: , Rfl:   •  donepezil (ARICEPT) 5 MG tablet, Take 5 mg by mouth. Pt's neurologist would like approval from cardio before starting, Disp: , Rfl:   •  fingolimod (Gilenya) 0.5 MG capsule, Gilenya 0.5 mg capsule, Disp: , Rfl:   •  lisinopril (PRINIVIL,ZESTRIL) 5 MG tablet, Take 1 tablet by mouth Daily., Disp: 90 tablet, Rfl: 3  •  Ofatumumab (Kesimpta) 20 MG/0.4ML solution auto-injector, Inject 20 mg under the skin into the appropriate area as directed Every 28 (Twenty-Eight) Days., Disp: , Rfl:   •  pramipexole (MIRAPEX) 0.5 MG tablet, TAKE 1 TABLET TWICE A DAY (Patient taking differently: 1 mg. Two tablets twice daily), Disp: 180 tablet, Rfl: 0  •  ranolazine (Ranexa) 500 MG 12 hr tablet, Take 1 tablet by mouth 2 (Two) Times a Day., Disp: 180 tablet, Rfl: 3  •  TiZANidine (ZANAFLEX) 4 MG capsule, tizanidine 4 mg capsule, Disp: , Rfl:   •  metoprolol succinate XL (TOPROL-XL) 25 MG 24 hr tablet, Take 1 tablet by mouth Daily., Disp: 90 tablet, Rfl: 3    Physical Exam:  Vital  "Signs:   Vitals:    10/14/21 1314 10/14/21 1318   BP: (!) 88/58 (!) 82/60   BP Location: Right arm Left arm   Patient Position: Sitting Sitting   Pulse: 68    Resp: 18    SpO2: 98%    Weight: 86.2 kg (190 lb)    Height: 182.9 cm (72\")        Physical Exam  Constitutional:       General: He is not in acute distress.     Appearance: Normal appearance. He is well-developed. He is not diaphoretic.   HENT:      Head: Normocephalic and atraumatic.   Eyes:      General: No scleral icterus.     Pupils: Pupils are equal, round, and reactive to light.   Neck:      Trachea: No tracheal deviation.   Cardiovascular:      Rate and Rhythm: Normal rate and regular rhythm.      Heart sounds: Normal heart sounds. No murmur heard.  No friction rub. No gallop.       Comments: Normal JVD.  Pulmonary:      Effort: Pulmonary effort is normal. No respiratory distress.      Breath sounds: Normal breath sounds. No stridor. No wheezing or rales.   Chest:      Chest wall: No tenderness.   Abdominal:      General: Bowel sounds are normal. There is no distension.      Palpations: Abdomen is soft.      Tenderness: There is no abdominal tenderness. There is no guarding or rebound.   Musculoskeletal:         General: No swelling. Normal range of motion.      Cervical back: Neck supple. No tenderness.      Comments: Ambulating with a cane   Lymphadenopathy:      Cervical: No cervical adenopathy.   Skin:     General: Skin is warm and dry.      Findings: No erythema.   Neurological:      General: No focal deficit present.      Mental Status: He is alert and oriented to person, place, and time.   Psychiatric:         Mood and Affect: Mood normal.         Behavior: Behavior normal.         Results Review:   I reviewed the patient's new clinical results.        Assessment / Plan:     1.  Coronary artery disease  --Initially presented with an NSTEMI in 10/2020, found to have severe one-vessel CAD involving the proximal to mid RCA, status post PCI with PEPPER " to the proximal to mid RCA  --Underwent repeat coronary angiogram in 3/21 due to recurrent episodes of chest pain, with widely patent RCA stents and no obstructive disease in the left coronary system  --Possible underlying microvascular dysfunction given sluggish flow noted throughout the coronary system on last coronary angiogram  --Episodes of chest pain currently well controlled, atypical in character  --Continue dual antiplatelet therapy with aspirin and Plavix through 10/21, then de-escalate to aspirin monotherapy  --Continue high intensity statin  --Continue Ranexa  --Follow-up in 6 months, or sooner if required     2.  Ischemic cardiomyopathy  --LVEF mildly depressed at 45-50% with inferior wall hypokinesis  --Euvolemic, NYHA class I  --Given hypotension, will change Lopressor to metoprolol XL low-dose  --Decrease lisinopril to 5 mg daily     3.  Hypertension   --Mild hypotension today, however the patient does have mild orthostatic symptoms intermittently  --Down titrate antihypertensives, as above     4. Hyperlipidemia  --Continue high intensity statin     5.  Multiple sclerosis  --Follows with  neurology        Preventative Cardiology:   Tobacco Cessation: Prior cessation  Obstructive Sleep Apnea Screening: N/A  AAA Screening: Deffered  Peripheral Arterial Disease Screening: N/A    Follow Up:   Return in about 6 months (around 4/14/2022).      Thank you for allowing me to participate in the care of your patient. Please to not hesitate to contact me with additional questions or concerns.     GERMAIN Rushing MD  Interventional Cardiology   10/14/2021  13:25 EDT

## 2021-10-14 NOTE — PATIENT INSTRUCTIONS
Change Metoprolol to Metoprolol XL 25 mg once daily  Decrease Lisinopril 5 mg once daily  Continue Plavix through 10/21, then stop Plavix and continue Aspirin 81 mg daily

## 2021-10-22 ENCOUNTER — HOSPITAL ENCOUNTER (EMERGENCY)
Facility: HOSPITAL | Age: 56
Discharge: HOME | End: 2021-10-22
Attending: EMERGENCY MEDICINE | Admitting: EMERGENCY MEDICINE

## 2021-10-22 ENCOUNTER — APPOINTMENT (OUTPATIENT)
Dept: GENERAL RADIOLOGY | Facility: HOSPITAL | Age: 56
End: 2021-10-22

## 2021-10-22 VITALS
RESPIRATION RATE: 16 BRPM | WEIGHT: 203.4 LBS | DIASTOLIC BLOOD PRESSURE: 87 MMHG | OXYGEN SATURATION: 99 % | SYSTOLIC BLOOD PRESSURE: 121 MMHG | HEART RATE: 80 BPM | BODY MASS INDEX: 27.55 KG/M2 | TEMPERATURE: 97.8 F | HEIGHT: 72 IN

## 2021-10-22 DIAGNOSIS — S61.211A LACERATION OF LEFT INDEX FINGER WITHOUT FOREIGN BODY WITHOUT DAMAGE TO NAIL, INITIAL ENCOUNTER: Primary | ICD-10-CM

## 2021-10-22 PROCEDURE — 99283 EMERGENCY DEPT VISIT LOW MDM: CPT

## 2021-10-22 PROCEDURE — 73140 X-RAY EXAM OF FINGER(S): CPT

## 2021-10-22 PROCEDURE — 90715 TDAP VACCINE 7 YRS/> IM: CPT | Performed by: EMERGENCY MEDICINE

## 2021-10-22 PROCEDURE — 25010000002 TDAP 5-2.5-18.5 LF-MCG/0.5 SUSPENSION: Performed by: EMERGENCY MEDICINE

## 2021-10-22 PROCEDURE — 90471 IMMUNIZATION ADMIN: CPT | Performed by: EMERGENCY MEDICINE

## 2021-10-22 RX ORDER — CEPHALEXIN 500 MG/1
500 CAPSULE ORAL 3 TIMES DAILY
Qty: 21 CAPSULE | Refills: 0 | Status: SHIPPED | OUTPATIENT
Start: 2021-10-22 | End: 2021-10-29

## 2021-10-22 RX ORDER — BACITRACIN ZINC 500 [USP'U]/G
1 OINTMENT TOPICAL ONCE
Status: COMPLETED | OUTPATIENT
Start: 2021-10-22 | End: 2021-10-22

## 2021-10-22 RX ADMIN — BACITRACIN ZINC 1 APPLICATION: 500 OINTMENT TOPICAL at 11:25

## 2021-10-22 RX ADMIN — TETANUS TOXOID, REDUCED DIPHTHERIA TOXOID AND ACELLULAR PERTUSSIS VACCINE, ADSORBED 0.5 ML: 5; 2.5; 8; 8; 2.5 SUSPENSION INTRAMUSCULAR at 10:01

## 2021-10-22 NOTE — ED PROVIDER NOTES
"Subjective   56-year-old right-hand-dominant male presenting with finger injury.  He states just prior to arrival he was using a  when it slipped injuring his left index finger.  He complains of pain at the finger, no other complaints or concerns.  Unsure of his last tetanus.          Review of Systems   Skin: Positive for wound.   All other systems reviewed and are negative.      Past Medical History:   Diagnosis Date   • Essential hypertension 11/7/2020   • Infection of kidney     H/o   • Kidney stones    • Multiple sclerosis (HCC)    • Nephrolithiasis     H/o   • Pneumothorax     H/o       Allergies   Allergen Reactions   • Contrast Dye Other (See Comments)     Made \"feel funny\"       Past Surgical History:   Procedure Laterality Date   • CARDIAC CATHETERIZATION N/A 10/26/2020    Procedure: Coronary angiography;  Surgeon: Sukh Rushing MD;  Location: [facility] CATH INVASIVE LOCATION;  Service: Cardiology;  Laterality: N/A;   • CARDIAC CATHETERIZATION N/A 10/26/2020    Procedure: Optical Coherent Tomography;  Surgeon: Sukh Rushing MD;  Location: [facility] CATH INVASIVE LOCATION;  Service: Cardiology;  Laterality: N/A;   • CARDIAC CATHETERIZATION N/A 10/26/2020    Procedure: Percutaneous Coronary Intervention;  Surgeon: Sukh Rushing MD;  Location: [facility] CATH INVASIVE LOCATION;  Service: Cardiology;  Laterality: N/A;   • CARDIAC CATHETERIZATION N/A 10/26/2020    Procedure: Stent PEPPER coronary;  Surgeon: Sukh Rushing MD;  Location: [facility] CATH INVASIVE LOCATION;  Service: Cardiology;  Laterality: N/A;   • CARDIAC CATHETERIZATION N/A 10/26/2020    Procedure: Left Heart Cath;  Surgeon: Sukh Rushing MD;  Location: [facility] CATH INVASIVE LOCATION;  Service: Cardiology;  Laterality: N/A;   • CARDIAC CATHETERIZATION N/A 3/3/2021    Procedure: Coronary angiography;  Surgeon: Sukh Rushing MD;  Location: [facility] CATH INVASIVE LOCATION;  Service: Cardiology;  Laterality: N/A;   • CARDIAC " CATHETERIZATION N/A 3/3/2021    Procedure: Left Heart Cath;  Surgeon: Sukh Rushing MD;  Location: [facility] CATH INVASIVE LOCATION;  Service: Cardiology;  Laterality: N/A;   • CORONARY STENT PLACEMENT     • KIDNEY STONE SURGERY  2005    Lithotomy   • LUNG SURGERY         Family History   Problem Relation Age of Onset   • Polycystic kidney disease Mother    • Dementia Mother    • Kidney disease Mother    • No Known Problems Sister    • No Known Problems Maternal Uncle    • Kidney disease Maternal Grandmother    • Polycystic kidney disease Maternal Grandmother    • Early death Maternal Grandfather        Social History     Socioeconomic History   • Marital status:    Tobacco Use   • Smoking status: Former Smoker     Packs/day: 0.50     Years: 20.00     Pack years: 10.00     Types: Cigarettes     Start date:      Quit date: 2001     Years since quittin.8   • Smokeless tobacco: Never Used   Substance and Sexual Activity   • Alcohol use: No   • Drug use: No   • Sexual activity: Yes     Partners: Female           Objective   Physical Exam  Vitals reviewed.   Constitutional:       General: He is not in acute distress.     Appearance: Normal appearance. He is not ill-appearing, toxic-appearing or diaphoretic.   HENT:      Head: Normocephalic and atraumatic.      Right Ear: External ear normal.      Left Ear: External ear normal.      Nose: Nose normal.   Eyes:      Extraocular Movements: Extraocular movements intact.   Cardiovascular:      Rate and Rhythm: Normal rate and regular rhythm.      Pulses: Normal pulses.      Heart sounds: Normal heart sounds.      Comments: Capillary refill normal  Pulmonary:      Effort: Pulmonary effort is normal. No respiratory distress.      Breath sounds: Normal breath sounds.   Musculoskeletal:         General: No swelling. Normal range of motion.      Cervical back: Normal range of motion and neck supple.      Comments: Left index finger with mild swelling and  tenderness, full range of motion   Skin:     General: Skin is warm and dry.      Capillary Refill: Capillary refill takes less than 2 seconds.      Findings: No rash.      Comments: There is an irregular macerated laceration to the dorsal aspect of the left index finger from the MCP to the middle phalanx   Neurological:      General: No focal deficit present.      Mental Status: He is alert and oriented to person, place, and time.      Comments: Strength and sensation normal   Psychiatric:         Mood and Affect: Mood normal.         Behavior: Behavior normal.         Laceration Repair    Date/Time: 10/22/2021 11:18 AM  Performed by: Rito Ghosh MD  Authorized by: Rito Ghosh MD     Consent:     Consent obtained:  Verbal    Consent given by:  Patient    Risks discussed:  Infection, need for additional repair, pain, poor cosmetic result and poor wound healing    Alternatives discussed:  No treatment  Anesthesia (see MAR for exact dosages):     Anesthesia method:  Local infiltration and nerve block    Local anesthetic:  Lidocaine 1% w/o epi    Block location:  Left index finger    Block needle gauge:  27 G    Block anesthetic:  Lidocaine 1% w/o epi    Block technique:  Digital    Block injection procedure:  Anatomic landmarks identified, anatomic landmarks palpated, introduced needle, negative aspiration for blood and incremental injection    Block outcome:  Anesthesia achieved  Laceration details:     Location:  Finger    Finger location:  L index finger    Length (cm):  8    Depth (mm):  6  Repair type:     Repair type:  Intermediate  Pre-procedure details:     Preparation:  Patient was prepped and draped in usual sterile fashion and imaging obtained to evaluate for foreign bodies  Exploration:     Hemostasis achieved with:  Direct pressure and tourniquet    Wound exploration: wound explored through full range of motion and entire depth of wound probed and visualized      Wound extent: no  foreign bodies/material noted, no tendon damage noted, no underlying fracture noted and no vascular damage noted      Contaminated: yes    Treatment:     Wound cleansed with: ChloraPrep.    Amount of cleaning:  Extensive    Irrigation solution:  Sterile saline    Irrigation volume:  1000    Irrigation method:  Pressure wash    Visualized foreign bodies/material removed: yes    Skin repair:     Repair method:  Sutures    Suture size:  4-0    Suture material:  Nylon    Suture technique:  Simple interrupted    Number of sutures:  13  Approximation:     Approximation:  Close  Post-procedure details:     Dressing:  Antibiotic ointment and sterile dressing    Patient tolerance of procedure:  Tolerated well, no immediate complications               ED Course                                           MDM  Number of Diagnoses or Management Options  Diagnosis management comments: 56-year-old male with laceration.  Well-developed, well-nourished man in no distress with exam as above.  He is neurovascular intact.  Will obtain x-ray, update tetanus and repair wound.  Disposition is likely to home.    DDx: Laceration, fracture      Final diagnoses:   Laceration of left index finger without foreign body without damage to nail, initial encounter          Rito Ghosh MD  10/22/21 1124

## 2021-11-03 ENCOUNTER — LAB (OUTPATIENT)
Dept: LAB | Facility: HOSPITAL | Age: 56
End: 2021-11-03

## 2021-11-03 ENCOUNTER — TRANSCRIBE ORDERS (OUTPATIENT)
Dept: LAB | Facility: HOSPITAL | Age: 56
End: 2021-11-03

## 2021-11-03 DIAGNOSIS — R53.83 TIREDNESS: ICD-10-CM

## 2021-11-03 DIAGNOSIS — E55.9 VITAMIN D DEFICIENCY: ICD-10-CM

## 2021-11-03 DIAGNOSIS — E78.2 MIXED HYPERLIPIDEMIA: ICD-10-CM

## 2021-11-03 DIAGNOSIS — E78.2 MIXED HYPERLIPIDEMIA: Primary | ICD-10-CM

## 2021-11-03 LAB
25(OH)D3 SERPL-MCNC: 47.2 NG/ML
CHOLEST SERPL-MCNC: 162 MG/DL (ref 0–200)
ESTRADIOL SERPL HS-MCNC: 25.7 PG/ML
HDLC SERPL-MCNC: 37 MG/DL (ref 40–60)
LDLC SERPL CALC-MCNC: 103 MG/DL (ref 0–100)
LDLC/HDLC SERPL: 2.72 {RATIO}
TRIGL SERPL-MCNC: 122 MG/DL (ref 0–150)
VLDLC SERPL-MCNC: 22 MG/DL (ref 5–40)

## 2021-11-03 PROCEDURE — 36415 COLL VENOUS BLD VENIPUNCTURE: CPT

## 2021-11-03 PROCEDURE — 84403 ASSAY OF TOTAL TESTOSTERONE: CPT

## 2021-11-03 PROCEDURE — 80061 LIPID PANEL: CPT

## 2021-11-03 PROCEDURE — 82670 ASSAY OF TOTAL ESTRADIOL: CPT

## 2021-11-03 PROCEDURE — 84402 ASSAY OF FREE TESTOSTERONE: CPT

## 2021-11-03 PROCEDURE — 82306 VITAMIN D 25 HYDROXY: CPT

## 2021-11-05 LAB
TESTOST FREE SERPL-MCNC: 6.8 PG/ML (ref 7.2–24)
TESTOST SERPL-MCNC: 356 NG/DL (ref 264–916)

## 2021-11-09 RX ORDER — LISINOPRIL 5 MG/1
5 TABLET ORAL DAILY
Qty: 90 TABLET | Refills: 3 | Status: SHIPPED | OUTPATIENT
Start: 2021-11-09

## 2021-11-09 RX ORDER — ATORVASTATIN CALCIUM 80 MG/1
TABLET, FILM COATED ORAL
Qty: 90 TABLET | Refills: 3 | Status: SHIPPED | OUTPATIENT
Start: 2021-11-09 | End: 2022-11-04

## 2021-11-09 RX ORDER — CLOPIDOGREL BISULFATE 75 MG/1
TABLET ORAL
Qty: 90 TABLET | Refills: 3 | Status: SHIPPED | OUTPATIENT
Start: 2021-11-09 | End: 2022-11-04

## 2021-11-09 RX ORDER — ASPIRIN 81 MG/1
TABLET, COATED ORAL
Qty: 90 TABLET | Refills: 3 | Status: SHIPPED | OUTPATIENT
Start: 2021-11-09 | End: 2022-11-04

## 2021-11-09 RX ORDER — METOPROLOL SUCCINATE 25 MG/1
25 TABLET, EXTENDED RELEASE ORAL DAILY
Qty: 90 TABLET | Refills: 3 | Status: SHIPPED | OUTPATIENT
Start: 2021-11-09 | End: 2022-05-11

## 2022-01-31 ENCOUNTER — LAB (OUTPATIENT)
Dept: LAB | Facility: HOSPITAL | Age: 57
End: 2022-01-31

## 2022-01-31 DIAGNOSIS — Z03.818 ENCOUNTER FOR PATIENT CONCERN ABOUT EXPOSURE TO INFECTIOUS ORGANISM: Primary | ICD-10-CM

## 2022-01-31 LAB — SARS-COV-2 RNA NOSE QL NAA+PROBE: NOT DETECTED

## 2022-01-31 PROCEDURE — U0004 COV-19 TEST NON-CDC HGH THRU: HCPCS

## 2022-03-15 RX ORDER — RANOLAZINE 500 MG/1
TABLET, EXTENDED RELEASE ORAL
Qty: 180 TABLET | Refills: 1 | Status: SHIPPED | OUTPATIENT
Start: 2022-03-15 | End: 2022-09-12

## 2022-04-14 ENCOUNTER — TELEPHONE (OUTPATIENT)
Dept: CARDIOLOGY | Facility: CLINIC | Age: 57
End: 2022-04-14

## 2022-04-30 ENCOUNTER — TELEPHONE (OUTPATIENT)
Dept: URGENT CARE | Facility: CLINIC | Age: 57
End: 2022-04-30

## 2022-04-30 DIAGNOSIS — N39.0 URINARY TRACT INFECTION WITHOUT HEMATURIA, SITE UNSPECIFIED: Primary | ICD-10-CM

## 2022-04-30 RX ORDER — LEVOFLOXACIN 500 MG/1
500 TABLET, FILM COATED ORAL DAILY
Qty: 10 TABLET | Refills: 0 | Status: SHIPPED | OUTPATIENT
Start: 2022-04-30 | End: 2022-05-11

## 2022-05-11 PROCEDURE — U0004 COV-19 TEST NON-CDC HGH THRU: HCPCS | Performed by: FAMILY MEDICINE

## 2022-05-12 ENCOUNTER — TELEPHONE (OUTPATIENT)
Dept: URGENT CARE | Facility: CLINIC | Age: 57
End: 2022-05-12

## 2022-05-12 NOTE — TELEPHONE ENCOUNTER
Patient wife called on behalf of patient. States that he wants to see his infectious disease doctor but he hasn't been there in 5+ years and they will not see him without a referral by the doctor who has been treating him for everything.     Dr. Tao   [facility]  [phone] is their office number.

## 2022-05-14 ENCOUNTER — TELEPHONE (OUTPATIENT)
Dept: URGENT CARE | Facility: CLINIC | Age: 57
End: 2022-05-14

## 2022-05-14 DIAGNOSIS — D84.821 IMMUNOCOMPROMISED STATE DUE TO DRUG THERAPY: ICD-10-CM

## 2022-05-14 DIAGNOSIS — A68.9 RECURRENT FEVER: Primary | ICD-10-CM

## 2022-05-14 DIAGNOSIS — Z79.899 IMMUNOCOMPROMISED STATE DUE TO DRUG THERAPY: ICD-10-CM

## 2022-05-17 ENCOUNTER — APPOINTMENT (OUTPATIENT)
Dept: GENERAL RADIOLOGY | Facility: HOSPITAL | Age: 57
End: 2022-05-17

## 2022-05-17 ENCOUNTER — HOSPITAL ENCOUNTER (EMERGENCY)
Facility: HOSPITAL | Age: 57
Discharge: HOME | End: 2022-05-17
Attending: EMERGENCY MEDICINE | Admitting: EMERGENCY MEDICINE

## 2022-05-17 VITALS
BODY MASS INDEX: 27.09 KG/M2 | DIASTOLIC BLOOD PRESSURE: 87 MMHG | OXYGEN SATURATION: 95 % | HEART RATE: 97 BPM | SYSTOLIC BLOOD PRESSURE: 106 MMHG | HEIGHT: 72 IN | RESPIRATION RATE: 18 BRPM | TEMPERATURE: 98.2 F | WEIGHT: 200 LBS

## 2022-05-17 DIAGNOSIS — N28.9 RENAL INSUFFICIENCY: ICD-10-CM

## 2022-05-17 DIAGNOSIS — B34.9 VIRAL ILLNESS: Primary | ICD-10-CM

## 2022-05-17 DIAGNOSIS — R50.9 FEVER, UNSPECIFIED FEVER CAUSE: ICD-10-CM

## 2022-05-17 LAB
ALBUMIN SERPL-MCNC: 3.6 G/DL (ref 3.5–5.2)
ALBUMIN/GLOB SERPL: 1.2 G/DL
ALP SERPL-CCNC: 105 U/L (ref 39–117)
ALT SERPL W P-5'-P-CCNC: 21 U/L (ref 1–41)
ANION GAP SERPL CALCULATED.3IONS-SCNC: 13 MMOL/L (ref 5–15)
AST SERPL-CCNC: 24 U/L (ref 1–40)
BACTERIA UR QL AUTO: NORMAL /HPF
BASOPHILS # BLD AUTO: 0 10*3/MM3 (ref 0–0.2)
BASOPHILS NFR BLD AUTO: 0 % (ref 0–1.5)
BILIRUB SERPL-MCNC: 0.3 MG/DL (ref 0–1.2)
BILIRUB UR QL STRIP: NEGATIVE
BUN SERPL-MCNC: 20 MG/DL (ref 6–20)
BUN/CREAT SERPL: 15.2 (ref 7–25)
CALCIUM SPEC-SCNC: 9 MG/DL (ref 8.6–10.5)
CHLORIDE SERPL-SCNC: 98 MMOL/L (ref 98–107)
CLARITY UR: CLEAR
CO2 SERPL-SCNC: 23 MMOL/L (ref 22–29)
COLOR UR: YELLOW
CREAT SERPL-MCNC: 1.32 MG/DL (ref 0.76–1.27)
D-LACTATE SERPL-SCNC: 1.2 MMOL/L (ref 0.5–2)
DEPRECATED RDW RBC AUTO: 39.7 FL (ref 37–54)
EGFRCR SERPLBLD CKD-EPI 2021: 64.1 ML/MIN/1.73
EOSINOPHIL # BLD AUTO: 0 10*3/MM3 (ref 0–0.4)
EOSINOPHIL NFR BLD AUTO: 0 % (ref 0.3–6.2)
ERYTHROCYTE [DISTWIDTH] IN BLOOD BY AUTOMATED COUNT: 13.2 % (ref 12.3–15.4)
FLUAV SUBTYP SPEC NAA+PROBE: NOT DETECTED
FLUBV RNA ISLT QL NAA+PROBE: NOT DETECTED
GLOBULIN UR ELPH-MCNC: 3.1 GM/DL
GLUCOSE SERPL-MCNC: 106 MG/DL (ref 65–99)
GLUCOSE UR STRIP-MCNC: NEGATIVE MG/DL
HCT VFR BLD AUTO: 36.8 % (ref 37.5–51)
HGB BLD-MCNC: 12.3 G/DL (ref 13–17.7)
HGB UR QL STRIP.AUTO: NEGATIVE
HOLD SPECIMEN: NORMAL
HYALINE CASTS UR QL AUTO: NORMAL /LPF
IMM GRANULOCYTES # BLD AUTO: 0.04 10*3/MM3 (ref 0–0.05)
IMM GRANULOCYTES NFR BLD AUTO: 1 % (ref 0–0.5)
KETONES UR QL STRIP: NEGATIVE
LEUKOCYTE ESTERASE UR QL STRIP.AUTO: NEGATIVE
LYMPHOCYTES # BLD AUTO: 0.45 10*3/MM3 (ref 0.7–3.1)
LYMPHOCYTES NFR BLD AUTO: 11.4 % (ref 19.6–45.3)
MCH RBC QN AUTO: 28 PG (ref 26.6–33)
MCHC RBC AUTO-ENTMCNC: 33.4 G/DL (ref 31.5–35.7)
MCV RBC AUTO: 83.8 FL (ref 79–97)
MONOCYTES # BLD AUTO: 0.43 10*3/MM3 (ref 0.1–0.9)
MONOCYTES NFR BLD AUTO: 10.9 % (ref 5–12)
NEUTROPHILS NFR BLD AUTO: 3.03 10*3/MM3 (ref 1.7–7)
NEUTROPHILS NFR BLD AUTO: 76.7 % (ref 42.7–76)
NITRITE UR QL STRIP: NEGATIVE
NRBC BLD AUTO-RTO: 0 /100 WBC (ref 0–0.2)
NT-PROBNP SERPL-MCNC: 125.8 PG/ML (ref 0–900)
PH UR STRIP.AUTO: 6.5 [PH] (ref 5–8)
PLATELET # BLD AUTO: 217 10*3/MM3 (ref 140–450)
PMV BLD AUTO: 8.4 FL (ref 6–12)
POTASSIUM SERPL-SCNC: 4.5 MMOL/L (ref 3.5–5.2)
PROCALCITONIN SERPL-MCNC: 0.1 NG/ML (ref 0–0.25)
PROT SERPL-MCNC: 6.7 G/DL (ref 6–8.5)
PROT UR QL STRIP: ABNORMAL
RBC # BLD AUTO: 4.39 10*6/MM3 (ref 4.14–5.8)
RBC # UR STRIP: NORMAL /HPF
REF LAB TEST METHOD: NORMAL
SARS-COV-2 RNA PNL SPEC NAA+PROBE: NOT DETECTED
SODIUM SERPL-SCNC: 134 MMOL/L (ref 136–145)
SP GR UR STRIP: 1.03 (ref 1–1.03)
SQUAMOUS #/AREA URNS HPF: NORMAL /HPF
TROPONIN T SERPL-MCNC: <0.01 NG/ML (ref 0–0.03)
UROBILINOGEN UR QL STRIP: ABNORMAL
WBC # UR STRIP: NORMAL /HPF
WBC NRBC COR # BLD: 3.95 10*3/MM3 (ref 3.4–10.8)
WHOLE BLOOD HOLD COAG: NORMAL
WHOLE BLOOD HOLD SPECIMEN: NORMAL

## 2022-05-17 PROCEDURE — 83880 ASSAY OF NATRIURETIC PEPTIDE: CPT | Performed by: EMERGENCY MEDICINE

## 2022-05-17 PROCEDURE — 80053 COMPREHEN METABOLIC PANEL: CPT | Performed by: EMERGENCY MEDICINE

## 2022-05-17 PROCEDURE — 81001 URINALYSIS AUTO W/SCOPE: CPT | Performed by: NURSE PRACTITIONER

## 2022-05-17 PROCEDURE — 85025 COMPLETE CBC W/AUTO DIFF WBC: CPT | Performed by: EMERGENCY MEDICINE

## 2022-05-17 PROCEDURE — 36415 COLL VENOUS BLD VENIPUNCTURE: CPT

## 2022-05-17 PROCEDURE — 84484 ASSAY OF TROPONIN QUANT: CPT | Performed by: EMERGENCY MEDICINE

## 2022-05-17 PROCEDURE — 71045 X-RAY EXAM CHEST 1 VIEW: CPT

## 2022-05-17 PROCEDURE — 87040 BLOOD CULTURE FOR BACTERIA: CPT | Performed by: NURSE PRACTITIONER

## 2022-05-17 PROCEDURE — 83605 ASSAY OF LACTIC ACID: CPT | Performed by: NURSE PRACTITIONER

## 2022-05-17 PROCEDURE — 99284 EMERGENCY DEPT VISIT MOD MDM: CPT

## 2022-05-17 PROCEDURE — 87636 SARSCOV2 & INF A&B AMP PRB: CPT | Performed by: NURSE PRACTITIONER

## 2022-05-17 PROCEDURE — 93005 ELECTROCARDIOGRAM TRACING: CPT | Performed by: EMERGENCY MEDICINE

## 2022-05-17 PROCEDURE — 84145 PROCALCITONIN (PCT): CPT | Performed by: NURSE PRACTITIONER

## 2022-05-17 RX ORDER — SODIUM CHLORIDE 0.9 % (FLUSH) 0.9 %
10 SYRINGE (ML) INJECTION AS NEEDED
Status: DISCONTINUED | OUTPATIENT
Start: 2022-05-17 | End: 2022-05-17 | Stop reason: HOSPADM

## 2022-05-17 NOTE — ED PROVIDER NOTES
"Subjective   Vinnie Whitley is a 56 yr old male that presents emergency department for complaints of an infection.  He advises that he has been ill over the past 4 to 5 weeks.  Patient reports that he has had staph in his right upper extremity.  He has had a urinary tract infection and pneumonia.  Patient has been on multiple antibiotics.  He reports injections of Rocephin, clindamycin and Bactrim most recently the patient is taking Levaquin.  Patient reports that he is not improving.  He attempted to see infectious disease however he has not seen them in the past for 5 years and they need a referral.  Patient saw his primary care physician today and it was suggested that he come to the ER for evaluation and admission.  Patient reports fevers and chills.  He advises he does not have an appetite.      History provided by:  Patient   used: No    Fever  Temp source:  Subjective  Severity:  Unable to specify  Duration:  4 weeks  Timing:  Constant  Progression:  Unchanged  Relieved by:  Nothing  Worsened by:  Nothing  Associated symptoms: chills, cough and dysuria    Associated symptoms: no chest pain, no nausea and no vomiting        Review of Systems   Constitutional: Positive for appetite change, chills, fatigue and fever.   Respiratory: Positive for cough and shortness of breath.    Cardiovascular: Negative for chest pain.   Gastrointestinal: Negative for abdominal pain, nausea and vomiting.   Genitourinary: Positive for dysuria.   Neurological: Negative for dizziness and weakness.   All other systems reviewed and are negative.      Past Medical History:   Diagnosis Date   • Essential hypertension 11/7/2020   • Infection of kidney     H/o   • Kidney stones    • Multiple sclerosis (HCC)    • Nephrolithiasis     H/o   • Pneumothorax     H/o       Allergies   Allergen Reactions   • Contrast Dye Other (See Comments)     Made \"feel funny\"       Past Surgical History:   Procedure Laterality Date   • " CARDIAC CATHETERIZATION N/A 10/26/2020    Procedure: Coronary angiography;  Surgeon: Sukh Rushing MD;  Location:  [facility];  Service: Cardiology;  Laterality: N/A;   • CARDIAC CATHETERIZATION N/A 10/26/2020    Procedure: Optical Coherent Tomography;  Surgeon: Sukh Rushing MD;  Location:  [facility];  Service: Cardiology;  Laterality: N/A;   • CARDIAC CATHETERIZATION N/A 10/26/2020    Procedure: Percutaneous Coronary Intervention;  Surgeon: Sukh Rushing MD;  Location:  [facility];  Service: Cardiology;  Laterality: N/A;   • CARDIAC CATHETERIZATION N/A 10/26/2020    Procedure: Stent PEPPER coronary;  Surgeon: Sukh Rushing MD;  Location: [facility];  Service: Cardiology;  Laterality: N/A;   • CARDIAC CATHETERIZATION N/A 10/26/2020    Procedure: Left Heart Cath;  Surgeon: Sukh Rushing MD;  Location: [facility] LOCATION;  Service: Cardiology;  Laterality: N/A;   • CARDIAC CATHETERIZATION N/A 3/3/2021    Procedure: Coronary angiography;  Surgeon: Sukh Rushing MD;  Location: [facility] LOCATION;  Service: Cardiology;  Laterality: N/A;   • CARDIAC CATHETERIZATION N/A 3/3/2021    Procedure: Left Heart Cath;  Surgeon: Sukh Rushing MD;  Location: [facility] INVASIVE LOCATION;  Service: Cardiology;  Laterality: N/A;   • CORONARY STENT PLACEMENT     • KIDNEY STONE SURGERY  2005    Lithotomy   • LUNG SURGERY         Family History   Problem Relation Age of Onset   • Polycystic kidney disease Mother    • Dementia Mother    • Kidney disease Mother    • No Known Problems Sister    • No Known Problems Maternal Uncle    • Kidney disease Maternal Grandmother    • Polycystic kidney disease Maternal Grandmother    • Early death Maternal Grandfather        Social History     Socioeconomic History   • Marital status:    Tobacco Use   • Smoking status: Former Smoker     Packs/day: 0.50     Years:  20.00     Pack years: 10.00     Types: Cigarettes     Start date:      Quit date: 2001     Years since quittin.3   • Smokeless tobacco: Never Used   Vaping Use   • Vaping Use: Never used   Substance and Sexual Activity   • Alcohol use: No   • Drug use: No   • Sexual activity: Yes     Partners: Female           Objective   Physical Exam  Vitals and nursing note reviewed.   Constitutional:       General: He is not in acute distress.     Appearance: Normal appearance. He is well-developed. He is not toxic-appearing.   HENT:      Head: Normocephalic and atraumatic.   Eyes:      General: Lids are normal.      Conjunctiva/sclera: Conjunctivae normal.   Neck:      Trachea: Trachea normal.   Cardiovascular:      Rate and Rhythm: Regular rhythm.      Pulses: Normal pulses.      Heart sounds: Normal heart sounds.   Pulmonary:      Effort: Pulmonary effort is normal. No respiratory distress.      Breath sounds: Normal breath sounds. No decreased breath sounds, wheezing, rhonchi or rales.   Abdominal:      General: Bowel sounds are normal.      Palpations: Abdomen is soft.      Tenderness: There is no abdominal tenderness.   Musculoskeletal:         General: Normal range of motion.      Cervical back: Full passive range of motion without pain and normal range of motion.   Skin:     General: Skin is warm and dry.      Findings: No rash.   Neurological:      Mental Status: He is alert and oriented to person, place, and time.      Cranial Nerves: No cranial nerve deficit.   Psychiatric:         Speech: Speech normal.         Behavior: Behavior normal. Behavior is cooperative.         Procedures           ED Course  ED Course as of 22 2213   Tue May 17, 2022   2049 COVID19: Not Detected [KG]   2049 Influenza A PCR: Not Detected [KG]   2049 Influenza B PCR: Not Detected [KG]   2100 Results are discussed with patient at this time.  Patient to continue his current Levaquin prescription.  To return to the ER as  needed.  Patient to follow-up with infectious disease. [KG]      ED Course User Index  [KG] Josi Varner EDY, APRN           Recent Results (from the past 24 hour(s))   Comprehensive Metabolic Panel    Collection Time: 05/17/22  5:10 PM    Specimen: Blood   Result Value Ref Range    Glucose 106 (H) 65 - 99 mg/dL    BUN 20 6 - 20 mg/dL    Creatinine 1.32 (H) 0.76 - 1.27 mg/dL    Sodium 134 (L) 136 - 145 mmol/L    Potassium 4.5 3.5 - 5.2 mmol/L    Chloride 98 98 - 107 mmol/L    CO2 23.0 22.0 - 29.0 mmol/L    Calcium 9.0 8.6 - 10.5 mg/dL    Total Protein 6.7 6.0 - 8.5 g/dL    Albumin 3.60 3.50 - 5.20 g/dL    ALT (SGPT) 21 1 - 41 U/L    AST (SGOT) 24 1 - 40 U/L    Alkaline Phosphatase 105 39 - 117 U/L    Total Bilirubin 0.3 0.0 - 1.2 mg/dL    Globulin 3.1 gm/dL    A/G Ratio 1.2 g/dL    BUN/Creatinine Ratio 15.2 7.0 - 25.0    Anion Gap 13.0 5.0 - 15.0 mmol/L    eGFR 64.1 >60.0 mL/min/1.73   BNP    Collection Time: 05/17/22  5:10 PM    Specimen: Blood   Result Value Ref Range    proBNP 125.8 0.0 - 900.0 pg/mL   Troponin    Collection Time: 05/17/22  5:10 PM    Specimen: Blood   Result Value Ref Range    Troponin T <0.010 0.000 - 0.030 ng/mL   Green Top (Gel)    Collection Time: 05/17/22  5:10 PM   Result Value Ref Range    Extra Tube Hold for add-ons.    Lavender Top    Collection Time: 05/17/22  5:10 PM   Result Value Ref Range    Extra Tube hold for add-on    Gold Top - SST    Collection Time: 05/17/22  5:10 PM   Result Value Ref Range    Extra Tube Hold for add-ons.    Gray Top    Collection Time: 05/17/22  5:10 PM   Result Value Ref Range    Extra Tube Hold for add-ons.    Light Blue Top    Collection Time: 05/17/22  5:10 PM   Result Value Ref Range    Extra Tube Hold for add-ons.    CBC Auto Differential    Collection Time: 05/17/22  5:10 PM    Specimen: Blood   Result Value Ref Range    WBC 3.95 3.40 - 10.80 10*3/mm3    RBC 4.39 4.14 - 5.80 10*6/mm3    Hemoglobin 12.3 (L) 13.0 - 17.7 g/dL    Hematocrit 36.8 (L)  37.5 - 51.0 %    MCV 83.8 79.0 - 97.0 fL    MCH 28.0 26.6 - 33.0 pg    MCHC 33.4 31.5 - 35.7 g/dL    RDW 13.2 12.3 - 15.4 %    RDW-SD 39.7 37.0 - 54.0 fl    MPV 8.4 6.0 - 12.0 fL    Platelets 217 140 - 450 10*3/mm3    Neutrophil % 76.7 (H) 42.7 - 76.0 %    Lymphocyte % 11.4 (L) 19.6 - 45.3 %    Monocyte % 10.9 5.0 - 12.0 %    Eosinophil % 0.0 (L) 0.3 - 6.2 %    Basophil % 0.0 0.0 - 1.5 %    Immature Grans % 1.0 (H) 0.0 - 0.5 %    Neutrophils, Absolute 3.03 1.70 - 7.00 10*3/mm3    Lymphocytes, Absolute 0.45 (L) 0.70 - 3.10 10*3/mm3    Monocytes, Absolute 0.43 0.10 - 0.90 10*3/mm3    Eosinophils, Absolute 0.00 0.00 - 0.40 10*3/mm3    Basophils, Absolute 0.00 0.00 - 0.20 10*3/mm3    Immature Grans, Absolute 0.04 0.00 - 0.05 10*3/mm3    nRBC 0.0 0.0 - 0.2 /100 WBC   Lactic Acid, Plasma    Collection Time: 05/17/22  5:10 PM    Specimen: Blood   Result Value Ref Range    Lactate 1.2 0.5 - 2.0 mmol/L   Procalcitonin    Collection Time: 05/17/22  5:10 PM    Specimen: Blood   Result Value Ref Range    Procalcitonin 0.10 0.00 - 0.25 ng/mL   Urinalysis With Microscopic If Indicated (No Culture) - Urine, Clean Catch    Collection Time: 05/17/22  7:43 PM    Specimen: Urine, Clean Catch   Result Value Ref Range    Color, UA Yellow Yellow, Straw    Appearance, UA Clear Clear    pH, UA 6.5 5.0 - 8.0    Specific Gravity, UA 1.026 1.001 - 1.030    Glucose, UA Negative Negative    Ketones, UA Negative Negative    Bilirubin, UA Negative Negative    Blood, UA Negative Negative    Protein, UA 30 mg/dL (1+) (A) Negative    Leuk Esterase, UA Negative Negative    Nitrite, UA Negative Negative    Urobilinogen, UA 1.0 E.U./dL 0.2 - 1.0 E.U./dL   Urinalysis, Microscopic Only - Urine, Clean Catch    Collection Time: 05/17/22  7:43 PM    Specimen: Urine, Clean Catch   Result Value Ref Range    RBC, UA 0-2 None Seen, 0-2 /HPF    WBC, UA 0-2 None Seen, 0-2 /HPF    Bacteria, UA None Seen None Seen, Trace /HPF    Squamous Epithelial Cells, UA 0-2  None Seen, 0-2 /HPF    Hyaline Casts, UA 0-6 0 - 6 /LPF    Methodology Automated Microscopy    COVID-19 and FLU A/B PCR - Swab, Nasopharynx    Collection Time: 05/17/22  8:21 PM    Specimen: Nasopharynx; Swab   Result Value Ref Range    COVID19 Not Detected Not Detected - Ref. Range    Influenza A PCR Not Detected Not Detected    Influenza B PCR Not Detected Not Detected     Note: In addition to lab results from this visit, the labs listed above may include labs taken at another facility or during a different encounter within the last 24 hours. Please correlate lab times with ED admission and discharge times for further clarification of the services performed during this visit.    XR Chest 1 View   Final Result   Bilateral peripheral interstitial disease in a pattern suggestive of   Covid 19 pneumonia. Other atypical pneumonias might have this appearance   as well.       This report was finalized on 5/17/2022 6:42 PM by Dr. Deven Holliday MD.            Vitals:    05/17/22 1943 05/17/22 2000 05/17/22 2030 05/17/22 2057   BP: 116/74 104/66 106/87    BP Location:       Patient Position:       Pulse: 89 94 98 97   Resp:       Temp:       TempSrc:       SpO2: 99% 98% 96% 95%   Weight:       Height:         Medications   sodium chloride 0.9 % flush 10 mL (has no administration in time range)     ECG/EMG Results (last 24 hours)     Procedure Component Value Units Date/Time    ECG 12 Lead [[id number]] Collected: 05/17/22 1651     Updated: 05/17/22 1652        ECG 12 Lead   Preliminary Result                                                   MDM    Final diagnoses:   Viral illness   Fever, unspecified fever cause   Renal insufficiency       ED Disposition  ED Disposition     ED Disposition   Discharge    Condition   Stable    Comment   --             Marlen Richards MD  [address]  [facility] [address]  [facility] [address]  [phone]          Ernestina Hughes MD  [address] [facility] [address]  [facility]  [address]  [phone]               Medication List      No changes were made to your prescriptions during this visit.          Josi Varner, APRN  05/17/22 7787

## 2022-05-18 ENCOUNTER — HOSPITAL ENCOUNTER (OUTPATIENT)
Dept: CT IMAGING | Facility: HOSPITAL | Age: 57
Discharge: HOME | End: 2022-05-18

## 2022-05-18 ENCOUNTER — TRANSCRIBE ORDERS (OUTPATIENT)
Dept: ADMINISTRATIVE | Facility: HOSPITAL | Age: 57
End: 2022-05-18

## 2022-05-18 ENCOUNTER — TRANSCRIBE ORDERS (OUTPATIENT)
Dept: LAB | Facility: HOSPITAL | Age: 57
End: 2022-05-18

## 2022-05-18 ENCOUNTER — LAB (OUTPATIENT)
Dept: LAB | Facility: HOSPITAL | Age: 57
End: 2022-05-18

## 2022-05-18 DIAGNOSIS — D70.9 FEBRILE NEUTROPENIA: ICD-10-CM

## 2022-05-18 DIAGNOSIS — R50.81 FEBRILE NEUTROPENIA: Primary | ICD-10-CM

## 2022-05-18 DIAGNOSIS — J15.9 BACTERIAL PNEUMONIA: Primary | ICD-10-CM

## 2022-05-18 DIAGNOSIS — D70.9 FEBRILE NEUTROPENIA: Primary | ICD-10-CM

## 2022-05-18 DIAGNOSIS — J15.9 BACTERIAL PNEUMONIA: ICD-10-CM

## 2022-05-18 DIAGNOSIS — R50.81 FEBRILE NEUTROPENIA: ICD-10-CM

## 2022-05-18 LAB — ERYTHROCYTE [SEDIMENTATION RATE] IN BLOOD: 51 MM/HR (ref 0–20)

## 2022-05-18 PROCEDURE — 71250 CT THORAX DX C-: CPT

## 2022-05-18 PROCEDURE — 86160 COMPLEMENT ANTIGEN: CPT

## 2022-05-18 PROCEDURE — 85652 RBC SED RATE AUTOMATED: CPT

## 2022-05-18 PROCEDURE — 86140 C-REACTIVE PROTEIN: CPT

## 2022-05-18 PROCEDURE — 86162 COMPLEMENT TOTAL (CH50): CPT

## 2022-05-18 PROCEDURE — 36415 COLL VENOUS BLD VENIPUNCTURE: CPT

## 2022-05-18 PROCEDURE — 86431 RHEUMATOID FACTOR QUANT: CPT

## 2022-05-18 NOTE — DISCHARGE INSTRUCTIONS
Continue to alternate Tylenol and ibuprofen.  Increase fluids.  Increase rest.  Follow-up with infectious disease as discussed.

## 2022-05-19 LAB
C3 SERPL-MCNC: 187 MG/DL (ref 82–167)
C4 SERPL-MCNC: 34 MG/DL (ref 14–44)
CH50 SERPL-ACNC: >60 U/ML
CHROMATIN AB SERPL-ACNC: 10 IU/ML (ref 0–14)
CRP SERPL-MCNC: 5 MG/DL (ref 0–0.5)
QT INTERVAL: 352 MS
QTC INTERVAL: 444 MS

## 2022-05-20 ENCOUNTER — LAB (OUTPATIENT)
Dept: LAB | Facility: HOSPITAL | Age: 57
End: 2022-05-20

## 2022-05-20 ENCOUNTER — TRANSCRIBE ORDERS (OUTPATIENT)
Dept: LAB | Facility: HOSPITAL | Age: 57
End: 2022-05-20

## 2022-05-20 DIAGNOSIS — Z92.25 STATUS POST RECENT IMMUNOSUPPRESSIVE THERAPY: ICD-10-CM

## 2022-05-20 DIAGNOSIS — J34.0 CARBUNCLE OF NOSE: ICD-10-CM

## 2022-05-20 DIAGNOSIS — B95.61 IMPETIGO DUE TO STAPHYLOCOCCUS AUREUS: ICD-10-CM

## 2022-05-20 DIAGNOSIS — L02.413 ABSCESS OF RIGHT ARM: ICD-10-CM

## 2022-05-20 DIAGNOSIS — L03.90 CELLULITIS, UNSPECIFIED CELLULITIS SITE: ICD-10-CM

## 2022-05-20 DIAGNOSIS — L01.00 IMPETIGO DUE TO STAPHYLOCOCCUS AUREUS: ICD-10-CM

## 2022-05-20 DIAGNOSIS — R50.9 HYPERTHERMIA-INDUCED DEFECT: ICD-10-CM

## 2022-05-20 DIAGNOSIS — L02.411 ABSCESS OF RIGHT AXILLA: ICD-10-CM

## 2022-05-20 DIAGNOSIS — N18.31 CHRONIC KIDNEY DISEASE (CKD) STAGE G3A/A1, MODERATELY DECREASED GLOMERULAR FILTRATION RATE (GFR) BETWEEN 45-59 ML/MIN/1.73 SQUARE METER AND ALBUMINURIA CREATININE RATIO LESS THAN 30 MG/G (CMS/H*: ICD-10-CM

## 2022-05-20 DIAGNOSIS — J18.9 UNRESOLVED PNEUMONIA: ICD-10-CM

## 2022-05-20 DIAGNOSIS — R91.8 LUNG MASS: ICD-10-CM

## 2022-05-20 DIAGNOSIS — E11.22 TYPE 2 DIABETES MELLITUS WITH ESRD (END-STAGE RENAL DISEASE): ICD-10-CM

## 2022-05-20 DIAGNOSIS — G35 MULTIPLE SCLEROSIS: ICD-10-CM

## 2022-05-20 DIAGNOSIS — R91.8 LUNG MASS: Primary | ICD-10-CM

## 2022-05-20 DIAGNOSIS — N18.6 TYPE 2 DIABETES MELLITUS WITH ESRD (END-STAGE RENAL DISEASE): ICD-10-CM

## 2022-05-20 LAB
ALBUMIN SERPL-MCNC: 3.7 G/DL (ref 3.5–5.2)
ALBUMIN/GLOB SERPL: 1.2 G/DL
ALP SERPL-CCNC: 103 U/L (ref 39–117)
ALT SERPL W P-5'-P-CCNC: 24 U/L (ref 1–41)
ANION GAP SERPL CALCULATED.3IONS-SCNC: 13 MMOL/L (ref 5–15)
AST SERPL-CCNC: 25 U/L (ref 1–40)
B PARAPERT DNA SPEC QL NAA+PROBE: NOT DETECTED
B PERT DNA SPEC QL NAA+PROBE: NOT DETECTED
BASOPHILS # BLD AUTO: 0.01 10*3/MM3 (ref 0–0.2)
BASOPHILS NFR BLD AUTO: 0.3 % (ref 0–1.5)
BILIRUB SERPL-MCNC: 0.4 MG/DL (ref 0–1.2)
BUN SERPL-MCNC: 24 MG/DL (ref 6–20)
BUN/CREAT SERPL: 17.6 (ref 7–25)
C PNEUM DNA NPH QL NAA+NON-PROBE: NOT DETECTED
CALCIUM SPEC-SCNC: 8.9 MG/DL (ref 8.6–10.5)
CHLORIDE SERPL-SCNC: 97 MMOL/L (ref 98–107)
CO2 SERPL-SCNC: 23 MMOL/L (ref 22–29)
CREAT SERPL-MCNC: 1.36 MG/DL (ref 0.76–1.27)
CRP SERPL-MCNC: 9.11 MG/DL (ref 0–0.5)
DEPRECATED RDW RBC AUTO: 39.5 FL (ref 37–54)
EGFRCR SERPLBLD CKD-EPI 2021: 61.8 ML/MIN/1.73
EOSINOPHIL # BLD AUTO: 0 10*3/MM3 (ref 0–0.4)
EOSINOPHIL NFR BLD AUTO: 0 % (ref 0.3–6.2)
ERYTHROCYTE [DISTWIDTH] IN BLOOD BY AUTOMATED COUNT: 12.8 % (ref 12.3–15.4)
ERYTHROCYTE [SEDIMENTATION RATE] IN BLOOD: 50 MM/HR (ref 0–20)
FLUAV SUBTYP SPEC NAA+PROBE: NOT DETECTED
FLUBV RNA ISLT QL NAA+PROBE: NOT DETECTED
GLOBULIN UR ELPH-MCNC: 3.2 GM/DL
GLUCOSE SERPL-MCNC: 101 MG/DL (ref 65–99)
HADV DNA SPEC NAA+PROBE: NOT DETECTED
HCOV 229E RNA SPEC QL NAA+PROBE: NOT DETECTED
HCOV HKU1 RNA SPEC QL NAA+PROBE: NOT DETECTED
HCOV NL63 RNA SPEC QL NAA+PROBE: NOT DETECTED
HCOV OC43 RNA SPEC QL NAA+PROBE: NOT DETECTED
HCT VFR BLD AUTO: 35.8 % (ref 37.5–51)
HGB BLD-MCNC: 12 G/DL (ref 13–17.7)
HMPV RNA NPH QL NAA+NON-PROBE: NOT DETECTED
HPIV1 RNA ISLT QL NAA+PROBE: NOT DETECTED
HPIV2 RNA SPEC QL NAA+PROBE: NOT DETECTED
HPIV3 RNA NPH QL NAA+PROBE: NOT DETECTED
HPIV4 P GENE NPH QL NAA+PROBE: NOT DETECTED
IMM GRANULOCYTES # BLD AUTO: 0.03 10*3/MM3 (ref 0–0.05)
IMM GRANULOCYTES NFR BLD AUTO: 0.9 % (ref 0–0.5)
LYMPHOCYTES # BLD AUTO: 0.28 10*3/MM3 (ref 0.7–3.1)
LYMPHOCYTES NFR BLD AUTO: 8.8 % (ref 19.6–45.3)
M PNEUMO IGG SER IA-ACNC: NOT DETECTED
MCH RBC QN AUTO: 28.4 PG (ref 26.6–33)
MCHC RBC AUTO-ENTMCNC: 33.5 G/DL (ref 31.5–35.7)
MCV RBC AUTO: 84.6 FL (ref 79–97)
MONOCYTES # BLD AUTO: 0.46 10*3/MM3 (ref 0.1–0.9)
MONOCYTES NFR BLD AUTO: 14.5 % (ref 5–12)
NEUTROPHILS NFR BLD AUTO: 2.39 10*3/MM3 (ref 1.7–7)
NEUTROPHILS NFR BLD AUTO: 75.5 % (ref 42.7–76)
NRBC BLD AUTO-RTO: 0 /100 WBC (ref 0–0.2)
PLATELET # BLD AUTO: 210 10*3/MM3 (ref 140–450)
PMV BLD AUTO: 8.6 FL (ref 6–12)
POTASSIUM SERPL-SCNC: 4.5 MMOL/L (ref 3.5–5.2)
PROT SERPL-MCNC: 6.9 G/DL (ref 6–8.5)
RBC # BLD AUTO: 4.23 10*6/MM3 (ref 4.14–5.8)
RHINOVIRUS RNA SPEC NAA+PROBE: NOT DETECTED
RSV RNA NPH QL NAA+NON-PROBE: NOT DETECTED
SARS-COV-2 RNA NPH QL NAA+NON-PROBE: NOT DETECTED
SODIUM SERPL-SCNC: 133 MMOL/L (ref 136–145)
WBC NRBC COR # BLD: 3.17 10*3/MM3 (ref 3.4–10.8)

## 2022-05-20 PROCEDURE — 86140 C-REACTIVE PROTEIN: CPT

## 2022-05-20 PROCEDURE — 85652 RBC SED RATE AUTOMATED: CPT

## 2022-05-20 PROCEDURE — 80053 COMPREHEN METABOLIC PANEL: CPT

## 2022-05-20 PROCEDURE — 36415 COLL VENOUS BLD VENIPUNCTURE: CPT

## 2022-05-20 PROCEDURE — 0202U NFCT DS 22 TRGT SARS-COV-2: CPT

## 2022-05-20 PROCEDURE — 85025 COMPLETE CBC W/AUTO DIFF WBC: CPT

## 2022-05-20 PROCEDURE — C9803 HOPD COVID-19 SPEC COLLECT: HCPCS

## 2022-05-22 LAB
BACTERIA SPEC AEROBE CULT: NORMAL
BACTERIA SPEC AEROBE CULT: NORMAL

## 2022-05-23 ENCOUNTER — OFFICE VISIT (OUTPATIENT)
Dept: CARDIOLOGY | Facility: CLINIC | Age: 57
End: 2022-05-23

## 2022-05-23 VITALS
BODY MASS INDEX: 26.01 KG/M2 | OXYGEN SATURATION: 94 % | HEART RATE: 87 BPM | DIASTOLIC BLOOD PRESSURE: 50 MMHG | SYSTOLIC BLOOD PRESSURE: 80 MMHG | HEIGHT: 72 IN | WEIGHT: 192 LBS

## 2022-05-23 DIAGNOSIS — I25.10 CORONARY ARTERY DISEASE INVOLVING NATIVE CORONARY ARTERY OF NATIVE HEART WITHOUT ANGINA PECTORIS: ICD-10-CM

## 2022-05-23 DIAGNOSIS — I39 ENDOCARDITIS DUE TO OTHER ORGANISM, UNSPECIFIED CHRONICITY: Primary | ICD-10-CM

## 2022-05-23 DIAGNOSIS — I25.5 ISCHEMIC CARDIOMYOPATHY: ICD-10-CM

## 2022-05-23 DIAGNOSIS — I10 ESSENTIAL HYPERTENSION: ICD-10-CM

## 2022-05-23 PROCEDURE — 99214 OFFICE O/P EST MOD 30 MIN: CPT | Performed by: INTERNAL MEDICINE

## 2022-05-23 RX ORDER — DOXYCYCLINE HYCLATE 100 MG/1
100 CAPSULE ORAL 2 TIMES DAILY
Status: ON HOLD | COMMUNITY
Start: 2022-05-20 | End: 2022-05-26 | Stop reason: SDUPTHER

## 2022-05-24 ENCOUNTER — APPOINTMENT (OUTPATIENT)
Dept: GENERAL RADIOLOGY | Facility: HOSPITAL | Age: 57
End: 2022-05-24

## 2022-05-24 ENCOUNTER — HOSPITAL ENCOUNTER (OUTPATIENT)
Facility: HOSPITAL | Age: 57
Discharge: HOME | End: 2022-05-26
Attending: INTERNAL MEDICINE | Admitting: INTERNAL MEDICINE

## 2022-05-24 DIAGNOSIS — J18.9 PNEUMONIA OF BOTH LUNGS DUE TO INFECTIOUS ORGANISM, UNSPECIFIED PART OF LUNG: Primary | ICD-10-CM

## 2022-05-24 PROCEDURE — 99219 PR INITIAL OBSERVATION CARE/DAY 50 MINUTES: CPT | Performed by: INTERNAL MEDICINE

## 2022-05-24 PROCEDURE — 71045 X-RAY EXAM CHEST 1 VIEW: CPT

## 2022-05-24 PROCEDURE — G0378 HOSPITAL OBSERVATION PER HR: HCPCS

## 2022-05-24 RX ORDER — MAGNESIUM SULFATE HEPTAHYDRATE 40 MG/ML
2 INJECTION, SOLUTION INTRAVENOUS AS NEEDED
Status: DISCONTINUED | OUTPATIENT
Start: 2022-05-24 | End: 2022-05-26 | Stop reason: HOSPADM

## 2022-05-24 RX ORDER — ONDANSETRON 4 MG/1
4 TABLET, FILM COATED ORAL EVERY 6 HOURS PRN
Status: DISCONTINUED | OUTPATIENT
Start: 2022-05-24 | End: 2022-05-26 | Stop reason: HOSPADM

## 2022-05-24 RX ORDER — POTASSIUM CHLORIDE 1.5 G/1.77G
40 POWDER, FOR SOLUTION ORAL AS NEEDED
Status: DISCONTINUED | OUTPATIENT
Start: 2022-05-24 | End: 2022-05-26 | Stop reason: HOSPADM

## 2022-05-24 RX ORDER — ACETAMINOPHEN 650 MG/1
650 SUPPOSITORY RECTAL EVERY 4 HOURS PRN
Status: DISCONTINUED | OUTPATIENT
Start: 2022-05-24 | End: 2022-05-26 | Stop reason: HOSPADM

## 2022-05-24 RX ORDER — ACETAMINOPHEN 160 MG/5ML
650 SOLUTION ORAL EVERY 4 HOURS PRN
Status: DISCONTINUED | OUTPATIENT
Start: 2022-05-24 | End: 2022-05-26 | Stop reason: HOSPADM

## 2022-05-24 RX ORDER — ONDANSETRON 2 MG/ML
4 INJECTION INTRAMUSCULAR; INTRAVENOUS EVERY 6 HOURS PRN
Status: DISCONTINUED | OUTPATIENT
Start: 2022-05-24 | End: 2022-05-26 | Stop reason: HOSPADM

## 2022-05-24 RX ORDER — RANOLAZINE 500 MG/1
500 TABLET, EXTENDED RELEASE ORAL 2 TIMES DAILY
Status: DISCONTINUED | OUTPATIENT
Start: 2022-05-24 | End: 2022-05-26 | Stop reason: HOSPADM

## 2022-05-24 RX ORDER — SODIUM CHLORIDE 0.9 % (FLUSH) 0.9 %
10 SYRINGE (ML) INJECTION EVERY 12 HOURS SCHEDULED
Status: DISCONTINUED | OUTPATIENT
Start: 2022-05-24 | End: 2022-05-26 | Stop reason: HOSPADM

## 2022-05-24 RX ORDER — BISACODYL 10 MG
10 SUPPOSITORY, RECTAL RECTAL DAILY PRN
Status: DISCONTINUED | OUTPATIENT
Start: 2022-05-24 | End: 2022-05-26 | Stop reason: HOSPADM

## 2022-05-24 RX ORDER — POTASSIUM CHLORIDE 750 MG/1
40 CAPSULE, EXTENDED RELEASE ORAL AS NEEDED
Status: DISCONTINUED | OUTPATIENT
Start: 2022-05-24 | End: 2022-05-26 | Stop reason: HOSPADM

## 2022-05-24 RX ORDER — ACETAMINOPHEN 325 MG/1
650 TABLET ORAL EVERY 4 HOURS PRN
Status: DISCONTINUED | OUTPATIENT
Start: 2022-05-24 | End: 2022-05-26 | Stop reason: HOSPADM

## 2022-05-24 RX ORDER — DOXYCYCLINE 100 MG/1
100 CAPSULE ORAL EVERY 12 HOURS SCHEDULED
Status: DISCONTINUED | OUTPATIENT
Start: 2022-05-24 | End: 2022-05-26 | Stop reason: HOSPADM

## 2022-05-24 RX ORDER — SODIUM CHLORIDE 0.9 % (FLUSH) 0.9 %
10 SYRINGE (ML) INJECTION AS NEEDED
Status: DISCONTINUED | OUTPATIENT
Start: 2022-05-24 | End: 2022-05-26 | Stop reason: HOSPADM

## 2022-05-24 RX ORDER — POLYETHYLENE GLYCOL 3350 17 G/17G
17 POWDER, FOR SOLUTION ORAL DAILY PRN
Status: DISCONTINUED | OUTPATIENT
Start: 2022-05-24 | End: 2022-05-26 | Stop reason: HOSPADM

## 2022-05-24 RX ORDER — PRAMIPEXOLE DIHYDROCHLORIDE 1 MG/1
1 TABLET ORAL 2 TIMES DAILY
Status: DISCONTINUED | OUTPATIENT
Start: 2022-05-24 | End: 2022-05-26 | Stop reason: HOSPADM

## 2022-05-24 RX ORDER — LISINOPRIL 5 MG/1
5 TABLET ORAL DAILY
Status: DISCONTINUED | OUTPATIENT
Start: 2022-05-25 | End: 2022-05-26 | Stop reason: HOSPADM

## 2022-05-24 RX ORDER — CHOLECALCIFEROL (VITAMIN D3) 125 MCG
5 CAPSULE ORAL NIGHTLY PRN
Status: DISCONTINUED | OUTPATIENT
Start: 2022-05-24 | End: 2022-05-26 | Stop reason: HOSPADM

## 2022-05-24 RX ORDER — ASPIRIN 81 MG/1
81 TABLET ORAL DAILY
Status: DISCONTINUED | OUTPATIENT
Start: 2022-05-25 | End: 2022-05-26 | Stop reason: HOSPADM

## 2022-05-24 RX ORDER — MAGNESIUM SULFATE HEPTAHYDRATE 40 MG/ML
4 INJECTION, SOLUTION INTRAVENOUS AS NEEDED
Status: DISCONTINUED | OUTPATIENT
Start: 2022-05-24 | End: 2022-05-26 | Stop reason: HOSPADM

## 2022-05-24 RX ORDER — AMOXICILLIN 250 MG
2 CAPSULE ORAL 2 TIMES DAILY PRN
Status: DISCONTINUED | OUTPATIENT
Start: 2022-05-24 | End: 2022-05-26 | Stop reason: HOSPADM

## 2022-05-24 RX ORDER — ATORVASTATIN CALCIUM 40 MG/1
80 TABLET, FILM COATED ORAL NIGHTLY
Status: DISCONTINUED | OUTPATIENT
Start: 2022-05-25 | End: 2022-05-26 | Stop reason: HOSPADM

## 2022-05-24 RX ORDER — CALCIUM CARBONATE 200(500)MG
2 TABLET,CHEWABLE ORAL 2 TIMES DAILY PRN
Status: DISCONTINUED | OUTPATIENT
Start: 2022-05-24 | End: 2022-05-26 | Stop reason: HOSPADM

## 2022-05-24 RX ORDER — DONEPEZIL HYDROCHLORIDE 5 MG/1
5 TABLET, FILM COATED ORAL NIGHTLY
Status: DISCONTINUED | OUTPATIENT
Start: 2022-05-24 | End: 2022-05-26 | Stop reason: HOSPADM

## 2022-05-24 RX ORDER — METOPROLOL SUCCINATE 25 MG/1
25 TABLET, EXTENDED RELEASE ORAL DAILY
Status: DISCONTINUED | OUTPATIENT
Start: 2022-05-25 | End: 2022-05-26 | Stop reason: HOSPADM

## 2022-05-24 RX ORDER — CITALOPRAM 20 MG/1
20 TABLET ORAL DAILY
Status: DISCONTINUED | OUTPATIENT
Start: 2022-05-25 | End: 2022-05-26 | Stop reason: HOSPADM

## 2022-05-24 RX ORDER — BISACODYL 5 MG/1
5 TABLET, DELAYED RELEASE ORAL DAILY PRN
Status: DISCONTINUED | OUTPATIENT
Start: 2022-05-24 | End: 2022-05-26 | Stop reason: HOSPADM

## 2022-05-24 RX ADMIN — Medication 10 ML: at 21:12

## 2022-05-24 RX ADMIN — DOXYCYCLINE 100 MG: 100 CAPSULE ORAL at 21:11

## 2022-05-24 RX ADMIN — ACETAMINOPHEN 650 MG: 325 TABLET ORAL at 22:00

## 2022-05-24 RX ADMIN — PRAMIPEXOLE DIHYDROCHLORIDE 1 MG: 1 TABLET ORAL at 21:11

## 2022-05-24 RX ADMIN — DONEPEZIL HYDROCHLORIDE 5 MG: 5 TABLET ORAL at 21:11

## 2022-05-24 RX ADMIN — RANOLAZINE 500 MG: 500 TABLET, EXTENDED RELEASE ORAL at 21:11

## 2022-05-25 ENCOUNTER — ANESTHESIA (OUTPATIENT)
Dept: GASTROENTEROLOGY | Facility: HOSPITAL | Age: 57
End: 2022-05-25

## 2022-05-25 ENCOUNTER — ANESTHESIA EVENT (OUTPATIENT)
Dept: GASTROENTEROLOGY | Facility: HOSPITAL | Age: 57
End: 2022-05-25

## 2022-05-25 ENCOUNTER — APPOINTMENT (OUTPATIENT)
Dept: CARDIOLOGY | Facility: HOSPITAL | Age: 57
End: 2022-05-25

## 2022-05-25 PROBLEM — R06.00 DYSPNEA: Status: ACTIVE | Noted: 2022-05-25

## 2022-05-25 LAB
ALBUMIN SERPL-MCNC: 3.4 G/DL (ref 3.5–5.2)
ALBUMIN/GLOB SERPL: 1.3 G/DL
ALP SERPL-CCNC: 84 U/L (ref 39–117)
ALT SERPL W P-5'-P-CCNC: 26 U/L (ref 1–41)
ANION GAP SERPL CALCULATED.3IONS-SCNC: 11 MMOL/L (ref 5–15)
AST SERPL-CCNC: 24 U/L (ref 1–40)
BASOPHILS # BLD AUTO: 0 10*3/MM3 (ref 0–0.2)
BASOPHILS NFR BLD AUTO: 0 % (ref 0–1.5)
BH CV ECHO MEAS - AO MAX PG: 8 MMHG
BH CV ECHO MEAS - AO MEAN PG: 4.8 MMHG
BH CV ECHO MEAS - AO ROOT DIAM: 3.1 CM
BH CV ECHO MEAS - AO V2 MAX: 141.6 CM/SEC
BH CV ECHO MEAS - AO V2 VTI: 27.7 CM
BH CV ECHO MEAS - AVA(I,D): 1.57 CM2
BH CV ECHO MEAS - EDV(CUBED): 128.2 ML
BH CV ECHO MEAS - EDV(MOD-SP2): 121 ML
BH CV ECHO MEAS - EDV(MOD-SP4): 79.8 ML
BH CV ECHO MEAS - EF(MOD-BP): 53.5 %
BH CV ECHO MEAS - EF(MOD-SP2): 56 %
BH CV ECHO MEAS - EF(MOD-SP4): 51.6 %
BH CV ECHO MEAS - ESV(CUBED): 37.8 ML
BH CV ECHO MEAS - ESV(MOD-SP2): 53.2 ML
BH CV ECHO MEAS - ESV(MOD-SP4): 38.6 ML
BH CV ECHO MEAS - FS: 33.5 %
BH CV ECHO MEAS - IVS/LVPW: 0.86 CM
BH CV ECHO MEAS - IVSD: 0.8 CM
BH CV ECHO MEAS - LA DIMENSION: 3.5 CM
BH CV ECHO MEAS - LAT PEAK E' VEL: 9.2 CM/SEC
BH CV ECHO MEAS - LV MASS(C)D: 152.8 GRAMS
BH CV ECHO MEAS - LV MAX PG: 1.95 MMHG
BH CV ECHO MEAS - LV MEAN PG: 1.16 MMHG
BH CV ECHO MEAS - LV V1 MAX: 69.8 CM/SEC
BH CV ECHO MEAS - LV V1 VTI: 13.4 CM
BH CV ECHO MEAS - LVIDD: 5 CM
BH CV ECHO MEAS - LVIDS: 3.4 CM
BH CV ECHO MEAS - LVOT AREA: 3.3 CM2
BH CV ECHO MEAS - LVOT DIAM: 2.04 CM
BH CV ECHO MEAS - LVPWD: 0.93 CM
BH CV ECHO MEAS - MED PEAK E' VEL: 8.1 CM/SEC
BH CV ECHO MEAS - MV A MAX VEL: 79.3 CM/SEC
BH CV ECHO MEAS - MV DEC SLOPE: 310.3 CM/SEC2
BH CV ECHO MEAS - MV DEC TIME: 0.18 MSEC
BH CV ECHO MEAS - MV E MAX VEL: 69.4 CM/SEC
BH CV ECHO MEAS - MV E/A: 0.88
BH CV ECHO MEAS - MV MAX PG: 3.3 MMHG
BH CV ECHO MEAS - MV MEAN PG: 1.71 MMHG
BH CV ECHO MEAS - MV P1/2T: 73.2 MSEC
BH CV ECHO MEAS - MV V2 VTI: 23 CM
BH CV ECHO MEAS - MVA(P1/2T): 3 CM2
BH CV ECHO MEAS - MVA(VTI): 1.89 CM2
BH CV ECHO MEAS - PA ACC TIME: 0.12 SEC
BH CV ECHO MEAS - PA PR(ACCEL): 25.1 MMHG
BH CV ECHO MEAS - PI END-D VEL: 82.8 CM/SEC
BH CV ECHO MEAS - RAP SYSTOLE: 3 MMHG
BH CV ECHO MEAS - RVSP: 11 MMHG
BH CV ECHO MEAS - SV(LVOT): 43.6 ML
BH CV ECHO MEAS - SV(MOD-SP2): 67.8 ML
BH CV ECHO MEAS - SV(MOD-SP4): 41.2 ML
BH CV ECHO MEAS - TAPSE (>1.6): 1.82 CM
BH CV ECHO MEAS - TR MAX PG: 7.9 MMHG
BH CV ECHO MEAS - TR MAX VEL: 140.6 CM/SEC
BH CV ECHO MEASUREMENTS AVERAGE E/E' RATIO: 8.02
BH CV VAS BP RIGHT ARM: NORMAL MMHG
BH CV XLRA - RV BASE: 3.7 CM
BH CV XLRA - RV LENGTH: 6.5 CM
BH CV XLRA - RV MID: 2.6 CM
BH CV XLRA - TDI S': 10.3 CM/SEC
BILIRUB SERPL-MCNC: 0.4 MG/DL (ref 0–1.2)
BUN SERPL-MCNC: 17 MG/DL (ref 6–20)
BUN/CREAT SERPL: 15.7 (ref 7–25)
CALCIUM SPEC-SCNC: 8.9 MG/DL (ref 8.6–10.5)
CHLORIDE SERPL-SCNC: 100 MMOL/L (ref 98–107)
CO2 SERPL-SCNC: 22 MMOL/L (ref 22–29)
CREAT SERPL-MCNC: 1.08 MG/DL (ref 0.76–1.27)
CRP SERPL-MCNC: 5.59 MG/DL (ref 0–0.5)
D DIMER PPP FEU-MCNC: 0.46 MCGFEU/ML (ref 0.01–0.5)
DEPRECATED RDW RBC AUTO: 39.3 FL (ref 37–54)
EGFRCR SERPLBLD CKD-EPI 2021: 81.5 ML/MIN/1.73
EOSINOPHIL # BLD AUTO: 0.01 10*3/MM3 (ref 0–0.4)
EOSINOPHIL NFR BLD AUTO: 0.3 % (ref 0.3–6.2)
ERYTHROCYTE [DISTWIDTH] IN BLOOD BY AUTOMATED COUNT: 13.1 % (ref 12.3–15.4)
FLUAV SUBTYP SPEC NAA+PROBE: NOT DETECTED
FLUBV RNA ISLT QL NAA+PROBE: NOT DETECTED
GLOBULIN UR ELPH-MCNC: 2.7 GM/DL
GLUCOSE SERPL-MCNC: 104 MG/DL (ref 65–99)
HCT VFR BLD AUTO: 32 % (ref 37.5–51)
HGB BLD-MCNC: 10.8 G/DL (ref 13–17.7)
IMM GRANULOCYTES # BLD AUTO: 0.05 10*3/MM3 (ref 0–0.05)
IMM GRANULOCYTES NFR BLD AUTO: 1.3 % (ref 0–0.5)
INR PPP: 1.13 (ref 0.84–1.13)
LEFT ATRIUM VOLUME INDEX: 14.7 ML/M2
LV EF 2D ECHO EST: 60 %
LYMPHOCYTES # BLD AUTO: 0.44 10*3/MM3 (ref 0.7–3.1)
LYMPHOCYTES NFR BLD AUTO: 11.2 % (ref 19.6–45.3)
MAXIMAL PREDICTED HEART RATE: 166 BPM
MCH RBC QN AUTO: 27.9 PG (ref 26.6–33)
MCHC RBC AUTO-ENTMCNC: 33.8 G/DL (ref 31.5–35.7)
MCV RBC AUTO: 82.7 FL (ref 79–97)
MONOCYTES # BLD AUTO: 0.48 10*3/MM3 (ref 0.1–0.9)
MONOCYTES NFR BLD AUTO: 12.2 % (ref 5–12)
NEUTROPHILS NFR BLD AUTO: 2.95 10*3/MM3 (ref 1.7–7)
NEUTROPHILS NFR BLD AUTO: 75 % (ref 42.7–76)
NRBC BLD AUTO-RTO: 0 /100 WBC (ref 0–0.2)
NT-PROBNP SERPL-MCNC: 91.2 PG/ML (ref 0–900)
PLATELET # BLD AUTO: 193 10*3/MM3 (ref 140–450)
PMV BLD AUTO: 8.9 FL (ref 6–12)
POTASSIUM SERPL-SCNC: 4.4 MMOL/L (ref 3.5–5.2)
PROCALCITONIN SERPL-MCNC: 0.12 NG/ML (ref 0–0.25)
PROT SERPL-MCNC: 6.1 G/DL (ref 6–8.5)
PROTHROMBIN TIME: 14.4 SECONDS (ref 11.4–14.4)
QT INTERVAL: 378 MS
QTC INTERVAL: 457 MS
RBC # BLD AUTO: 3.87 10*6/MM3 (ref 4.14–5.8)
SARS-COV-2 RNA PNL SPEC NAA+PROBE: NOT DETECTED
SODIUM SERPL-SCNC: 133 MMOL/L (ref 136–145)
STRESS TARGET HR: 141 BPM
WBC NRBC COR # BLD: 3.93 10*3/MM3 (ref 3.4–10.8)

## 2022-05-25 PROCEDURE — 31624 DX BRONCHOSCOPE/LAVAGE: CPT | Performed by: INTERNAL MEDICINE

## 2022-05-25 PROCEDURE — 85610 PROTHROMBIN TIME: CPT | Performed by: NURSE PRACTITIONER

## 2022-05-25 PROCEDURE — 85379 FIBRIN DEGRADATION QUANT: CPT | Performed by: INTERNAL MEDICINE

## 2022-05-25 PROCEDURE — 87206 SMEAR FLUORESCENT/ACID STAI: CPT | Performed by: INTERNAL MEDICINE

## 2022-05-25 PROCEDURE — G0378 HOSPITAL OBSERVATION PER HR: HCPCS

## 2022-05-25 PROCEDURE — 80053 COMPREHEN METABOLIC PANEL: CPT | Performed by: INTERNAL MEDICINE

## 2022-05-25 PROCEDURE — 99204 OFFICE O/P NEW MOD 45 MIN: CPT | Performed by: INTERNAL MEDICINE

## 2022-05-25 PROCEDURE — 25010000002 SULFUR HEXAFLUORIDE MICROSPH 60.7-25 MG RECONSTITUTED SUSPENSION: Performed by: INTERNAL MEDICINE

## 2022-05-25 PROCEDURE — 85025 COMPLETE CBC W/AUTO DIFF WBC: CPT | Performed by: NURSE PRACTITIONER

## 2022-05-25 PROCEDURE — 87205 SMEAR GRAM STAIN: CPT | Performed by: INTERNAL MEDICINE

## 2022-05-25 PROCEDURE — 83880 ASSAY OF NATRIURETIC PEPTIDE: CPT | Performed by: INTERNAL MEDICINE

## 2022-05-25 PROCEDURE — 87636 SARSCOV2 & INF A&B AMP PRB: CPT | Performed by: INTERNAL MEDICINE

## 2022-05-25 PROCEDURE — 84145 PROCALCITONIN (PCT): CPT | Performed by: INTERNAL MEDICINE

## 2022-05-25 PROCEDURE — 87070 CULTURE OTHR SPECIMN AEROBIC: CPT | Performed by: INTERNAL MEDICINE

## 2022-05-25 PROCEDURE — 93005 ELECTROCARDIOGRAM TRACING: CPT | Performed by: INTERNAL MEDICINE

## 2022-05-25 PROCEDURE — 87116 MYCOBACTERIA CULTURE: CPT | Performed by: INTERNAL MEDICINE

## 2022-05-25 PROCEDURE — 93306 TTE W/DOPPLER COMPLETE: CPT

## 2022-05-25 PROCEDURE — 87102 FUNGUS ISOLATION CULTURE: CPT | Performed by: INTERNAL MEDICINE

## 2022-05-25 PROCEDURE — 86140 C-REACTIVE PROTEIN: CPT | Performed by: INTERNAL MEDICINE

## 2022-05-25 PROCEDURE — 87385 HISTOPLASMA CAPSUL AG IA: CPT | Performed by: INTERNAL MEDICINE

## 2022-05-25 PROCEDURE — 87798 DETECT AGENT NOS DNA AMP: CPT | Performed by: INTERNAL MEDICINE

## 2022-05-25 PROCEDURE — 25010000002 PROPOFOL 10 MG/ML EMULSION: Performed by: NURSE ANESTHETIST, CERTIFIED REGISTERED

## 2022-05-25 PROCEDURE — 87449 NOS EACH ORGANISM AG IA: CPT | Performed by: INTERNAL MEDICINE

## 2022-05-25 PROCEDURE — 93306 TTE W/DOPPLER COMPLETE: CPT | Performed by: INTERNAL MEDICINE

## 2022-05-25 PROCEDURE — 99226 PR SBSQ OBSERVATION CARE/DAY 35 MINUTES: CPT | Performed by: FAMILY MEDICINE

## 2022-05-25 RX ORDER — SODIUM CHLORIDE 9 MG/ML
INJECTION, SOLUTION INTRAVENOUS CONTINUOUS PRN
Status: DISCONTINUED | OUTPATIENT
Start: 2022-05-25 | End: 2022-05-25 | Stop reason: SURG

## 2022-05-25 RX ORDER — SODIUM CHLORIDE 9 MG/ML
50 INJECTION, SOLUTION INTRAVENOUS ONCE
Status: COMPLETED | OUTPATIENT
Start: 2022-05-25 | End: 2022-05-25

## 2022-05-25 RX ORDER — PROPOFOL 10 MG/ML
VIAL (ML) INTRAVENOUS AS NEEDED
Status: DISCONTINUED | OUTPATIENT
Start: 2022-05-25 | End: 2022-05-25 | Stop reason: SURG

## 2022-05-25 RX ORDER — ONDANSETRON 2 MG/ML
4 INJECTION INTRAMUSCULAR; INTRAVENOUS ONCE AS NEEDED
Status: DISCONTINUED | OUTPATIENT
Start: 2022-05-25 | End: 2022-05-25 | Stop reason: HOSPADM

## 2022-05-25 RX ORDER — LIDOCAINE HYDROCHLORIDE 40 MG/ML
4 INJECTION, SOLUTION RETROBULBAR; TOPICAL ONCE
Status: COMPLETED | OUTPATIENT
Start: 2022-05-25 | End: 2022-05-25

## 2022-05-25 RX ORDER — IPRATROPIUM BROMIDE AND ALBUTEROL SULFATE 2.5; .5 MG/3ML; MG/3ML
3 SOLUTION RESPIRATORY (INHALATION) ONCE AS NEEDED
Status: DISCONTINUED | OUTPATIENT
Start: 2022-05-25 | End: 2022-05-25 | Stop reason: HOSPADM

## 2022-05-25 RX ORDER — TRAMADOL HYDROCHLORIDE 50 MG/1
50 TABLET ORAL EVERY 6 HOURS PRN
Status: DISCONTINUED | OUTPATIENT
Start: 2022-05-25 | End: 2022-05-26 | Stop reason: HOSPADM

## 2022-05-25 RX ORDER — LIDOCAINE HYDROCHLORIDE 10 MG/ML
INJECTION, SOLUTION EPIDURAL; INFILTRATION; INTRACAUDAL; PERINEURAL AS NEEDED
Status: DISCONTINUED | OUTPATIENT
Start: 2022-05-25 | End: 2022-05-25 | Stop reason: SURG

## 2022-05-25 RX ADMIN — DOXYCYCLINE 100 MG: 100 CAPSULE ORAL at 20:18

## 2022-05-25 RX ADMIN — SULFUR HEXAFLUORIDE 2 ML: KIT at 11:39

## 2022-05-25 RX ADMIN — ACETAMINOPHEN 650 MG: 325 TABLET ORAL at 19:13

## 2022-05-25 RX ADMIN — TRAMADOL HYDROCHLORIDE 50 MG: 50 TABLET, COATED ORAL at 23:02

## 2022-05-25 RX ADMIN — DONEPEZIL HYDROCHLORIDE 5 MG: 5 TABLET ORAL at 20:18

## 2022-05-25 RX ADMIN — PROPOFOL 150 MCG/KG/MIN: 10 INJECTION, EMULSION INTRAVENOUS at 14:59

## 2022-05-25 RX ADMIN — DOXYCYCLINE 100 MG: 100 CAPSULE ORAL at 07:10

## 2022-05-25 RX ADMIN — ATORVASTATIN CALCIUM 80 MG: 40 TABLET, FILM COATED ORAL at 20:18

## 2022-05-25 RX ADMIN — PROPOFOL 80 MG: 10 INJECTION, EMULSION INTRAVENOUS at 14:58

## 2022-05-25 RX ADMIN — Medication 10 ML: at 20:20

## 2022-05-25 RX ADMIN — PRAMIPEXOLE DIHYDROCHLORIDE 1 MG: 1 TABLET ORAL at 20:18

## 2022-05-25 RX ADMIN — ACETAMINOPHEN 650 MG: 325 TABLET ORAL at 07:10

## 2022-05-25 RX ADMIN — LISINOPRIL 5 MG: 5 TABLET ORAL at 07:09

## 2022-05-25 RX ADMIN — Medication 10 ML: at 07:11

## 2022-05-25 RX ADMIN — CITALOPRAM HYDROBROMIDE 20 MG: 20 TABLET ORAL at 07:10

## 2022-05-25 RX ADMIN — PRAMIPEXOLE DIHYDROCHLORIDE 1 MG: 1 TABLET ORAL at 07:10

## 2022-05-25 RX ADMIN — SODIUM CHLORIDE 50 ML/HR: 9 INJECTION, SOLUTION INTRAVENOUS at 14:36

## 2022-05-25 RX ADMIN — ASPIRIN 81 MG: 81 TABLET, COATED ORAL at 07:10

## 2022-05-25 RX ADMIN — RANOLAZINE 500 MG: 500 TABLET, EXTENDED RELEASE ORAL at 20:19

## 2022-05-25 RX ADMIN — SODIUM CHLORIDE: 9 INJECTION, SOLUTION INTRAVENOUS at 14:55

## 2022-05-25 RX ADMIN — RANOLAZINE 500 MG: 500 TABLET, EXTENDED RELEASE ORAL at 07:09

## 2022-05-25 RX ADMIN — METOPROLOL SUCCINATE 25 MG: 25 TABLET, EXTENDED RELEASE ORAL at 07:10

## 2022-05-25 RX ADMIN — LIDOCAINE HYDROCHLORIDE 100 MG: 10 INJECTION, SOLUTION EPIDURAL; INFILTRATION; INTRACAUDAL; PERINEURAL at 14:58

## 2022-05-25 RX ADMIN — LIDOCAINE HYDROCHLORIDE 4 ML: 40 INJECTION, SOLUTION RETROBULBAR; TOPICAL at 14:50

## 2022-05-25 NOTE — ANESTHESIA POSTPROCEDURE EVALUATION
Patient: Vinnie Whitley III    Procedure Summary     Date: 05/25/22 Room / Location:  [facility]    Anesthesia Start: 1454 Anesthesia Stop: 1517    Procedure: BRONCHOSCOPY (N/A Bronchus) Diagnosis:       Pneumonia of both lungs due to infectious organism, unspecified part of lung      (Pneumonia of both lungs due to infectious organism, unspecified part of lung [J18.9])    Surgeons: Julio Costa MD Provider: Clifford Early Jr., MD    Anesthesia Type: general, MAC ASA Status: 3          Anesthesia Type: general, MAC    Vitals  Vitals Value Taken Time   BP 92/56 05/25/22 1518   Temp 97 °F (36.1 °C) 05/25/22 1518   Pulse 86 05/25/22 1518   Resp 14 05/25/22 1518   SpO2 92 % 05/25/22 1518           Post Anesthesia Care and Evaluation    Patient location during evaluation: PACU  Patient participation: complete - patient participated  Level of consciousness: awake and alert  Pain management: adequate  Airway patency: patent  Anesthetic complications: No anesthetic complications  PONV Status: none  Cardiovascular status: hemodynamically stable and acceptable  Respiratory status: nonlabored ventilation, acceptable and nasal cannula  Hydration status: acceptable

## 2022-05-25 NOTE — ANESTHESIA PREPROCEDURE EVALUATION
Anesthesia Evaluation     Patient summary reviewed and Nursing notes reviewed   NPO Solid Status: > 8 hours  NPO Liquid Status: > 8 hours           Airway   Mallampati: II  TM distance: >3 FB  Neck ROM: full  No difficulty expected  Dental      Pulmonary    (+) pneumonia stable , a smoker (remote ) Former, shortness of breath, sleep apnea (non compliant ),   (-) COPD, asthma, recent URI, no home oxygen    ROS comment: RA SATS 99%  Cardiovascular     ECG reviewed    (+) hypertension, past MI (mid RCA STEMI stented )  >12 months, CAD (stents 2 years ago ), cardiac stents more than 12 months ago hyperlipidemia,   (-) dysrhythmias, angina    ROS comment: ECG SR c MARYLU SVCs    ECHO 2022 · EF 60%.valves normal   CATH 2021 No significant obstructive CAD to explain symptoms.  Given sluggish flow throughout the LAD possibly contributing to current symptoms, continue metoprolol and optimize medical management with the addition of Ranexa.    Neuro/Psych    ROS Comment: MS walks with cane   GI/Hepatic/Renal/Endo    (+)   renal disease stones,   (-) liver disease, diabetes, no thyroid disorder    Musculoskeletal     Abdominal    Substance History      OB/GYN          Other                        Anesthesia Plan    ASA 3     general and MAC   (TOP PFL )  intravenous induction     Anesthetic plan, all risks, benefits, and alternatives have been provided, discussed and informed consent has been obtained with: patient.    Plan discussed with CRNA.        CODE STATUS:    Level Of Support Discussed With: Patient  Code Status (Patient has no pulse and is not breathing): CPR (Attempt to Resuscitate)  Medical Interventions (Patient has pulse or is breathing): Full Support

## 2022-05-26 VITALS
RESPIRATION RATE: 18 BRPM | SYSTOLIC BLOOD PRESSURE: 105 MMHG | DIASTOLIC BLOOD PRESSURE: 76 MMHG | HEIGHT: 72 IN | WEIGHT: 190.48 LBS | BODY MASS INDEX: 25.8 KG/M2 | OXYGEN SATURATION: 92 % | TEMPERATURE: 98.1 F | HEART RATE: 84 BPM

## 2022-05-26 LAB
ANION GAP SERPL CALCULATED.3IONS-SCNC: 12 MMOL/L (ref 5–15)
BUN SERPL-MCNC: 17 MG/DL (ref 6–20)
BUN/CREAT SERPL: 14.4 (ref 7–25)
CALCIUM SPEC-SCNC: 8.8 MG/DL (ref 8.6–10.5)
CHLORIDE SERPL-SCNC: 102 MMOL/L (ref 98–107)
CO2 SERPL-SCNC: 23 MMOL/L (ref 22–29)
CREAT SERPL-MCNC: 1.18 MG/DL (ref 0.76–1.27)
CRYPTOC AG CSF QL: NEGATIVE
DEPRECATED RDW RBC AUTO: 39.6 FL (ref 37–54)
EGFRCR SERPLBLD CKD-EPI 2021: 73.3 ML/MIN/1.73
ERYTHROCYTE [DISTWIDTH] IN BLOOD BY AUTOMATED COUNT: 13 % (ref 12.3–15.4)
GIE STN SPEC: NORMAL
GLUCOSE SERPL-MCNC: 102 MG/DL (ref 65–99)
HCT VFR BLD AUTO: 32.1 % (ref 37.5–51)
HGB BLD-MCNC: 10.7 G/DL (ref 13–17.7)
MCH RBC QN AUTO: 27.6 PG (ref 26.6–33)
MCHC RBC AUTO-ENTMCNC: 33.3 G/DL (ref 31.5–35.7)
MCV RBC AUTO: 82.9 FL (ref 79–97)
PLATELET # BLD AUTO: 193 10*3/MM3 (ref 140–450)
PMV BLD AUTO: 8.7 FL (ref 6–12)
POTASSIUM SERPL-SCNC: 4.2 MMOL/L (ref 3.5–5.2)
RBC # BLD AUTO: 3.87 10*6/MM3 (ref 4.14–5.8)
SODIUM SERPL-SCNC: 137 MMOL/L (ref 136–145)
WBC NRBC COR # BLD: 4.09 10*3/MM3 (ref 3.4–10.8)

## 2022-05-26 PROCEDURE — 80048 BASIC METABOLIC PNL TOTAL CA: CPT | Performed by: FAMILY MEDICINE

## 2022-05-26 PROCEDURE — 99217 PR OBSERVATION CARE DISCHARGE MANAGEMENT: CPT | Performed by: FAMILY MEDICINE

## 2022-05-26 PROCEDURE — 87899 AGENT NOS ASSAY W/OPTIC: CPT | Performed by: INTERNAL MEDICINE

## 2022-05-26 PROCEDURE — 85027 COMPLETE CBC AUTOMATED: CPT | Performed by: FAMILY MEDICINE

## 2022-05-26 PROCEDURE — G0378 HOSPITAL OBSERVATION PER HR: HCPCS

## 2022-05-26 RX ORDER — CYCLOBENZAPRINE HCL 10 MG
10 TABLET ORAL 3 TIMES DAILY PRN
Status: DISCONTINUED | OUTPATIENT
Start: 2022-05-26 | End: 2022-05-26 | Stop reason: HOSPADM

## 2022-05-26 RX ORDER — CYCLOBENZAPRINE HCL 10 MG
5 TABLET ORAL ONCE
Status: COMPLETED | OUTPATIENT
Start: 2022-05-26 | End: 2022-05-26

## 2022-05-26 RX ORDER — DOXYCYCLINE HYCLATE 100 MG/1
100 CAPSULE ORAL 2 TIMES DAILY
Qty: 14 CAPSULE | Refills: 0 | Status: SHIPPED | OUTPATIENT
Start: 2022-05-26

## 2022-05-26 RX ADMIN — PRAMIPEXOLE DIHYDROCHLORIDE 1 MG: 1 TABLET ORAL at 08:38

## 2022-05-26 RX ADMIN — DOXYCYCLINE 100 MG: 100 CAPSULE ORAL at 08:38

## 2022-05-26 RX ADMIN — LISINOPRIL 5 MG: 5 TABLET ORAL at 08:38

## 2022-05-26 RX ADMIN — ASPIRIN 81 MG: 81 TABLET, COATED ORAL at 08:38

## 2022-05-26 RX ADMIN — METOPROLOL SUCCINATE 25 MG: 25 TABLET, EXTENDED RELEASE ORAL at 08:38

## 2022-05-26 RX ADMIN — RANOLAZINE 500 MG: 500 TABLET, EXTENDED RELEASE ORAL at 08:38

## 2022-05-26 RX ADMIN — CITALOPRAM HYDROBROMIDE 20 MG: 20 TABLET ORAL at 08:38

## 2022-05-26 RX ADMIN — CYCLOBENZAPRINE 5 MG: 10 TABLET, FILM COATED ORAL at 02:00

## 2022-05-26 RX ADMIN — Medication 10 ML: at 08:38

## 2022-05-27 LAB
BACTERIA SPEC RESP CULT: NORMAL
GRAM STN SPEC: NORMAL
H CAPSUL AG UR QL IA: <0.5
REF LAB TEST METHOD: NORMAL

## 2022-05-29 LAB
P JIROVECII DNA # SPEC NAA+PROBE: NEGATIVE {COPIES}/ML
SPECIMEN SOURCE: NORMAL

## 2022-05-31 LAB
MVISTA(R) BLASTOMYCES AG: NORMAL NG/ML
SPECIMEN SOURCE: NORMAL

## 2022-06-02 ENCOUNTER — TRANSCRIBE ORDERS (OUTPATIENT)
Dept: LAB | Facility: HOSPITAL | Age: 57
End: 2022-06-02

## 2022-06-02 ENCOUNTER — LAB (OUTPATIENT)
Dept: LAB | Facility: HOSPITAL | Age: 57
End: 2022-06-02

## 2022-06-02 DIAGNOSIS — N39.0 URINARY TRACT INFECTION WITHOUT HEMATURIA, SITE UNSPECIFIED: ICD-10-CM

## 2022-06-02 DIAGNOSIS — J18.9 UNRESOLVED PNEUMONIA: ICD-10-CM

## 2022-06-02 DIAGNOSIS — L02.413 ABSCESS OF RIGHT ARM: ICD-10-CM

## 2022-06-02 DIAGNOSIS — J18.9 UNRESOLVED PNEUMONIA: Primary | ICD-10-CM

## 2022-06-02 LAB
BACTERIA UR QL AUTO: NORMAL /HPF
BILIRUB UR QL STRIP: NEGATIVE
CLARITY UR: ABNORMAL
COLOR UR: ABNORMAL
GLUCOSE UR STRIP-MCNC: NEGATIVE MG/DL
HGB UR QL STRIP.AUTO: NEGATIVE
HYALINE CASTS UR QL AUTO: NORMAL /LPF
KETONES UR QL STRIP: ABNORMAL
LEUKOCYTE ESTERASE UR QL STRIP.AUTO: NEGATIVE
NITRITE UR QL STRIP: NEGATIVE
PH UR STRIP.AUTO: 6 [PH] (ref 5–8)
PROT UR QL STRIP: ABNORMAL
RBC # UR STRIP: NORMAL /HPF
REF LAB TEST METHOD: NORMAL
SP GR UR STRIP: 1.04 (ref 1–1.03)
SQUAMOUS #/AREA URNS HPF: NORMAL /HPF
UROBILINOGEN UR QL STRIP: ABNORMAL
WBC # UR STRIP: NORMAL /HPF

## 2022-06-02 PROCEDURE — 81001 URINALYSIS AUTO W/SCOPE: CPT

## 2022-06-07 ENCOUNTER — HOSPITAL ENCOUNTER (OUTPATIENT)
Dept: CT IMAGING | Facility: HOSPITAL | Age: 57
Discharge: HOME | End: 2022-06-07
Admitting: INTERNAL MEDICINE

## 2022-06-07 DIAGNOSIS — J18.9 UNRESOLVED PNEUMONIA: ICD-10-CM

## 2022-06-07 PROCEDURE — 71250 CT THORAX DX C-: CPT

## 2022-07-06 LAB
FUNGUS WND CULT: NORMAL
MYCOBACTERIUM SPEC CULT: NORMAL
NIGHT BLUE STAIN TISS: NORMAL

## 2022-08-04 ENCOUNTER — TRANSCRIBE ORDERS (OUTPATIENT)
Dept: GENERAL RADIOLOGY | Facility: HOSPITAL | Age: 57
End: 2022-08-04

## 2022-08-04 ENCOUNTER — HOSPITAL ENCOUNTER (OUTPATIENT)
Dept: GENERAL RADIOLOGY | Facility: HOSPITAL | Age: 57
Discharge: HOME | End: 2022-08-04
Admitting: INTERNAL MEDICINE

## 2022-08-04 DIAGNOSIS — J18.9 UNRESOLVED PNEUMONIA: Primary | ICD-10-CM

## 2022-08-04 DIAGNOSIS — J18.9 UNRESOLVED PNEUMONIA: ICD-10-CM

## 2022-08-04 PROCEDURE — 71046 X-RAY EXAM CHEST 2 VIEWS: CPT

## 2022-09-12 RX ORDER — RANOLAZINE 500 MG/1
TABLET, EXTENDED RELEASE ORAL
Qty: 180 TABLET | Refills: 3 | Status: SHIPPED | OUTPATIENT
Start: 2022-09-12 | End: 2022-11-23 | Stop reason: ALTCHOICE

## 2022-10-17 RX ORDER — METOPROLOL SUCCINATE 25 MG/1
TABLET, EXTENDED RELEASE ORAL
Qty: 90 TABLET | Refills: 3 | OUTPATIENT
Start: 2022-10-17

## 2022-10-17 RX ORDER — LISINOPRIL 5 MG/1
TABLET ORAL
Qty: 90 TABLET | Refills: 3 | OUTPATIENT
Start: 2022-10-17

## 2022-10-27 ENCOUNTER — TRANSCRIBE ORDERS (OUTPATIENT)
Dept: LAB | Facility: HOSPITAL | Age: 57
End: 2022-10-27

## 2022-10-27 ENCOUNTER — HOSPITAL ENCOUNTER (OUTPATIENT)
Dept: GENERAL RADIOLOGY | Facility: HOSPITAL | Age: 57
Discharge: HOME | End: 2022-10-27

## 2022-10-27 ENCOUNTER — LAB (OUTPATIENT)
Dept: LAB | Facility: HOSPITAL | Age: 57
End: 2022-10-27

## 2022-10-27 DIAGNOSIS — R53.83 TIREDNESS: ICD-10-CM

## 2022-10-27 DIAGNOSIS — M25.572 ARTHRALGIA OF LEFT ANKLE OR FOOT: ICD-10-CM

## 2022-10-27 DIAGNOSIS — E55.9 VITAMIN D DEFICIENCY: ICD-10-CM

## 2022-10-27 DIAGNOSIS — M25.572 ACUTE LEFT ANKLE PAIN: ICD-10-CM

## 2022-10-27 DIAGNOSIS — G35 MULTIPLE SCLEROSIS: Primary | ICD-10-CM

## 2022-10-27 DIAGNOSIS — G35 MULTIPLE SCLEROSIS: ICD-10-CM

## 2022-10-27 LAB
25(OH)D3 SERPL-MCNC: 43.5 NG/ML (ref 30–100)
ALBUMIN SERPL-MCNC: 4.2 G/DL (ref 3.5–5.2)
ALBUMIN/GLOB SERPL: 2 G/DL
ALP SERPL-CCNC: 75 U/L (ref 39–117)
ALT SERPL W P-5'-P-CCNC: 22 U/L (ref 1–41)
ANION GAP SERPL CALCULATED.3IONS-SCNC: 8.9 MMOL/L (ref 5–15)
AST SERPL-CCNC: 17 U/L (ref 1–40)
BILIRUB SERPL-MCNC: 0.4 MG/DL (ref 0–1.2)
BUN SERPL-MCNC: 15 MG/DL (ref 6–20)
BUN/CREAT SERPL: 12.9 (ref 7–25)
CALCIUM SPEC-SCNC: 8.8 MG/DL (ref 8.6–10.5)
CHLORIDE SERPL-SCNC: 106 MMOL/L (ref 98–107)
CO2 SERPL-SCNC: 28.1 MMOL/L (ref 22–29)
CREAT SERPL-MCNC: 1.16 MG/DL (ref 0.76–1.27)
CRP SERPL-MCNC: 0.31 MG/DL (ref 0–0.5)
DEPRECATED RDW RBC AUTO: 46.3 FL (ref 37–54)
EGFRCR SERPLBLD CKD-EPI 2021: 74.4 ML/MIN/1.73
ERYTHROCYTE [DISTWIDTH] IN BLOOD BY AUTOMATED COUNT: 15.3 % (ref 12.3–15.4)
ERYTHROCYTE [SEDIMENTATION RATE] IN BLOOD: 2 MM/HR (ref 0–20)
GLOBULIN UR ELPH-MCNC: 2.1 GM/DL
GLUCOSE SERPL-MCNC: 93 MG/DL (ref 65–99)
HCT VFR BLD AUTO: 38.9 % (ref 37.5–51)
HGB BLD-MCNC: 13 G/DL (ref 13–17.7)
IRON 24H UR-MRATE: 78 MCG/DL (ref 59–158)
IRON SATN MFR SERPL: 21 % (ref 20–50)
MCH RBC QN AUTO: 27.8 PG (ref 26.6–33)
MCHC RBC AUTO-ENTMCNC: 33.4 G/DL (ref 31.5–35.7)
MCV RBC AUTO: 83.3 FL (ref 79–97)
PLATELET # BLD AUTO: 179 10*3/MM3 (ref 140–450)
PMV BLD AUTO: 8.9 FL (ref 6–12)
POTASSIUM SERPL-SCNC: 3.9 MMOL/L (ref 3.5–5.2)
PROT SERPL-MCNC: 6.3 G/DL (ref 6–8.5)
RBC # BLD AUTO: 4.67 10*6/MM3 (ref 4.14–5.8)
SODIUM SERPL-SCNC: 143 MMOL/L (ref 136–145)
T4 FREE SERPL-MCNC: 0.99 NG/DL (ref 0.93–1.7)
TIBC SERPL-MCNC: 367 MCG/DL (ref 298–536)
TRANSFERRIN SERPL-MCNC: 246 MG/DL (ref 200–360)
TSH SERPL DL<=0.05 MIU/L-ACNC: 1.77 UIU/ML (ref 0.27–4.2)
URATE SERPL-MCNC: 4 MG/DL (ref 3.4–7)
VIT B12 BLD-MCNC: 218 PG/ML (ref 211–946)
WBC NRBC COR # BLD: 4.46 10*3/MM3 (ref 3.4–10.8)

## 2022-10-27 PROCEDURE — 84466 ASSAY OF TRANSFERRIN: CPT

## 2022-10-27 PROCEDURE — 73610 X-RAY EXAM OF ANKLE: CPT

## 2022-10-27 PROCEDURE — 86140 C-REACTIVE PROTEIN: CPT

## 2022-10-27 PROCEDURE — 83540 ASSAY OF IRON: CPT

## 2022-10-27 PROCEDURE — 84439 ASSAY OF FREE THYROXINE: CPT

## 2022-10-27 PROCEDURE — 84550 ASSAY OF BLOOD/URIC ACID: CPT

## 2022-10-27 PROCEDURE — 82607 VITAMIN B-12: CPT

## 2022-10-27 PROCEDURE — 36415 COLL VENOUS BLD VENIPUNCTURE: CPT

## 2022-10-27 PROCEDURE — 85652 RBC SED RATE AUTOMATED: CPT

## 2022-10-27 PROCEDURE — 80050 GENERAL HEALTH PANEL: CPT

## 2022-10-27 PROCEDURE — 82306 VITAMIN D 25 HYDROXY: CPT

## 2022-11-04 RX ORDER — ASPIRIN 81 MG/1
TABLET, COATED ORAL
Qty: 90 TABLET | Refills: 3 | Status: SHIPPED | OUTPATIENT
Start: 2022-11-04

## 2022-11-04 RX ORDER — ATORVASTATIN CALCIUM 80 MG/1
TABLET, FILM COATED ORAL
Qty: 90 TABLET | Refills: 3 | Status: SHIPPED | OUTPATIENT
Start: 2022-11-04

## 2022-11-04 RX ORDER — CLOPIDOGREL BISULFATE 75 MG/1
TABLET ORAL
Qty: 90 TABLET | Refills: 3 | Status: SHIPPED | OUTPATIENT
Start: 2022-11-04 | End: 2022-11-23

## 2022-11-23 ENCOUNTER — OFFICE VISIT (OUTPATIENT)
Dept: CARDIOLOGY | Facility: CLINIC | Age: 57
End: 2022-11-23

## 2022-11-23 VITALS
HEIGHT: 72 IN | HEART RATE: 82 BPM | DIASTOLIC BLOOD PRESSURE: 72 MMHG | BODY MASS INDEX: 26.68 KG/M2 | OXYGEN SATURATION: 95 % | RESPIRATION RATE: 18 BRPM | SYSTOLIC BLOOD PRESSURE: 112 MMHG | WEIGHT: 197 LBS

## 2022-11-23 DIAGNOSIS — I10 ESSENTIAL HYPERTENSION: ICD-10-CM

## 2022-11-23 DIAGNOSIS — E78.00 PURE HYPERCHOLESTEROLEMIA: ICD-10-CM

## 2022-11-23 DIAGNOSIS — I25.10 CORONARY ARTERY DISEASE INVOLVING NATIVE CORONARY ARTERY OF NATIVE HEART WITHOUT ANGINA PECTORIS: Primary | ICD-10-CM

## 2022-11-23 DIAGNOSIS — I25.5 ISCHEMIC CARDIOMYOPATHY: ICD-10-CM

## 2022-11-23 PROCEDURE — 99214 OFFICE O/P EST MOD 30 MIN: CPT | Performed by: INTERNAL MEDICINE

## 2022-11-23 RX ORDER — ISOSORBIDE MONONITRATE 30 MG/1
TABLET, EXTENDED RELEASE ORAL
COMMUNITY
End: 2022-11-23 | Stop reason: SDUPTHER

## 2022-11-23 RX ORDER — ERGOCALCIFEROL (VITAMIN D2) 10 MCG
400 TABLET ORAL DAILY
COMMUNITY

## 2022-11-23 NOTE — PROGRESS NOTES
[facility] Cardiology Office Follow Up Note    Vinnie Whitley III  [id number]  2022    Primary Care Provider: Marlen Richards MD    Chief Complaint: Routine follow-up    History of Present Illness:   Mr. Vinnie Whitley III is a 57 y.o. male who presents to the Cardiology Clinic for regular follow-up.  The patient has a past medical history significant for multiple sclerosis followed at [facility], hypertension, hyperlipidemia, and prior tobacco use with cessation in approximately .  His past cardiac history is significant for presentation with an NSTEMI in 10/2020.  Coronary angiography at that time revealed severe one-vessel disease involving the proximal to mid RCA.  He underwent successful PCI with placement of a 3.0 x 28 mm Xience PEPPER.  An echocardiogram at that time showed mildly reduced LV systolic function with an LVEF 45-50%.  He returns today for routine follow-up.  Since his last appointment, the patient reports he has generally been well.  He has rare episodes of brief chest discomfort, however no association with exertion.  He believes anxiety may potentially be contributing to his episodes of discomfort.  He denies significant exertional dyspnea.  He continues tolerate his current medications without significant difficulty.  No new complaints today.     Past Cardiac Testin. Last Coronary Angio:               1.  10/26/2020                          1.  Severe one-vessel coronary artery disease involving the proximal to mid RCA as culprit lesion for NSTEMI                                      -Successful PCI with placement of a 3.0 x 28 mm Xience PEPPER                          2.  No significant disease in the left coronary system                          3.  Normal LVEDP (11 mmHg)              2.  3/3/2021                          1.  Widely patent stent in the proximal to mid RCA with mild diffuse disease in the distal posteriolateral system at which  point the vessel is small in caliber.                          2.  No significant obstructive disease in the left coronary system                          3.  Sluggish, EAMON II flow throughout the LAD.                          4.  Mildly elevated LVEDP, 22 mmHg.  2. Prior Stress Testing: None  3. Last Echo: 10/26/2020              1.  Normal left ventricular size with mildly reduced LV systolic function, LVEF 45-50%.              2.  Mild inferior wall hypokinesis.              3.  Grade 1 diastolic dysfunction.              4.  Normal right ventricular size and systolic function.              5.  Normal left atrial index.              6.  No significant valvular abnormalities.  4. Prior Holter Monitor: None    Review of Systems:   Review of Systems   Constitutional: Negative for activity change, appetite change, chills, diaphoresis, fatigue, fever, unexpected weight gain and unexpected weight loss.   Eyes: Negative for blurred vision.   Respiratory: Negative for cough, chest tightness, shortness of breath and wheezing.    Cardiovascular: Negative for chest pain, palpitations and leg swelling.   Gastrointestinal: Negative for abdominal pain, anal bleeding, blood in stool and GERD.   Endocrine: Negative for cold intolerance and heat intolerance.   Genitourinary: Negative for hematuria.   Neurological: Negative for dizziness, syncope, weakness and light-headedness.   Hematological: Does not bruise/bleed easily.   Psychiatric/Behavioral: Negative for depressed mood and stress. The patient is not nervous/anxious.        I have reviewed and/or updated the patient's past medical, past surgical, family, social history, problem list and allergies as appropriate.     Medications:     Current Outpatient Medications:   •  Aspirin Low Dose 81 MG EC tablet, TAKE 1 TABLET DAILY, Disp: 90 tablet, Rfl: 3  •  atorvastatin (LIPITOR) 80 MG tablet, TAKE 1 TABLET EVERY NIGHT, Disp: 90 tablet, Rfl: 3  •  citalopram (CeleXA) 20 MG tablet,  "TAKE 1 TABLET DAILY, Disp: 90 tablet, Rfl: 1  •  donepezil (ARICEPT) 5 MG tablet, Take 5 mg by mouth Every Night. Pt's neurologist would like approval from cardio before starting, Disp: , Rfl:   •  lisinopril (PRINIVIL,ZESTRIL) 5 MG tablet, Take 1 tablet by mouth Daily., Disp: 90 tablet, Rfl: 3  •  metoprolol succinate XL (TOPROL-XL) 25 MG 24 hr tablet, Take 1 tablet by mouth Daily., Disp: 90 tablet, Rfl: 3  •  pramipexole (MIRAPEX) 1 MG tablet, Take 1 tablet by mouth 2 (Two) Times a Day., Disp: , Rfl:   •  Vitamin D, Cholecalciferol, (CHOLECALCIFEROL) 10 MCG (400 UNIT) tablet, Take 400 Units by mouth Daily., Disp: , Rfl:   •  dextromethorphan-quinidine (Nuedexta) 20-10 MG capsule capsule, Nuedexta 20 mg-10 mg capsule, Disp: , Rfl:   •  doxycycline (VIBRAMYCIN) 100 MG capsule, Take 1 capsule by mouth 2 (Two) Times a Day., Disp: 14 capsule, Rfl: 0    Physical Exam:  Vital Signs:   Vitals:    11/23/22 1309   BP: 112/72   Pulse: 82   Resp: 18   SpO2: 95%   Weight: 89.4 kg (197 lb)   Height: 182.9 cm (72\")       Physical Exam  Constitutional:       General: He is not in acute distress.     Appearance: Normal appearance. He is not diaphoretic.   HENT:      Head: Normocephalic and atraumatic.   Cardiovascular:      Rate and Rhythm: Normal rate and regular rhythm.      Heart sounds: No murmur heard.  Pulmonary:      Effort: Pulmonary effort is normal. No respiratory distress.      Breath sounds: Normal breath sounds. No stridor. No wheezing, rhonchi or rales.   Abdominal:      General: Bowel sounds are normal. There is no distension.      Palpations: Abdomen is soft.      Tenderness: There is no abdominal tenderness. There is no guarding or rebound.   Musculoskeletal:         General: No swelling. Normal range of motion.      Cervical back: Neck supple. No tenderness.      Comments: Ambulating with a cane   Skin:     General: Skin is warm and dry.   Neurological:      General: No focal deficit present.      Mental Status: " He is alert and oriented to person, place, and time.   Psychiatric:         Mood and Affect: Mood normal.         Behavior: Behavior normal.         Results Review:   I reviewed the patient's new clinical results.      Assessment / Plan:     1.  Coronary artery disease  --Initially presented with an NSTEMI in 10/2020, found to have severe one-vessel CAD involving the proximal to mid RCA, status post PCI with PEPPER to the proximal to mid RCA  --Underwent repeat coronary angiogram in 3/21 due to recurrent episodes of chest pain, with widely patent RCA stents and no obstructive disease in the left coronary system  --Currently without exertional angina  --De-escalate DAPT to aspirin monotherapy  --Given no significant obstructive residual disease on last coronary angiogram, will discontinue Ranexa  --Continue high intensity statin     2.  Ischemic cardiomyopathy  --LVEF mildly depressed at 45-50% with inferior wall hypokinesis  --Remains well compensated and euvolemic on exam today, NYHA class I  --Continue metoprolol and lisinopril     3.  Hypertension   --  BP remains well controlled with current antihypertensives     4. Hyperlipidemia  -- Last lipid profile in 11/21 showed , HDL 37, triglycerides 122  --Continue high intensity statin     5.  Multiple sclerosis  --Follows with  neurology       Follow Up:   Return in about 6 months (around 5/23/2023).      Thank you for allowing me to participate in the care of your patient. Please to not hesitate to contact me with additional questions or concerns.     GERMAIN Rushing MD  Interventional Cardiology   11/23/2022  13:16 EST

## 2022-12-06 ENCOUNTER — LAB (OUTPATIENT)
Dept: LAB | Facility: HOSPITAL | Age: 57
End: 2022-12-06

## 2022-12-06 ENCOUNTER — TRANSCRIBE ORDERS (OUTPATIENT)
Dept: LAB | Facility: HOSPITAL | Age: 57
End: 2022-12-06

## 2022-12-06 DIAGNOSIS — R53.83 TIREDNESS: ICD-10-CM

## 2022-12-06 DIAGNOSIS — I10 ESSENTIAL HYPERTENSION, MALIGNANT: ICD-10-CM

## 2022-12-06 DIAGNOSIS — E53.8 BIOTIN-(PROPIONYL-COA-CARBOXYLASE) LIGASE DEFICIENCY: ICD-10-CM

## 2022-12-06 DIAGNOSIS — I10 ESSENTIAL HYPERTENSION, MALIGNANT: Primary | ICD-10-CM

## 2022-12-06 DIAGNOSIS — R94.5 NONSPECIFIC ABNORMAL RESULTS OF LIVER FUNCTION STUDY: ICD-10-CM

## 2022-12-06 LAB
BILIRUB UR QL STRIP: NEGATIVE
CLARITY UR: CLEAR
COLOR UR: YELLOW
DEPRECATED RDW RBC AUTO: 44.8 FL (ref 37–54)
ERYTHROCYTE [DISTWIDTH] IN BLOOD BY AUTOMATED COUNT: 14.2 % (ref 12.3–15.4)
GLUCOSE UR STRIP-MCNC: NEGATIVE MG/DL
HCT VFR BLD AUTO: 41.2 % (ref 37.5–51)
HGB BLD-MCNC: 14 G/DL (ref 13–17.7)
HGB UR QL STRIP.AUTO: NEGATIVE
KETONES UR QL STRIP: NEGATIVE
LEUKOCYTE ESTERASE UR QL STRIP.AUTO: NEGATIVE
MCH RBC QN AUTO: 29.5 PG (ref 26.6–33)
MCHC RBC AUTO-ENTMCNC: 34 G/DL (ref 31.5–35.7)
MCV RBC AUTO: 86.7 FL (ref 79–97)
NITRITE UR QL STRIP: NEGATIVE
PH UR STRIP.AUTO: 6 [PH] (ref 5–8)
PLATELET # BLD AUTO: 155 10*3/MM3 (ref 140–450)
PMV BLD AUTO: 9.2 FL (ref 6–12)
PROT UR QL STRIP: ABNORMAL
RBC # BLD AUTO: 4.75 10*6/MM3 (ref 4.14–5.8)
SP GR UR STRIP: 1.02 (ref 1–1.03)
UROBILINOGEN UR QL STRIP: ABNORMAL
WBC NRBC COR # BLD: 4.41 10*3/MM3 (ref 3.4–10.8)

## 2022-12-06 PROCEDURE — 36415 COLL VENOUS BLD VENIPUNCTURE: CPT

## 2022-12-06 PROCEDURE — 80048 BASIC METABOLIC PNL TOTAL CA: CPT

## 2022-12-06 PROCEDURE — 81001 URINALYSIS AUTO W/SCOPE: CPT

## 2022-12-06 PROCEDURE — 80076 HEPATIC FUNCTION PANEL: CPT

## 2022-12-06 PROCEDURE — 82607 VITAMIN B-12: CPT

## 2022-12-06 PROCEDURE — 85027 COMPLETE CBC AUTOMATED: CPT

## 2022-12-07 LAB
ALBUMIN SERPL-MCNC: 4.5 G/DL (ref 3.5–5.2)
ALP SERPL-CCNC: 80 U/L (ref 39–117)
ALT SERPL W P-5'-P-CCNC: 20 U/L (ref 1–41)
ANION GAP SERPL CALCULATED.3IONS-SCNC: 7.4 MMOL/L (ref 5–15)
AST SERPL-CCNC: 15 U/L (ref 1–40)
BACTERIA UR QL AUTO: ABNORMAL /HPF
BILIRUB CONJ SERPL-MCNC: <0.2 MG/DL (ref 0–0.3)
BILIRUB INDIRECT SERPL-MCNC: NORMAL MG/DL
BILIRUB SERPL-MCNC: 0.3 MG/DL (ref 0–1.2)
BUN SERPL-MCNC: 19 MG/DL (ref 6–20)
BUN/CREAT SERPL: 14.7 (ref 7–25)
CALCIUM SPEC-SCNC: 9.4 MG/DL (ref 8.6–10.5)
CHLORIDE SERPL-SCNC: 105 MMOL/L (ref 98–107)
CO2 SERPL-SCNC: 27.6 MMOL/L (ref 22–29)
COD CRY URNS QL: ABNORMAL /HPF
CREAT SERPL-MCNC: 1.29 MG/DL (ref 0.76–1.27)
EGFRCR SERPLBLD CKD-EPI 2021: 65.5 ML/MIN/1.73
GLUCOSE SERPL-MCNC: 106 MG/DL (ref 65–99)
HYALINE CASTS UR QL AUTO: ABNORMAL /LPF
POTASSIUM SERPL-SCNC: 4.4 MMOL/L (ref 3.5–5.2)
PROT SERPL-MCNC: 6.5 G/DL (ref 6–8.5)
RBC # UR STRIP: ABNORMAL /HPF
REF LAB TEST METHOD: ABNORMAL
SODIUM SERPL-SCNC: 140 MMOL/L (ref 136–145)
SPERM URNS QL MICRO: ABNORMAL /HPF
SQUAMOUS #/AREA URNS HPF: ABNORMAL /HPF
VIT B12 BLD-MCNC: 1600 PG/ML (ref 211–946)
WBC # UR STRIP: ABNORMAL /HPF

## 2022-12-13 ENCOUNTER — TRANSCRIBE ORDERS (OUTPATIENT)
Dept: LAB | Facility: HOSPITAL | Age: 57
End: 2022-12-13

## 2022-12-13 DIAGNOSIS — G35 MULTIPLE SCLEROSIS: Primary | ICD-10-CM

## 2022-12-27 ENCOUNTER — LAB (OUTPATIENT)
Dept: LAB | Facility: HOSPITAL | Age: 57
End: 2022-12-27

## 2022-12-27 DIAGNOSIS — G35 MULTIPLE SCLEROSIS: ICD-10-CM

## 2022-12-27 LAB
ALBUMIN SERPL-MCNC: 3.9 G/DL (ref 3.5–5.2)
ALBUMIN/GLOB SERPL: 1.8 G/DL
ALP SERPL-CCNC: 78 U/L (ref 39–117)
ALT SERPL W P-5'-P-CCNC: 25 U/L (ref 1–41)
ANION GAP SERPL CALCULATED.3IONS-SCNC: 11.4 MMOL/L (ref 5–15)
AST SERPL-CCNC: 20 U/L (ref 1–40)
BASOPHILS # BLD AUTO: 0 10*3/MM3 (ref 0–0.2)
BASOPHILS NFR BLD AUTO: 0 % (ref 0–1.5)
BILIRUB SERPL-MCNC: <0.2 MG/DL (ref 0–1.2)
BUN SERPL-MCNC: 19 MG/DL (ref 6–20)
BUN/CREAT SERPL: 15.4 (ref 7–25)
CALCIUM SPEC-SCNC: 9 MG/DL (ref 8.6–10.5)
CHLORIDE SERPL-SCNC: 108 MMOL/L (ref 98–107)
CO2 SERPL-SCNC: 23.6 MMOL/L (ref 22–29)
CREAT SERPL-MCNC: 1.23 MG/DL (ref 0.76–1.27)
DEPRECATED RDW RBC AUTO: 43.5 FL (ref 37–54)
EGFRCR SERPLBLD CKD-EPI 2021: 69.3 ML/MIN/1.73
EOSINOPHIL # BLD AUTO: 0.03 10*3/MM3 (ref 0–0.4)
EOSINOPHIL NFR BLD AUTO: 0.8 % (ref 0.3–6.2)
ERYTHROCYTE [DISTWIDTH] IN BLOOD BY AUTOMATED COUNT: 13.7 % (ref 12.3–15.4)
GLOBULIN UR ELPH-MCNC: 2.2 GM/DL
GLUCOSE SERPL-MCNC: 112 MG/DL (ref 65–99)
HCT VFR BLD AUTO: 39.9 % (ref 37.5–51)
HGB BLD-MCNC: 13.7 G/DL (ref 13–17.7)
IMM GRANULOCYTES # BLD AUTO: 0.01 10*3/MM3 (ref 0–0.05)
IMM GRANULOCYTES NFR BLD AUTO: 0.3 % (ref 0–0.5)
LYMPHOCYTES # BLD AUTO: 1.14 10*3/MM3 (ref 0.7–3.1)
LYMPHOCYTES NFR BLD AUTO: 31.4 % (ref 19.6–45.3)
MCH RBC QN AUTO: 29.8 PG (ref 26.6–33)
MCHC RBC AUTO-ENTMCNC: 34.3 G/DL (ref 31.5–35.7)
MCV RBC AUTO: 86.7 FL (ref 79–97)
MONOCYTES # BLD AUTO: 0.41 10*3/MM3 (ref 0.1–0.9)
MONOCYTES NFR BLD AUTO: 11.3 % (ref 5–12)
NEUTROPHILS NFR BLD AUTO: 2.04 10*3/MM3 (ref 1.7–7)
NEUTROPHILS NFR BLD AUTO: 56.2 % (ref 42.7–76)
NRBC BLD AUTO-RTO: 0 /100 WBC (ref 0–0.2)
PLATELET # BLD AUTO: 194 10*3/MM3 (ref 140–450)
PMV BLD AUTO: 9 FL (ref 6–12)
POTASSIUM SERPL-SCNC: 4.2 MMOL/L (ref 3.5–5.2)
PROT SERPL-MCNC: 6.1 G/DL (ref 6–8.5)
RBC # BLD AUTO: 4.6 10*6/MM3 (ref 4.14–5.8)
SODIUM SERPL-SCNC: 143 MMOL/L (ref 136–145)
WBC NRBC COR # BLD: 3.63 10*3/MM3 (ref 3.4–10.8)

## 2022-12-27 PROCEDURE — 85025 COMPLETE CBC W/AUTO DIFF WBC: CPT

## 2022-12-27 PROCEDURE — 36415 COLL VENOUS BLD VENIPUNCTURE: CPT

## 2022-12-27 PROCEDURE — 86480 TB TEST CELL IMMUN MEASURE: CPT

## 2022-12-27 PROCEDURE — 80053 COMPREHEN METABOLIC PANEL: CPT

## 2022-12-30 LAB
GAMMA INTERFERON BACKGROUND BLD IA-ACNC: 0.02 IU/ML
M TB IFN-G BLD-IMP: NEGATIVE
M TB IFN-G CD4+ BCKGRND COR BLD-ACNC: 0.03 IU/ML
M TB IFN-G CD4+CD8+ BCKGRND COR BLD-ACNC: 0.03 IU/ML
MITOGEN IGNF BLD-ACNC: >10 IU/ML
QUANTIFERON INCUBATION: NORMAL
SERVICE CMNT-IMP: NORMAL

## 2023-04-03 ENCOUNTER — TELEPHONE (OUTPATIENT)
Dept: CARDIOLOGY | Facility: CLINIC | Age: 58
End: 2023-04-03

## 2023-04-03 NOTE — TELEPHONE ENCOUNTER
Caller: Vinnie Whitley III    Relationship: Self    Best call back number: [phone]    What is the best time to reach you: ANY    Who are you requesting to speak with (clinical staff, provider,  specific staff member): CLINICAL      What was the call regarding: PT HAS AN UPCOMING APPT IN MAY WITH DR. MCMAHON. HE IS CALLING IN TODAY, BECAUSE HE IS REPORTING SOME CHEST DISCOMFORT OVER THE WEEKEND AND WAS WONDERING IF HE CAN BE SEEN SOONER.     Do you require a callback: YES

## 2023-04-03 NOTE — TELEPHONE ENCOUNTER
Called and spoke with patient. Scheduled patient for a sooner appointment for this coming Monday 4/10/23. Advised for patient to go to the ED if chest pain gets worse

## 2023-04-10 ENCOUNTER — OFFICE VISIT (OUTPATIENT)
Dept: CARDIOLOGY | Facility: CLINIC | Age: 58
End: 2023-04-10
Payer: MEDICARE

## 2023-04-10 ENCOUNTER — HOSPITAL ENCOUNTER (OUTPATIENT)
Dept: GENERAL RADIOLOGY | Facility: HOSPITAL | Age: 58
Discharge: HOME | End: 2023-04-10
Admitting: FAMILY MEDICINE
Payer: MEDICARE

## 2023-04-10 VITALS
SYSTOLIC BLOOD PRESSURE: 98 MMHG | WEIGHT: 203 LBS | HEART RATE: 94 BPM | HEIGHT: 72 IN | DIASTOLIC BLOOD PRESSURE: 58 MMHG | BODY MASS INDEX: 27.5 KG/M2 | RESPIRATION RATE: 18 BRPM | OXYGEN SATURATION: 99 %

## 2023-04-10 DIAGNOSIS — I25.10 CORONARY ARTERY DISEASE INVOLVING NATIVE CORONARY ARTERY OF NATIVE HEART WITHOUT ANGINA PECTORIS: Primary | ICD-10-CM

## 2023-04-10 DIAGNOSIS — E78.00 PURE HYPERCHOLESTEROLEMIA: ICD-10-CM

## 2023-04-10 DIAGNOSIS — I25.5 ISCHEMIC CARDIOMYOPATHY: ICD-10-CM

## 2023-04-10 DIAGNOSIS — M25.572 ACUTE LEFT ANKLE PAIN: ICD-10-CM

## 2023-04-10 DIAGNOSIS — I10 ESSENTIAL HYPERTENSION: ICD-10-CM

## 2023-04-10 DIAGNOSIS — R07.9 CHEST PAIN, UNSPECIFIED TYPE: ICD-10-CM

## 2023-04-10 PROCEDURE — 3074F SYST BP LT 130 MM HG: CPT | Performed by: INTERNAL MEDICINE

## 2023-04-10 PROCEDURE — 3078F DIAST BP <80 MM HG: CPT | Performed by: INTERNAL MEDICINE

## 2023-04-10 PROCEDURE — 99214 OFFICE O/P EST MOD 30 MIN: CPT | Performed by: INTERNAL MEDICINE

## 2023-04-10 PROCEDURE — 73610 X-RAY EXAM OF ANKLE: CPT

## 2023-04-10 NOTE — PROGRESS NOTES
"             [facility] Cardiology Office Follow Up Note    Vinnie Whitley III  [id number]  04/10/2023    Primary Care Provider: Marlen Richards MD    Chief Complaint: Routine follow-up    History of Present Illness:   Mr. Vinnie Whitley III is a 57 y.o. male who presents to the Cardiology Clinic for routine follow-up.  The patient has a past medical history significant for multiple sclerosis followed at [facility], hypertension, hyperlipidemia, and prior tobacco use with cessation in approximately .  His past cardiac history is significant for presentation with an NSTEMI in 10/2020.  Coronary angiography at that time revealed severe one-vessel disease involving the proximal to mid RCA.  He underwent successful PCI with placement of a 3.0 x 28 mm Xience PEPPER.  An echocardiogram at that time showed mildly reduced LV systolic function with an LVEF 45-50%.    He presents today for routine follow-up.  Since his last appointment, the patient reports having occasional episodes of substernal chest discomfort.  The discomfort is described as a \"dull\" sensation.  The episodes intermittently occur with exertion.  No associated nausea, vomiting, or diaphoresis.  No other specific complaints today.    Past Cardiac Testin. Last Coronary Angio:               1.  10/26/2020                          1.  Severe one-vessel coronary artery disease involving the proximal to mid RCA as culprit lesion for NSTEMI                                      -Successful PCI with placement of a 3.0 x 28 mm Xience PEPPER                          2.  No significant disease in the left coronary system                          3.  Normal LVEDP (11 mmHg)              2.  3/3/2021                          1.  Widely patent stent in the proximal to mid RCA with mild diffuse disease in the distal posteriolateral system at which point the vessel is small in caliber.                          2.  No significant obstructive " disease in the left coronary system                          3.  Sluggish, EAMON II flow throughout the LAD.                          4.  Mildly elevated LVEDP, 22 mmHg.  2. Prior Stress Testing: None  3. Last Echo: 10/26/2020              1.  Normal left ventricular size with mildly reduced LV systolic function, LVEF 45-50%.              2.  Mild inferior wall hypokinesis.              3.  Grade 1 diastolic dysfunction.              4.  Normal right ventricular size and systolic function.              5.  Normal left atrial index.              6.  No significant valvular abnormalities.  4. Prior Holter Monitor: None       Review of Systems:   Review of Systems   Constitutional: Negative for activity change, appetite change, chills, diaphoresis, fatigue, fever, unexpected weight gain and unexpected weight loss.   Eyes: Negative for blurred vision and double vision.   Respiratory: Positive for chest tightness. Negative for cough, shortness of breath and wheezing.    Cardiovascular: Negative for chest pain, palpitations and leg swelling.   Gastrointestinal: Negative for abdominal pain, anal bleeding, blood in stool and GERD.   Endocrine: Negative for cold intolerance and heat intolerance.   Genitourinary: Negative for hematuria.   Neurological: Negative for dizziness, syncope, weakness and light-headedness.   Hematological: Does not bruise/bleed easily.   Psychiatric/Behavioral: Negative for depressed mood and stress. The patient is not nervous/anxious.        I have reviewed and/or updated the patient's past medical, past surgical, family, social history, problem list and allergies as appropriate.     Medications:     Current Outpatient Medications:   •  Aspirin Low Dose 81 MG EC tablet, TAKE 1 TABLET DAILY, Disp: 90 tablet, Rfl: 3  •  atorvastatin (LIPITOR) 80 MG tablet, TAKE 1 TABLET EVERY NIGHT, Disp: 90 tablet, Rfl: 3  •  citalopram (CeleXA) 20 MG tablet, TAKE 1 TABLET DAILY, Disp: 90 tablet, Rfl: 1  •   "dextromethorphan-quinidine (Nuedexta) 20-10 MG capsule capsule, Nuedexta 20 mg-10 mg capsule, Disp: , Rfl:   •  lisinopril (PRINIVIL,ZESTRIL) 5 MG tablet, Take 1 tablet by mouth Daily., Disp: 90 tablet, Rfl: 3  •  metoprolol succinate XL (TOPROL-XL) 25 MG 24 hr tablet, Take 1 tablet by mouth Daily., Disp: 90 tablet, Rfl: 3  •  pramipexole (MIRAPEX) 1 MG tablet, Take 1 tablet by mouth 2 (Two) Times a Day., Disp: , Rfl:   •  ranolazine (RANEXA) 500 MG 12 hr tablet, Take 1 tablet by mouth 2 (Two) Times a Day., Disp: , Rfl:   •  Teriflunomide (AUBAGIO PO), Take  by mouth Daily., Disp: , Rfl:   •  Vitamin D, Cholecalciferol, (CHOLECALCIFEROL) 10 MCG (400 UNIT) tablet, Take 1 tablet by mouth Daily., Disp: , Rfl:   •  donepezil (ARICEPT) 5 MG tablet, Take 5 mg by mouth Every Night. Pt's neurologist would like approval from cardio before starting, Disp: , Rfl:     Physical Exam:  Vital Signs:   Vitals:    04/10/23 1334   BP: 98/58   BP Location: Right arm   Patient Position: Sitting   Pulse: 94   Resp: 18   SpO2: 99%   Weight: 92.1 kg (203 lb)   Height: 182.9 cm (72\")       Physical Exam  Constitutional:       General: He is not in acute distress.     Appearance: Normal appearance. He is not diaphoretic.   HENT:      Head: Normocephalic and atraumatic.   Cardiovascular:      Rate and Rhythm: Normal rate and regular rhythm.      Heart sounds: No murmur heard.  Pulmonary:      Effort: Pulmonary effort is normal. No respiratory distress.      Breath sounds: Normal breath sounds. No stridor. No wheezing, rhonchi or rales.   Abdominal:      General: Bowel sounds are normal. There is no distension.      Palpations: Abdomen is soft.      Tenderness: There is no abdominal tenderness. There is no guarding or rebound.   Musculoskeletal:         General: No swelling. Normal range of motion.      Cervical back: Neck supple. No tenderness.   Skin:     General: Skin is warm and dry.   Neurological:      General: No focal deficit present. "      Mental Status: He is alert and oriented to person, place, and time.   Psychiatric:         Mood and Affect: Mood normal.         Behavior: Behavior normal.         Results Review:   I reviewed the patient's new clinical results.  I personally viewed and interpreted the patient's EKG/Telemetry data      ECG 12 Lead    Date/Time: 4/11/2023 4:05 PM  Performed by: Sukh Rushing MD  Authorized by: Sukh Rushing MD   Comparison: not compared with previous ECG   Rhythm: sinus rhythm  Rate: normal  QRS axis: normal    Clinical impression: normal ECG            Assessment / Plan:     1.  Coronary artery disease  --Initially presented with an NSTEMI in 10/2020, found to have severe one-vessel CAD involving the proximal to mid RCA, status post PCI with PEPPER to the proximal to mid RCA  --Underwent repeat coronary angiogram in 3/21 due to recurrent episodes of chest pain, with widely patent RCA stents and no obstructive disease in the left coronary system  --Intermittent episodes of chest discomfort concerning for potential angina  --Will proceed with pharmacologic MPS (inability to exercise) to further evaluate for ischemia contributing to current symptoms  --Continue aspirin 81 mg daily  --Continue high intensity statin     2.  Ischemic cardiomyopathy  --LVEF mildly depressed at 45-50% with inferior wall hypokinesis  --Remains well compensated and euvolemic on exam today, NYHA class I  --Continue metoprolol and lisinopril     3.  Hypertension   --BP adequately controlled today     4. Hyperlipidemia  -- Last lipid profile in 11/21 showed , HDL 37, triglycerides 122  --Continue high intensity statin     5.  Multiple sclerosis  --Follows with  neurology       Follow Up:   Return in about 3 months (around 7/10/2023).      Thank you for allowing me to participate in the care of your patient. Please to not hesitate to contact me with additional questions or concerns.     GERMAIN Rushing MD  Interventional  Cardiology   04/10/2023  13:26 EDT

## 2023-04-11 PROCEDURE — 93000 ELECTROCARDIOGRAM COMPLETE: CPT | Performed by: INTERNAL MEDICINE

## 2023-04-26 ENCOUNTER — HOSPITAL ENCOUNTER (OUTPATIENT)
Dept: NUCLEAR MEDICINE | Facility: HOSPITAL | Age: 58
Discharge: HOME | End: 2023-04-26
Payer: MEDICARE

## 2023-04-26 DIAGNOSIS — R07.9 CHEST PAIN, UNSPECIFIED TYPE: ICD-10-CM

## 2023-05-03 ENCOUNTER — HOSPITAL ENCOUNTER (OUTPATIENT)
Dept: NUCLEAR MEDICINE | Facility: HOSPITAL | Age: 58
Discharge: HOME | End: 2023-05-03
Payer: MEDICARE

## 2023-05-03 LAB
BH CV REST NUCLEAR ISOTOPE DOSE: 9.5 MCI
BH CV STRESS COMMENTS STAGE 1: NORMAL
BH CV STRESS DOSE REGADENOSON STAGE 1: 0.4
BH CV STRESS DURATION MIN STAGE 1: 0
BH CV STRESS DURATION SEC STAGE 1: 10
BH CV STRESS NUCLEAR ISOTOPE DOSE: 32.8 MCI
BH CV STRESS PROTOCOL 1: NORMAL
BH CV STRESS RECOVERY BP: NORMAL MMHG
BH CV STRESS RECOVERY HR: 94 BPM
BH CV STRESS STAGE 1: 1
LV EF NUC BP: 57 %
MAXIMAL PREDICTED HEART RATE: 165 BPM
PERCENT MAX PREDICTED HR: 65.45 %
STRESS BASELINE BP: NORMAL MMHG
STRESS BASELINE HR: 69 BPM
STRESS PERCENT HR: 77 %
STRESS POST EXERCISE DUR MIN: 5 MIN
STRESS POST PEAK BP: NORMAL MMHG
STRESS POST PEAK HR: 108 BPM
STRESS TARGET HR: 140 BPM

## 2023-05-03 PROCEDURE — 0 TECHNETIUM SESTAMIBI: Performed by: INTERNAL MEDICINE

## 2023-05-03 PROCEDURE — A9500 TC99M SESTAMIBI: HCPCS | Performed by: INTERNAL MEDICINE

## 2023-05-03 PROCEDURE — 25010000002 REGADENOSON 0.4 MG/5ML SOLUTION: Performed by: INTERNAL MEDICINE

## 2023-05-03 PROCEDURE — 78452 HT MUSCLE IMAGE SPECT MULT: CPT | Performed by: INTERNAL MEDICINE

## 2023-05-03 PROCEDURE — 93018 CV STRESS TEST I&R ONLY: CPT | Performed by: INTERNAL MEDICINE

## 2023-05-03 PROCEDURE — 93017 CV STRESS TEST TRACING ONLY: CPT

## 2023-05-03 PROCEDURE — 78452 HT MUSCLE IMAGE SPECT MULT: CPT

## 2023-05-03 RX ORDER — REGADENOSON 0.08 MG/ML
0.4 INJECTION, SOLUTION INTRAVENOUS
Status: COMPLETED | OUTPATIENT
Start: 2023-05-03 | End: 2023-05-03

## 2023-05-03 RX ADMIN — REGADENOSON 0.4 MG: 0.08 INJECTION, SOLUTION INTRAVENOUS at 08:13

## 2023-05-03 RX ADMIN — TECHNETIUM TC 99M SESTAMIBI 1 DOSE: 1 INJECTION INTRAVENOUS at 08:13

## 2023-05-03 RX ADMIN — TECHNETIUM TC 99M SESTAMIBI 1 DOSE: 1 INJECTION INTRAVENOUS at 06:15

## 2023-05-13 ENCOUNTER — HOSPITAL ENCOUNTER (EMERGENCY)
Facility: HOSPITAL | Age: 58
Discharge: HOME | End: 2023-05-13
Attending: EMERGENCY MEDICINE
Payer: MEDICARE

## 2023-05-13 ENCOUNTER — APPOINTMENT (OUTPATIENT)
Dept: GENERAL RADIOLOGY | Facility: HOSPITAL | Age: 58
End: 2023-05-13
Payer: MEDICARE

## 2023-05-13 VITALS
RESPIRATION RATE: 18 BRPM | DIASTOLIC BLOOD PRESSURE: 88 MMHG | SYSTOLIC BLOOD PRESSURE: 138 MMHG | OXYGEN SATURATION: 98 % | WEIGHT: 200 LBS | BODY MASS INDEX: 27.09 KG/M2 | HEIGHT: 72 IN | HEART RATE: 80 BPM | TEMPERATURE: 98.1 F

## 2023-05-13 DIAGNOSIS — R07.9 CHEST PAIN, UNSPECIFIED TYPE: Primary | ICD-10-CM

## 2023-05-13 LAB
ALBUMIN SERPL-MCNC: 4.1 G/DL (ref 3.5–5.2)
ALBUMIN/GLOB SERPL: 2 G/DL
ALP SERPL-CCNC: 78 U/L (ref 39–117)
ALT SERPL W P-5'-P-CCNC: 25 U/L (ref 1–41)
ANION GAP SERPL CALCULATED.3IONS-SCNC: 9.4 MMOL/L (ref 5–15)
AST SERPL-CCNC: 24 U/L (ref 1–40)
BASOPHILS # BLD AUTO: 0.01 10*3/MM3 (ref 0–0.2)
BASOPHILS NFR BLD AUTO: 0.3 % (ref 0–1.5)
BILIRUB SERPL-MCNC: 0.3 MG/DL (ref 0–1.2)
BUN SERPL-MCNC: 17 MG/DL (ref 6–20)
BUN/CREAT SERPL: 12.7 (ref 7–25)
CALCIUM SPEC-SCNC: 9 MG/DL (ref 8.6–10.5)
CHLORIDE SERPL-SCNC: 107 MMOL/L (ref 98–107)
CO2 SERPL-SCNC: 25.6 MMOL/L (ref 22–29)
CREAT SERPL-MCNC: 1.34 MG/DL (ref 0.76–1.27)
DEPRECATED RDW RBC AUTO: 39.5 FL (ref 37–54)
EGFRCR SERPLBLD CKD-EPI 2021: 62.6 ML/MIN/1.73
EOSINOPHIL # BLD AUTO: 0.08 10*3/MM3 (ref 0–0.4)
EOSINOPHIL NFR BLD AUTO: 2.2 % (ref 0.3–6.2)
ERYTHROCYTE [DISTWIDTH] IN BLOOD BY AUTOMATED COUNT: 12.5 % (ref 12.3–15.4)
GEN 5 2HR TROPONIN T REFLEX: <6 NG/L
GLOBULIN UR ELPH-MCNC: 2.1 GM/DL
GLUCOSE SERPL-MCNC: 109 MG/DL (ref 65–99)
HCT VFR BLD AUTO: 41.2 % (ref 37.5–51)
HGB BLD-MCNC: 13.9 G/DL (ref 13–17.7)
HOLD SPECIMEN: NORMAL
HOLD SPECIMEN: NORMAL
IMM GRANULOCYTES # BLD AUTO: 0.01 10*3/MM3 (ref 0–0.05)
IMM GRANULOCYTES NFR BLD AUTO: 0.3 % (ref 0–0.5)
LYMPHOCYTES # BLD AUTO: 0.85 10*3/MM3 (ref 0.7–3.1)
LYMPHOCYTES NFR BLD AUTO: 23.4 % (ref 19.6–45.3)
MCH RBC QN AUTO: 29.1 PG (ref 26.6–33)
MCHC RBC AUTO-ENTMCNC: 33.7 G/DL (ref 31.5–35.7)
MCV RBC AUTO: 86.4 FL (ref 79–97)
MONOCYTES # BLD AUTO: 0.4 10*3/MM3 (ref 0.1–0.9)
MONOCYTES NFR BLD AUTO: 11 % (ref 5–12)
NEUTROPHILS NFR BLD AUTO: 2.29 10*3/MM3 (ref 1.7–7)
NEUTROPHILS NFR BLD AUTO: 62.8 % (ref 42.7–76)
NRBC BLD AUTO-RTO: 0 /100 WBC (ref 0–0.2)
PLATELET # BLD AUTO: 148 10*3/MM3 (ref 140–450)
PMV BLD AUTO: 8.3 FL (ref 6–12)
POTASSIUM SERPL-SCNC: 4.1 MMOL/L (ref 3.5–5.2)
PROT SERPL-MCNC: 6.2 G/DL (ref 6–8.5)
RBC # BLD AUTO: 4.77 10*6/MM3 (ref 4.14–5.8)
SODIUM SERPL-SCNC: 142 MMOL/L (ref 136–145)
TROPONIN T DELTA: NORMAL
TROPONIN T SERPL HS-MCNC: <6 NG/L
WBC NRBC COR # BLD: 3.64 10*3/MM3 (ref 3.4–10.8)
WHOLE BLOOD HOLD COAG: NORMAL
WHOLE BLOOD HOLD SPECIMEN: NORMAL

## 2023-05-13 PROCEDURE — 85025 COMPLETE CBC W/AUTO DIFF WBC: CPT | Performed by: EMERGENCY MEDICINE

## 2023-05-13 PROCEDURE — 80053 COMPREHEN METABOLIC PANEL: CPT | Performed by: EMERGENCY MEDICINE

## 2023-05-13 PROCEDURE — 84484 ASSAY OF TROPONIN QUANT: CPT | Performed by: EMERGENCY MEDICINE

## 2023-05-13 PROCEDURE — 71045 X-RAY EXAM CHEST 1 VIEW: CPT

## 2023-05-13 PROCEDURE — 99283 EMERGENCY DEPT VISIT LOW MDM: CPT

## 2023-05-13 PROCEDURE — 36415 COLL VENOUS BLD VENIPUNCTURE: CPT

## 2023-05-13 PROCEDURE — 93005 ELECTROCARDIOGRAM TRACING: CPT | Performed by: EMERGENCY MEDICINE

## 2023-05-13 RX ORDER — SODIUM CHLORIDE 0.9 % (FLUSH) 0.9 %
10 SYRINGE (ML) INJECTION AS NEEDED
Status: DISCONTINUED | OUTPATIENT
Start: 2023-05-13 | End: 2023-05-13 | Stop reason: HOSPADM

## 2023-05-13 NOTE — ED PROVIDER NOTES
"Subjective   History of Present Illness  57-year-old male with chest pain.  Left-sided chest pain.  Started 15 minutes prior to arrival.  No aching pain in his left chest that radiated to his left shoulder blade.  No shortness of breath.  No cough or wheeze.  No diaphoresis.  No prior treatments or limiting factors.  No prior evaluations.  No other complaints at this time.        Review of Systems   Constitutional: Negative for fever.   Respiratory: Negative for shortness of breath.    Cardiovascular: Positive for chest pain. Negative for palpitations and leg swelling.   All other systems reviewed and are negative.      Past Medical History:   Diagnosis Date   • Essential hypertension 11/07/2020   • Infection of kidney     H/o   • Kidney stones    • Multiple sclerosis    • Nephrolithiasis     H/o   • Pneumonia    • Pneumothorax     H/o       Allergies   Allergen Reactions   • Contrast Dye (Echo Or Unknown Ct/Mr) Other (See Comments)     Made \"feel funny\"       Past Surgical History:   Procedure Laterality Date   • BRONCHOSCOPY N/A 5/25/2022    Procedure: BRONCHOSCOPY;  Surgeon: Julio Costa MD;  Location:  [facility];  Service: Pulmonary;  Laterality: N/A;   • CARDIAC CATHETERIZATION N/A 10/26/2020    Procedure: Coronary angiography;  Surgeon: Sukh Rushing MD;  Location: [facility] CATH INVASIVE LOCATION;  Service: Cardiology;  Laterality: N/A;   • CARDIAC CATHETERIZATION N/A 10/26/2020    Procedure: Optical Coherent Tomography;  Surgeon: Sukh Rushing MD;  Location: [facility] CATH INVASIVE LOCATION;  Service: Cardiology;  Laterality: N/A;   • CARDIAC CATHETERIZATION N/A 10/26/2020    Procedure: Percutaneous Coronary Intervention;  Surgeon: Sukh Rushing MD;  Location: [facility] CATH INVASIVE LOCATION;  Service: Cardiology;  Laterality: N/A;   • CARDIAC CATHETERIZATION N/A 10/26/2020    Procedure: Stent PEPPER coronary;  Surgeon: Sukh Rushing MD;  Location: [facility] CATH INVASIVE LOCATION;  " Service: Cardiology;  Laterality: N/A;   • CARDIAC CATHETERIZATION N/A 10/26/2020    Procedure: Left Heart Cath;  Surgeon: Sukh Rushing MD;  Location:  [facility] CATH INVASIVE LOCATION;  Service: Cardiology;  Laterality: N/A;   • CARDIAC CATHETERIZATION N/A 3/3/2021    Procedure: Coronary angiography;  Surgeon: Sukh Rushing MD;  Location:  [facility] CATH INVASIVE LOCATION;  Service: Cardiology;  Laterality: N/A;   • CARDIAC CATHETERIZATION N/A 3/3/2021    Procedure: Left Heart Cath;  Surgeon: Sukh Rushing MD;  Location: [facility] CATH INVASIVE LOCATION;  Service: Cardiology;  Laterality: N/A;   • CORONARY STENT PLACEMENT     • KIDNEY STONE SURGERY  2005    Lithotomy   • LUNG SURGERY         Family History   Problem Relation Age of Onset   • Polycystic kidney disease Mother    • Dementia Mother    • Kidney disease Mother    • No Known Problems Sister    • No Known Problems Maternal Uncle    • Kidney disease Maternal Grandmother    • Polycystic kidney disease Maternal Grandmother    • Early death Maternal Grandfather        Social History     Socioeconomic History   • Marital status:    Tobacco Use   • Smoking status: Former     Packs/day: 0.50     Years: 20.00     Pack years: 10.00     Types: Cigarettes     Start date:      Quit date: 2001     Years since quittin.3     Passive exposure: Past   • Smokeless tobacco: Never   Vaping Use   • Vaping Use: Never used   Substance and Sexual Activity   • Alcohol use: No   • Drug use: No   • Sexual activity: Yes     Partners: Female           Objective   Physical Exam  Vitals and nursing note reviewed.   Constitutional:       Appearance: He is well-developed.   HENT:      Head: Atraumatic.   Cardiovascular:      Rate and Rhythm: Normal rate and regular rhythm.      Heart sounds: Normal heart sounds.   Pulmonary:      Effort: Pulmonary effort is normal.      Breath sounds: Normal breath sounds.   Chest:      Chest wall: No tenderness.   Neurological:       Mental Status: He is alert.         Procedures           ED Course  ED Course as of 05/13/23 1504   Sat May 13, 2023   1147 EKG interpreted by me.  Normal sinus rhythm.  Rate of 73.  Intervals appropriate.  No obvious ST segment or T wave changes.  Normal EKG [CG]      ED Course User Index  [CG] Bacilio Rangel, DO                                           MDM  Chief complaint: Chest pain    Differential diagnosis includes but not limited to: STEMI, NSTEMI, pneumothorax, pleurisy, ACS, other    Patient arrives stable, afebrile, without respiratory distress with initial vitals interpreted by myself.  Pertinent initial vitals include within normal limit    Plan: EKG troponin chest x-ray    Results from initial plan were reviewed and interpreted by myself and include: Chest x-ray negative for acute process.  Initial troponin negative, EKG is unremarkable and normal.  Repeat troponin negative      Diagnostic information from other sources includes: Review of previous visits, prior labs, prior imaging, available notes from prior evaluations or visits with specialists, medication list, allergies, past medical history, past surgical history    Interventions in the ED included: Continuous cardiac monitoring    Disposition plan: Discharge.    Low suspicion for ACS given negative work-up today.  Symptomatology and negative troponins x2.  He is resting comfortably.  Currently pain-free.  Appropriate for discharge follow-up with his cardiologist.  Patient is agreeable to this plan.  Does not have a history of CAD with 1 stent but no acute emergency identified tonight.      Final diagnoses:   Chest pain, unspecified type       ED Disposition  ED Disposition     ED Disposition   Discharge    Condition   Stable    Comment   --             Sukh Rushing MD  [address]  [facility] [address]  [phone]          Marlen Richards MD  [address]  [address]  [facility] [address]  [phone]               Medication  List      No changes were made to your prescriptions during this visit.          Bacilio Rangel, DO  05/13/23 1502

## 2023-05-15 ENCOUNTER — TELEPHONE (OUTPATIENT)
Dept: CARDIOLOGY | Facility: CLINIC | Age: 58
End: 2023-05-15

## 2023-05-15 NOTE — TELEPHONE ENCOUNTER
"Patient returned phone call. States he is still having some chest pains but \"not like it was\". Patient wanted to be seen in office. Scheduled him for an appointment with ALY Barger for 5/16/23 at 1:30. Patient confirmed.  "

## 2023-05-15 NOTE — TELEPHONE ENCOUNTER
Caller: Vinnie Whitley III    Relationship: Self    Best call back number: [phone]    What is the best time to reach you: ANYTIME     Who are you requesting to speak with (clinical staff, provider,  specific staff member): ANYONE     What was the call regarding: PATIENT REPORTS HAVING CHEST PAIN AND WENT TO ER ON 5.13.23. STATES THAT LAST TIME HE HAD THAT SIMILAR PAIN HE HAD A HEART ATTACK.     Do you require a callback: IF NECESSARY

## 2023-05-16 ENCOUNTER — OFFICE VISIT (OUTPATIENT)
Dept: CARDIOLOGY | Facility: CLINIC | Age: 58
End: 2023-05-16
Payer: MEDICARE

## 2023-05-16 VITALS
WEIGHT: 200 LBS | OXYGEN SATURATION: 98 % | DIASTOLIC BLOOD PRESSURE: 76 MMHG | SYSTOLIC BLOOD PRESSURE: 118 MMHG | HEIGHT: 72 IN | BODY MASS INDEX: 27.09 KG/M2 | HEART RATE: 92 BPM

## 2023-05-16 DIAGNOSIS — I25.10 CORONARY ARTERY DISEASE INVOLVING NATIVE CORONARY ARTERY OF NATIVE HEART WITHOUT ANGINA PECTORIS: Primary | ICD-10-CM

## 2023-05-16 DIAGNOSIS — E78.5 HYPERLIPIDEMIA, UNSPECIFIED HYPERLIPIDEMIA TYPE: ICD-10-CM

## 2023-05-16 DIAGNOSIS — I10 ESSENTIAL HYPERTENSION: ICD-10-CM

## 2023-05-16 DIAGNOSIS — I25.5 ISCHEMIC CARDIOMYOPATHY: ICD-10-CM

## 2023-05-16 PROCEDURE — 1160F RVW MEDS BY RX/DR IN RCRD: CPT | Performed by: NURSE PRACTITIONER

## 2023-05-16 PROCEDURE — 1159F MED LIST DOCD IN RCRD: CPT | Performed by: NURSE PRACTITIONER

## 2023-05-16 PROCEDURE — 3074F SYST BP LT 130 MM HG: CPT | Performed by: NURSE PRACTITIONER

## 2023-05-16 PROCEDURE — 3078F DIAST BP <80 MM HG: CPT | Performed by: NURSE PRACTITIONER

## 2023-05-16 PROCEDURE — 99214 OFFICE O/P EST MOD 30 MIN: CPT | Performed by: NURSE PRACTITIONER

## 2023-05-16 RX ORDER — LISINOPRIL 5 MG/1
5 TABLET ORAL DAILY
Qty: 90 TABLET | Refills: 3 | Status: SHIPPED | OUTPATIENT
Start: 2023-05-16

## 2023-05-16 RX ORDER — CLOPIDOGREL BISULFATE 75 MG/1
TABLET ORAL
COMMUNITY
Start: 2023-05-03

## 2023-05-16 RX ORDER — LISINOPRIL 5 MG/1
5 TABLET ORAL DAILY
Qty: 90 TABLET | Refills: 3 | Status: SHIPPED | OUTPATIENT
Start: 2023-05-16 | End: 2023-05-16

## 2023-05-16 NOTE — PATIENT INSTRUCTIONS
RESTART lisinopril 5 mg.  (I SENT THIS TO [facility])    Trial increased dose of ranexa to see if this improves symptoms.  Follow-up with Dr. Rushing in 1 month.

## 2023-05-16 NOTE — PROGRESS NOTES
[facility] Cardiology Office Follow Up Note    Vinnie Whitley III  [id number]  2023    Primary Care Provider: Marlen Richards MD   Referring Provider: No ref. provider found    Chief Complaint: CP - ER F/U    History of Present Illness:   Mr. Vinnie Whitley III is a 57 y.o. male who presents to the Cardiology Clinic for follow up of chest pain.  The patient has a past medical history significant for multiple sclerosis followed at [facility], hypertension, hyperlipidemia, and prior tobacco use with cessation in approximately .  His past cardiac history is significant for presentation with an NSTEMI in 10/2020.  Coronary angiography at that time revealed severe one-vessel disease involving the proximal to mid RCA.  He underwent successful PCI with placement of a 3.0 x 28 mm Xience PEPPER.  He recently underwent a vasodilator nuclear stress test which was normal.  3 days ago, however, he presented to the ED with chest pain.  His work-up included troponin x2, CXR, ECG, all of which were unremarkable.  He denies any recurrent chest discomfort and was not exerting himself at the time of his ED presentation.  He is concerned, however, because before his heart attack in  he went to the ED 3 times before he received a stent.  He continues to take his medications as prescribed.  He offers no other complaints or concerns at this time.    Past Cardiac Testin. Last Coronary Angio:               1.  10/26/2020                          1.  Severe one-vessel coronary artery disease involving the proximal to mid RCA as culprit lesion for NSTEMI                                      -Successful PCI with placement of a 3.0 x 28 mm Xience PEPPER                          2.  No significant disease in the left coronary system                          3.  Normal LVEDP (11 mmHg)              2.  3/3/2021                          1.  Widely patent stent in the proximal to mid RCA with mild  diffuse disease in the distal posteriolateral system at which point the vessel is small in caliber.                          2.  No significant obstructive disease in the left coronary system                          3.  Sluggish, EAMON II flow throughout the LAD.                          4.  Mildly elevated LVEDP, 22 mmHg.    2. Prior Stress Testin/3/2023  1.  Normal pharmacologic MPS with no evidence of inducible ischemia.   2. Normal LV systolic function, LVEF 57%.     3. Last Echo: 2022  •  Normal left ventricular systolic function, estimated EF 60%.  • No significant valvular abnormalities.      4. Prior Holter Monitor: None    Review of Systems:   Review of Systems  As Noted in HPI.   I have reviewed and confirmed the accuracy of the ROS as documented by the MA/LPN/RN ALY Overton    I have reviewed and/or updated the patient's past medical, past surgical, family, social history, problem list and allergies as appropriate.     Medications:     Current Outpatient Medications:   •  Aspirin Low Dose 81 MG EC tablet, TAKE 1 TABLET DAILY, Disp: 90 tablet, Rfl: 3  •  atorvastatin (LIPITOR) 80 MG tablet, TAKE 1 TABLET EVERY NIGHT, Disp: 90 tablet, Rfl: 3  •  citalopram (CeleXA) 20 MG tablet, TAKE 1 TABLET DAILY, Disp: 90 tablet, Rfl: 1  •  clopidogrel (PLAVIX) 75 MG tablet, , Disp: , Rfl:   •  dextromethorphan-quinidine (Nuedexta) 20-10 MG capsule capsule, Nuedexta 20 mg-10 mg capsule, Disp: , Rfl:   •  metoprolol succinate XL (TOPROL-XL) 25 MG 24 hr tablet, Take 1 tablet by mouth Daily., Disp: 90 tablet, Rfl: 3  •  pramipexole (MIRAPEX) 1 MG tablet, Take 1 tablet by mouth 2 (Two) Times a Day., Disp: , Rfl:   •  ranolazine (RANEXA) 500 MG 12 hr tablet, Take 2 tablets by mouth 2 (Two) Times a Day., Disp: , Rfl:   •  Teriflunomide (AUBAGIO PO), Take  by mouth Daily., Disp: , Rfl:   •  Vitamin D, Cholecalciferol, (CHOLECALCIFEROL) 10 MCG (400 UNIT) tablet, Take 1 tablet by mouth Daily., Disp: , Rfl:  "  •  donepezil (ARICEPT) 5 MG tablet, Take 5 mg by mouth Every Night. Pt's neurologist would like approval from cardio before starting, Disp: , Rfl:   •  lisinopril (PRINIVIL,ZESTRIL) 5 MG tablet, Take 1 tablet by mouth Daily., Disp: 90 tablet, Rfl: 3    Physical Exam:  Vital Signs:   Vitals:    05/16/23 1304   BP: 118/76   BP Location: Right arm   Patient Position: Sitting   Cuff Size: Adult   Pulse: 92   SpO2: 98%   Weight: 90.7 kg (200 lb)   Height: 182.9 cm (72\")     Body mass index is 27.12 kg/m².    Physical Exam  Vitals and nursing note reviewed.   Constitutional:       General: He is not in acute distress.  HENT:      Head: Normocephalic and atraumatic.   Neck:      Trachea: Trachea normal.   Cardiovascular:      Rate and Rhythm: Normal rate and regular rhythm.      Pulses: Normal pulses.      Heart sounds: Normal heart sounds. No murmur heard.    No friction rub. No gallop.   Pulmonary:      Effort: Pulmonary effort is normal.      Breath sounds: Normal breath sounds.   Musculoskeletal:      Cervical back: Neck supple.      Right lower leg: No edema.      Left lower leg: No edema.   Skin:     General: Skin is warm and dry.   Neurological:      Mental Status: He is alert and oriented to person, place, and time.   Psychiatric:         Mood and Affect: Mood normal.         Behavior: Behavior normal. Behavior is cooperative.         Thought Content: Thought content does not include suicidal ideation.         Results Review:   I reviewed the patient's new clinical results.    Procedures    Assessment / Plan:   Diagnoses and all orders for this visit:    1. Coronary artery disease involving native coronary artery of native heart without angina pectoris (Primary)  5/23, normal MPS  ED work-up earlier this week was normal  Trial up titration of Ranexa to 1000 mg twice daily  Continue aspirin and Plavix  Continue high intensity statin (repeat lipid panel for surveillance; patient will have this drawn at PCP " office)    2.  Primary hypertension  Currently well controlled    3.  Hyperlipidemia  11/21,   Repeat lipid panel for surveillance  Continue high intensity statin    Patient Instructions   RESTART lisinopril 5 mg.  (I SENT THIS TO [facility])    Trial increased dose of ranexa to see if this improves symptoms.  Follow-up with Dr. Rushing in 1 month.    I encouraged patient to call back to our office if he experiences recurrent chest discomfort so I can forward this to Dr. Rushing for further recommendation.    Preventative Cardiology:   Tobacco Cessation: N/A   Advance Care Planning: ACP discussion was declined by the patient. Patient does not have an advance directive, declines further assistance.     Follow Up:   Return in about 1 month (around 6/16/2023) for Follow-up with Dr. Rushing.      Thank you for allowing me to participate in the care of your patient. Please to not hesitate to contact me with additional questions or concerns.     ALY Melara

## 2023-05-31 ENCOUNTER — HOSPITAL ENCOUNTER (OUTPATIENT)
Dept: GENERAL RADIOLOGY | Facility: HOSPITAL | Age: 58
Discharge: HOME | End: 2023-05-31

## 2023-05-31 ENCOUNTER — LAB (OUTPATIENT)
Dept: LAB | Facility: HOSPITAL | Age: 58
End: 2023-05-31

## 2023-05-31 ENCOUNTER — TRANSCRIBE ORDERS (OUTPATIENT)
Dept: LAB | Facility: HOSPITAL | Age: 58
End: 2023-05-31

## 2023-05-31 DIAGNOSIS — M54.2 CERVICALGIA: ICD-10-CM

## 2023-05-31 DIAGNOSIS — E55.9 VITAMIN D DEFICIENCY: ICD-10-CM

## 2023-05-31 DIAGNOSIS — M54.2 NECK PAIN ON LEFT SIDE: ICD-10-CM

## 2023-05-31 DIAGNOSIS — E53.8 BIOTIN-(PROPIONYL-COA-CARBOXYLASE) LIGASE DEFICIENCY: Primary | ICD-10-CM

## 2023-05-31 DIAGNOSIS — I25.10 CORONARY ARTERY DISEASE INVOLVING NATIVE CORONARY ARTERY OF NATIVE HEART WITHOUT ANGINA PECTORIS: ICD-10-CM

## 2023-05-31 DIAGNOSIS — D50.0 IRON DEFICIENCY ANEMIA SECONDARY TO BLOOD LOSS (CHRONIC): ICD-10-CM

## 2023-05-31 DIAGNOSIS — M54.6 PAIN IN THORACIC SPINE: ICD-10-CM

## 2023-05-31 DIAGNOSIS — E53.8 BIOTIN-(PROPIONYL-COA-CARBOXYLASE) LIGASE DEFICIENCY: ICD-10-CM

## 2023-05-31 LAB
25(OH)D3 SERPL-MCNC: 37.2 NG/ML (ref 30–100)
CHOLEST SERPL-MCNC: 154 MG/DL (ref 0–200)
CRP SERPL-MCNC: <0.3 MG/DL (ref 0–0.5)
ERYTHROCYTE [SEDIMENTATION RATE] IN BLOOD: <1 MM/HR (ref 0–20)
FERRITIN SERPL-MCNC: 298 NG/ML (ref 30–400)
HDLC SERPL-MCNC: 32 MG/DL (ref 40–60)
IRON 24H UR-MRATE: 76 MCG/DL (ref 59–158)
IRON SATN MFR SERPL: 22 % (ref 20–50)
LDLC SERPL CALC-MCNC: 86 MG/DL (ref 0–100)
LDLC/HDLC SERPL: 2.47 {RATIO}
TIBC SERPL-MCNC: 353 MCG/DL (ref 298–536)
TRANSFERRIN SERPL-MCNC: 237 MG/DL (ref 200–360)
TRIGL SERPL-MCNC: 215 MG/DL (ref 0–150)
VIT B12 BLD-MCNC: 1159 PG/ML (ref 211–946)
VLDLC SERPL-MCNC: 36 MG/DL (ref 5–40)

## 2023-05-31 PROCEDURE — 72070 X-RAY EXAM THORAC SPINE 2VWS: CPT

## 2023-05-31 PROCEDURE — 36415 COLL VENOUS BLD VENIPUNCTURE: CPT

## 2023-05-31 PROCEDURE — 82607 VITAMIN B-12: CPT

## 2023-05-31 PROCEDURE — 82306 VITAMIN D 25 HYDROXY: CPT

## 2023-05-31 PROCEDURE — 84466 ASSAY OF TRANSFERRIN: CPT

## 2023-05-31 PROCEDURE — 85652 RBC SED RATE AUTOMATED: CPT

## 2023-05-31 PROCEDURE — 72052 X-RAY EXAM NECK SPINE 6/>VWS: CPT

## 2023-05-31 PROCEDURE — 82728 ASSAY OF FERRITIN: CPT

## 2023-05-31 PROCEDURE — 86140 C-REACTIVE PROTEIN: CPT

## 2023-05-31 PROCEDURE — 80061 LIPID PANEL: CPT

## 2023-05-31 PROCEDURE — 83540 ASSAY OF IRON: CPT

## 2023-06-02 ENCOUNTER — TELEPHONE (OUTPATIENT)
Dept: CARDIOLOGY | Facility: CLINIC | Age: 58
End: 2023-06-02

## 2023-06-02 RX ORDER — EZETIMIBE 10 MG/1
10 TABLET ORAL DAILY
Qty: 90 TABLET | Refills: 3 | Status: SHIPPED | OUTPATIENT
Start: 2023-06-02

## 2023-06-02 NOTE — TELEPHONE ENCOUNTER
Please tell patient his LDL and triglycerides are elevated.  I'm going to add zetia.  This has been called into [facility].

## 2023-10-12 ENCOUNTER — TRANSCRIBE ORDERS (OUTPATIENT)
Dept: LAB | Facility: HOSPITAL | Age: 58
End: 2023-10-12
Payer: MEDICARE

## 2023-10-12 ENCOUNTER — LAB (OUTPATIENT)
Dept: LAB | Facility: HOSPITAL | Age: 58
End: 2023-10-12
Payer: MEDICARE

## 2023-10-12 DIAGNOSIS — I25.10 DISEASE OF CARDIOVASCULAR SYSTEM: ICD-10-CM

## 2023-10-12 DIAGNOSIS — I25.10 DISEASE OF CARDIOVASCULAR SYSTEM: Primary | ICD-10-CM

## 2023-10-12 DIAGNOSIS — Z12.5 SPECIAL SCREENING FOR MALIGNANT NEOPLASM OF PROSTATE: ICD-10-CM

## 2023-10-12 DIAGNOSIS — E29.1 3-OXO-5 ALPHA-STEROID DELTA 4-DEHYDROGENASE DEFICIENCY: ICD-10-CM

## 2023-10-12 LAB
ALBUMIN SERPL-MCNC: 4.4 G/DL (ref 3.5–5.2)
ALBUMIN/GLOB SERPL: 2.3 G/DL
ALP SERPL-CCNC: 80 U/L (ref 39–117)
ALT SERPL W P-5'-P-CCNC: 14 U/L (ref 1–41)
ANION GAP SERPL CALCULATED.3IONS-SCNC: 10.3 MMOL/L (ref 5–15)
AST SERPL-CCNC: 14 U/L (ref 1–40)
BILIRUB SERPL-MCNC: 0.6 MG/DL (ref 0–1.2)
BUN SERPL-MCNC: 16 MG/DL (ref 6–20)
BUN/CREAT SERPL: 11.6 (ref 7–25)
CALCIUM SPEC-SCNC: 9.3 MG/DL (ref 8.6–10.5)
CHLORIDE SERPL-SCNC: 109 MMOL/L (ref 98–107)
CO2 SERPL-SCNC: 25.7 MMOL/L (ref 22–29)
CREAT SERPL-MCNC: 1.38 MG/DL (ref 0.76–1.27)
EGFRCR SERPLBLD CKD-EPI 2021: 60 ML/MIN/1.73
ESTRADIOL SERPL HS-MCNC: 31.6 PG/ML
GLOBULIN UR ELPH-MCNC: 1.9 GM/DL
GLUCOSE SERPL-MCNC: 116 MG/DL (ref 65–99)
POTASSIUM SERPL-SCNC: 4.3 MMOL/L (ref 3.5–5.2)
PROT SERPL-MCNC: 6.3 G/DL (ref 6–8.5)
PSA SERPL-MCNC: 1.99 NG/ML (ref 0–4)
SODIUM SERPL-SCNC: 145 MMOL/L (ref 136–145)

## 2023-10-12 PROCEDURE — 84403 ASSAY OF TOTAL TESTOSTERONE: CPT

## 2023-10-12 PROCEDURE — 36415 COLL VENOUS BLD VENIPUNCTURE: CPT

## 2023-10-12 PROCEDURE — 82670 ASSAY OF TOTAL ESTRADIOL: CPT

## 2023-10-12 PROCEDURE — 84402 ASSAY OF FREE TESTOSTERONE: CPT

## 2023-10-12 PROCEDURE — 80053 COMPREHEN METABOLIC PANEL: CPT

## 2023-10-12 PROCEDURE — G0103 PSA SCREENING: HCPCS

## 2023-10-19 LAB
TESTOST FREE SERPL-MCNC: 6.2 PG/ML (ref 7.2–24)
TESTOST SERPL-MCNC: 511 NG/DL (ref 264–916)

## 2023-12-01 RX ORDER — ASPIRIN 81 MG/1
TABLET, COATED ORAL
Qty: 90 TABLET | Refills: 3 | Status: SHIPPED | OUTPATIENT
Start: 2023-12-01

## 2024-01-01 ENCOUNTER — CLINICAL DOCUMENTATION (OUTPATIENT)
Facility: HOSPITAL | Age: 59
End: 2024-01-01

## 2024-01-01 ENCOUNTER — OFFICE VISIT (OUTPATIENT)
Age: 59
End: 2024-01-01
Payer: COMMERCIAL

## 2024-01-01 ENCOUNTER — HOSPITAL ENCOUNTER (OUTPATIENT)
Facility: HOSPITAL | Age: 59
Setting detail: INFUSION SERIES
End: 2024-01-01

## 2024-01-01 ENCOUNTER — TELEPHONE (OUTPATIENT)
Age: 59
End: 2024-01-01

## 2024-01-01 ENCOUNTER — ENROLLMENT (OUTPATIENT)
Age: 59
End: 2024-01-01

## 2024-01-01 VITALS
BODY MASS INDEX: 32.55 KG/M2 | RESPIRATION RATE: 20 BRPM | HEART RATE: 96 BPM | OXYGEN SATURATION: 90 % | SYSTOLIC BLOOD PRESSURE: 102 MMHG | HEIGHT: 67 IN | TEMPERATURE: 96.5 F | DIASTOLIC BLOOD PRESSURE: 72 MMHG | WEIGHT: 207.4 LBS

## 2024-01-01 DIAGNOSIS — C34.90 METASTATIC NON-SMALL CELL LUNG CANCER (HCC): Primary | ICD-10-CM

## 2024-01-01 DIAGNOSIS — J42 CHRONIC BRONCHITIS, UNSPECIFIED CHRONIC BRONCHITIS TYPE (HCC): ICD-10-CM

## 2024-01-01 DIAGNOSIS — R06.02 SOB (SHORTNESS OF BREATH): ICD-10-CM

## 2024-01-01 DIAGNOSIS — G89.3 CANCER ASSOCIATED PAIN: ICD-10-CM

## 2024-01-01 PROCEDURE — 99215 OFFICE O/P EST HI 40 MIN: CPT | Performed by: INTERNAL MEDICINE

## 2024-01-01 RX ORDER — EPINEPHRINE 1 MG/ML
0.3 INJECTION, SOLUTION INTRAMUSCULAR; SUBCUTANEOUS PRN
Status: CANCELLED | OUTPATIENT
Start: 2024-10-03

## 2024-01-01 RX ORDER — SODIUM CHLORIDE 9 MG/ML
5-250 INJECTION, SOLUTION INTRAVENOUS PRN
Status: CANCELLED | OUTPATIENT
Start: 2024-10-03

## 2024-01-01 RX ORDER — DEXAMETHASONE SODIUM PHOSPHATE 10 MG/ML
8 INJECTION, SOLUTION INTRAMUSCULAR; INTRAVENOUS ONCE
Status: CANCELLED | OUTPATIENT
Start: 2024-10-03 | End: 2024-10-03

## 2024-01-01 RX ORDER — SODIUM CHLORIDE 9 MG/ML
INJECTION, SOLUTION INTRAVENOUS CONTINUOUS
Status: CANCELLED | OUTPATIENT
Start: 2024-10-03

## 2024-01-01 RX ORDER — OXYCODONE HCL 20 MG/1
20 TABLET, FILM COATED, EXTENDED RELEASE ORAL EVERY 12 HOURS
Qty: 60 EACH | Refills: 0 | Status: SHIPPED | OUTPATIENT
Start: 2024-01-01 | End: 2024-09-27

## 2024-01-01 RX ORDER — DIPHENHYDRAMINE HYDROCHLORIDE 50 MG/ML
50 INJECTION INTRAMUSCULAR; INTRAVENOUS
Status: CANCELLED | OUTPATIENT
Start: 2024-10-03

## 2024-01-01 RX ORDER — SODIUM CHLORIDE 0.9 % (FLUSH) 0.9 %
5-40 SYRINGE (ML) INJECTION PRN
Status: CANCELLED | OUTPATIENT
Start: 2024-10-03

## 2024-01-01 RX ORDER — ACETAMINOPHEN 325 MG/1
650 TABLET ORAL
Status: CANCELLED | OUTPATIENT
Start: 2024-10-03

## 2024-01-01 RX ORDER — ONDANSETRON 2 MG/ML
8 INJECTION INTRAMUSCULAR; INTRAVENOUS
Status: CANCELLED | OUTPATIENT
Start: 2024-10-03

## 2024-01-01 RX ORDER — ALBUTEROL SULFATE 90 UG/1
4 INHALANT RESPIRATORY (INHALATION) PRN
Status: CANCELLED | OUTPATIENT
Start: 2024-10-03

## 2024-01-01 RX ORDER — OXYCODONE HYDROCHLORIDE 10 MG/1
10 TABLET ORAL EVERY 4 HOURS PRN
Qty: 90 TABLET | Refills: 0 | Status: SHIPPED | OUTPATIENT
Start: 2024-01-01 | End: 2024-09-27

## 2024-01-01 RX ORDER — HEPARIN 100 UNIT/ML
500 SYRINGE INTRAVENOUS PRN
Status: CANCELLED | OUTPATIENT
Start: 2024-10-03

## 2024-01-01 ASSESSMENT — PATIENT HEALTH QUESTIONNAIRE - PHQ9
1. LITTLE INTEREST OR PLEASURE IN DOING THINGS: NOT AT ALL
SUM OF ALL RESPONSES TO PHQ QUESTIONS 1-9: 0
SUM OF ALL RESPONSES TO PHQ9 QUESTIONS 1 & 2: 0
2. FEELING DOWN, DEPRESSED OR HOPELESS: NOT AT ALL
SUM OF ALL RESPONSES TO PHQ QUESTIONS 1-9: 0

## 2024-01-12 ENCOUNTER — LAB (OUTPATIENT)
Dept: LAB | Facility: HOSPITAL | Age: 59
End: 2024-01-12
Payer: MEDICARE

## 2024-01-12 ENCOUNTER — TRANSCRIBE ORDERS (OUTPATIENT)
Dept: LAB | Facility: HOSPITAL | Age: 59
End: 2024-01-12
Payer: MEDICARE

## 2024-01-12 DIAGNOSIS — I10 ESSENTIAL HYPERTENSION, MALIGNANT: Primary | ICD-10-CM

## 2024-01-12 DIAGNOSIS — E29.1 3-OXO-5 ALPHA-STEROID DELTA 4-DEHYDROGENASE DEFICIENCY: ICD-10-CM

## 2024-01-12 DIAGNOSIS — E53.8 BIOTIN-(PROPIONYL-COA-CARBOXYLASE) LIGASE DEFICIENCY: ICD-10-CM

## 2024-01-12 DIAGNOSIS — D50.0 IRON DEFICIENCY ANEMIA SECONDARY TO BLOOD LOSS (CHRONIC): ICD-10-CM

## 2024-01-12 DIAGNOSIS — I10 ESSENTIAL HYPERTENSION, MALIGNANT: ICD-10-CM

## 2024-01-12 DIAGNOSIS — E55.9 AVITAMINOSIS D: ICD-10-CM

## 2024-01-12 LAB
25(OH)D3 SERPL-MCNC: 40.3 NG/ML (ref 30–100)
ALBUMIN SERPL-MCNC: 4 G/DL (ref 3.5–5.2)
ALBUMIN/GLOB SERPL: 1.7 G/DL
ALP SERPL-CCNC: 81 U/L (ref 39–117)
ALT SERPL W P-5'-P-CCNC: 13 U/L (ref 1–41)
ANION GAP SERPL CALCULATED.3IONS-SCNC: 9.3 MMOL/L (ref 5–15)
AST SERPL-CCNC: 17 U/L (ref 1–40)
BILIRUB SERPL-MCNC: 0.4 MG/DL (ref 0–1.2)
BUN SERPL-MCNC: 19 MG/DL (ref 6–20)
BUN/CREAT SERPL: 13.7 (ref 7–25)
CALCIUM SPEC-SCNC: 9 MG/DL (ref 8.6–10.5)
CHLORIDE SERPL-SCNC: 108 MMOL/L (ref 98–107)
CO2 SERPL-SCNC: 25.7 MMOL/L (ref 22–29)
CREAT SERPL-MCNC: 1.39 MG/DL (ref 0.76–1.27)
DEPRECATED RDW RBC AUTO: 39.4 FL (ref 37–54)
EGFRCR SERPLBLD CKD-EPI 2021: 59.5 ML/MIN/1.73
ERYTHROCYTE [DISTWIDTH] IN BLOOD BY AUTOMATED COUNT: 12.3 % (ref 12.3–15.4)
ESTRADIOL SERPL HS-MCNC: 13.5 PG/ML
GLOBULIN UR ELPH-MCNC: 2.3 GM/DL
GLUCOSE SERPL-MCNC: 108 MG/DL (ref 65–99)
HCT VFR BLD AUTO: 41.1 % (ref 37.5–51)
HGB BLD-MCNC: 13.8 G/DL (ref 13–17.7)
IRON 24H UR-MRATE: 61 MCG/DL (ref 59–158)
IRON SATN MFR SERPL: 18 % (ref 20–50)
MCH RBC QN AUTO: 29.5 PG (ref 26.6–33)
MCHC RBC AUTO-ENTMCNC: 33.6 G/DL (ref 31.5–35.7)
MCV RBC AUTO: 87.8 FL (ref 79–97)
PLATELET # BLD AUTO: 211 10*3/MM3 (ref 140–450)
PMV BLD AUTO: 9 FL (ref 6–12)
POTASSIUM SERPL-SCNC: 4.1 MMOL/L (ref 3.5–5.2)
PROT SERPL-MCNC: 6.3 G/DL (ref 6–8.5)
RBC # BLD AUTO: 4.68 10*6/MM3 (ref 4.14–5.8)
SODIUM SERPL-SCNC: 143 MMOL/L (ref 136–145)
TIBC SERPL-MCNC: 338 MCG/DL (ref 298–536)
TRANSFERRIN SERPL-MCNC: 227 MG/DL (ref 200–360)
VIT B12 BLD-MCNC: 813 PG/ML (ref 211–946)
WBC NRBC COR # BLD AUTO: 3.03 10*3/MM3 (ref 3.4–10.8)

## 2024-01-12 PROCEDURE — 82670 ASSAY OF TOTAL ESTRADIOL: CPT

## 2024-01-12 PROCEDURE — 84403 ASSAY OF TOTAL TESTOSTERONE: CPT

## 2024-01-12 PROCEDURE — 82607 VITAMIN B-12: CPT

## 2024-01-12 PROCEDURE — 36415 COLL VENOUS BLD VENIPUNCTURE: CPT

## 2024-01-12 PROCEDURE — 84466 ASSAY OF TRANSFERRIN: CPT

## 2024-01-12 PROCEDURE — 85027 COMPLETE CBC AUTOMATED: CPT

## 2024-01-12 PROCEDURE — 83540 ASSAY OF IRON: CPT

## 2024-01-12 PROCEDURE — 84402 ASSAY OF FREE TESTOSTERONE: CPT

## 2024-01-12 PROCEDURE — 82306 VITAMIN D 25 HYDROXY: CPT

## 2024-01-12 PROCEDURE — 80053 COMPREHEN METABOLIC PANEL: CPT

## 2024-01-23 LAB
TESTOST FREE SERPL-MCNC: 4.4 PG/ML (ref 7.2–24)
TESTOST SERPL-MCNC: 397 NG/DL (ref 264–916)

## 2024-03-01 RX ORDER — RANOLAZINE 500 MG/1
500 TABLET, EXTENDED RELEASE ORAL 2 TIMES DAILY
Qty: 180 TABLET | Refills: 3 | Status: SHIPPED | OUTPATIENT
Start: 2024-03-01

## 2024-03-01 RX ORDER — CLOPIDOGREL BISULFATE 75 MG/1
75 TABLET ORAL DAILY
Qty: 90 TABLET | Refills: 3 | Status: SHIPPED | OUTPATIENT
Start: 2024-03-01

## 2024-04-06 ENCOUNTER — APPOINTMENT (OUTPATIENT)
Facility: HOSPITAL | Age: 59
End: 2024-04-06

## 2024-04-06 ENCOUNTER — HOSPITAL ENCOUNTER (INPATIENT)
Facility: HOSPITAL | Age: 59
LOS: 6 days | Discharge: HOME | End: 2024-04-12
Attending: EMERGENCY MEDICINE | Admitting: STUDENT IN AN ORGANIZED HEALTH CARE EDUCATION/TRAINING PROGRAM

## 2024-04-06 DIAGNOSIS — J90 PLEURAL EFFUSION ON RIGHT: Primary | ICD-10-CM

## 2024-04-06 DIAGNOSIS — J18.9 PNEUMONIA OF LEFT LOWER LOBE DUE TO INFECTIOUS ORGANISM: ICD-10-CM

## 2024-04-06 DIAGNOSIS — R91.8 MASS OF RIGHT LUNG: ICD-10-CM

## 2024-04-06 PROBLEM — J16.0 CAP (COMMUNITY ACQUIRED PNEUMONIA) DUE TO CHLAMYDIA SPECIES: Status: ACTIVE | Noted: 2024-04-06

## 2024-04-06 LAB
ALBUMIN SERPL-MCNC: 3.4 G/DL (ref 3.5–5)
ALBUMIN/GLOB SERPL: 0.7 (ref 1.1–2.2)
ALP SERPL-CCNC: 102 U/L (ref 45–117)
ALT SERPL-CCNC: 31 U/L (ref 12–78)
ANION GAP BLD CALC-SCNC: 9 (ref 10–20)
ANION GAP SERPL CALC-SCNC: 5 MMOL/L (ref 5–15)
AST SERPL-CCNC: 18 U/L (ref 15–37)
BASE EXCESS BLD CALC-SCNC: 2.7 MMOL/L
BASOPHILS # BLD: 0.1 K/UL (ref 0–0.1)
BASOPHILS NFR BLD: 1 % (ref 0–1)
BILIRUB SERPL-MCNC: 1 MG/DL (ref 0.2–1)
BUN SERPL-MCNC: 13 MG/DL (ref 6–20)
BUN/CREAT SERPL: 13 (ref 12–20)
CA-I BLD-MCNC: 1.21 MMOL/L (ref 1.12–1.32)
CALCIUM SERPL-MCNC: 9.5 MG/DL (ref 8.5–10.1)
CHLORIDE BLD-SCNC: 109 MMOL/L (ref 100–108)
CHLORIDE SERPL-SCNC: 108 MMOL/L (ref 97–108)
CO2 BLD-SCNC: 24 MMOL/L (ref 19–24)
CO2 SERPL-SCNC: 22 MMOL/L (ref 21–32)
COMMENT:: NORMAL
CREAT SERPL-MCNC: 1.03 MG/DL (ref 0.7–1.3)
CREAT UR-MCNC: 0.9 MG/DL (ref 0.6–1.3)
DIFFERENTIAL METHOD BLD: ABNORMAL
EOSINOPHIL # BLD: 0.3 K/UL (ref 0–0.4)
EOSINOPHIL NFR BLD: 2 % (ref 0–7)
ERYTHROCYTE [DISTWIDTH] IN BLOOD BY AUTOMATED COUNT: 14.6 % (ref 11.5–14.5)
GLOBULIN SER CALC-MCNC: 4.6 G/DL (ref 2–4)
GLUCOSE BLD STRIP.AUTO-MCNC: 107 MG/DL (ref 74–106)
GLUCOSE SERPL-MCNC: 119 MG/DL (ref 65–100)
HCO3 BLDA-SCNC: 25 MMOL/L
HCT VFR BLD AUTO: 49.4 % (ref 36.6–50.3)
HGB BLD-MCNC: 16.6 G/DL (ref 12.1–17)
IMM GRANULOCYTES # BLD AUTO: 0.1 K/UL (ref 0–0.04)
IMM GRANULOCYTES NFR BLD AUTO: 0 % (ref 0–0.5)
LACTATE BLD-SCNC: 0.65 MMOL/L (ref 0.4–2)
LYMPHOCYTES # BLD: 2 K/UL (ref 0.8–3.5)
LYMPHOCYTES NFR BLD: 13 % (ref 12–49)
MAGNESIUM SERPL-MCNC: 1.8 MG/DL (ref 1.6–2.4)
MCH RBC QN AUTO: 29.1 PG (ref 26–34)
MCHC RBC AUTO-ENTMCNC: 33.6 G/DL (ref 30–36.5)
MCV RBC AUTO: 86.7 FL (ref 80–99)
MONOCYTES # BLD: 1.3 K/UL (ref 0–1)
MONOCYTES NFR BLD: 9 % (ref 5–13)
NEUTS SEG # BLD: 11.1 K/UL (ref 1.8–8)
NEUTS SEG NFR BLD: 75 % (ref 32–75)
NRBC # BLD: 0 K/UL (ref 0–0.01)
NRBC BLD-RTO: 0 PER 100 WBC
NT PRO BNP: 325 PG/ML
PCO2 BLDV: 31.1 MMHG (ref 41–51)
PH BLDV: 7.51 (ref 7.32–7.42)
PLATELET # BLD AUTO: 336 K/UL (ref 150–400)
PMV BLD AUTO: 11.4 FL (ref 8.9–12.9)
PO2 BLDV: 53 MMHG (ref 25–40)
POTASSIUM BLD-SCNC: 4.1 MMOL/L (ref 3.5–5.5)
POTASSIUM SERPL-SCNC: 3.8 MMOL/L (ref 3.5–5.1)
PROCALCITONIN SERPL-MCNC: 0.05 NG/ML
PROT SERPL-MCNC: 8 G/DL (ref 6.4–8.2)
RBC # BLD AUTO: 5.7 M/UL (ref 4.1–5.7)
SAO2 % BLD: 91 %
SERVICE CMNT-IMP: ABNORMAL
SODIUM BLD-SCNC: 142 MMOL/L (ref 136–145)
SODIUM SERPL-SCNC: 135 MMOL/L (ref 136–145)
SPECIMEN HOLD: NORMAL
SPECIMEN SITE: ABNORMAL
TROPONIN I SERPL HS-MCNC: 16 NG/L (ref 0–76)
WBC # BLD AUTO: 14.8 K/UL (ref 4.1–11.1)

## 2024-04-06 PROCEDURE — 6360000002 HC RX W HCPCS: Performed by: STUDENT IN AN ORGANIZED HEALTH CARE EDUCATION/TRAINING PROGRAM

## 2024-04-06 PROCEDURE — 85025 COMPLETE CBC W/AUTO DIFF WBC: CPT

## 2024-04-06 PROCEDURE — 2580000003 HC RX 258: Performed by: STUDENT IN AN ORGANIZED HEALTH CARE EDUCATION/TRAINING PROGRAM

## 2024-04-06 PROCEDURE — 6370000000 HC RX 637 (ALT 250 FOR IP): Performed by: STUDENT IN AN ORGANIZED HEALTH CARE EDUCATION/TRAINING PROGRAM

## 2024-04-06 PROCEDURE — 96365 THER/PROPH/DIAG IV INF INIT: CPT

## 2024-04-06 PROCEDURE — 84132 ASSAY OF SERUM POTASSIUM: CPT

## 2024-04-06 PROCEDURE — 6360000002 HC RX W HCPCS: Performed by: EMERGENCY MEDICINE

## 2024-04-06 PROCEDURE — 36415 COLL VENOUS BLD VENIPUNCTURE: CPT

## 2024-04-06 PROCEDURE — 2060000000 HC ICU INTERMEDIATE R&B

## 2024-04-06 PROCEDURE — 84484 ASSAY OF TROPONIN QUANT: CPT

## 2024-04-06 PROCEDURE — 84295 ASSAY OF SERUM SODIUM: CPT

## 2024-04-06 PROCEDURE — 84145 PROCALCITONIN (PCT): CPT

## 2024-04-06 PROCEDURE — 82803 BLOOD GASES ANY COMBINATION: CPT

## 2024-04-06 PROCEDURE — 99285 EMERGENCY DEPT VISIT HI MDM: CPT

## 2024-04-06 PROCEDURE — 96368 THER/DIAG CONCURRENT INF: CPT

## 2024-04-06 PROCEDURE — 82330 ASSAY OF CALCIUM: CPT

## 2024-04-06 PROCEDURE — 82947 ASSAY GLUCOSE BLOOD QUANT: CPT

## 2024-04-06 PROCEDURE — 80053 COMPREHEN METABOLIC PANEL: CPT

## 2024-04-06 PROCEDURE — 96366 THER/PROPH/DIAG IV INF ADDON: CPT

## 2024-04-06 PROCEDURE — 83880 ASSAY OF NATRIURETIC PEPTIDE: CPT

## 2024-04-06 PROCEDURE — 83735 ASSAY OF MAGNESIUM: CPT

## 2024-04-06 PROCEDURE — 2580000003 HC RX 258: Performed by: EMERGENCY MEDICINE

## 2024-04-06 PROCEDURE — 93005 ELECTROCARDIOGRAM TRACING: CPT | Performed by: EMERGENCY MEDICINE

## 2024-04-06 PROCEDURE — 2700000000 HC OXYGEN THERAPY PER DAY

## 2024-04-06 PROCEDURE — 71046 X-RAY EXAM CHEST 2 VIEWS: CPT

## 2024-04-06 PROCEDURE — 87040 BLOOD CULTURE FOR BACTERIA: CPT

## 2024-04-06 RX ORDER — SODIUM CHLORIDE 0.9 % (FLUSH) 0.9 %
5-40 SYRINGE (ML) INJECTION PRN
Status: DISCONTINUED | OUTPATIENT
Start: 2024-04-06 | End: 2024-04-12 | Stop reason: HOSPADM

## 2024-04-06 RX ORDER — ONDANSETRON 2 MG/ML
4 INJECTION INTRAMUSCULAR; INTRAVENOUS EVERY 6 HOURS PRN
Status: DISCONTINUED | OUTPATIENT
Start: 2024-04-06 | End: 2024-04-12 | Stop reason: HOSPADM

## 2024-04-06 RX ORDER — SODIUM CHLORIDE 0.9 % (FLUSH) 0.9 %
5-40 SYRINGE (ML) INJECTION EVERY 12 HOURS SCHEDULED
Status: DISCONTINUED | OUTPATIENT
Start: 2024-04-06 | End: 2024-04-12 | Stop reason: HOSPADM

## 2024-04-06 RX ORDER — ACETAMINOPHEN 325 MG/1
650 TABLET ORAL EVERY 6 HOURS PRN
Status: DISCONTINUED | OUTPATIENT
Start: 2024-04-06 | End: 2024-04-12 | Stop reason: HOSPADM

## 2024-04-06 RX ORDER — ALBUTEROL SULFATE 2.5 MG/3ML
5 SOLUTION RESPIRATORY (INHALATION)
Status: DISCONTINUED | OUTPATIENT
Start: 2024-04-06 | End: 2024-04-06

## 2024-04-06 RX ORDER — SODIUM CHLORIDE 9 MG/ML
INJECTION, SOLUTION INTRAVENOUS PRN
Status: DISCONTINUED | OUTPATIENT
Start: 2024-04-06 | End: 2024-04-12 | Stop reason: HOSPADM

## 2024-04-06 RX ORDER — ALBUTEROL SULFATE 2.5 MG/3ML
2.5 SOLUTION RESPIRATORY (INHALATION)
Status: DISCONTINUED | OUTPATIENT
Start: 2024-04-06 | End: 2024-04-08

## 2024-04-06 RX ORDER — POLYETHYLENE GLYCOL 3350 17 G/17G
17 POWDER, FOR SOLUTION ORAL DAILY PRN
Status: DISCONTINUED | OUTPATIENT
Start: 2024-04-06 | End: 2024-04-12 | Stop reason: HOSPADM

## 2024-04-06 RX ORDER — ENOXAPARIN SODIUM 100 MG/ML
40 INJECTION SUBCUTANEOUS DAILY
Status: DISCONTINUED | OUTPATIENT
Start: 2024-04-06 | End: 2024-04-12 | Stop reason: HOSPADM

## 2024-04-06 RX ORDER — ALBUTEROL SULFATE 2.5 MG/3ML
5 SOLUTION RESPIRATORY (INHALATION)
Status: COMPLETED | OUTPATIENT
Start: 2024-04-06 | End: 2024-04-06

## 2024-04-06 RX ORDER — ACETAMINOPHEN 325 MG/1
650 TABLET ORAL EVERY 4 HOURS PRN
Status: DISCONTINUED | OUTPATIENT
Start: 2024-04-06 | End: 2024-04-09

## 2024-04-06 RX ORDER — NICOTINE 21 MG/24HR
2 PATCH, TRANSDERMAL 24 HOURS TRANSDERMAL DAILY
Status: DISCONTINUED | OUTPATIENT
Start: 2024-04-06 | End: 2024-04-07

## 2024-04-06 RX ORDER — ACETAMINOPHEN 650 MG/1
650 SUPPOSITORY RECTAL EVERY 6 HOURS PRN
Status: DISCONTINUED | OUTPATIENT
Start: 2024-04-06 | End: 2024-04-12 | Stop reason: HOSPADM

## 2024-04-06 RX ORDER — ONDANSETRON 4 MG/1
4 TABLET, ORALLY DISINTEGRATING ORAL EVERY 8 HOURS PRN
Status: DISCONTINUED | OUTPATIENT
Start: 2024-04-06 | End: 2024-04-12 | Stop reason: HOSPADM

## 2024-04-06 RX ADMIN — ENOXAPARIN SODIUM 40 MG: 100 INJECTION SUBCUTANEOUS at 21:01

## 2024-04-06 RX ADMIN — AZITHROMYCIN MONOHYDRATE 500 MG: 500 INJECTION, POWDER, LYOPHILIZED, FOR SOLUTION INTRAVENOUS at 18:13

## 2024-04-06 RX ADMIN — CEFTRIAXONE SODIUM 1000 MG: 1 INJECTION, POWDER, FOR SOLUTION INTRAMUSCULAR; INTRAVENOUS at 17:51

## 2024-04-06 RX ADMIN — ALBUTEROL SULFATE 5 MG: 2.5 SOLUTION RESPIRATORY (INHALATION) at 17:10

## 2024-04-06 RX ADMIN — SODIUM CHLORIDE, PRESERVATIVE FREE 10 ML: 5 INJECTION INTRAVENOUS at 21:07

## 2024-04-06 RX ADMIN — WATER 125 MG: 1 INJECTION INTRAMUSCULAR; INTRAVENOUS; SUBCUTANEOUS at 17:42

## 2024-04-06 ASSESSMENT — LIFESTYLE VARIABLES
HOW OFTEN DO YOU HAVE A DRINK CONTAINING ALCOHOL: MONTHLY OR LESS
HOW MANY STANDARD DRINKS CONTAINING ALCOHOL DO YOU HAVE ON A TYPICAL DAY: 1 OR 2

## 2024-04-06 ASSESSMENT — PAIN SCALES - GENERAL: PAINLEVEL_OUTOF10: 0

## 2024-04-06 NOTE — ED PROVIDER NOTES
[facility] EMERGENCY DEPT  EMERGENCY DEPARTMENT ENCOUNTER       Pt Name: Fortunato Kowalski  MRN: [id number]  Birthdate 1965  Date of evaluation: 4/6/2024  Provider: Fortunato Cowart DO   PCP: No primary care provider on file.  Note Started: 5:12 PM EDT 4/6/24     CHIEF COMPLAINT       Chief Complaint   Patient presents with   • Shortness of Breath     Pt in the hospital [facility]. Pt diagnosed with pleural effusion on the right-and pneumonia left lower lobe with Mass of upper lobe of right lung. He has been sick for 11-12 days but he is a , and thought the higher altitude was \"messing with my breathing\"        HISTORY OF PRESENT ILLNESS: 1 or more elements      History From: Patient, History limited by: none     Fortunato Kowalski is a 58 y.o. male presents to the emergency department for evaluation of shortness of breath.       Please See MDM for Additional Details of the HPI/PMH  Nursing Notes were all reviewed and agreed with or any disagreements were addressed in the HPI.     REVIEW OF SYSTEMS        Positives and Pertinent negatives as per HPI.    PAST HISTORY     Past Medical History:  No past medical history on file.    Past Surgical History:  No past surgical history on file.    Family History:  No family history on file.    Social History:       Allergies:  Allergies   Allergen Reactions   • Hydrocodone Itching   • Morphine Itching     Itching and doesn't work       CURRENT MEDICATIONS      Previous Medications    No medications on file       SCREENINGS               No data recorded         PHYSICAL EXAM      ED Triage Vitals [04/06/24 1609]   Enc Vitals Group      BP (!) 137/106      Pulse (!) 115      Respirations (!) 40      Temp 98.1 °F (36.7 °C)      Temp Source Oral      SpO2 95 %      Weight       Height       Head Circumference       Peak Flow       Pain Score       Pain Loc       Pain Edu?       Excl. in GC?         Physical Exam  Vitals and nursing note reviewed.  drugs.  Prescription drug management.  Decision regarding hospitalization.      Patient seen at [facility] in [place], [place] yesterday.  Patient with workup to include CBC CMP COVID and flu testing.  COVID and flu were negative.  Patient did have a CT angiogram performed there which showed a large right pleural effusion with collapse of the right lower lobe and partial collapse of the right middle and upper lobes.  In addition there was concerns for right upper lobe mass measuring 3.8 x 3.4 x 5.1 cm.  In addition patient also has a left lower lobe infiltrate with some left hilar lymph nodes.  Patient had signed out AMA as he lives here in [place].  Patient not provided any antibiotics upon discharge.    Patient presents to the emergency department for evaluation of shortness of breath.  Patient states does have a history of smoking.  No prior diagnosis of cancer.  He states over the past 12 days he has had worsening shortness of breath.  He states he is a  and had gone to  a load in [facility] and then delivered to [place] yesterday.  He states that shortness of breath had gotten progressively worse during his trip for which he sought treatment yesterday.  He denies any fever or chills.  He denies any chest pain.  Denies any abdominal pain, nausea vomiting diarrhea.  He denies any pain or swelling lower extremities.    Will send off sepsis lab work.  Suspect a large pleural effusion, likely malignant.  I discussed this with the patient and his wife.  Plan will be for admission.  Patient is tachypneic with active wheezing.  Patient be given albuterol treatment in addition to Solu-Medrol.  Will also order Rocephin and azithromycin secondary to the pneumonia.    EKG obtained at 4:17 PM interpreted by me sinus tachycardia rate 114, normal axis/MO/QRS, nonspecific ST changes inferior lateral leads.    Consult note:  Case discussed with Dr. Freitas with

## 2024-04-06 NOTE — ED NOTES
This RN gave SBAR report to Luis Alberto, RN at this time. Offered oncoming RN the opportunity to ask any questions. This RN stated pertinent pt information, additionally informed oncoming RN of tasks that still need to be completed. Pt's bed locked & in lowest position and call light w/in reach. Emergency equipment at bedside.

## 2024-04-07 ENCOUNTER — APPOINTMENT (OUTPATIENT)
Facility: HOSPITAL | Age: 59
End: 2024-04-07

## 2024-04-07 LAB
ANION GAP SERPL CALC-SCNC: 6 MMOL/L (ref 5–15)
APPEARANCE FLD: ABNORMAL
BODY FLD TYPE: NORMAL
BUN SERPL-MCNC: 16 MG/DL (ref 6–20)
BUN/CREAT SERPL: 16 (ref 12–20)
CALCIUM SERPL-MCNC: 9.3 MG/DL (ref 8.5–10.1)
CHLORIDE SERPL-SCNC: 108 MMOL/L (ref 97–108)
CO2 SERPL-SCNC: 20 MMOL/L (ref 21–32)
COLOR FLD: YELLOW
CREAT SERPL-MCNC: 1.03 MG/DL (ref 0.7–1.3)
EKG ATRIAL RATE: 114 BPM
EKG DIAGNOSIS: NORMAL
EKG P AXIS: 80 DEGREES
EKG P-R INTERVAL: 130 MS
EKG Q-T INTERVAL: 318 MS
EKG QRS DURATION: 64 MS
EKG QTC CALCULATION (BAZETT): 438 MS
EKG R AXIS: 66 DEGREES
EKG T AXIS: -88 DEGREES
EKG VENTRICULAR RATE: 114 BPM
EOSINOPHIL NFR FLD MANUAL: 2 %
GLUCOSE FLD-MCNC: 181 MG/DL
GLUCOSE SERPL-MCNC: 169 MG/DL (ref 65–100)
LDH FLD L TO P-CCNC: 128 U/L
LYMPHOCYTES NFR FLD: 57 %
MESOTHL CELL NFR FLD: 1 %
MONOS+MACROS NFR FLD: 21 %
NEUTROPHILS NFR FLD: 19 %
NUC CELL # FLD: 509 /CU MM
PH FLD: 7.2
POTASSIUM SERPL-SCNC: 4.1 MMOL/L (ref 3.5–5.1)
PROT FLD-MCNC: 4.9 G/DL
RBC # FLD: >100 /CU MM
SODIUM SERPL-SCNC: 134 MMOL/L (ref 136–145)
SPECIMEN SOURCE FLD: ABNORMAL
SPECIMEN SOURCE FLD: NORMAL

## 2024-04-07 PROCEDURE — 83986 ASSAY PH BODY FLUID NOS: CPT

## 2024-04-07 PROCEDURE — 82945 GLUCOSE OTHER FLUID: CPT

## 2024-04-07 PROCEDURE — 86738 MYCOPLASMA ANTIBODY: CPT

## 2024-04-07 PROCEDURE — 0W9930Z DRAINAGE OF RIGHT PLEURAL CAVITY WITH DRAINAGE DEVICE, PERCUTANEOUS APPROACH: ICD-10-PCS | Performed by: STUDENT IN AN ORGANIZED HEALTH CARE EDUCATION/TRAINING PROGRAM

## 2024-04-07 PROCEDURE — 87205 SMEAR GRAM STAIN: CPT

## 2024-04-07 PROCEDURE — 36415 COLL VENOUS BLD VENIPUNCTURE: CPT

## 2024-04-07 PROCEDURE — 89050 BODY FLUID CELL COUNT: CPT

## 2024-04-07 PROCEDURE — 2060000000 HC ICU INTERMEDIATE R&B

## 2024-04-07 PROCEDURE — 88112 CYTOPATH CELL ENHANCE TECH: CPT

## 2024-04-07 PROCEDURE — 71045 X-RAY EXAM CHEST 1 VIEW: CPT

## 2024-04-07 PROCEDURE — 6370000000 HC RX 637 (ALT 250 FOR IP): Performed by: STUDENT IN AN ORGANIZED HEALTH CARE EDUCATION/TRAINING PROGRAM

## 2024-04-07 PROCEDURE — 6360000002 HC RX W HCPCS: Performed by: STUDENT IN AN ORGANIZED HEALTH CARE EDUCATION/TRAINING PROGRAM

## 2024-04-07 PROCEDURE — 6370000000 HC RX 637 (ALT 250 FOR IP): Performed by: INTERNAL MEDICINE

## 2024-04-07 PROCEDURE — 87449 NOS EACH ORGANISM AG IA: CPT

## 2024-04-07 PROCEDURE — 87070 CULTURE OTHR SPECIMN AEROBIC: CPT

## 2024-04-07 PROCEDURE — 87102 FUNGUS ISOLATION CULTURE: CPT

## 2024-04-07 PROCEDURE — 2580000003 HC RX 258: Performed by: STUDENT IN AN ORGANIZED HEALTH CARE EDUCATION/TRAINING PROGRAM

## 2024-04-07 PROCEDURE — 87206 SMEAR FLUORESCENT/ACID STAI: CPT

## 2024-04-07 PROCEDURE — 80048 BASIC METABOLIC PNL TOTAL CA: CPT

## 2024-04-07 PROCEDURE — 83615 LACTATE (LD) (LDH) ENZYME: CPT

## 2024-04-07 PROCEDURE — 94640 AIRWAY INHALATION TREATMENT: CPT

## 2024-04-07 PROCEDURE — 2709999900 US THORACENTESIS

## 2024-04-07 PROCEDURE — 84157 ASSAY OF PROTEIN OTHER: CPT

## 2024-04-07 RX ORDER — NICOTINE 21 MG/24HR
1 PATCH, TRANSDERMAL 24 HOURS TRANSDERMAL DAILY
Status: DISCONTINUED | OUTPATIENT
Start: 2024-04-07 | End: 2024-04-12 | Stop reason: HOSPADM

## 2024-04-07 RX ORDER — HYDROXYZINE HYDROCHLORIDE 10 MG/1
10 TABLET, FILM COATED ORAL 3 TIMES DAILY PRN
Status: DISCONTINUED | OUTPATIENT
Start: 2024-04-07 | End: 2024-04-12 | Stop reason: HOSPADM

## 2024-04-07 RX ADMIN — SODIUM CHLORIDE 1000 MG: 900 INJECTION INTRAVENOUS at 17:32

## 2024-04-07 RX ADMIN — WATER 40 MG: 1 INJECTION INTRAMUSCULAR; INTRAVENOUS; SUBCUTANEOUS at 08:14

## 2024-04-07 RX ADMIN — SODIUM CHLORIDE, PRESERVATIVE FREE 10 ML: 5 INJECTION INTRAVENOUS at 08:23

## 2024-04-07 RX ADMIN — HYDROXYZINE HYDROCHLORIDE 10 MG: 10 TABLET ORAL at 11:07

## 2024-04-07 RX ADMIN — ENOXAPARIN SODIUM 40 MG: 100 INJECTION SUBCUTANEOUS at 08:13

## 2024-04-07 RX ADMIN — AZITHROMYCIN MONOHYDRATE 500 MG: 500 INJECTION, POWDER, LYOPHILIZED, FOR SOLUTION INTRAVENOUS at 17:36

## 2024-04-07 RX ADMIN — SODIUM CHLORIDE, PRESERVATIVE FREE 10 ML: 5 INJECTION INTRAVENOUS at 22:14

## 2024-04-07 RX ADMIN — ALBUTEROL SULFATE 2.5 MG: 2.5 SOLUTION RESPIRATORY (INHALATION) at 18:01

## 2024-04-07 RX ADMIN — ALBUTEROL SULFATE 2.5 MG: 2.5 SOLUTION RESPIRATORY (INHALATION) at 09:09

## 2024-04-07 NOTE — H&P
(225 lb 12 oz)   24 -- 98.4 °F (36.9 °C) -- -- -- -- -- --   24 2030 (!) 157/98 -- -- (!) 124 (!) 31 92 % -- --   24 1901 (!) 146/103 98 °F (36.7 °C) Oral (!) 121 29 94 % -- --   24 1831 (!) 145/106 -- -- (!) 118 27 92 % -- --   24 1800 (!) 132/96 -- -- (!) 118 (!) 33 93 % -- --   24 1740 (!) 127/95 -- -- (!) 118 (!) 36 -- -- --   24 1715 (!) 141/100 -- -- (!) 111 29 93 % -- --   24 1700 127/88 -- -- (!) 112 (!) 32 93 % -- --   24 1655 (!) 144/97 -- -- (!) 110 (!) 34 93 % -- --   24 1614 -- -- -- (!) 115 (!) 36 94 % -- --   24 1609 (!) 137/106 98.1 °F (36.7 °C) Oral (!) 115 (!) 40 95 % -- --       Temp (24hrs), Av.3 °F (36.8 °C), Min:98 °F (36.7 °C), Max:98.8 °F (37.1 °C)      O2 Flow Rate (L/min): 1 L/min O2 Device: Nasal cannula    Wt Readings from Last 12 Encounters:   24 97.1 kg (214 lb)         PHYSICAL EXAM:  General:    Alert, cooperative, appears stated age, respiratory distress      Lungs:   Diffuse wheeze, diminished breath sounds on right basal area   Chest wall:  No tenderness.  No accessory muscle use.  Heart:   Regular  rhythm,  No  Murmur.   No edema  Abdomen:   Soft, NT. ND  BS+  Extremities: No cyanosis.  No clubbing,      Skin turgor normal, Radial dial pulse 2+. Capillary refill normal  Neurologic: No facial asymmetry. No aphasia or slurred speech. Symmetrical strength, Sensation grossly intact. AAOx4.         LAB DATA REVIEWED:    Recent Results (from the past 12 hour(s))   EKG 12 Lead    Collection Time: 24  4:17 PM   Result Value Ref Range    Ventricular Rate 114 BPM    Atrial Rate 114 BPM    P-R Interval 130 ms    QRS Duration 64 ms    Q-T Interval 318 ms    QTc Calculation (Bazett) 438 ms    P Axis 80 degrees    R Axis 66 degrees    T Axis -88 degrees    Diagnosis       Sinus tachycardia  ST & T wave abnormality, consider inferior ischemia  When compared with ECG of 05-MAR-2013 13:45,  ST now depressed in  represent bilateral pneumonia versus pulmonary edema. Poor evaluation of the heart Electronically Signed by: Gibson De La Cruz MD on 4/5/2024 8:13 PM     _______________________________________________________________________    TOTAL TIME:  76 Minutes    Critical Care Provided     Minutes non procedure based    Signed: Cate Murphy MD    Procedures: see electronic medical records for all procedures/Xrays and details which were not copied into this note but were reviewed prior to creation of Plan.

## 2024-04-07 NOTE — CONSULTS
Pulmonary, Critical Care, and Sleep Medicine      Name: Fortunato Kowalski MRN: [id number]   : 1965 Hospital: [facility]   Date: 2024  Admission date: 2024 Hospital Day: 2   Patient PCP: No primary care provider on file.    History:   Pt is acutely ill and unstable. Medical records and data reviewed. Pt seen in consultation    IMPRESSION:   Large right pleural effusion -concern for malignancy versus infection  Right-sided mass per CT scan report done in [place] images not available left lower lobe infiltrate question of pneumonia   Probable COPD no sign of acute exacerbation  Situational anxiety  Heavy cigarette smoking tobacco use 2 and half pack per day   History of diverticulitis status post surgery years ago   Body mass index is 33.52 kg/m².  Prognosis guarded       RECOMMENDATIONS/PLAN:   Procalcitonin  Agree with diagnostic and therapeutic thoracentesis  If patient's shortness of breath improves and there is no empyema, might consider discharging patient home pending cytology reports.  Orders placed for fluid studies  Will get CT of chest after fluid removed for better visualization  Agree with antibiotics pending culture results    Follow culture results  Bronchodilators prn  DVT prophylaxis  Smoking cessation counseling -patient now on nicotine patch  Prescription drug management with home med reconciliation reviewed  Thanks you for asking us to see pt while in the hospital     Interval history:         [x] High complexity decision making was performed  [x] See my orders for details      Initial HPI:      I was asked by Cate Murphy MD to see Fortunato Kowalski  a 58 y.o.   male in consultation for a chief complaint of large pleural effusion with underlying mass on right?    Excerpts from admission 2024 or consult notes as follows:     \"  Fortunato Kowalski is a 58 y.o.  male with PMHx as mentioned below presented to ED for evaluation of shortness of breath.

## 2024-04-07 NOTE — PROCEDURES
Interventional Radiology  Procedure Note        4/7/2024 11:33 AM    Patient: Fortunato Kowalski     Informed consent obtained    Diagnosis: Right pleural effusion    Procedure(s): US guided right thoracentesis    Findings: Yellowish fluid removed, final amount in the radiology dictation.     EBL: Minimal    Specimens removed: Fluid sent     Complications: None    Primary Physician: Adi Freitas MD    Recomendations: CXR pending to exclude ptx    Full dictated report to follow    Signed By: Adi Freitas MD

## 2024-04-07 NOTE — PROGRESS NOTES
Hospitalist Progress Note    NAME:   Fortunato Kowalski   : 1965   MRN: [id number]     Date/Time: 2024 2:15 PM  Patient PCP: No primary care provider on file.    Estimated discharge date:   Barriers: Pulmonary clearance      Assessment / Plan:    Large right pleural effusion  Right-sided mass  Community acquired pneumonia- left lower lobe pneumonia  with rt sided pleural effusion   Respiratory alkalosis  EKG sinus tachycardia  CT angio pulmonary-negative for PE, large right pleural effusion with collapse of right lower lobe partial collapse of right middle and upper lobe  Irregular pleural thickening along the medial aspect of right upper lobe with 3.8 x 3.4 x 5.1 cm mass projecting over the anterior mediastinum  Left lower lobe infiltrate likely left lower lobe pneumonia    -s/p thoracentesis , 2 L fluid removed  -Pulmonary on board  -Follow studies  -Continue empiric antibiotics  -Continue Solu-Medrol    Pneumothorax postprocedure  -Repeat chest x-ray pending, if it is worse may need chest tube         Tobacco use   Smokes 2and half pack cig/day. Counseled on quitting smoking. Offered nicotine patch. Total duration 3 min        Medical Decision Making:   I personally reviewed labs: yes   I personally reviewed imaging:yes  I personally reviewed EKG:yes  Toxic drug monitoring: solumedrol  Discussed case with:         Code Status: full   DVT Prophylaxis:   Baseline: independent        Subjective:     Chief Complaint / Reason for Physician Visit  \" Had thoracentesis today, had 2 L fluid removed,.  Feels better.  No chest pain\".  Discussed with RN events overnight.       Objective:     VITALS:   Last 24hrs VS reviewed since prior progress note. Most recent are:  Patient Vitals for the past 24 hrs:   BP Temp Temp src Pulse Resp SpO2 Height Weight   24 1111 123/84 -- -- (!) 112 (!) 40 -- -- --   24 1109 -- 98 °F (36.7 °C) -- -- -- -- -- --   24 0909 -- -- -- -- -- 95 % -- --  Plan discussed with:    Comments   Patient x    Family      RN x    Care Manager     Consultant                        Multidiciplinary team rounds were held today with , nursing, pharmacist and clinical coordinator.  Patient's plan of care was discussed; medications were reviewed and discharge planning was addressed.     ________________________________________________________________________  Total NON critical care TIME:  54  Minutes    Total CRITICAL CARE TIME Spent:   Minutes non procedure based      Comments   >50% of visit spent in counseling and coordination of care     ________________________________________________________________________  Sarbjit Freitas MD     Procedures: see electronic medical records for all procedures/Xrays and details which were not copied into this note but were reviewed prior to creation of Plan.      LABS:  I reviewed today's most current labs and imaging studies.  Pertinent labs include:  Recent Labs     04/06/24  1710   WBC 14.8*   HGB 16.6   HCT 49.4        Recent Labs     04/06/24  1710 04/06/24  1738 04/07/24  0536   *  --  134*   K 3.8  --  4.1     --  108   CO2 22  --  20*   GLUCOSE 119*  --  169*   BUN 13  --  16   CREATININE 1.03 0.9 1.03   CALCIUM 9.5  --  9.3   MG 1.8  --   --    LABALBU 3.4*  --   --    BILITOT 1.0  --   --    AST 18  --   --    ALT 31  --   --        Signed: Sarbjit Freitas MD

## 2024-04-07 NOTE — ED NOTES
Patient noticeably flush and uncomfortable in the room. Slight increased work of breathing. Stating that it is extremely hot in the room. I have given the patient and icepack and a fan to try to help with the hot flashes. Patient beginning to vomit because of the irritability. After further discussion, patient states he smokes 2.5 packs of cigarettes a day and may be beginning to experience withdrawal symptoms. Will talk with doctor to get a nicotine patch for the patient

## 2024-04-07 NOTE — PROGRESS NOTES
1500: Bedside shift change report given to YOLA Peoples (oncoming nurse) by Vianca (offgoing nurse). Report included the following information Nurse Handoff Report and Index.

## 2024-04-08 ENCOUNTER — APPOINTMENT (OUTPATIENT)
Facility: HOSPITAL | Age: 59
End: 2024-04-08

## 2024-04-08 LAB
ANION GAP SERPL CALC-SCNC: 5 MMOL/L (ref 5–15)
BASOPHILS # BLD: 0.1 K/UL (ref 0–0.1)
BASOPHILS NFR BLD: 0 % (ref 0–1)
BUN SERPL-MCNC: 20 MG/DL (ref 6–20)
BUN/CREAT SERPL: 19 (ref 12–20)
CALCIUM SERPL-MCNC: 8.6 MG/DL (ref 8.5–10.1)
CHLORIDE SERPL-SCNC: 110 MMOL/L (ref 97–108)
CO2 SERPL-SCNC: 24 MMOL/L (ref 21–32)
CREAT SERPL-MCNC: 1.05 MG/DL (ref 0.7–1.3)
DIFFERENTIAL METHOD BLD: ABNORMAL
EOSINOPHIL # BLD: 0.2 K/UL (ref 0–0.4)
EOSINOPHIL NFR BLD: 2 % (ref 0–7)
ERYTHROCYTE [DISTWIDTH] IN BLOOD BY AUTOMATED COUNT: 14.7 % (ref 11.5–14.5)
GLUCOSE SERPL-MCNC: 138 MG/DL (ref 65–100)
HCT VFR BLD AUTO: 49.4 % (ref 36.6–50.3)
HGB BLD-MCNC: 15.6 G/DL (ref 12.1–17)
IMM GRANULOCYTES # BLD AUTO: 0.1 K/UL (ref 0–0.04)
IMM GRANULOCYTES NFR BLD AUTO: 1 % (ref 0–0.5)
L PNEUMO1 AG UR QL IA: NEGATIVE
LYMPHOCYTES # BLD: 2.4 K/UL (ref 0.8–3.5)
LYMPHOCYTES NFR BLD: 17 % (ref 12–49)
M PNEUMO IGG SER IA-ACNC: 479 U/ML (ref 0–99)
M PNEUMO IGM SER IA-ACNC: <770 U/ML (ref 0–769)
MCH RBC QN AUTO: 28.2 PG (ref 26–34)
MCHC RBC AUTO-ENTMCNC: 31.6 G/DL (ref 30–36.5)
MCV RBC AUTO: 89.2 FL (ref 80–99)
MONOCYTES # BLD: 1.2 K/UL (ref 0–1)
MONOCYTES NFR BLD: 9 % (ref 5–13)
NEUTS SEG # BLD: 10.2 K/UL (ref 1.8–8)
NEUTS SEG NFR BLD: 71 % (ref 32–75)
NRBC # BLD: 0 K/UL (ref 0–0.01)
NRBC BLD-RTO: 0 PER 100 WBC
PLATELET # BLD AUTO: 310 K/UL (ref 150–400)
PMV BLD AUTO: 11 FL (ref 8.9–12.9)
POTASSIUM SERPL-SCNC: 4 MMOL/L (ref 3.5–5.1)
RBC # BLD AUTO: 5.54 M/UL (ref 4.1–5.7)
SODIUM SERPL-SCNC: 139 MMOL/L (ref 136–145)
SPECIMEN SOURCE: NORMAL
WBC # BLD AUTO: 14.2 K/UL (ref 4.1–11.1)

## 2024-04-08 PROCEDURE — 6360000002 HC RX W HCPCS: Performed by: STUDENT IN AN ORGANIZED HEALTH CARE EDUCATION/TRAINING PROGRAM

## 2024-04-08 PROCEDURE — 6370000000 HC RX 637 (ALT 250 FOR IP): Performed by: EMERGENCY MEDICINE

## 2024-04-08 PROCEDURE — 94640 AIRWAY INHALATION TREATMENT: CPT

## 2024-04-08 PROCEDURE — 6370000000 HC RX 637 (ALT 250 FOR IP): Performed by: INTERNAL MEDICINE

## 2024-04-08 PROCEDURE — 0W993ZZ DRAINAGE OF RIGHT PLEURAL CAVITY, PERCUTANEOUS APPROACH: ICD-10-PCS | Performed by: STUDENT IN AN ORGANIZED HEALTH CARE EDUCATION/TRAINING PROGRAM

## 2024-04-08 PROCEDURE — 80048 BASIC METABOLIC PNL TOTAL CA: CPT

## 2024-04-08 PROCEDURE — 71250 CT THORAX DX C-: CPT

## 2024-04-08 PROCEDURE — 85025 COMPLETE CBC W/AUTO DIFF WBC: CPT

## 2024-04-08 PROCEDURE — 71045 X-RAY EXAM CHEST 1 VIEW: CPT

## 2024-04-08 PROCEDURE — 2500000003 HC RX 250 WO HCPCS: Performed by: STUDENT IN AN ORGANIZED HEALTH CARE EDUCATION/TRAINING PROGRAM

## 2024-04-08 PROCEDURE — 2580000003 HC RX 258: Performed by: STUDENT IN AN ORGANIZED HEALTH CARE EDUCATION/TRAINING PROGRAM

## 2024-04-08 PROCEDURE — 2060000000 HC ICU INTERMEDIATE R&B

## 2024-04-08 PROCEDURE — 2709999900 IR GUIDED PERC PLEURAL DRAIN W CATH INSERT

## 2024-04-08 PROCEDURE — 36415 COLL VENOUS BLD VENIPUNCTURE: CPT

## 2024-04-08 RX ORDER — HEPARIN SODIUM 200 [USP'U]/100ML
200 INJECTION, SOLUTION INTRAVENOUS ONCE
Status: DISCONTINUED | OUTPATIENT
Start: 2024-04-08 | End: 2024-04-12 | Stop reason: HOSPADM

## 2024-04-08 RX ORDER — ALBUTEROL SULFATE 2.5 MG/3ML
2.5 SOLUTION RESPIRATORY (INHALATION)
Status: DISCONTINUED | OUTPATIENT
Start: 2024-04-08 | End: 2024-04-10

## 2024-04-08 RX ORDER — LIDOCAINE HYDROCHLORIDE 20 MG/ML
20 INJECTION, SOLUTION INFILTRATION; PERINEURAL ONCE
Status: COMPLETED | OUTPATIENT
Start: 2024-04-08 | End: 2024-04-08

## 2024-04-08 RX ADMIN — WATER 40 MG: 1 INJECTION INTRAMUSCULAR; INTRAVENOUS; SUBCUTANEOUS at 09:04

## 2024-04-08 RX ADMIN — SODIUM CHLORIDE: 9 INJECTION, SOLUTION INTRAVENOUS at 16:53

## 2024-04-08 RX ADMIN — LIDOCAINE HYDROCHLORIDE 15 ML: 20 INJECTION, SOLUTION INFILTRATION; PERINEURAL at 10:26

## 2024-04-08 RX ADMIN — SODIUM CHLORIDE 1000 MG: 900 INJECTION INTRAVENOUS at 16:54

## 2024-04-08 RX ADMIN — ACETAMINOPHEN 650 MG: 325 TABLET ORAL at 20:50

## 2024-04-08 RX ADMIN — ENOXAPARIN SODIUM 40 MG: 100 INJECTION SUBCUTANEOUS at 09:03

## 2024-04-08 RX ADMIN — SODIUM CHLORIDE, PRESERVATIVE FREE 10 ML: 5 INJECTION INTRAVENOUS at 20:54

## 2024-04-08 RX ADMIN — ALBUTEROL SULFATE 2.5 MG: 2.5 SOLUTION RESPIRATORY (INHALATION) at 09:04

## 2024-04-08 RX ADMIN — AZITHROMYCIN MONOHYDRATE 500 MG: 500 INJECTION, POWDER, LYOPHILIZED, FOR SOLUTION INTRAVENOUS at 17:59

## 2024-04-08 RX ADMIN — SODIUM CHLORIDE, PRESERVATIVE FREE 10 ML: 5 INJECTION INTRAVENOUS at 09:03

## 2024-04-08 ASSESSMENT — PAIN SCALES - GENERAL: PAINLEVEL_OUTOF10: 7

## 2024-04-08 ASSESSMENT — PAIN - FUNCTIONAL ASSESSMENT: PAIN_FUNCTIONAL_ASSESSMENT: ACTIVITIES ARE NOT PREVENTED

## 2024-04-08 ASSESSMENT — PAIN DESCRIPTION - ORIENTATION: ORIENTATION: RIGHT

## 2024-04-08 ASSESSMENT — PAIN DESCRIPTION - DESCRIPTORS: DESCRIPTORS: ACHING

## 2024-04-08 ASSESSMENT — PAIN DESCRIPTION - LOCATION: LOCATION: ELBOW

## 2024-04-08 NOTE — PROGRESS NOTES
2230- Report received from YOLA Macario.    2310- Patient arrived to [facility].  Placed on monitor.  Dual skin assessment completed with YOLA Macario.  No impairments noted.  Patient is s/p right lung thoracentesis.  Band-aid in place is clean, dry, and intact.     2343- Shift assessment completed.    0428- Reassessed.  No changes.  Labs drawn and sent.    0700- Report given to the oncoming shift.

## 2024-04-08 NOTE — PROGRESS NOTES
Physician Progress Note      PATIENT:               FIORELLA MACHADO  CSN #:                  [id number]  :                       1965  ADMIT DATE:       2024 4:13 PM  DISCH DATE:  RESPONDING  PROVIDER #:        Sarbjit Freitas MD          QUERY TEXT:    Pt admitted with pleural infection. Pt noted to have wbc 14, hr 110-118. If   possible, please document in the progress notes and discharge summary if you   are evaluating and /or treating any of the following:    The medical record reflects the following:  Risk Factors: malignancy vs infection  Clinical Indicators: wbc 14, hr 110-118, procal 0.05, lac 0.65,    pn-Community acquired pneumonia- left lower lobe pneumonia  with rt sided   pleural effusion  Treatment: zmax, rocephing, cx ngtd  Yessica Simpson RN/CDI [id number]  Options provided:  -- Sepsis, present on admission  -- pneumonia without Sepsis  -- Other - I will add my own diagnosis  -- Disagree - Not applicable / Not valid  -- Disagree - Clinically unable to determine / Unknown  -- Refer to Clinical Documentation Reviewer    PROVIDER RESPONSE TEXT:    This patient has sepsis which was present on admission.    Query created by: Yessica Patten on 2024 10:57 AM      Electronically signed by:  Sarbjit Freitas MD 2024 4:52 PM

## 2024-04-08 NOTE — PROGRESS NOTES
Pulmonary, Critical Care, and Sleep Medicine      Name: Fortunato Kowalski MRN: [id number]   : 1965 Hospital: [facility]   Date: 2024  Admission date: 2024 Hospital Day: 3   Patient PCP: No primary care provider on file.    History:   Pt is acutely ill and unstable. Medical records and data reviewed. Pt seen in consultation    IMPRESSION:   Large right pleural effusion - concern for malignancy versus infection - s/p thoracentesis  Right pneumothorax - worsening  Right-sided mass per CT scan report done in [place] images not available left lower lobe infiltrate question of pneumonia   Probable COPD no sign of acute exacerbation  Situational anxiety  Heavy cigarette smoking tobacco use 2 and half pack per day   History of diverticulitis status post surgery years ago   Body mass index is 33.52 kg/m².  Prognosis guarded       RECOMMENDATIONS/PLAN:   F/U pleural fluid studies - asked to ensure cytology sent - if not can sent fluid if available post ct placement  D/W IR - plans for chest tube placement  Will get CT of chest once lung reexpanded   Agree with antibiotics pending culture results    Bronchodilators prn  DVT prophylaxis  Smoking cessation counseling - patient now on nicotine patch  Prescription drug management with home med reconciliation reviewed  Thank you for asking us to see pt while in the hospital  D/W him importance of follow up - his family lives in [place] and plans to follow up here     Interval history:         [x] High complexity decision making was performed  [x] See my orders for details      Initial HPI:      24 : Sitting up in bed. Dyspnea improved. WBC 14.2. No current complaints. Family says he has been dyspneic for some time. Smokes 2.5ppd and drinks a lot of coffee. . PCT low. On RA. Discussed chest imaging w/ IR, enlarging pneumothorax.     I was asked by Cate Murphy MD to see Fortunato Kowalski  a 58 y.o.   male in  not significantly changed.  Pleural thickening of the right base persists. Partial collapse of the right  base unchanged. No mediastinal shift. No left-sided consolidation or  pneumothorax  Impression: 1. Moderate right-sided pneumothorax unchanged  XR CHEST PORTABLE  Narrative: EXAM: XR CHEST PORTABLE    INDICATION: Thoracentesis    COMPARISON: 4/6/2024    FINDINGS: A portable AP radiograph of the chest was obtained at 1221 hours. .  Right apical pneumothorax.. Decreased right effusion with some small residual  present. Right lower lobe airspace opacity. Left perihilar airspace disease..   The bones and soft tissues are grossly within normal limits.  Impression: Right apical pneumothorax status post thoracentesis with decreased  right effusion. The pneumothorax is small to moderate with maximum pleural  separation measuring approximately 4 cm.    Findings were relayed to the referring team at 1:35 p.m. on 4/7/2024.    XR CHEST PORTABLE   Final Result   1. Moderate right-sided pneumothorax unchanged         XR CHEST PORTABLE   Final Result   Right apical pneumothorax status post thoracentesis with decreased   right effusion. The pneumothorax is small to moderate with maximum pleural   separation measuring approximately 4 cm.      Findings were relayed to the referring team at 1:35 p.m. on 4/7/2024.      XR CHEST (2 VW)   Final Result   1. Opacification of the right mid and lower hemithorax is new since prior exam.   It is unclear how much of this represents elevation of the right hemidiaphragm   versus consolidation/airspace disease and pleural fluid. Recommend chest CT for   further evaluation.         CT CHEST WO CONTRAST    (Results Pending)   US THORACENTESIS Which side should the procedure be performed? Right    (Results Pending)   XR CHEST PORTABLE    (Results Pending)   IR CHEST TUBE INSERTION    (Results Pending)     XR CHEST (2 VW)    Result Date: 4/6/2024  EXAM:  XR CHEST (2 VW) INDICATION:   SOB

## 2024-04-08 NOTE — PROGRESS NOTES
Hospitalist Progress Note    NAME:   Fortunato Kowalski   : 1965   MRN: [id number]     Date/Time: 2024 12:17 PM  Patient PCP: No primary care provider on file.    Estimated discharge date: 4/10  Barriers: Pulmonary clearance, chest tube      Assessment / Plan:    Large right pleural effusion  Pneumothorax status post thoracentesis,   Right-sided mass  Community acquired pneumonia- left lower lobe pneumonia  with rt sided pleural effusion   Respiratory alkalosis  EKG sinus tachycardia  -CT angio pulmonary-negative for PE, large right pleural effusion with collapse of right lower lobe partial collapse of right middle and upper lobe  -Irregular pleural thickening along the medial aspect of right upper lobe with 3.8 x 3.4 x 5.1 cm mass projecting over the anterior mediastinum  Left lower lobe infiltrate likely left lower lobe pneumonia    -s/p thoracentesis , 2 L fluid removed  -s/p chest tube  for postprocedure pneumothorax  -Awaiting cytology  -Pulmonary on board  -Checking for AFB as per pulmonary    -Continue empiric antibiotics  -Continue Solu-Medrol         Tobacco use   Smokes 2and half pack cig/day. Counseled on quitting smoking. Offered nicotine patch. Total duration 3 min        Medical Decision Making:   I personally reviewed labs: yes   I personally reviewed imaging:yes  I personally reviewed EKG:yes  Toxic drug monitoring: solumedrol  Discussed case with:         Code Status: full   DVT Prophylaxis:   Baseline: independent        Subjective:     Chief Complaint / Reason for Physician Visit  This morning he is on room air.  Says he feels okay, no chest pain, no shortness of breath    Objective:     VITALS:   Last 24hrs VS reviewed since prior progress note. Most recent are:  Patient Vitals for the past 24 hrs:   BP Temp Temp src Pulse Resp SpO2   24 1140 (!) 132/92 97.6 °F (36.4 °C) Oral 98 -- 93 %   24 1032 126/85 97.6 °F (36.4 °C) Oral 98 20 91 %   24 1020 131/89 -- --

## 2024-04-08 NOTE — PLAN OF CARE
Predictive Model Details          25 (Normal)  Factor Value    Calculated 4/8/2024 08:09 40% Age 58 years old    Deterioration Index Model 23% Respiratory rate 20     14% WBC count abnormal (14.2 K/uL)     14% Pulse oximetry 90 %     3% Pulse 92     2% Systolic 107     2% Sodium 139 mmol/L     1% Hematocrit 49.4 %     0% Temperature 97.9 °F (36.6 °C)     0% Potassium 4.0 mmol/L      Problem: Discharge Planning  Goal: Discharge to home or other facility with appropriate resources  Outcome: Progressing     Problem: Safety - Adult  Goal: Free from fall injury  Outcome: Progressing     Problem: Respiratory - Adult  Goal: Achieves optimal ventilation and oxygenation  Outcome: Progressing

## 2024-04-08 NOTE — PROGRESS NOTES
Bedside and Verbal shift change report given to Amirah RN (oncoming nurse) by Carla RN (offgoing nurse). Report included the following information Nurse Handoff Report, MAR, and Cardiac Rhythm NSR/ sinus tachy .     2048: Airborne precautions ordered for pulmonary tb rule out    This RN verified with nursing supervisor that patient needs to be moved to room with negative air pressure capabilities    Patient to move to room 2222 per nursing supervisor.     2230: This RN on CMSU gave report to [facility] RN via telephone

## 2024-04-08 NOTE — PROCEDURES
Interventional Radiology  Procedure Note        4/8/2024 10:41 AM    Patient: Fortunato Kowalski     Informed consent obtained    Diagnosis: Right ptx following thoracentesis    Procedure(s): Right chest tube placement    Findings: Bedside placement of 8.5 Fr pigtail chest tube via midaxillary approach. Attached to Pleurevac.     EBL: Minimal    Specimens removed: None    Complications: None    Primary Physician: Adi Freitas MD    Recomendations:   - Low continuous suction for now.  - Repeat CXR now     Full dictated report to follow    Signed By: Adi Freitas MD

## 2024-04-09 ENCOUNTER — APPOINTMENT (OUTPATIENT)
Facility: HOSPITAL | Age: 59
End: 2024-04-09

## 2024-04-09 LAB
ANION GAP SERPL CALC-SCNC: 4 MMOL/L (ref 5–15)
BASOPHILS # BLD: 0.1 K/UL (ref 0–0.1)
BASOPHILS NFR BLD: 0 % (ref 0–1)
BUN SERPL-MCNC: 18 MG/DL (ref 6–20)
BUN/CREAT SERPL: 18 (ref 12–20)
CALCIUM SERPL-MCNC: 8.7 MG/DL (ref 8.5–10.1)
CHLORIDE SERPL-SCNC: 110 MMOL/L (ref 97–108)
CO2 SERPL-SCNC: 20 MMOL/L (ref 21–32)
CREAT SERPL-MCNC: 1 MG/DL (ref 0.7–1.3)
DIFFERENTIAL METHOD BLD: ABNORMAL
EOSINOPHIL # BLD: 0.5 K/UL (ref 0–0.4)
EOSINOPHIL NFR BLD: 4 % (ref 0–7)
ERYTHROCYTE [DISTWIDTH] IN BLOOD BY AUTOMATED COUNT: 14.4 % (ref 11.5–14.5)
GLUCOSE SERPL-MCNC: 122 MG/DL (ref 65–100)
HCT VFR BLD AUTO: 48.1 % (ref 36.6–50.3)
HGB BLD-MCNC: 15.6 G/DL (ref 12.1–17)
IMM GRANULOCYTES # BLD AUTO: 0.1 K/UL (ref 0–0.04)
IMM GRANULOCYTES NFR BLD AUTO: 1 % (ref 0–0.5)
LYMPHOCYTES # BLD: 2.5 K/UL (ref 0.8–3.5)
LYMPHOCYTES NFR BLD: 18 % (ref 12–49)
MCH RBC QN AUTO: 28.4 PG (ref 26–34)
MCHC RBC AUTO-ENTMCNC: 32.4 G/DL (ref 30–36.5)
MCV RBC AUTO: 87.5 FL (ref 80–99)
MONOCYTES # BLD: 1 K/UL (ref 0–1)
MONOCYTES NFR BLD: 8 % (ref 5–13)
NEUTS SEG # BLD: 9.5 K/UL (ref 1.8–8)
NEUTS SEG NFR BLD: 69 % (ref 32–75)
NRBC # BLD: 0 K/UL (ref 0–0.01)
NRBC BLD-RTO: 0 PER 100 WBC
PLATELET # BLD AUTO: 331 K/UL (ref 150–400)
PMV BLD AUTO: 11.2 FL (ref 8.9–12.9)
POTASSIUM SERPL-SCNC: 4 MMOL/L (ref 3.5–5.1)
RBC # BLD AUTO: 5.5 M/UL (ref 4.1–5.7)
SODIUM SERPL-SCNC: 134 MMOL/L (ref 136–145)
WBC # BLD AUTO: 13.7 K/UL (ref 4.1–11.1)

## 2024-04-09 PROCEDURE — 36415 COLL VENOUS BLD VENIPUNCTURE: CPT

## 2024-04-09 PROCEDURE — 71045 X-RAY EXAM CHEST 1 VIEW: CPT

## 2024-04-09 PROCEDURE — 85025 COMPLETE CBC W/AUTO DIFF WBC: CPT

## 2024-04-09 PROCEDURE — 94640 AIRWAY INHALATION TREATMENT: CPT

## 2024-04-09 PROCEDURE — 71260 CT THORAX DX C+: CPT

## 2024-04-09 PROCEDURE — 99222 1ST HOSP IP/OBS MODERATE 55: CPT | Performed by: STUDENT IN AN ORGANIZED HEALTH CARE EDUCATION/TRAINING PROGRAM

## 2024-04-09 PROCEDURE — 88112 CYTOPATH CELL ENHANCE TECH: CPT

## 2024-04-09 PROCEDURE — 6370000000 HC RX 637 (ALT 250 FOR IP): Performed by: INTERNAL MEDICINE

## 2024-04-09 PROCEDURE — 6360000004 HC RX CONTRAST MEDICATION: Performed by: NURSE PRACTITIONER

## 2024-04-09 PROCEDURE — 2060000000 HC ICU INTERMEDIATE R&B

## 2024-04-09 PROCEDURE — 2580000003 HC RX 258: Performed by: STUDENT IN AN ORGANIZED HEALTH CARE EDUCATION/TRAINING PROGRAM

## 2024-04-09 PROCEDURE — 6360000002 HC RX W HCPCS: Performed by: INTERNAL MEDICINE

## 2024-04-09 PROCEDURE — 6360000002 HC RX W HCPCS: Performed by: STUDENT IN AN ORGANIZED HEALTH CARE EDUCATION/TRAINING PROGRAM

## 2024-04-09 PROCEDURE — 80048 BASIC METABOLIC PNL TOTAL CA: CPT

## 2024-04-09 RX ADMIN — WATER 40 MG: 1 INJECTION INTRAMUSCULAR; INTRAVENOUS; SUBCUTANEOUS at 09:03

## 2024-04-09 RX ADMIN — SODIUM CHLORIDE, PRESERVATIVE FREE 10 ML: 5 INJECTION INTRAVENOUS at 21:48

## 2024-04-09 RX ADMIN — AZITHROMYCIN MONOHYDRATE 500 MG: 500 INJECTION, POWDER, LYOPHILIZED, FOR SOLUTION INTRAVENOUS at 18:09

## 2024-04-09 RX ADMIN — SODIUM CHLORIDE, PRESERVATIVE FREE 10 ML: 5 INJECTION INTRAVENOUS at 09:03

## 2024-04-09 RX ADMIN — ALBUTEROL SULFATE 2.5 MG: 2.5 SOLUTION RESPIRATORY (INHALATION) at 09:06

## 2024-04-09 RX ADMIN — ALBUTEROL SULFATE 2.5 MG: 2.5 SOLUTION RESPIRATORY (INHALATION) at 20:46

## 2024-04-09 RX ADMIN — IOPAMIDOL 100 ML: 755 INJECTION, SOLUTION INTRAVENOUS at 14:13

## 2024-04-09 RX ADMIN — SODIUM CHLORIDE 1000 MG: 900 INJECTION INTRAVENOUS at 17:08

## 2024-04-09 ASSESSMENT — PAIN SCALES - GENERAL
PAINLEVEL_OUTOF10: 0
PAINLEVEL_OUTOF10: 0

## 2024-04-09 NOTE — PLAN OF CARE
Problem: Discharge Planning  Goal: Discharge to home or other facility with appropriate resources  Outcome: Progressing     Problem: Safety - Adult  Goal: Free from fall injury  Outcome: Progressing     Problem: Respiratory - Adult  Goal: Achieves optimal ventilation and oxygenation  Outcome: Progressing     Problem: Skin/Tissue Integrity  Goal: Absence of new skin breakdown  Description: 1.  Monitor for areas of redness and/or skin breakdown  2.  Assess vascular access sites hourly  3.  Every 4-6 hours minimum:  Change oxygen saturation probe site  4.  Every 4-6 hours:  If on nasal continuous positive airway pressure, respiratory therapy assess nares and determine need for appliance change or resting period.  Outcome: Progressing     Problem: Pain  Goal: Verbalizes/displays adequate comfort level or baseline comfort level  Outcome: Progressing

## 2024-04-09 NOTE — CARE COORDINATION
Advance Care Planning     General Advance Care Planning (ACP) Conversation    Date of Conversation: 4/9/2024  Conducted with: Patient with Decision Making Capacity    Healthcare Decision Maker:  No healthcare decision makers have been documented.  Click here to complete HealthCare Decision Makers including selection of the Healthcare Decision Maker Relationship (ie \"Primary\")   Today we  discussed HCDM and he chose his ex-wife Yolanda Kowalski [phone]    Content/Action Overview:  Has NO ACP documents-Information provided  Reviewed DNR/DNI and patient elects Full Code (Attempt Resuscitation)        Length of Voluntary ACP Conversation in minutes:  <16 minutes (Non-Billable)    ENGLISH H HOSAY

## 2024-04-09 NOTE — PROGRESS NOTES
Hospitalist Progress Note    NAME:   Fortunato Kowalski   : 1965   MRN: [id number]     Date/Time: 2024 11:45 AM  Patient PCP: No primary care provider on file.    Estimated discharge date:   Barriers: Pulmonary clearance, chest tube      Assessment / Plan:    Large right pleural effusion  Pneumothorax status post thoracentesis,   Right-sided mass  Community acquired pneumonia- left lower lobe pneumonia  with rt sided pleural effusion   Respiratory alkalosis  EKG sinus tachycardia  -CT angio pulmonary-negative for PE, large right pleural effusion with collapse of right lower lobe partial collapse of right middle and upper lobe  -Irregular pleural thickening along the medial aspect of right upper lobe with 3.8 x 3.4 x 5.1 cm mass projecting over the anterior mediastinum  Left lower lobe infiltrate likely left lower lobe pneumonia    -s/p thoracentesis , 2 L fluid removed  -s/p chest tube  for postprocedure pneumothorax  -Awaiting cytology  -Pulmonary on board  -Pending labs.  If cytology is negative may need biopsy  -Consult hematologist    -Continue empiric antibiotics  -Continue Solu-Medrol         Tobacco use   Smokes 2and half pack cig/day. Counseled on quitting smoking. Offered nicotine patch. Total duration 3 min        Medical Decision Making:   I personally reviewed labs: yes   I personally reviewed imaging:yes  I personally reviewed EKG:yes  Toxic drug monitoring:   Discussed case with: Pulmonary        Code Status: full   DVT Prophylaxis:   Baseline: independent        Subjective:     Chief Complaint / Reason for Physician Visit  Currently on room air.  No events overnight.  No chest pain    Objective:     VITALS:   Last 24hrs VS reviewed since prior progress note. Most recent are:  Patient Vitals for the past 24 hrs:   BP Temp Temp src Pulse Resp SpO2   24 1135 119/84 -- -- (!) 105 -- 94 %   24 0859 126/89 -- -- 95 -- --   24 0245 121/74 97.7 °F (36.5 °C) Axillary  85 19 93 %   04/08/24 2303 117/66 98 °F (36.7 °C) Oral 85 24 95 %   04/08/24 1950 121/73 97.6 °F (36.4 °C) Oral (!) 102 20 93 %   04/08/24 1545 125/86 97.7 °F (36.5 °C) Oral (!) 103 (!) 35 97 %   04/08/24 1430 119/81 97.7 °F (36.5 °C) Oral 97 -- 96 %           Intake/Output Summary (Last 24 hours) at 4/9/2024 1145  Last data filed at 4/9/2024 1135  Gross per 24 hour   Intake 400 ml   Output 2700 ml   Net -2300 ml          I had a face to face encounter and independently examined this patient on 4/9/2024, as outlined below:  PHYSICAL EXAM:  General: Alert, cooperative  EENT:  EOMI. Anicteric sclerae.  Resp:  CTA bilaterally, no wheezing or rales.  No accessory muscle use  CV:  Regular  rhythm,  No edema  GI:  Soft, Non distended, Non tender.  +Bowel sounds  Neurologic:  Alert and oriented X 3, normal speech,   Psych:   Good insight. Not anxious nor agitated  Skin:  No rashes.  No jaundice    Reviewed most current lab test results and cultures  YES  Reviewed most current radiology test results   YES  Review and summation of old records today    NO  Reviewed patient's current orders and MAR    YES  PMH/ reviewed - no change compared to H&P  ________________________________________________________________________  Care Plan discussed with:    Comments   Patient x    Family      RN x    Care Manager     Consultant                        Multidiciplinary team rounds were held today with , nursing, pharmacist and clinical coordinator.  Patient's plan of care was discussed; medications were reviewed and discharge planning was addressed.     ________________________________________________________________________  Total NON critical care TIME:  54  Minutes    Total CRITICAL CARE TIME Spent:   Minutes non procedure based      Comments   >50% of visit spent in counseling and coordination of care     ________________________________________________________________________  Sarbjit Freitas MD     Procedures: see

## 2024-04-09 NOTE — PROGRESS NOTES
End of Shift Note    Bedside shift change report given to YOLA Lim (oncoming nurse) by Benoit Richter RN (offgoing nurse).  Report included the following information SBAR    Shift worked:  [id number]     Shift summary and any significant changes:    PRN tylenol given for complaint of pain/soreness at chest tube site.  CT output 500mL overnight.  AM labs drawn.        Concerns for physician to address:  Patient still on TB precautions however Pulm DC'd order.     Zone phone for oncoming shift:          Activity:     Number times ambulated in hallways past shift: 0  Number of times OOB to chair past shift: 0    Cardiac:   Cardiac Monitoring: Yes           Access:  Current line(s): PIV     Genitourinary:   Urinary status: voiding    Respiratory:      Chronic home O2 use?: NO  Incentive spirometer at bedside: YES       GI:     Current diet:  ADULT DIET; Regular  Passing flatus: YES  Tolerating current diet: YES       Pain Management:   Patient states pain is manageable on current regimen: YES    Skin:     Interventions: specialty bed, float heels, and increase time out of bed    Patient Safety:  Fall Score:    Interventions: bed/chair alarm, assistive device (walker, cane. etc), gripper socks, and pt to call before getting OOB       Length of Stay:  Expected LOS: 3  Actual LOS: 3      Benoit Richter RN

## 2024-04-09 NOTE — CARE COORDINATION
CM made attempts to speak with patient to compete CM assessment was unsuccessful. As per chart pt is listed self pay.    Prince Liang MSW    Ext [phone]

## 2024-04-09 NOTE — CARE COORDINATION
Care Management Initial Assessment       RUR:8%  Readmission? No  1st IM letter given? Yes -     1st  letter given: No     04/09/24 0957   Service Assessment   Patient Orientation Alert and Oriented   Cognition Alert   History Provided By Patient   Primary Caregiver Self   Accompanied By/Relationship ex-wife is in the room   Support Systems Spouse/Significant Other   Patient's Healthcare Decision Maker is: Legal Next of Kin   PCP Verified by CM No   Prior Functional Level Independent in ADLs/IADLs   Current Functional Level Independent in ADLs/IADLs   Can patient return to prior living arrangement Other (see comment)  (patient is living with ex-wife and plans on staying at that residence)   Ability to make needs known: Good   Family able to assist with home care needs: Yes   Financial Resources Other (Comment)   Social/Functional History   Lives With Significant other   Home Equipment None   ADL Assistance Independent   Homemaking Assistance Independent   Ambulation Assistance Independent   Transfer Assistance Independent   Active  Yes   Mode of Transportation Car   Occupation Full time employment   Discharge Planning   Living Arrangements Spouse/Significant Other   Current Services Prior To Admission None   DME Ordered? No   Potential Assistance Purchasing Medications Yes   Services At/After Discharge   Transition of Care Consult (CM Consult) N/A    Resource Information Provided? No     CM spoke with patient on the phone as he is on Airborne isolation.  His Ex-wife was in the room. He is planning on residing with her when he is discharged and the address in the chart is no longer accurate.    Ex-spouse address is [address] [place] [address]  His new phone number is [phone]. The phone in the chart is inaccurate.  He works full time, drives, has been independent  He does not have insurance or a PCP  I will refer to first choice for financial screening and will give

## 2024-04-09 NOTE — CONSULTS
Addendum:     I have personally reviewed the nurse practitioner's documentation and have evaluated the patient independently.  I agree with her plan.  Additionally, I have included the following further information about my medical decision making and plan below.     MDM Elements:   1) Data Complexity -  I reviewed the patients most recent hematology labs, as well as relevant labs from prior encounters.   2) Complexity of problems addressed - High -   3) Risk of complications, morbidity, and mortality of treatment of management - High    # Metastatic malignancy, pathology pending  # Pleural nodules, effusion  # Mediastinal Mass    Fortunato Kowalski was seen today at bedside.  Please see above documentation for subjective findings.  On my evaluation I noted no respiratory distress, normal exam.  Oncology/Hematology was consulted for suspected metastatic lung cancer.  Please complete staging and obtain tissue diagnosis.  Treatment depends on tissue diagnosis.  Given limited social support, recommend completing tissue retrieval as inpatient.         Shlomo Veronica MD    Attending Medical Oncologist   [facility]                [place] [address]  W: [phone] F: [phone]      Hematology/ Oncology Consult Note      Reason for consult:     Fortunato Kowalski is a 58 y.o. male who we have been asked to see by Dr. Freitas for likely lung cancer.    Subjective:     Fortunato Kowalski is a 58-year-old male patient admitted to [facility] for lower left lobe pneumonia.  Patient has not seen a physician in many years and has very little past medical history.  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  He does have family close to [place] and reports he will be staying in [place] for outpatient follow-up.    Patient was seen at  331 04/09/2024       Physical Exam:   /87   Pulse (!) 105   Temp 97.6 °F (36.4 °C) (Oral)   Resp 20   Ht 1.702 m (5' 7\")   Wt 97.1 kg (214 lb)   SpO2 94%   BMI 33.52 kg/m²   General appearance: alert, appears stated age, and cooperative  Abdomen: soft, non-tender; bowel sounds normal; no masses,  no organomegaly  Extremities: extremities normal, atraumatic, no cyanosis or edema  Skin: Skin color, texture, turgor normal. No rashes or lesions  Neurologic: Grossly normal    Assessment:     Likely new diagnosis of lung cancer  CT chest abdomen and pelvis  IMPRESSION:  1. Malignant pleural disease with multiple pleural nodules in the right lung and  small right effusion. Decreased right-sided pneumothorax status post right-sided  chest tube placement.  2. Superior mediastinal mass and multiple cardiophrenic nodules  3. Improved aeration of the right lung base. Persistent airspace opacity in the  superior segment of the left lower lobe which is overall improved as well. No  dominant lung masses are identified. 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 after midnight with plan for biopsy in the morning  > Await cytology results from thoracentesis  > Pulmonology following    Discussed with Sarah Branch Pulmonology PA    Thank you for allowing us to participate in the care of Mr. Kowalski. Will continue to follow during hospitalization and arrange close OP follow

## 2024-04-09 NOTE — PROGRESS NOTES
infiltrate and correlation for left lower lobe pneumonia is recommended. Subcentimeter left hilar lymph nodes fossa related to the left lower lobe pneumonia. No left pleural effusion. Bibasilar atelectasis. Mild coronary artery calcifications with small pericardial effusion or mediastinal shift to the left possibly related to the large right pleural effusion. Bilateral fatty lesions in the adrenal glands possibly mild lipomatous. Electronically Signed by: Gibson De La Cruz MD on 4/5/2024 9:42 PM    XR Chest Ap Portable    Result Date: 4/5/2024  Single view of the chest. HISTORY: Shortness of breath. There are no prior films available for comparison. There is a large right pleural effusion. There are mild bilateral infiltrates right greater than left. The heart is poorly evaluated due to AP technique and the large right pleural effusion. The osseous structures are within normal limits.    IMPRESSION: Large right pleural effusion with bilateral infiltrates which may represent bilateral pneumonia versus pulmonary edema. Poor evaluation of the heart Electronically Signed by: Gibson De La Cruz MD on 4/5/2024 8:13 PM      I personally reviewed laboratory testing, pulmonary imaging, radiology reports, and pulse oximetry data.        Please note that this dictation was completed with DOZ, the [employer] voice recognition software.  Quite often unanticipated grammatical, syntax, homophones, and other interpretive errors are inadvertently transcribed by the computer software.  Please disregard these errors.  Please excuse any errors that have escaped final proofreading  ______________________________________________________________________      Thank you for allowing us to participate in the care of this patient.      This care involved high complexity medical decision making: I personally:  Reviewed the flowsheet and previous days notes  Reviewed and summarized records or history from previous days note or discussions with  staff, family  High Risk Drug therapy requiring intensive monitoring for toxicity: eg steroids, antibiotics  Reviewed and/or ordered Clinical lab tests  Reviewed images and/or ordered Radiology tests  discussed my assessment/management with : Nursing,Family for coordination of care      ASHANTI Zuniga - NP

## 2024-04-09 NOTE — PROGRESS NOTES
Airborne TB precautions taken off per ID at 1043.     Pulmonary put in nursing orders to ensure cytology was sent on pleural fluid. Will obtain pleural fluid specimen from drain.  Attempted calling [facility] lab x2 at 1215 and 1243, so specimen can be collected in appropriate container. Was unable to get any lab personnel on the phone. Left voicemail for pathology department.     4899 Update: Cytology called and informed this RN to put specimen in a sterile cup or sterile tube    1339: Cytology specimen collected in sterile cup and sent to lab. Results pending

## 2024-04-09 NOTE — PROGRESS NOTES
End of Shift Note    Bedside shift change report given to RN (oncoming nurse) by Annie May RN (offgoing nurse).  Report included the following information SBAR, Kardex, and MAR    Shift worked:  7a-7p     Shift summary and any significant changes:     Airborne precautions discontinued.  720 output from chest tube. Cytology speciman collected from chest tube. Continued abx and Solu-Medrol.      Concerns for physician to address:  N/A     Zone phone for oncoming shift:            Annie May RN

## 2024-04-10 ENCOUNTER — APPOINTMENT (OUTPATIENT)
Facility: HOSPITAL | Age: 59
End: 2024-04-10

## 2024-04-10 LAB
CYTOLOGY-NON GYN: NORMAL
ECHO AO ROOT DIAM: 3.2 CM
ECHO AO ROOT INDEX: 1.54 CM/M2
ECHO AV AREA PEAK VELOCITY: 2.9 CM2
ECHO AV AREA PEAK VELOCITY: 2.9 CM2
ECHO AV AREA PEAK VELOCITY: 3 CM2
ECHO AV AREA PEAK VELOCITY: 3 CM2
ECHO AV AREA VTI: 3.1 CM2
ECHO AV AREA/BSA VTI: 1.5 CM2/M2
ECHO AV CUSP MM: 1.7 CM
ECHO AV MEAN GRADIENT: 4 MMHG
ECHO AV MEAN VELOCITY: 1 M/S
ECHO AV PEAK GRADIENT: 7 MMHG
ECHO AV PEAK GRADIENT: 8 MMHG
ECHO AV PEAK VELOCITY: 1.4 M/S
ECHO AV PEAK VELOCITY: 1.4 M/S
ECHO AV VTI: 18.2 CM
ECHO BSA: 2.14 M2
ECHO LA DIAMETER INDEX: 1.63 CM/M2
ECHO LA DIAMETER: 3.4 CM
ECHO LA TO AORTIC ROOT RATIO: 1.06
ECHO LA VOL A-L A2C: 53 ML (ref 18–58)
ECHO LA VOL A-L A4C: 35 ML (ref 18–58)
ECHO LA VOL BP: 41 ML (ref 18–58)
ECHO LA VOL MOD A2C: 49 ML (ref 18–58)
ECHO LA VOL MOD A4C: 34 ML (ref 18–58)
ECHO LA VOL/BSA BIPLANE: 20 ML/M2 (ref 16–34)
ECHO LA VOLUME AREA LENGTH: 43 ML
ECHO LA VOLUME INDEX A-L A2C: 25 ML/M2 (ref 16–34)
ECHO LA VOLUME INDEX A-L A4C: 17 ML/M2 (ref 16–34)
ECHO LA VOLUME INDEX AREA LENGTH: 21 ML/M2 (ref 16–34)
ECHO LA VOLUME INDEX MOD A2C: 24 ML/M2 (ref 16–34)
ECHO LA VOLUME INDEX MOD A4C: 16 ML/M2 (ref 16–34)
ECHO LV E' LATERAL VELOCITY: 8 CM/S
ECHO LV E' SEPTAL VELOCITY: 8 CM/S
ECHO LV FRACTIONAL SHORTENING: 22 % (ref 28–44)
ECHO LV INTERNAL DIMENSION DIASTOLE INDEX: 1.97 CM/M2
ECHO LV INTERNAL DIMENSION DIASTOLIC: 4.1 CM (ref 4.2–5.9)
ECHO LV INTERNAL DIMENSION SYSTOLIC INDEX: 1.54 CM/M2
ECHO LV INTERNAL DIMENSION SYSTOLIC: 3.2 CM
ECHO LV IVSD: 1.2 CM (ref 0.6–1)
ECHO LV MASS 2D: 171.7 G (ref 88–224)
ECHO LV MASS INDEX 2D: 82.6 G/M2 (ref 49–115)
ECHO LV POSTERIOR WALL DIASTOLIC: 1.2 CM (ref 0.6–1)
ECHO LV RELATIVE WALL THICKNESS RATIO: 0.59
ECHO LVOT AREA: 3.5 CM2
ECHO LVOT AV VTI INDEX: 0.91
ECHO LVOT DIAM: 2.1 CM
ECHO LVOT MEAN GRADIENT: 3 MMHG
ECHO LVOT PEAK GRADIENT: 6 MMHG
ECHO LVOT PEAK GRADIENT: 6 MMHG
ECHO LVOT PEAK VELOCITY: 1.2 M/S
ECHO LVOT PEAK VELOCITY: 1.2 M/S
ECHO LVOT STROKE VOLUME INDEX: 27.6 ML/M2
ECHO LVOT SV: 57.5 ML
ECHO LVOT VTI: 16.6 CM
ECHO MV A VELOCITY: 0.96 M/S
ECHO MV AREA VTI: 3.1 CM2
ECHO MV E DECELERATION TIME (DT): 148.9 MS
ECHO MV E VELOCITY: 0.79 M/S
ECHO MV E/A RATIO: 0.82
ECHO MV E/E' LATERAL: 9.88
ECHO MV E/E' RATIO (AVERAGED): 9.88
ECHO MV LVOT VTI INDEX: 1.12
ECHO MV MAX VELOCITY: 1 M/S
ECHO MV MEAN GRADIENT: 2 MMHG
ECHO MV MEAN VELOCITY: 0.8 M/S
ECHO MV PEAK GRADIENT: 4 MMHG
ECHO MV VTI: 18.6 CM
ECHO PV MAX VELOCITY: 1.1 M/S
ECHO PV PEAK GRADIENT: 5 MMHG
ECHO RV FREE WALL PEAK S': 18 CM/S
ECHO RV INTERNAL DIMENSION: 3 CM
ECHO RVOT PEAK GRADIENT: 2 MMHG
ECHO RVOT PEAK VELOCITY: 0.6 M/S

## 2024-04-10 PROCEDURE — 2709999900 CT BIOPSY LUNG/MEDIASTINUM PERC

## 2024-04-10 PROCEDURE — 88342 IMHCHEM/IMCYTCHM 1ST ANTB: CPT

## 2024-04-10 PROCEDURE — 88341 IMHCHEM/IMCYTCHM EA ADD ANTB: CPT

## 2024-04-10 PROCEDURE — 93306 TTE W/DOPPLER COMPLETE: CPT

## 2024-04-10 PROCEDURE — 88305 TISSUE EXAM BY PATHOLOGIST: CPT

## 2024-04-10 PROCEDURE — 2060000000 HC ICU INTERMEDIATE R&B

## 2024-04-10 PROCEDURE — 6370000000 HC RX 637 (ALT 250 FOR IP): Performed by: INTERNAL MEDICINE

## 2024-04-10 PROCEDURE — 88333 PATH CONSLTJ SURG CYTO XM 1: CPT

## 2024-04-10 PROCEDURE — 0BBK3ZX EXCISION OF RIGHT LUNG, PERCUTANEOUS APPROACH, DIAGNOSTIC: ICD-10-PCS | Performed by: RADIOLOGY

## 2024-04-10 PROCEDURE — 2580000003 HC RX 258: Performed by: STUDENT IN AN ORGANIZED HEALTH CARE EDUCATION/TRAINING PROGRAM

## 2024-04-10 PROCEDURE — 6360000002 HC RX W HCPCS: Performed by: RADIOLOGY

## 2024-04-10 PROCEDURE — 6360000002 HC RX W HCPCS: Performed by: STUDENT IN AN ORGANIZED HEALTH CARE EDUCATION/TRAINING PROGRAM

## 2024-04-10 RX ORDER — FENTANYL CITRATE 50 UG/ML
100 INJECTION, SOLUTION INTRAMUSCULAR; INTRAVENOUS
Status: DISCONTINUED | OUTPATIENT
Start: 2024-04-10 | End: 2024-04-10

## 2024-04-10 RX ORDER — ALBUTEROL SULFATE 2.5 MG/3ML
2.5 SOLUTION RESPIRATORY (INHALATION)
Status: DISCONTINUED | OUTPATIENT
Start: 2024-04-10 | End: 2024-04-10

## 2024-04-10 RX ORDER — ALBUTEROL SULFATE 2.5 MG/3ML
2.5 SOLUTION RESPIRATORY (INHALATION) EVERY 6 HOURS PRN
Status: DISCONTINUED | OUTPATIENT
Start: 2024-04-10 | End: 2024-04-12 | Stop reason: HOSPADM

## 2024-04-10 RX ORDER — ALBUTEROL SULFATE 2.5 MG/3ML
2.5 SOLUTION RESPIRATORY (INHALATION) EVERY 6 HOURS PRN
Status: DISCONTINUED | OUTPATIENT
Start: 2024-04-10 | End: 2024-04-10

## 2024-04-10 RX ORDER — GUAIFENESIN 600 MG/1
600 TABLET, EXTENDED RELEASE ORAL 2 TIMES DAILY
Status: DISCONTINUED | OUTPATIENT
Start: 2024-04-10 | End: 2024-04-12 | Stop reason: HOSPADM

## 2024-04-10 RX ORDER — MIDAZOLAM HYDROCHLORIDE 2 MG/2ML
5 INJECTION, SOLUTION INTRAMUSCULAR; INTRAVENOUS
Status: DISCONTINUED | OUTPATIENT
Start: 2024-04-10 | End: 2024-04-10

## 2024-04-10 RX ORDER — BENZONATATE 100 MG/1
100 CAPSULE ORAL 3 TIMES DAILY PRN
Status: DISCONTINUED | OUTPATIENT
Start: 2024-04-10 | End: 2024-04-12 | Stop reason: HOSPADM

## 2024-04-10 RX ORDER — IPRATROPIUM BROMIDE AND ALBUTEROL SULFATE 2.5; .5 MG/3ML; MG/3ML
1 SOLUTION RESPIRATORY (INHALATION) EVERY 4 HOURS PRN
Status: DISCONTINUED | OUTPATIENT
Start: 2024-04-10 | End: 2024-04-12 | Stop reason: HOSPADM

## 2024-04-10 RX ADMIN — FENTANYL CITRATE 25 MCG: 50 INJECTION, SOLUTION INTRAMUSCULAR; INTRAVENOUS at 13:15

## 2024-04-10 RX ADMIN — MIDAZOLAM HYDROCHLORIDE 1 MG: 1 INJECTION, SOLUTION INTRAMUSCULAR; INTRAVENOUS at 13:20

## 2024-04-10 RX ADMIN — SODIUM CHLORIDE 1000 MG: 900 INJECTION INTRAVENOUS at 16:07

## 2024-04-10 RX ADMIN — MIDAZOLAM HYDROCHLORIDE 1 MG: 1 INJECTION, SOLUTION INTRAMUSCULAR; INTRAVENOUS at 13:10

## 2024-04-10 RX ADMIN — AZITHROMYCIN MONOHYDRATE 500 MG: 500 INJECTION, POWDER, LYOPHILIZED, FOR SOLUTION INTRAVENOUS at 17:48

## 2024-04-10 RX ADMIN — FENTANYL CITRATE 25 MCG: 50 INJECTION, SOLUTION INTRAMUSCULAR; INTRAVENOUS at 13:10

## 2024-04-10 RX ADMIN — MIDAZOLAM HYDROCHLORIDE 1 MG: 1 INJECTION, SOLUTION INTRAMUSCULAR; INTRAVENOUS at 13:15

## 2024-04-10 RX ADMIN — SODIUM CHLORIDE: 9 INJECTION, SOLUTION INTRAVENOUS at 16:06

## 2024-04-10 RX ADMIN — SODIUM CHLORIDE, PRESERVATIVE FREE 10 ML: 5 INJECTION INTRAVENOUS at 21:26

## 2024-04-10 RX ADMIN — SODIUM CHLORIDE, PRESERVATIVE FREE 10 ML: 5 INJECTION INTRAVENOUS at 08:44

## 2024-04-10 RX ADMIN — GUAIFENESIN 600 MG: 600 TABLET, EXTENDED RELEASE ORAL at 21:26

## 2024-04-10 ASSESSMENT — PAIN - FUNCTIONAL ASSESSMENT: PAIN_FUNCTIONAL_ASSESSMENT: NONE - DENIES PAIN

## 2024-04-10 NOTE — PROGRESS NOTES
1200: Patient arrived to [facility] Recovery area. Patient is A&Ox4 on RA in NAD at this time.  NPO status, allergies, and code status verified with patient prior to procedure start.     Name of Procedure: Right lung biopsy      Sedation medications given:      Versed: 3 mg     Fentanyl:  50 mcg     Sedation Tolerated: well     Procedure and sedation times are the same.     Time out was performed prior to procedure start.       Sedation Start: 1305  Sedation End: 1325     Vital Signs:  VSS throughout     Fluids Removed: n/a     Samples sent to lab: yes; cytology present for biopsy      Any complications related to procedure: none identified at this time     Patient is A&Ox4, on RA, and is in NAD at this time.     This patient is at an increased risk of falling because they have received sedating medications. Please evaluate and implement fall precautions/fall prevention practices as appropriate.      1340:    TRANSFER - OUT REPORT:    Verbal report given to YOLA Shepard on Fortunato Kowalski  being transferred to [facility]   for routine post-op       Report consisted of patient's Situation, Background, Assessment and   Recommendations(SBAR).     Information from the following report(s) Nurse Handoff Report was reviewed with the receiving nurse.           Lines:   Peripheral IV Left;Anterior Forearm (Active)   Site Assessment Clean, dry & intact 04/10/24 1045   Line Status Flushed;Capped 04/10/24 1045   Line Care Cap changed;Connections checked and tightened 04/10/24 1045   Phlebitis Assessment No symptoms 04/10/24 1045   Infiltration Assessment 0 04/10/24 1045   Alcohol Cap Used Yes 04/10/24 1045   Dressing Status Clean, dry & intact 04/10/24 1045   Dressing Type Transparent 04/10/24 1045   Dressing Intervention New 04/09/24 1740        Opportunity for questions and clarification was provided.      Patient transported with:  Registered Nurse

## 2024-04-10 NOTE — PRE SEDATION
Sedation Pre-Procedure Note  INTERVENTIONAL RADIOLOGY  Preoperative History and Physical      Patient:  Fortunato Kowalski  :  1965  Age:  58 y.o.  MRN:  [id number]  Today's Date:  4/10/2024      CC / HPI   Fortunato Kowalski is a 58 y.o. male with recent CT imaging demonstrating malignant pleural disease who presents for CT guided right lung biopsy.    PAST MEDICAL HISTORY  No past medical history on file.    PAST SURGICAL HISTORY  Past Surgical History:   Procedure Laterality Date    IR PERC CATH PLEURAL DRAIN W/IMAG  2024    IR PERC CATH PLEURAL DRAIN W/IMAG 2024 Adi Freitas MD [facility] RAD ANGIO IR       SOCIAL HISTORY  Social History     Socioeconomic History    Marital status:      Spouse name: Not on file    Number of children: Not on file    Years of education: Not on file    Highest education level: Not on file   Occupational History    Not on file   Tobacco Use    Smoking status: Not on file    Smokeless tobacco: Not on file   Vaping Use    Vaping Use: Every day   Substance and Sexual Activity    Alcohol use: Not on file    Drug use: Not on file    Sexual activity: Not on file   Other Topics Concern    Not on file   Social History Narrative    Not on file     Social Determinants of Health     Financial Resource Strain: Not on file   Food Insecurity: No Food Insecurity (2024)    Hunger Vital Sign     Worried About Running Out of Food in the Last Year: Never true     Ran Out of Food in the Last Year: Never true   Transportation Needs: No Transportation Needs (2024)    PRAPARE - Transportation     Lack of Transportation (Medical): No     Lack of Transportation (Non-Medical): No   Physical Activity: Not on file   Stress: Not on file   Social Connections: Not on file   Intimate Partner Violence: Not on file   Housing Stability: Low Risk  (2024)    Housing Stability Vital Sign     Unable to Pay for Housing in the Last Year: No     Number of Places Lived in the Last Year: 1     Unstable

## 2024-04-10 NOTE — PROGRESS NOTES
Chief Complaint / Reason for Physician Visit  \" Follow-up for large right pleural effusion, pneumothorax status postthoracentesis 4/7, right sided mass, pneumonia of left lower lobe, respiratory alkalosis, tobacco use.\".  Discussed with RN events overnight.       Objective:     VITALS:   Last 24hrs VS reviewed since prior progress note. Most recent are:  Patient Vitals for the past 24 hrs:   BP Temp Temp src Pulse Resp SpO2   04/10/24 0800 118/86 97.4 °F (36.3 °C) Oral 88 18 --   04/10/24 0300 112/83 97.6 °F (36.4 °C) Oral 94 17 95 %   04/09/24 2251 104/64 97.7 °F (36.5 °C) Axillary 100 16 95 %   04/09/24 1905 118/82 97.4 °F (36.3 °C) Oral (!) 101 18 96 %   04/09/24 1500 127/87 97.6 °F (36.4 °C) Oral (!) 105 20 --   04/09/24 1135 119/84 97.4 °F (36.3 °C) Oral (!) 105 20 94 %   04/09/24 0859 126/89 97.8 °F (36.6 °C) Oral 95 22 94 %         Intake/Output Summary (Last 24 hours) at 4/10/2024 0808  Last data filed at 4/10/2024 0741  Gross per 24 hour   Intake 680 ml   Output 1670 ml   Net -990 ml        I had a face to face encounter and independently examined this patient on 4/10/2024, as outlined below:  PHYSICAL EXAM:  General: Alert, obese, cooperative  EENT:  EOMI. Anicteric sclerae.  Resp:  Demonstrates sound of the right lung, chest tube in place draining of pleural fluid.  CV:  Regular  rhythm,  No edema  GI:  Soft, Non distended, Non tender.  +Bowel sounds  Neurologic:  Alert and oriented X 3, normal speech,   Psych:   Good insight. Not anxious nor agitated  Skin:  No rashes.  No jaundice    Reviewed most current lab test results and cultures  YES  Reviewed most current radiology test results   YES  Review and summation of old records today    NO  Reviewed patient's current orders and MAR    YES  PMH/SH reviewed - no change compared to H&P    Procedures: see electronic medical records for all procedures/Xrays and details which were not copied into this note but were reviewed prior to creation of Plan.       LABS:  I reviewed today's most current labs and imaging studies.  Pertinent labs include:  Recent Labs     04/08/24 0420 04/09/24  0246   WBC 14.2* 13.7*   HGB 15.6 15.6   HCT 49.4 48.1    331     Recent Labs     04/08/24 0420 04/09/24  0246    134*   K 4.0 4.0   * 110*   CO2 24 20*   GLUCOSE 138* 122*   BUN 20 18   CREATININE 1.05 1.00   CALCIUM 8.6 8.7       Signed: Gabrielle Dyson MD

## 2024-04-10 NOTE — PROGRESS NOTES
@LABRCNT(CPK:3,CKMB:3,TROPONINI:3,B-NP:3))@  Microbiology:  No results found for: \"SDES\"  No components found for: \"CULT\"     Xray Result (most recent): image(s) personally reviewed  CT CHEST ABDOMEN PELVIS W CONTRAST Additional Contrast? Radiologist Recommendation  Narrative: INDICATION: malignancy staging, concern for lung cancer    COMPARISON: 4/8/2024    TECHNIQUE:  Following the uneventful intravenous administration of 100 cc  Isovue-300, 5 mm axial images were obtained through the chest, abdomen, and  pelvis. Oral contrast was not administered.  Coronal and sagittal  reconstructions were generated. CT dose reduction was achieved through use of a  standardized protocol tailored for this examination and automatic exposure  control for dose modulation.     FINDINGS:    THYROID: No nodule.  MEDIASTINUM: Superior mediastinal mass unchanged. Multiple cardiophrenic nodules  as noted previously.  KEI: 12 mm right hilar lymph node  THORACIC AORTA: No dissection or aneurysm.  MAIN PULMONARY ARTERY: Normal in caliber.  TRACHEA/BRONCHI: Patent.  ESOPHAGUS: No wall thickening or dilatation.  HEART: Small pericardial effusion. Coronary artery calcium: present  PLEURA: Small right pleural effusion. Multiple pleural-based nodules around the  right lung. Decreased right sided pneumothorax with residual.  LUNGS: Small nodules are again noted in the right upper lobe and along the right  major fissure. There is decreased right lower lobe airspace disease likely  reflecting improved aeration. There is persistent left lower lobe airspace  opacity although slightly improved as well.  LIVER: No mass or biliary dilatation.  GALLBLADDER: Unremarkable.  SPLEEN: No mass.  PANCREAS: No mass or ductal dilatation.  ADRENALS: Small and calcified right adrenal gland  KIDNEYS: No mass, calculus, or hydronephrosis.  STOMACH: Unremarkable.  SMALL BOWEL: No dilatation or wall thickening.  COLON: Rectosigmoid anastomosis with partial  angiogram: INDICATION: Shortness of breath TECHNIQUE: Thin axial scans were performed through the chest after intravenous injection of 100 cc of Isovue-370. Maximum intensity projection images were generated on an independent workstation and archived to PACS. Radiation dose reduction was utilized (automated exposure control, mA or kV adjustment based on patient size, or iterative image reconstruction). COMPARISON: None FINDINGS: LUNGS: There is a large right pleural effusion with total collapse of the right lower lobe and partial collapse of the middle lobe and right upper lobe. There is irregular pleural thickening along the medial aspect of the right lung apex. On image 149 of series 8 there is a 3.8 x 3.4 x 5.1 cm soft tissue lesion projecting over the anterior mediastinum however it is difficult to say for sure whether this represents an anterior mediastinal mass which abuts the pleura but this represents a right upper lobe pleural-based mass extending into the anterior mediastinum. Pulmonary arteries: No definite filling defect is identified however it should be stressed that the vessels of the right lower extremity are significantly limited due to collapse of the right lower lobe. Thoracic aorta: No aneurysm or dissection. Mediastinal and hilar structures:  There are scattered predominantly subcentimeter mediastinal lymph nodes one of the largest anterior to the trachea on image 112 of series 8 measuring 1.9 x 0.9 cm. There are subcentimeter left hilar lymph nodes There is mediastinal shift to the left which may be related to the large right. There is a left lower lobe infiltrate and correlation for left lower lobe pneumonia is recommended. There is bibasilar atelectasis right greater than left. The heart is not enlarged. There are sparse coronary artery calcifications. There is a small pericardial effusion.. Bony structures: Unremarkable. Visualized upper abdomen: On image 333 of series 8 there is a 2.6 x 1.9

## 2024-04-10 NOTE — PLAN OF CARE
Problem: Discharge Planning  Goal: Discharge to home or other facility with appropriate resources  Outcome: Progressing  Flowsheets (Taken 4/9/2024 0859 by Annie Luke, RN)  Discharge to home or other facility with appropriate resources: Identify barriers to discharge with patient and caregiver     Problem: Safety - Adult  Goal: Free from fall injury  Outcome: Progressing  Flowsheets (Taken 4/9/2024 0859 by Anine Luke, RN)  Free From Fall Injury: Instruct family/caregiver on patient safety     Problem: Respiratory - Adult  Goal: Achieves optimal ventilation and oxygenation  4/9/2024 2224 by Benoit Richter, RN  Outcome: Progressing  4/9/2024 1016 by Denisse Schaefer, RT  Outcome: Progressing     Problem: Skin/Tissue Integrity  Goal: Absence of new skin breakdown  Description: 1.  Monitor for areas of redness and/or skin breakdown  2.  Assess vascular access sites hourly  3.  Every 4-6 hours minimum:  Change oxygen saturation probe site  4.  Every 4-6 hours:  If on nasal continuous positive airway pressure, respiratory therapy assess nares and determine need for appliance change or resting period.  Outcome: Progressing     Problem: Pain  Goal: Verbalizes/displays adequate comfort level or baseline comfort level  Outcome: Progressing

## 2024-04-10 NOTE — PROGRESS NOTES
[facility] [place] at [facility]  [address] [facility] [address] [place] [id number]  W: [phone] F: [phone]      Hematology/ Oncology Progress Note      Reason for consult:     Fortunato Kowalski is a 58 y.o. male who we have been asked to see by Dr. Freitas for likely lung cancer.    Subjective:     Fortunato Kowalski is a 58-year-old male patient admitted to [facility] for lower left lobe pneumonia.  Patient has not seen a physician in many years and has very little past medical history.  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  He does have family close to [place] and reports he will be staying in [place] for outpatient follow-up.    Patient was seen at the bedside with his ex-wife present.  Kathy further evaluation for malignancy.  Patient wishing to put off biopsy until the outpatient setting.  Discussed pros and cons of obtaining tissue biopsy during inpatient admission.  Patient is worried about finances and reports that even if he would be receiving cancer treatment he would continue to drive to [place] for work.  Discussed that that likely would not be realistic for ongoing malignancy evaluation and possible treatment.  Patient expressed an interest in disability paperwork.  Discussed would need tissue biopsy/diagnosis before he would qualify for any disability.  At end of discussion patient was agreeable to further scans for malignancy staging and possible biopsy based on scan findings.    Interval History:     4/10/53483  Patient seen at bedside with his ex-wife present.  Discussed plans for CT-guided biopsy today.  Patient is hopeful to be discharged home tomorrow.    Review of Systems:  Pertinent items are noted in the History of Present Illness.       No past medical history on file.  Past Surgical History:   Procedure Laterality Date    IR PERC CATH PLEURAL DRAIN W/IMAG  airspace opacity in the  superior segment of the left lower lobe which is overall improved as well. No  dominant lung masses are identified. 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 Kowalski. Will continue to follow during hospitalization and arrange close OP follow up.       ASHANTI Day - NP

## 2024-04-11 ENCOUNTER — APPOINTMENT (OUTPATIENT)
Facility: HOSPITAL | Age: 59
End: 2024-04-11

## 2024-04-11 LAB
ALBUMIN SERPL-MCNC: 2.4 G/DL (ref 3.5–5)
ALBUMIN/GLOB SERPL: 0.6 (ref 1.1–2.2)
ALP SERPL-CCNC: 82 U/L (ref 45–117)
ALT SERPL-CCNC: 29 U/L (ref 12–78)
ANION GAP SERPL CALC-SCNC: 5 MMOL/L (ref 5–15)
AST SERPL-CCNC: 12 U/L (ref 15–37)
BACTERIA SPEC CULT: NORMAL
BILIRUB SERPL-MCNC: 0.4 MG/DL (ref 0.2–1)
BUN SERPL-MCNC: 13 MG/DL (ref 6–20)
BUN/CREAT SERPL: 13 (ref 12–20)
CALCIUM SERPL-MCNC: 8.3 MG/DL (ref 8.5–10.1)
CHLORIDE SERPL-SCNC: 108 MMOL/L (ref 97–108)
CO2 SERPL-SCNC: 22 MMOL/L (ref 21–32)
CREAT SERPL-MCNC: 0.98 MG/DL (ref 0.7–1.3)
ERYTHROCYTE [DISTWIDTH] IN BLOOD BY AUTOMATED COUNT: 14.1 % (ref 11.5–14.5)
FUNGUS SPEC FUNGUS STN: NORMAL
FUNGUS STAIN: NORMAL
GLOBULIN SER CALC-MCNC: 3.8 G/DL (ref 2–4)
GLUCOSE SERPL-MCNC: 149 MG/DL (ref 65–100)
GRAM STN SPEC: NORMAL
GRAM STN SPEC: NORMAL
HCT VFR BLD AUTO: 50 % (ref 36.6–50.3)
HGB BLD-MCNC: 16.3 G/DL (ref 12.1–17)
MAGNESIUM SERPL-MCNC: 2 MG/DL (ref 1.6–2.4)
MCH RBC QN AUTO: 29.1 PG (ref 26–34)
MCHC RBC AUTO-ENTMCNC: 32.6 G/DL (ref 30–36.5)
MCV RBC AUTO: 89.1 FL (ref 80–99)
NRBC # BLD: 0 K/UL (ref 0–0.01)
NRBC BLD-RTO: 0 PER 100 WBC
PHOSPHATE SERPL-MCNC: 3.4 MG/DL (ref 2.6–4.7)
PLATELET # BLD AUTO: 322 K/UL (ref 150–400)
PMV BLD AUTO: 11.2 FL (ref 8.9–12.9)
POTASSIUM SERPL-SCNC: 3.8 MMOL/L (ref 3.5–5.1)
PROT SERPL-MCNC: 6.2 G/DL (ref 6.4–8.2)
RBC # BLD AUTO: 5.61 M/UL (ref 4.1–5.7)
SERVICE CMNT-IMP: NORMAL
SODIUM SERPL-SCNC: 135 MMOL/L (ref 136–145)
SPECIMEN SOURCE: NORMAL
SPECIMEN SOURCE: NORMAL
WBC # BLD AUTO: 10.4 K/UL (ref 4.1–11.1)

## 2024-04-11 PROCEDURE — 6370000000 HC RX 637 (ALT 250 FOR IP): Performed by: INTERNAL MEDICINE

## 2024-04-11 PROCEDURE — 84100 ASSAY OF PHOSPHORUS: CPT

## 2024-04-11 PROCEDURE — 83735 ASSAY OF MAGNESIUM: CPT

## 2024-04-11 PROCEDURE — 2580000003 HC RX 258: Performed by: STUDENT IN AN ORGANIZED HEALTH CARE EDUCATION/TRAINING PROGRAM

## 2024-04-11 PROCEDURE — 71045 X-RAY EXAM CHEST 1 VIEW: CPT

## 2024-04-11 PROCEDURE — 6360000002 HC RX W HCPCS: Performed by: STUDENT IN AN ORGANIZED HEALTH CARE EDUCATION/TRAINING PROGRAM

## 2024-04-11 PROCEDURE — 80053 COMPREHEN METABOLIC PANEL: CPT

## 2024-04-11 PROCEDURE — 36415 COLL VENOUS BLD VENIPUNCTURE: CPT

## 2024-04-11 PROCEDURE — 85027 COMPLETE CBC AUTOMATED: CPT

## 2024-04-11 PROCEDURE — 2060000000 HC ICU INTERMEDIATE R&B

## 2024-04-11 RX ADMIN — GUAIFENESIN 600 MG: 600 TABLET, EXTENDED RELEASE ORAL at 21:07

## 2024-04-11 RX ADMIN — SODIUM CHLORIDE, PRESERVATIVE FREE 10 ML: 5 INJECTION INTRAVENOUS at 21:08

## 2024-04-11 RX ADMIN — SODIUM CHLORIDE, PRESERVATIVE FREE 10 ML: 5 INJECTION INTRAVENOUS at 08:13

## 2024-04-11 RX ADMIN — SODIUM CHLORIDE 1000 MG: 900 INJECTION INTRAVENOUS at 17:38

## 2024-04-11 RX ADMIN — AZITHROMYCIN MONOHYDRATE 500 MG: 500 INJECTION, POWDER, LYOPHILIZED, FOR SOLUTION INTRAVENOUS at 18:20

## 2024-04-11 RX ADMIN — GUAIFENESIN 600 MG: 600 TABLET, EXTENDED RELEASE ORAL at 08:10

## 2024-04-11 ASSESSMENT — PAIN SCALES - GENERAL
PAINLEVEL_OUTOF10: 0

## 2024-04-11 NOTE — PROGRESS NOTES
Hospitalist Progress Note    NAME:   Fortunato Kowalski   : 1965   MRN: [id number]     Date/Time: 2024 8:22 AM  Patient PCP: No primary care provider on file.    Estimated discharge date:   Barriers: Lung biopsy 4/10, pulmonary clearance, chest tube      Assessment / Plan:  Large right pleural effusion likely malignant pleural effusion  Pneumothorax status post thoracentesis   Right-sided mass s/p biopsy 4/10  Left lower lobe pneumonia with rt sided pleural effusion  Leukocytosis  Respiratory alkalosis  EKG sinus tachycardia  -CT angio pulmonary-negative for PE, large right pleural effusion with collapse of right lower lobe partial collapse of right middle and upper lobe  -Irregular pleural thickening along the medial aspect of right upper lobe with 3.8 x 3.4 x 5.1 cm mass projecting over the anterior mediastinum  Left lower lobe infiltrate likely left lower lobe pneumonia  -s/p thoracentesis , 2 L fluid removed  -s/p chest tube  for postprocedure pneumothorax  -Awaiting cytology  -Pulmonary on board  -Pending labs.  If cytology is negative may need biopsy  -Consult hematologist  -Continue empiric antibiotics  -Continue Solu-Medrol  4/10: Patient with lung biopsy today.  Chest tube is draining about 200 cc of pleural fluid.  He reports some discomfort in the biopsy site but not able.  Sister and ex-wife at the bedside.  Ex-wife is the DPOA for the patient.  : Chest x-ray in the morning showed stable trace right pneumothorax with increased right chest wall subcutaneous gas.  Chest tube was clamped by pulmonology and repeat chest x-ray does not show any residual or visible pneumothorax.  Chest tube will be removed this evening.  I spoke with oncology Vasu Delgado and they are planning for setting up outpatient visit for possible chemotherapy.     Tobacco use   Smokes 2and half pack cig/day. Counseled on quitting smoking. Offered nicotine patch. Total duration 3 min      Medical Decision  600 ml   Output 800 ml   Net -200 ml        I had a face to face encounter and independently examined this patient on 4/11/2024, as outlined below:  PHYSICAL EXAM:  General: Alert, obese, cooperative  EENT:  EOMI. Anicteric sclerae.  Resp:  Improved breath sounds, chest tube in place draining of pleural fluid.  CV:  Regular  rhythm,  No edema  GI:  Soft, Non distended, Non tender.  +Bowel sounds  Neurologic:  Alert and oriented X 3, normal speech,   Psych:   Good insight. Not anxious nor agitated  Skin:  No rashes.  No jaundice    Reviewed most current lab test results and cultures  YES  Reviewed most current radiology test results   YES  Review and summation of old records today    NO  Reviewed patient's current orders and MAR    YES  PMH/SH reviewed - no change compared to H&P    Procedures: see electronic medical records for all procedures/Xrays and details which were not copied into this note but were reviewed prior to creation of Plan.      LABS:  I reviewed today's most current labs and imaging studies.  Pertinent labs include:  Recent Labs     04/09/24  0246 04/11/24  0314   WBC 13.7* 10.4   HGB 15.6 16.3   HCT 48.1 50.0    322     Recent Labs     04/09/24  0246 04/11/24  0314   * 135*   K 4.0 3.8   * 108   CO2 20* 22   GLUCOSE 122* 149*   BUN 18 13   CREATININE 1.00 0.98   CALCIUM 8.7 8.3*   MG  --  2.0   PHOS  --  3.4   LABALBU  --  2.4*   BILITOT  --  0.4   AST  --  12*   ALT  --  29       Signed: Gabrielle Dyson MD

## 2024-04-11 NOTE — PROGRESS NOTES
Spiritual Care Partner Volunteer visited patient at [facility] in [facility] CARDIOPULMONARY CARE on 4/11/2024   Documented by: Chaplain Girma Marquis M.Div., [facility].   Paging Service: [phone])

## 2024-04-11 NOTE — PROGRESS NOTES
[facility] [place] at [facility]  [address] [facility] [address] [address]  W: [phone] F: [phone]    Patient seen at bedside with his ex-wife.  He is under the impression he is being discharged home today.  We discussed his follow-up appointment has been made for 4/18.  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  We discussed the importance of maintaining follow-up as patient likely has new malignancy.  Discussed once there is confirmation of diagnosis we can assist with disability paperwork as patient is worried about finances.  Patient and his ex-wife verbalized understanding and agreement and assured NP they would be at his follow-up appointment on 4/18.    Please call neurology with questions or concerns

## 2024-04-11 NOTE — PROGRESS NOTES
Pulmonary, Critical Care, and Sleep Medicine      Name: Fortunato Kowalski MRN: [id number]   : 1965 Hospital: [facility]   Date: 2024  Admission date: 2024 Hospital Day: 6   Patient PCP: No primary care provider on file.    History:   Pt is acutely ill and unstable. Medical records and data reviewed. Pt seen in consultation    IMPRESSION:   Large right pleural effusion - concern for malignancy versus infection - s/p thoracentesis - cytology rare atypical cells  Right pneumothorax - s/p CT placement    3.1 x 3.5 cm superior mediastinal mass, multiple nodules in the right upper lobe s/p CT guided biopsy 4/10/24  Patchy airspace disease RLL, LLL  Probable COPD no sign of acute exacerbation  Situational anxiety  Heavy cigarette smoking tobacco use 2 and half pack per day   History of diverticulitis status post surgery years ago   Body mass index is 33.51 kg/m².  Prognosis guarded       RECOMMENDATIONS/PLAN:   Chest tube clamped, will repeat CXR this afternoon, if stable can d/c chest tube  Unclamp chest tube with any changes in respiratory status  Agree with antibiotics pending culture results    Bronchodilators prn  DVT prophylaxis  Smoking cessation counseling - patient now on nicotine patch  Prescription drug management with home med reconciliation reviewed  Thank you for asking us to see pt while in the hospital  D/W him importance of follow up - his family lives in [place] and plans to follow up here     Interval history:         [x] High complexity decision making was performed  [x] See my orders for details      Initial HPI:      24 : Resting in bed, family at bedside. No changes/complaints. CT guided biopsy done yesterday. Cytology pleural fluid : rare atypical cells. d    4/10/24 : Resting in bed. Frustrated to be in the hospital. Wants coffee and a cigarette. We discussed plans.     24 : Less cough, dyspnea. No complaints. Sitting up in bed. Soreness at CT site.  toxicity: eg steroids, antibiotics  Reviewed and/or ordered Clinical lab tests  Reviewed images and/or ordered Radiology tests  discussed my assessment/management with : Nursing,Family for coordination of care      ASHANTI Zuniga - NP

## 2024-04-12 ENCOUNTER — APPOINTMENT (OUTPATIENT)
Facility: HOSPITAL | Age: 59
End: 2024-04-12

## 2024-04-12 VITALS
SYSTOLIC BLOOD PRESSURE: 117 MMHG | TEMPERATURE: 97.7 F | HEIGHT: 67 IN | HEART RATE: 97 BPM | BODY MASS INDEX: 33.59 KG/M2 | DIASTOLIC BLOOD PRESSURE: 85 MMHG | OXYGEN SATURATION: 96 % | WEIGHT: 214 LBS | RESPIRATION RATE: 18 BRPM

## 2024-04-12 LAB
ANION GAP SERPL CALC-SCNC: 8 MMOL/L (ref 5–15)
BACTERIA SPEC CULT: NORMAL
BACTERIA SPEC CULT: NORMAL
BUN SERPL-MCNC: 15 MG/DL (ref 6–20)
BUN/CREAT SERPL: 14 (ref 12–20)
CALCIUM SERPL-MCNC: 8.5 MG/DL (ref 8.5–10.1)
CHLORIDE SERPL-SCNC: 106 MMOL/L (ref 97–108)
CO2 SERPL-SCNC: 22 MMOL/L (ref 21–32)
CREAT SERPL-MCNC: 1.05 MG/DL (ref 0.7–1.3)
ERYTHROCYTE [DISTWIDTH] IN BLOOD BY AUTOMATED COUNT: 14.1 % (ref 11.5–14.5)
GLUCOSE SERPL-MCNC: 163 MG/DL (ref 65–100)
HCT VFR BLD AUTO: 49 % (ref 36.6–50.3)
HGB BLD-MCNC: 15.9 G/DL (ref 12.1–17)
MAGNESIUM SERPL-MCNC: 1.8 MG/DL (ref 1.6–2.4)
MCH RBC QN AUTO: 28.8 PG (ref 26–34)
MCHC RBC AUTO-ENTMCNC: 32.4 G/DL (ref 30–36.5)
MCV RBC AUTO: 88.6 FL (ref 80–99)
NRBC # BLD: 0 K/UL (ref 0–0.01)
NRBC BLD-RTO: 0 PER 100 WBC
PHOSPHATE SERPL-MCNC: 2.9 MG/DL (ref 2.6–4.7)
PLATELET # BLD AUTO: 334 K/UL (ref 150–400)
PMV BLD AUTO: 11.3 FL (ref 8.9–12.9)
POTASSIUM SERPL-SCNC: 3.8 MMOL/L (ref 3.5–5.1)
RBC # BLD AUTO: 5.53 M/UL (ref 4.1–5.7)
SERVICE CMNT-IMP: NORMAL
SERVICE CMNT-IMP: NORMAL
SODIUM SERPL-SCNC: 136 MMOL/L (ref 136–145)
WBC # BLD AUTO: 11.2 K/UL (ref 4.1–11.1)

## 2024-04-12 PROCEDURE — 71045 X-RAY EXAM CHEST 1 VIEW: CPT

## 2024-04-12 PROCEDURE — 84100 ASSAY OF PHOSPHORUS: CPT

## 2024-04-12 PROCEDURE — 83735 ASSAY OF MAGNESIUM: CPT

## 2024-04-12 PROCEDURE — 6370000000 HC RX 637 (ALT 250 FOR IP): Performed by: INTERNAL MEDICINE

## 2024-04-12 PROCEDURE — 32999 UNLISTED PX LUNGS & PLEURA: CPT

## 2024-04-12 PROCEDURE — 80048 BASIC METABOLIC PNL TOTAL CA: CPT

## 2024-04-12 PROCEDURE — 2580000003 HC RX 258: Performed by: STUDENT IN AN ORGANIZED HEALTH CARE EDUCATION/TRAINING PROGRAM

## 2024-04-12 PROCEDURE — 36415 COLL VENOUS BLD VENIPUNCTURE: CPT

## 2024-04-12 PROCEDURE — 0WP9X0Z REMOVAL OF DRAINAGE DEVICE FROM RIGHT PLEURAL CAVITY, EXTERNAL APPROACH: ICD-10-PCS | Performed by: STUDENT IN AN ORGANIZED HEALTH CARE EDUCATION/TRAINING PROGRAM

## 2024-04-12 PROCEDURE — 85027 COMPLETE CBC AUTOMATED: CPT

## 2024-04-12 PROCEDURE — 6360000002 HC RX W HCPCS: Performed by: STUDENT IN AN ORGANIZED HEALTH CARE EDUCATION/TRAINING PROGRAM

## 2024-04-12 RX ORDER — ONDANSETRON 4 MG/1
4 TABLET, ORALLY DISINTEGRATING ORAL EVERY 8 HOURS PRN
Qty: 30 TABLET | Refills: 0 | Status: SHIPPED | OUTPATIENT
Start: 2024-04-12 | End: 2024-04-22

## 2024-04-12 RX ORDER — BENZONATATE 100 MG/1
100 CAPSULE ORAL 3 TIMES DAILY PRN
Qty: 30 CAPSULE | Refills: 0 | Status: SHIPPED | OUTPATIENT
Start: 2024-04-12 | End: 2024-04-22

## 2024-04-12 RX ORDER — HYDROXYZINE HYDROCHLORIDE 10 MG/1
10 TABLET, FILM COATED ORAL 3 TIMES DAILY PRN
Qty: 30 TABLET | Refills: 0 | Status: SHIPPED | OUTPATIENT
Start: 2024-04-12 | End: 2024-04-22

## 2024-04-12 RX ORDER — GUAIFENESIN 600 MG/1
600 TABLET, EXTENDED RELEASE ORAL 2 TIMES DAILY
Qty: 20 TABLET | Refills: 0 | Status: SHIPPED | OUTPATIENT
Start: 2024-04-12 | End: 2024-04-22

## 2024-04-12 RX ADMIN — SODIUM CHLORIDE, PRESERVATIVE FREE 10 ML: 5 INJECTION INTRAVENOUS at 09:48

## 2024-04-12 RX ADMIN — SODIUM CHLORIDE 1000 MG: 900 INJECTION INTRAVENOUS at 12:07

## 2024-04-12 RX ADMIN — AZITHROMYCIN MONOHYDRATE 500 MG: 500 INJECTION, POWDER, LYOPHILIZED, FOR SOLUTION INTRAVENOUS at 13:44

## 2024-04-12 RX ADMIN — GUAIFENESIN 600 MG: 600 TABLET, EXTENDED RELEASE ORAL at 09:44

## 2024-04-12 ASSESSMENT — PAIN SCALES - GENERAL
PAINLEVEL_OUTOF10: 0
PAINLEVEL_OUTOF10: 0

## 2024-04-12 NOTE — PROGRESS NOTES
End of Shift Note    Bedside shift change report given to  (oncoming nurse) by Kalani Crouch RN (offgoing nurse).  Report included the following information SBAR    Shift worked:  R chest tube remains clamped for CXR in am . To IR in am for chest tube removal . No C/O resp distress      Shift summary and any significant changes:          Concerns for physician to address:       Zone phone for oncoming shift:          Activity:     Number times ambulated in hallways past shift: 0  Number of times OOB to chair past shift: 2    Cardiac:   Cardiac Monitoring: Yes           Access:  Current line(s): PIV     Genitourinary:   Urinary status: voiding    Respiratory:      Chronic home O2 use?: NO  Incentive spirometer at bedside: YES       GI:     Current diet:  ADULT DIET; Regular  Passing flatus: YES  Tolerating current diet: YES       Pain Management:   Patient states pain is manageable on current regimen: YES    Skin:     Interventions: increase time out of bed    Patient Safety:  Fall Score:    Interventions: bed/chair alarm, gripper socks, pt to call before getting OOB, and stay with me (per policy)       Length of Stay:  Expected LOS: 6  Actual LOS: 6      Kalani Crouch RN

## 2024-04-12 NOTE — DISCHARGE SUMMARY
Discharge Summary    Name: Fortunato Kowalski  [id number]  YOB: 1965 (Age: 58 y.o.)   Date of Admission: 4/6/2024  Date of Discharge: 4/12/2024  Attending Physician: Gabrielle Dyson MD    Discharge Diagnosis:   Large right pleural effusion likely malignant pleural effusion  Pneumothorax status post thoracentesis 4/7  Right-sided mass s/p biopsy 4/10  Left lower lobe pneumonia with rt sided pleural effusion  Leukocytosis  Respiratory alkalosis  Tobacco use         Consultations:  IP CONSULT TO HOSPITALIST  IP CONSULT TO PULMONOLOGY  IP CONSULT TO HEMATOLOGY      Brief Admission History/Reason for Admission Per Cate Murphy MD: Fortunato Kowalski is a 58 y.o.  male with PMHx as mentioned below presented to ED for evaluation of shortness of breath.  Patient is a  by profession and he travels frequently.  He had cough and flulike symptoms 12 days ago.  He initially thought were just cold and did not seek medical advice.  He traveled to [place] then to [place] then to [place].  The shortness of breath progressively worsened so he thought to see a doctor in [place].  In [place] -CTA chest was negative for PE and noted for pleural effusion on right and pneumonia of the left lower lobe with the mass of upper lobe of the right lung.  Patient smokes 2 and half pack of cigarettes for more than 40 years.  Occasionally drinks alcohol.           Brief Hospital Course by Main Problems:   Large right pleural effusion likely malignant pleural effusion  Pneumothorax status post thoracentesis 4/7  Right-sided mass s/p biopsy 4/10  Left lower lobe pneumonia with rt sided pleural effusion  Leukocytosis  Respiratory alkalosis  EKG sinus tachycardia  -CT angio pulmonary-negative for PE, large right pleural effusion with collapse of right lower lobe partial collapse of right middle and upper lobe  -Irregular pleural thickening along the medial aspect of right

## 2024-04-12 NOTE — PROGRESS NOTES
Not on file    Smokeless tobacco: Not on file   Substance Use Topics    Alcohol use: Not on file      No family history on file.      ROS:Pertinent items are noted in HPI.    Objective:     Vital Signs: Telemetry:     Intake/Output:   /84   Pulse 96   Temp 97.5 °F (36.4 °C) (Oral)   Resp 16   Ht 1.702 m (5' 7.01\")   Wt 97.1 kg (214 lb)   SpO2 96%   BMI 33.51 kg/m²     Temp (24hrs), Av.6 °F (36.4 °C), Min:97.3 °F (36.3 °C), Max:97.9 °F (36.6 °C)                  Wt Readings from Last 4 Encounters:   04/10/24 97.1 kg (214 lb)       Patient Vitals for the past 96 hrs (Last 3 readings):   Weight   04/10/24 1432 97.1 kg (214 lb)         PF Readings from Last 3 Encounters:   No data found for PF      Intake/Output Summary (Last 24 hours) at 2024 0854  Last data filed at 2024 2258  Gross per 24 hour   Intake 850 ml   Output 900 ml   Net -50 ml         Last shift:      No intake/output data recorded.  Last 3 shifts: 04/10 1901 -  0700  In: 940 [P.O.:690]  Out: 1550 [Urine:1150]       Physical Exam:   General: Awake, anxious, tachypneic;  male; well developed and well nourished;  Obese;  HEENT: NCAT, poor dentition, lips and mucosa dry. crowded oropharynx, no thrush  Eyes: anicteric; conjunctiva clear  Neck: no nodes or obvious goiter, no accessory MM use, no stridor  Chest/ Thorax: no deformity, moderate; barrel chested  Cardiac: regular rate, rhythm; no murmur; no tachycardia  Lungs: Diminished breath sounds; especailly right side scattered, no wheezes, no rales,  no rhonchi,   Abd: soft, NT, hypoactive BS, distended, OBESE,   MS/Ext: no edema; no joint swelling; No clubbing  : No ko,   Neuro: fluent,  follows commands;   Psych: no agitation, oriented to person; Anxious  Skin: normal temperature, dry, no cyanosis;   Pulses: 1-2+ Bilateral pedal, radial  Capillary: brisk; pale      Labs:  ABG Invalid input(s): \"PHI\", \"PCO2I\", \"PO2I\", \"HCO3I\", \"SO2I\", \"FIO2I\"  Invalid input(s):

## 2024-04-12 NOTE — PLAN OF CARE
Problem: Safety - Adult  Goal: Free from fall injury  Outcome: Progressing     Problem: Respiratory - Adult  Goal: Achieves optimal ventilation and oxygenation  Outcome: Progressing     Problem: Skin/Tissue Integrity  Goal: Absence of new skin breakdown  Description: 1.  Monitor for areas of redness and/or skin breakdown  2.  Assess vascular access sites hourly  3.  Every 4-6 hours minimum:  Change oxygen saturation probe site  4.  Every 4-6 hours:  If on nasal continuous positive airway pressure, respiratory therapy assess nares and determine need for appliance change or resting period.  Outcome: Progressing     Problem: Pain  Goal: Verbalizes/displays adequate comfort level or baseline comfort level  Outcome: Progressing  Flowsheets (Taken 4/11/2024 1505 by Annie Luke RN)  Verbalizes/displays adequate comfort level or baseline comfort level: Encourage patient to monitor pain and request assistance

## 2024-04-12 NOTE — CARE COORDINATION
04/12/24 1312   Discharge Planning   Type of Residence House   Living Arrangements Spouse/Significant Other   Patient expects to be discharged to: House   Services At/After Discharge   Transition of Care Consult (CM Consult) N/A   Services At/After Discharge None   Mode of Transport at Discharge Other (see comment)   Confirm Follow Up Transport Family   Condition of Participation: Discharge Planning   The Plan for Transition of Care is related to the following treatment goals: PCP and specialist     No further needs from YAMINI Morales RN CM   [id number]

## 2024-04-12 NOTE — CARE COORDINATION
Transition of Care Plan:    RUR: 6%  Prior Level of Functioning: independant  Disposition: home  If SNF or IPR: Date FOC offered:   Date FOC received:   Accepting facility:   Date authorization started with reference number:   Date authorization received and expires:   Follow up appointments: PCP and specialist  DME needed: none noted  Transportation at discharge: ex-spouse  IM/IMM Medicare/ letter given: n/a  Is patient a  and connected with [facility]?    If yes, was [place] transfer form completed and [facility] notified?   Caregiver Contact: Yolanda Kowalski (Ex-Spouse)  [phone]    Discharge Caregiver contacted prior to discharge?   Care Conference needed?   Barriers to discharge: clinical improvement  YAMINI reviewed notes this am. Will follow up on discharge planning.    English Carmen ACEVES CM   [phone]

## 2024-04-15 LAB
BACTERIA SPEC CULT: NORMAL
SERVICE CMNT-IMP: NORMAL

## 2024-04-18 ENCOUNTER — OFFICE VISIT (OUTPATIENT)
Age: 59
End: 2024-04-18

## 2024-04-18 VITALS
SYSTOLIC BLOOD PRESSURE: 124 MMHG | OXYGEN SATURATION: 98 % | RESPIRATION RATE: 17 BRPM | WEIGHT: 217 LBS | BODY MASS INDEX: 34.06 KG/M2 | HEART RATE: 105 BPM | HEIGHT: 67 IN | DIASTOLIC BLOOD PRESSURE: 87 MMHG | TEMPERATURE: 98.2 F

## 2024-04-18 DIAGNOSIS — C34.90 SQUAMOUS CELL CARCINOMA OF LUNG, STAGE IV, UNSPECIFIED LATERALITY (HCC): Primary | ICD-10-CM

## 2024-04-18 PROCEDURE — 99215 OFFICE O/P EST HI 40 MIN: CPT | Performed by: STUDENT IN AN ORGANIZED HEALTH CARE EDUCATION/TRAINING PROGRAM

## 2024-04-18 RX ORDER — ONDANSETRON 4 MG/1
4 TABLET, ORALLY DISINTEGRATING ORAL EVERY 8 HOURS PRN
Qty: 60 TABLET | Refills: 2 | Status: SHIPPED | OUTPATIENT
Start: 2024-04-18

## 2024-04-18 RX ORDER — PROCHLORPERAZINE MALEATE 5 MG/1
5 TABLET ORAL EVERY 6 HOURS PRN
Qty: 60 TABLET | Refills: 2 | Status: SHIPPED | OUTPATIENT
Start: 2024-04-18

## 2024-04-18 RX ORDER — DEXAMETHASONE 4 MG/1
TABLET ORAL
Qty: 20 TABLET | Refills: 0 | Status: SHIPPED | OUTPATIENT
Start: 2024-04-18

## 2024-04-18 RX ORDER — LIDOCAINE AND PRILOCAINE 25; 25 MG/G; MG/G
CREAM TOPICAL
Qty: 30 G | Refills: 2 | Status: SHIPPED | OUTPATIENT
Start: 2024-04-18

## 2024-04-18 NOTE — PROGRESS NOTES
[facility]  [address] [facility] [address] [place] [id number]  W: [phone] F: [phone]    Reason for Visit:   Fortunato Kowalski is a 58 y.o. male who is seen in consultation at the request of Dr. Dyson for evaluation of lung cancer.      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    Core biopsy shows similar features.       General Categorization   Malignant cells present     Specimen Adequacy   Satisfactory for evaluation.       Comment   Immunohistochemical staining on block 1A shows that the malignant cells   stain as follows:   CK 7: Positive   CK 20: Negative   TTF-1: Negative   P40: Positive   P63: Positive   CA-9: Negative   PAX8: Negative   Napsin A: Negative   NKX 3.1: Negative   CDX2: Negative   WT1: Negative   Claudin-4: Weakly positive   Calretinin: Negative     PDL1 (Keytruda) immunohistochemical testing is ordered on tissue block 1A   with the results to be reported in an addendum.     Initial Presentation  (HPI):       History of Present Illness  The patient is a 58-year-old gentleman who I originally saw as an inpatient for suspected lung malignancy with pleural-based nodules. He originally presented to the emergency department with complaints of shortness of breath and a large right-sided pleural effusion. He had an irregular area of pleural thickening on the medial aspect

## 2024-04-18 NOTE — PROGRESS NOTES
Fortunato Kowalski is a 58 y.o. male who presents for follow up of   Chief Complaint   Patient presents with    New Patient     Lung bx        Mr. Kowalski is here today for BX results He reports a cough he believes from allergies . He is very anxious and denies any other symptoms. He is accompanied with his ex wife.      medications reviewed with the patient, and chart updated to reflect changes.

## 2024-04-22 LAB
BACTERIA SPEC CULT: NORMAL
SERVICE CMNT-IMP: NORMAL

## 2024-04-23 ENCOUNTER — CLINICAL DOCUMENTATION (OUTPATIENT)
Age: 59
End: 2024-04-23

## 2024-04-23 NOTE — PROGRESS NOTES
[facility] Oncology Social Work   Psychosocial Assessment    [x] Med-Onc [facility] [] Med-Onc [facility] [] Med-Onc [facility] [] Rad-Onc [facility] [] Rad-Onc [facility] [] Rad-Onc [facility] [] Rad-Onc [facility] [] Breast Center       Patient: Fortunato Kowalski    Reason for Assessment:    [] Social Work Referral  [x] Initial Assessment  [x] New Diagnosis  [] Other:     Advance Care Planning:  [] AMD Discussed and Completed  [] AMD Reviewed Verbally [] AMD Copy Provided  [x] Conversation Deferred [] Conversation Declined      Sources of Information:    [x] Patient  [x] Family  [x] Staff  [x] Medical Record    Mental Status:    [x] Alert  [] Lethargic  [] Unresponsive  [] Unable to assess   Oriented to: [x] Person  [x] Place  [x] Time  [x] Situation      Relationship Status:  [] Single  []   [] Significant Other/Life Partner  [x]   []   []     Living Circumstances:  [x] Lives Alone  [] Family/Significant Other in Household  [] Roommates  [] Children in the Home  [] Paid Caregivers  [] Assisted Living Facility/Group Home  [] Skilled Nursing Facility  [] Homeless  [] Incarcerated  [] Environmental/Care Concerns  [] Other:    Employment Status:   [x] Employed Full-time  [] Employed Part-time  [] Homemaker  [] Retired  [] Short-Term Disability  [] Long-Term Disability  [] Unemployed  [] SSI  [] SSDI  [] Self-Employment    Transportation Resources:   [x] Self  [] Family/Friends  [] Insurance  [] Community Programs  [] Other:    Barriers to Learning:    [] Language  [] Developmental  [] Cognitive  [] Altered Mental Status  [] Visual/Hearing Impairment  [] Unable to Read/Write  [] Motivational  [] Challenges Understanding Medical Jargon [x] No Barriers Identified      Financial/Legal Concerns:    [] Uninsured  [x] Limited Income/Resources  [] Non-Citizen  [] Food Insecurity [] No Concerns Identified   [] Other:    Confucianist/Spiritual/Existential:   Does patient have any spiritual or Muslim beliefs? [] Yes [x] No  Is the patient

## 2024-04-25 ENCOUNTER — TELEPHONE (OUTPATIENT)
Age: 59
End: 2024-04-25

## 2024-04-25 NOTE — TELEPHONE ENCOUNTER
[facility]  Oncology Social Work Encounter    [x] Med-Onc [facility] [] Med-Onc [facility] [] Med-Onc [facility] [] Rad-Onc [facility] [] Rad-Onc [facility] [] Rad-Onc [facility] [] Rad-Onc [facility] [] Breast Center    Patient: Fortunato Kowalski    Encounter Type:    [] Initial SW Encounter  [] Patient Initiated  [] Referral  [] Distress/PHQ Screening  [x] Other:      Concern(s)/Barrier(s) to Care:     Narrative: ex wife has completed task of calling [employer] for SSDI, also completing medicaid application which she needs to take to .   Per Yolanda, he will be living with her. PT also has stated when he gets back, he will not be working anymore - higher elevations have made it hard for him to breath.        Pt will be driving back to [place] and hopefully get a letter from his boss about how much he made in the last 3 months.      Referral/Handouts:         Insurance/Entitlements referral  Financial/Medication assistance referral     Plan:

## 2024-04-29 LAB
BACTERIA SPEC CULT: NORMAL
SERVICE CMNT-IMP: NORMAL

## 2024-05-01 ENCOUNTER — TELEPHONE (OUTPATIENT)
Age: 59
End: 2024-05-01

## 2024-05-01 NOTE — TELEPHONE ENCOUNTER
Carl Rosario  Oncology Social Work Encounter    [x] Med-Onc [facility] [] Med-Onc [facility] [] Med-Onc [facility] [] Rad-Onc [facility] [] Rad-Onc [facility] [] Rad-Onc [facility] [] Rad-Onc [facility] [] Breast Center    Patient: Fortunato Kowalski    Encounter Type:    [] Initial SW Encounter  [] Patient Initiated  [] Referral  [] Distress/PHQ Screening  [x] Other:      Concern(s)/Barrier(s) to Care:     Narrative: Pt ex wife states pt has called back and stated pt had spoken to his boss and explained 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  PT boss said I cannot run a company like this and fired him.  Pt states to ex wife, that boss will not write a letter for him to turn in with [employer].        Ex wife has applied for [place] but has not heard back.     Again, SW advised pt needs to seek medical attention for his SOB and back pain.      Ex wife stated he said \"good thing I don't have access to a 357\".       Referral/Handouts:         Plan:  Pt also has not gotten a returned call from [facility] for him to apply for SSDI.

## 2024-05-01 NOTE — TELEPHONE ENCOUNTER
Carl Rosario  Oncology Social Work Encounter    [x] Med-Onc [facility] [] Med-Onc [facility] [] Med-Onc [facility] [] Rad-Onc [facility] [] Rad-Onc [facility] [] Rad-Onc [facility] [] Rad-Onc [facility] [] Breast Center    Patient: Fortunato Kowalski    Encounter Type:    [] Initial SW Encounter  [x] Patient Initiated  [] Referral  [] Distress/PHQ Screening  [] Other:      Concern(s)/Barrier(s) to Care:     Narrative: pt ex wife Yolanda call concerned about pt driving [employer] and has passed [employer] and states he has 9 more hours to [place].   RN shared her concern about pt passing out at the wheel.   RN/SW could hear pt labored breathing.   Ex wife thinks he has fluid again that needs to be drained.      Referral/Handouts:       Insurance/Entitlements referral   pt is uninsured  Financial/Medication assistance referral   pt needs to complete FAA - needs proof on income and a letter stating he does not have a checking account.      Plan:

## 2024-05-02 ENCOUNTER — TELEPHONE (OUTPATIENT)
Age: 59
End: 2024-05-02

## 2024-05-02 ENCOUNTER — CLINICAL DOCUMENTATION (OUTPATIENT)
Facility: HOSPITAL | Age: 59
End: 2024-05-02

## 2024-05-02 DIAGNOSIS — F17.200 SMOKING: Primary | ICD-10-CM

## 2024-05-02 RX ORDER — ATORVASTATIN CALCIUM 80 MG/1
TABLET, FILM COATED ORAL
Qty: 90 TABLET | Refills: 3 | Status: SHIPPED | OUTPATIENT
Start: 2024-05-02

## 2024-05-02 RX ORDER — NICOTINE 21 MG/24HR
1 PATCH, TRANSDERMAL 24 HOURS TRANSDERMAL EVERY 24 HOURS
Qty: 42 PATCH | Refills: 0 | Status: ON HOLD | OUTPATIENT
Start: 2024-05-02 | End: 2024-06-13

## 2024-05-02 NOTE — TELEPHONE ENCOUNTER
Rx request for nicoderm patches. Spoke with pt, verified with 2 identifiers. He is smoking 1 1/2 - 2 ppd cigarettes, plan to send in nicoderm 21 mg/24hr patches

## 2024-05-02 NOTE — TELEPHONE ENCOUNTER
Carl Rosario  Oncology Social Work Encounter    [] Med-Onc [facility] [] Med-Onc [facility] [] Med-Onc [facility] [] Rad-Onc [facility] [] Rad-Onc [facility] [] Rad-Onc [facility] [] Rad-Onc [facility] [] Breast Center    Patient: Fortunato Kowalski    Encounter Type:    [] Initial SW Encounter  [x] Patient Initiated  [] Referral  [] Distress/PHQ Screening  [] Other:      Concern(s)/Barrier(s) to Care:     Narrative: pt back in [place] but [place] not go to [place]  Pt ex has dropped off Medicaid application in [place] on Monday 4/29 along with food stamp application.        PT has submitted application for [employer] but needs income verification and letter signed that he does not have a checking account.    Referral/Handouts:   Insurance/Entitlements referral  has applied for Medicaid in [place]  Financial/Medication assistance referral   Will complete [employer] when he has a letter from employer and signs a letter he does not have a checking account     Plan:

## 2024-05-02 NOTE — PROGRESS NOTES
Patient Assistance        Navigator Type: Infusion  Documentation Type: New Patient  Contact Type: Telephone  Navigation Status: Free Drug Enrollment  Status of Patient Insurance Coverage: Patient does not have active coverage          Additional notes: Free drug saleem is Pending     Drug Name: Keytruda  Form of PAP Assistance: Free Drug - Pending

## 2024-05-03 ENCOUNTER — APPOINTMENT (OUTPATIENT)
Facility: HOSPITAL | Age: 59
DRG: 167 | End: 2024-05-03

## 2024-05-03 ENCOUNTER — CLINICAL DOCUMENTATION (OUTPATIENT)
Facility: HOSPITAL | Age: 59
End: 2024-05-03

## 2024-05-03 ENCOUNTER — TELEPHONE (OUTPATIENT)
Age: 59
End: 2024-05-03

## 2024-05-03 ENCOUNTER — HOSPITAL ENCOUNTER (INPATIENT)
Facility: HOSPITAL | Age: 59
LOS: 4 days | Discharge: HOME | DRG: 167 | End: 2024-05-07
Attending: EMERGENCY MEDICINE | Admitting: STUDENT IN AN ORGANIZED HEALTH CARE EDUCATION/TRAINING PROGRAM

## 2024-05-03 DIAGNOSIS — J91.0 MALIGNANT PLEURAL EFFUSION: Primary | ICD-10-CM

## 2024-05-03 DIAGNOSIS — C34.90 SQUAMOUS CELL CARCINOMA OF LUNG, STAGE IV, UNSPECIFIED LATERALITY (HCC): ICD-10-CM

## 2024-05-03 DIAGNOSIS — R05.3 PERSISTENT COUGH: ICD-10-CM

## 2024-05-03 DIAGNOSIS — C34.90 PRIMARY MALIGNANT NEOPLASM OF LUNG METASTATIC TO OTHER SITE, UNSPECIFIED LATERALITY (HCC): ICD-10-CM

## 2024-05-03 PROBLEM — J90 PLEURAL EFFUSION: Status: ACTIVE | Noted: 2024-05-03

## 2024-05-03 LAB
ALBUMIN SERPL-MCNC: 2.9 G/DL (ref 3.5–5)
ALBUMIN/GLOB SERPL: 0.7 (ref 1.1–2.2)
ALP SERPL-CCNC: 192 U/L (ref 45–117)
ALT SERPL-CCNC: 107 U/L (ref 12–78)
ANION GAP SERPL CALC-SCNC: 9 MMOL/L (ref 5–15)
AST SERPL-CCNC: 38 U/L (ref 15–37)
BASOPHILS # BLD: 0.1 K/UL (ref 0–0.1)
BASOPHILS NFR BLD: 1 % (ref 0–1)
BILIRUB SERPL-MCNC: 1.1 MG/DL (ref 0.2–1)
BUN/CREAT SERPL: 11 (ref 12–20)
CALCIUM SERPL-MCNC: 8.8 MG/DL (ref 8.5–10.1)
CHLORIDE SERPL-SCNC: 106 MMOL/L (ref 97–108)
CO2 SERPL-SCNC: 22 MMOL/L (ref 21–32)
CREAT SERPL-MCNC: 1.27 MG/DL (ref 0.7–1.3)
DIFFERENTIAL METHOD BLD: ABNORMAL
EOSINOPHIL # BLD: 0.3 K/UL (ref 0–0.4)
EOSINOPHIL NFR BLD: 4 % (ref 0–7)
ERYTHROCYTE [DISTWIDTH] IN BLOOD BY AUTOMATED COUNT: 14.8 % (ref 11.5–14.5)
GLOBULIN SER CALC-MCNC: 4 G/DL (ref 2–4)
GLUCOSE SERPL-MCNC: 172 MG/DL (ref 65–100)
HCT VFR BLD AUTO: 42.8 % (ref 36.6–50.3)
HGB BLD-MCNC: 14 G/DL (ref 12.1–17)
IMM GRANULOCYTES # BLD AUTO: 0 K/UL (ref 0–0.04)
IMM GRANULOCYTES NFR BLD AUTO: 0 % (ref 0–0.5)
LYMPHOCYTES # BLD: 1.7 K/UL (ref 0.8–3.5)
LYMPHOCYTES NFR BLD: 18 % (ref 12–49)
MCH RBC QN AUTO: 27.8 PG (ref 26–34)
MCHC RBC AUTO-ENTMCNC: 32.7 G/DL (ref 30–36.5)
MCV RBC AUTO: 85.1 FL (ref 80–99)
MONOCYTES # BLD: 0.9 K/UL (ref 0–1)
MONOCYTES NFR BLD: 10 % (ref 5–13)
NEUTS SEG # BLD: 6.3 K/UL (ref 1.8–8)
NEUTS SEG NFR BLD: 67 % (ref 32–75)
NRBC # BLD: 0 K/UL (ref 0–0.01)
NRBC BLD-RTO: 0 PER 100 WBC
NT PRO BNP: 534 PG/ML
PLATELET # BLD AUTO: 365 K/UL (ref 150–400)
PMV BLD AUTO: 11.4 FL (ref 8.9–12.9)
POTASSIUM SERPL-SCNC: 3.3 MMOL/L (ref 3.5–5.1)
PROT SERPL-MCNC: 6.9 G/DL (ref 6.4–8.2)
RBC # BLD AUTO: 5.03 M/UL (ref 4.1–5.7)
SODIUM SERPL-SCNC: 137 MMOL/L (ref 136–145)
TROPONIN I SERPL HS-MCNC: 9 NG/L (ref 0–76)
WBC # BLD AUTO: 9.3 K/UL (ref 4.1–11.1)

## 2024-05-03 PROCEDURE — 71046 X-RAY EXAM CHEST 2 VIEWS: CPT

## 2024-05-03 PROCEDURE — 6370000000 HC RX 637 (ALT 250 FOR IP): Performed by: EMERGENCY MEDICINE

## 2024-05-03 PROCEDURE — 2500000003 HC RX 250 WO HCPCS: Performed by: EMERGENCY MEDICINE

## 2024-05-03 PROCEDURE — 85025 COMPLETE CBC W/AUTO DIFF WBC: CPT

## 2024-05-03 PROCEDURE — 6360000002 HC RX W HCPCS: Performed by: EMERGENCY MEDICINE

## 2024-05-03 PROCEDURE — 84484 ASSAY OF TROPONIN QUANT: CPT

## 2024-05-03 PROCEDURE — 6370000000 HC RX 637 (ALT 250 FOR IP): Performed by: NURSE PRACTITIONER

## 2024-05-03 PROCEDURE — 83036 HEMOGLOBIN GLYCOSYLATED A1C: CPT

## 2024-05-03 PROCEDURE — 80053 COMPREHEN METABOLIC PANEL: CPT

## 2024-05-03 PROCEDURE — 94640 AIRWAY INHALATION TREATMENT: CPT

## 2024-05-03 PROCEDURE — 96374 THER/PROPH/DIAG INJ IV PUSH: CPT

## 2024-05-03 PROCEDURE — 96375 TX/PRO/DX INJ NEW DRUG ADDON: CPT

## 2024-05-03 PROCEDURE — 2500000003 HC RX 250 WO HCPCS: Performed by: HOSPITALIST

## 2024-05-03 PROCEDURE — 2580000003 HC RX 258: Performed by: NURSE PRACTITIONER

## 2024-05-03 PROCEDURE — 99285 EMERGENCY DEPT VISIT HI MDM: CPT

## 2024-05-03 PROCEDURE — 93005 ELECTROCARDIOGRAM TRACING: CPT | Performed by: EMERGENCY MEDICINE

## 2024-05-03 PROCEDURE — 6370000000 HC RX 637 (ALT 250 FOR IP): Performed by: HOSPITALIST

## 2024-05-03 PROCEDURE — 1100000003 HC PRIVATE W/ TELEMETRY

## 2024-05-03 PROCEDURE — 6360000002 HC RX W HCPCS: Performed by: NURSE PRACTITIONER

## 2024-05-03 PROCEDURE — 2580000003 HC RX 258

## 2024-05-03 PROCEDURE — 83880 ASSAY OF NATRIURETIC PEPTIDE: CPT

## 2024-05-03 PROCEDURE — 36415 COLL VENOUS BLD VENIPUNCTURE: CPT

## 2024-05-03 RX ORDER — HYDROMORPHONE HYDROCHLORIDE 1 MG/ML
1 INJECTION, SOLUTION INTRAMUSCULAR; INTRAVENOUS; SUBCUTANEOUS
Status: COMPLETED | OUTPATIENT
Start: 2024-05-03 | End: 2024-05-03

## 2024-05-03 RX ORDER — ENOXAPARIN SODIUM 100 MG/ML
40 INJECTION SUBCUTANEOUS DAILY
Status: DISCONTINUED | OUTPATIENT
Start: 2024-05-03 | End: 2024-05-07 | Stop reason: HOSPADM

## 2024-05-03 RX ORDER — SODIUM CHLORIDE 0.9 % (FLUSH) 0.9 %
5-40 SYRINGE (ML) INJECTION EVERY 12 HOURS SCHEDULED
Status: DISCONTINUED | OUTPATIENT
Start: 2024-05-03 | End: 2024-05-07 | Stop reason: HOSPADM

## 2024-05-03 RX ORDER — BENZONATATE 100 MG/1
100 CAPSULE ORAL
Status: DISPENSED | OUTPATIENT
Start: 2024-05-03 | End: 2024-05-04

## 2024-05-03 RX ORDER — POTASSIUM CHLORIDE 20 MEQ/1
40 TABLET, EXTENDED RELEASE ORAL ONCE
Status: COMPLETED | OUTPATIENT
Start: 2024-05-03 | End: 2024-05-03

## 2024-05-03 RX ORDER — ONDANSETRON 2 MG/ML
4 INJECTION INTRAMUSCULAR; INTRAVENOUS EVERY 6 HOURS PRN
Status: DISCONTINUED | OUTPATIENT
Start: 2024-05-03 | End: 2024-05-07 | Stop reason: HOSPADM

## 2024-05-03 RX ORDER — HYDROMORPHONE HYDROCHLORIDE 1 MG/ML
0.5 INJECTION, SOLUTION INTRAMUSCULAR; INTRAVENOUS; SUBCUTANEOUS ONCE
Status: COMPLETED | OUTPATIENT
Start: 2024-05-03 | End: 2024-05-03

## 2024-05-03 RX ORDER — NICOTINE 21 MG/24HR
1 PATCH, TRANSDERMAL 24 HOURS TRANSDERMAL EVERY 24 HOURS
Status: CANCELLED | OUTPATIENT
Start: 2024-05-03

## 2024-05-03 RX ORDER — POLYETHYLENE GLYCOL 3350 17 G/17G
17 POWDER, FOR SOLUTION ORAL DAILY PRN
Status: DISCONTINUED | OUTPATIENT
Start: 2024-05-03 | End: 2024-05-07 | Stop reason: HOSPADM

## 2024-05-03 RX ORDER — NICOTINE 21 MG/24HR
1 PATCH, TRANSDERMAL 24 HOURS TRANSDERMAL DAILY
Status: DISCONTINUED | OUTPATIENT
Start: 2024-05-03 | End: 2024-05-07 | Stop reason: HOSPADM

## 2024-05-03 RX ORDER — ONDANSETRON 4 MG/1
4 TABLET, ORALLY DISINTEGRATING ORAL EVERY 8 HOURS PRN
Status: CANCELLED | OUTPATIENT
Start: 2024-05-03

## 2024-05-03 RX ORDER — BENZONATATE 100 MG/1
200 CAPSULE ORAL
Status: COMPLETED | OUTPATIENT
Start: 2024-05-03 | End: 2024-05-03

## 2024-05-03 RX ORDER — IPRATROPIUM BROMIDE AND ALBUTEROL SULFATE 2.5; .5 MG/3ML; MG/3ML
1 SOLUTION RESPIRATORY (INHALATION)
Status: COMPLETED | OUTPATIENT
Start: 2024-05-03 | End: 2024-05-03

## 2024-05-03 RX ORDER — PROCHLORPERAZINE MALEATE 5 MG/1
5 TABLET ORAL EVERY 6 HOURS PRN
Status: CANCELLED | OUTPATIENT
Start: 2024-05-03

## 2024-05-03 RX ORDER — SODIUM CHLORIDE 9 MG/ML
INJECTION, SOLUTION INTRAVENOUS PRN
Status: DISCONTINUED | OUTPATIENT
Start: 2024-05-03 | End: 2024-05-07 | Stop reason: HOSPADM

## 2024-05-03 RX ORDER — ONDANSETRON 4 MG/1
4 TABLET, ORALLY DISINTEGRATING ORAL EVERY 8 HOURS PRN
Status: DISCONTINUED | OUTPATIENT
Start: 2024-05-03 | End: 2024-05-07 | Stop reason: HOSPADM

## 2024-05-03 RX ORDER — DIAZEPAM 5 MG/1
5 TABLET ORAL ONCE
Status: COMPLETED | OUTPATIENT
Start: 2024-05-03 | End: 2024-05-03

## 2024-05-03 RX ORDER — ACETAMINOPHEN 325 MG/1
650 TABLET ORAL EVERY 6 HOURS PRN
Status: DISCONTINUED | OUTPATIENT
Start: 2024-05-03 | End: 2024-05-07 | Stop reason: HOSPADM

## 2024-05-03 RX ORDER — METHYLPREDNISOLONE SODIUM SUCCINATE 125 MG/2ML
125 INJECTION, POWDER, LYOPHILIZED, FOR SOLUTION INTRAMUSCULAR; INTRAVENOUS ONCE
Status: COMPLETED | OUTPATIENT
Start: 2024-05-03 | End: 2024-05-03

## 2024-05-03 RX ORDER — ACETAMINOPHEN 325 MG/1
650 TABLET ORAL EVERY 4 HOURS PRN
Status: DISCONTINUED | OUTPATIENT
Start: 2024-05-03 | End: 2024-05-06 | Stop reason: SDUPTHER

## 2024-05-03 RX ORDER — ACETAMINOPHEN 650 MG/1
650 SUPPOSITORY RECTAL EVERY 6 HOURS PRN
Status: DISCONTINUED | OUTPATIENT
Start: 2024-05-03 | End: 2024-05-07 | Stop reason: HOSPADM

## 2024-05-03 RX ORDER — WATER 10 ML/10ML
INJECTION INTRAMUSCULAR; INTRAVENOUS; SUBCUTANEOUS
Status: COMPLETED
Start: 2024-05-03 | End: 2024-05-03

## 2024-05-03 RX ORDER — SODIUM CHLORIDE 0.9 % (FLUSH) 0.9 %
5-40 SYRINGE (ML) INJECTION PRN
Status: DISCONTINUED | OUTPATIENT
Start: 2024-05-03 | End: 2024-05-07 | Stop reason: HOSPADM

## 2024-05-03 RX ADMIN — POTASSIUM CHLORIDE 40 MEQ: 1500 TABLET, EXTENDED RELEASE ORAL at 19:41

## 2024-05-03 RX ADMIN — BENZONATATE 200 MG: 100 CAPSULE ORAL at 17:30

## 2024-05-03 RX ADMIN — DIAZEPAM 5 MG: 5 TABLET ORAL at 16:51

## 2024-05-03 RX ADMIN — METHYLPREDNISOLONE SODIUM SUCCINATE 125 MG: 125 INJECTION INTRAMUSCULAR; INTRAVENOUS at 16:51

## 2024-05-03 RX ADMIN — HYDROMORPHONE HYDROCHLORIDE 1 MG: 1 INJECTION, SOLUTION INTRAMUSCULAR; INTRAVENOUS; SUBCUTANEOUS at 17:30

## 2024-05-03 RX ADMIN — ENOXAPARIN SODIUM 40 MG: 100 INJECTION SUBCUTANEOUS at 19:41

## 2024-05-03 RX ADMIN — HYDROMORPHONE HYDROCHLORIDE 0.5 MG: 1 INJECTION, SOLUTION INTRAMUSCULAR; INTRAVENOUS; SUBCUTANEOUS at 22:33

## 2024-05-03 RX ADMIN — IPRATROPIUM BROMIDE AND ALBUTEROL SULFATE 1 DOSE: .5; 3 SOLUTION RESPIRATORY (INHALATION) at 17:12

## 2024-05-03 RX ADMIN — SODIUM CHLORIDE, PRESERVATIVE FREE 5 ML: 5 INJECTION INTRAVENOUS at 19:42

## 2024-05-03 RX ADMIN — WATER 2 ML: 1 INJECTION INTRAMUSCULAR; INTRAVENOUS; SUBCUTANEOUS at 16:54

## 2024-05-03 ASSESSMENT — PAIN DESCRIPTION - ORIENTATION: ORIENTATION: RIGHT

## 2024-05-03 ASSESSMENT — PAIN - FUNCTIONAL ASSESSMENT
PAIN_FUNCTIONAL_ASSESSMENT: NONE - DENIES PAIN
PAIN_FUNCTIONAL_ASSESSMENT: ACTIVITIES ARE NOT PREVENTED

## 2024-05-03 ASSESSMENT — PAIN DESCRIPTION - DESCRIPTORS: DESCRIPTORS: ACHING;STABBING

## 2024-05-03 ASSESSMENT — PAIN DESCRIPTION - LOCATION: LOCATION: RIB CAGE

## 2024-05-03 ASSESSMENT — PAIN SCALES - GENERAL: PAINLEVEL_OUTOF10: 9

## 2024-05-03 NOTE — TELEPHONE ENCOUNTER
Pt ex-wife called to report pt is complaining of difficulty breathing, really bad back pain, coughing, and feeling miserable and wanted to know if he should go to ER or not.    Case d/w NP Bal CLARK and yes he should go to the ED.    Pt ex wife is aware and pt will come to [facility] ED.

## 2024-05-03 NOTE — PROGRESS NOTES
Patient Assistance        Navigator Type: Infusion  Documentation Type: New Patient  Contact Type: Telephone  Navigation Status: Free Drug Enrollment  Status of Patient Insurance Coverage: Patient does not have active coverage          Additional notes:  [employer] PAP 5/2/2024-5/2/2025     Drug Name: Keytruda  Form of PAP Assistance: Free Drug

## 2024-05-03 NOTE — ED PROVIDER NOTES
EMERGENCY DEPARTMENT HISTORY AND PHYSICAL EXAM    Date: 5/3/2024  Patient Name: Fortunato Kowalski  Patient Age and Sex: 58 y.o. male  MRN:  [id number]  CSN:  [id number]    History of Present Illness     Chief Complaint   Patient presents with    Shortness of Breath     Patient wheeled into triage w c/o SOB reports he has stage IV lung cancer.        History Provided By: Patient    Ability to gather history was limited by:     HPI: Fortunato Kowalski, 58 y.o. male   Lifelong heavy smoker complains of persistent severe cough for the last few weeks.  He was just hospitalized a few weeks ago, discharged April 12, in the setting of a large right-sided pleural effusion and new diagnosis of metastatic lung cancer, for which he is about to start chemotherapy next week.  He does not have significant chest pain but does endorse severe frequent cough which is bothersome, as well as wheezing and shortness of breath.      Tobacco Use      Smoking status: Every Day        Packs/day: 2.00        Types: Cigarettes      Smokeless tobacco: Never     Past History   The patient's medical, surgical, and social history were reviewed by me today.    Current Medications:  No current facility-administered medications on file prior to encounter.     Current Outpatient Medications on File Prior to Encounter   Medication Sig Dispense Refill    nicotine (NICODERM CQ) 21 MG/24HR Place 1 patch onto the skin every 24 hours 42 patch 0    lidocaine-prilocaine (EMLA) 2.5-2.5 % cream Apply topically to port 1 hour prior to infusion, cover with plastic wrap 30 g 2    ondansetron (ZOFRAN-ODT) 4 MG disintegrating tablet Take 1 tablet by mouth every 8 hours as needed for Nausea 60 tablet 2    prochlorperazine (COMPAZINE) 5 MG tablet Take 1 tablet by mouth every 6 hours as needed for Nausea 60 tablet 2    dexAMETHasone (DECADRON) 4 MG tablet Take 5 tabs by mouth the evening before chemotherapy treatments, every 21 days. 20 tablet 0       No past medical history on

## 2024-05-03 NOTE — ED NOTES
Bedside and Verbal shift change report given to YOLA Rodriguez (oncoming nurse) by YOLA Cortez (offgoing nurse). Report included the following information Nurse Handoff Report, Index, ED Encounter Summary, ED SBAR, MAR, Recent Results, Med Rec Status, and Neuro Assessment.    
IntraVENous Given 5/3/24 1730)   benzonatate (TESSALON) capsule 200 mg (200 mg Oral Given 5/3/24 1730)   potassium chloride (KLOR-CON M) extended release tablet 40 mEq (40 mEq Oral Given 5/3/24 1941)     Last documented pain medication administration: 1730  Pertinent or High Risk Medications/Drips: no   If Yes, please provide details:   Blood Product Administration: no  If Yes, please provide details:   Process Protocols/Bundles:     Recommendation  Incomplete STAT orders: none  Overdue Medications: none  Patient Belongings:    Additional Comments:   If any further questions, please call Sending RN at [phone]      Admitting Unit Notification  Name of person notified and time: Oncology Rn at 1955      Electronically signed by: Electronically signed by Moe Clark RN on 5/3/2024 at 7:56 PM

## 2024-05-04 LAB
ALBUMIN SERPL-MCNC: 2.8 G/DL (ref 3.5–5)
ALBUMIN/GLOB SERPL: 0.7 (ref 1.1–2.2)
ALP SERPL-CCNC: 185 U/L (ref 45–117)
ALT SERPL-CCNC: 101 U/L (ref 12–78)
ANION GAP SERPL CALC-SCNC: 5 MMOL/L (ref 5–15)
AST SERPL-CCNC: 32 U/L (ref 15–37)
BILIRUB SERPL-MCNC: 1.1 MG/DL (ref 0.2–1)
BUN SERPL-MCNC: 17 MG/DL (ref 6–20)
BUN/CREAT SERPL: 13 (ref 12–20)
CALCIUM SERPL-MCNC: 8.8 MG/DL (ref 8.5–10.1)
CHLORIDE SERPL-SCNC: 107 MMOL/L (ref 97–108)
CO2 SERPL-SCNC: 22 MMOL/L (ref 21–32)
CREAT SERPL-MCNC: 1.35 MG/DL (ref 0.7–1.3)
ERYTHROCYTE [DISTWIDTH] IN BLOOD BY AUTOMATED COUNT: 14.7 % (ref 11.5–14.5)
EST. AVERAGE GLUCOSE BLD GHB EST-MCNC: 143 MG/DL
GLOBULIN SER CALC-MCNC: 4.1 G/DL (ref 2–4)
GLUCOSE BLD STRIP.AUTO-MCNC: 129 MG/DL (ref 65–117)
GLUCOSE BLD STRIP.AUTO-MCNC: 147 MG/DL (ref 65–117)
GLUCOSE BLD STRIP.AUTO-MCNC: 177 MG/DL (ref 65–117)
GLUCOSE SERPL-MCNC: 225 MG/DL (ref 65–100)
HBA1C MFR BLD: 6.6 % (ref 4–5.6)
HCT VFR BLD AUTO: 42.5 % (ref 36.6–50.3)
HGB BLD-MCNC: 13.8 G/DL (ref 12.1–17)
MCH RBC QN AUTO: 27.8 PG (ref 26–34)
MCHC RBC AUTO-ENTMCNC: 32.5 G/DL (ref 30–36.5)
MCV RBC AUTO: 85.5 FL (ref 80–99)
NRBC # BLD: 0 K/UL (ref 0–0.01)
NRBC BLD-RTO: 0 PER 100 WBC
PLATELET # BLD AUTO: 341 K/UL (ref 150–400)
PMV BLD AUTO: 11.3 FL (ref 8.9–12.9)
POTASSIUM SERPL-SCNC: 4.5 MMOL/L (ref 3.5–5.1)
PROT SERPL-MCNC: 6.9 G/DL (ref 6.4–8.2)
RBC # BLD AUTO: 4.97 M/UL (ref 4.1–5.7)
SERVICE CMNT-IMP: ABNORMAL
SODIUM SERPL-SCNC: 134 MMOL/L (ref 136–145)
WBC # BLD AUTO: 8.7 K/UL (ref 4.1–11.1)

## 2024-05-04 PROCEDURE — 85027 COMPLETE CBC AUTOMATED: CPT

## 2024-05-04 PROCEDURE — 80053 COMPREHEN METABOLIC PANEL: CPT

## 2024-05-04 PROCEDURE — 36415 COLL VENOUS BLD VENIPUNCTURE: CPT

## 2024-05-04 PROCEDURE — 2500000003 HC RX 250 WO HCPCS: Performed by: HOSPITALIST

## 2024-05-04 PROCEDURE — 6360000002 HC RX W HCPCS: Performed by: NURSE PRACTITIONER

## 2024-05-04 PROCEDURE — 82962 GLUCOSE BLOOD TEST: CPT

## 2024-05-04 PROCEDURE — 2580000003 HC RX 258: Performed by: NURSE PRACTITIONER

## 2024-05-04 PROCEDURE — 6370000000 HC RX 637 (ALT 250 FOR IP): Performed by: HOSPITALIST

## 2024-05-04 PROCEDURE — 6370000000 HC RX 637 (ALT 250 FOR IP): Performed by: NURSE PRACTITIONER

## 2024-05-04 PROCEDURE — 1100000003 HC PRIVATE W/ TELEMETRY

## 2024-05-04 RX ORDER — BENZONATATE 100 MG/1
100 CAPSULE ORAL 3 TIMES DAILY PRN
Status: DISCONTINUED | OUTPATIENT
Start: 2024-05-04 | End: 2024-05-07 | Stop reason: HOSPADM

## 2024-05-04 RX ORDER — DEXTROSE MONOHYDRATE 100 MG/ML
INJECTION, SOLUTION INTRAVENOUS CONTINUOUS PRN
Status: DISCONTINUED | OUTPATIENT
Start: 2024-05-04 | End: 2024-05-07 | Stop reason: HOSPADM

## 2024-05-04 RX ORDER — GUAIFENESIN 200 MG/10ML
200 LIQUID ORAL ONCE
Status: COMPLETED | OUTPATIENT
Start: 2024-05-04 | End: 2024-05-04

## 2024-05-04 RX ADMIN — BENZONATATE 100 MG: 100 CAPSULE ORAL at 10:34

## 2024-05-04 RX ADMIN — BENZONATATE 100 MG: 100 CAPSULE ORAL at 15:51

## 2024-05-04 RX ADMIN — SODIUM CHLORIDE, PRESERVATIVE FREE 10 ML: 5 INJECTION INTRAVENOUS at 21:30

## 2024-05-04 RX ADMIN — ENOXAPARIN SODIUM 40 MG: 100 INJECTION SUBCUTANEOUS at 08:43

## 2024-05-04 RX ADMIN — METFORMIN HYDROCHLORIDE 500 MG: 500 TABLET ORAL at 17:29

## 2024-05-04 RX ADMIN — METOPROLOL TARTRATE 12.5 MG: 25 TABLET, FILM COATED ORAL at 08:43

## 2024-05-04 RX ADMIN — METOPROLOL TARTRATE 12.5 MG: 25 TABLET, FILM COATED ORAL at 21:23

## 2024-05-04 RX ADMIN — SODIUM CHLORIDE, PRESERVATIVE FREE 10 ML: 5 INJECTION INTRAVENOUS at 08:44

## 2024-05-04 RX ADMIN — GUAIFENESIN 200 MG: 200 SOLUTION ORAL at 00:50

## 2024-05-04 ASSESSMENT — PAIN SCALES - GENERAL: PAINLEVEL_OUTOF10: 0

## 2024-05-04 NOTE — PLAN OF CARE
Problem: Respiratory - Adult  Goal: Achieves optimal ventilation and oxygenation  5/4/2024 0330 by Tomas Farmer RN  Outcome: Progressing  5/3/2024 1714 by Mi Beckford, RT  Outcome: Progressing     Problem: Cardiovascular - Adult  Goal: Maintains optimal cardiac output and hemodynamic stability  Outcome: Progressing  Goal: Absence of cardiac dysrhythmias or at baseline  Outcome: Progressing     Problem: Skin/Tissue Integrity - Adult  Goal: Skin integrity remains intact  Outcome: Progressing

## 2024-05-05 LAB
ALBUMIN SERPL-MCNC: 2.7 G/DL (ref 3.5–5)
ALBUMIN/GLOB SERPL: 0.7 (ref 1.1–2.2)
ALP SERPL-CCNC: 169 U/L (ref 45–117)
ALT SERPL-CCNC: 84 U/L (ref 12–78)
ANION GAP SERPL CALC-SCNC: 4 MMOL/L (ref 5–15)
AST SERPL-CCNC: 32 U/L (ref 15–37)
BILIRUB SERPL-MCNC: 0.6 MG/DL (ref 0.2–1)
BUN SERPL-MCNC: 19 MG/DL (ref 6–20)
BUN/CREAT SERPL: 16 (ref 12–20)
CALCIUM SERPL-MCNC: 8.3 MG/DL (ref 8.5–10.1)
CHLORIDE SERPL-SCNC: 109 MMOL/L (ref 97–108)
CO2 SERPL-SCNC: 23 MMOL/L (ref 21–32)
CREAT SERPL-MCNC: 1.21 MG/DL (ref 0.7–1.3)
GLOBULIN SER CALC-MCNC: 3.9 G/DL (ref 2–4)
GLUCOSE BLD STRIP.AUTO-MCNC: 126 MG/DL (ref 65–117)
GLUCOSE BLD STRIP.AUTO-MCNC: 137 MG/DL (ref 65–117)
GLUCOSE BLD STRIP.AUTO-MCNC: 138 MG/DL (ref 65–117)
GLUCOSE SERPL-MCNC: 175 MG/DL (ref 65–100)
POTASSIUM SERPL-SCNC: 3.8 MMOL/L (ref 3.5–5.1)
PROT SERPL-MCNC: 6.6 G/DL (ref 6.4–8.2)
SERVICE CMNT-IMP: ABNORMAL
SODIUM SERPL-SCNC: 136 MMOL/L (ref 136–145)

## 2024-05-05 PROCEDURE — 2580000003 HC RX 258: Performed by: NURSE PRACTITIONER

## 2024-05-05 PROCEDURE — 1100000003 HC PRIVATE W/ TELEMETRY

## 2024-05-05 PROCEDURE — 6370000000 HC RX 637 (ALT 250 FOR IP): Performed by: EMERGENCY MEDICINE

## 2024-05-05 PROCEDURE — 80053 COMPREHEN METABOLIC PANEL: CPT

## 2024-05-05 PROCEDURE — 36415 COLL VENOUS BLD VENIPUNCTURE: CPT

## 2024-05-05 PROCEDURE — 6370000000 HC RX 637 (ALT 250 FOR IP): Performed by: NURSE PRACTITIONER

## 2024-05-05 PROCEDURE — 6360000002 HC RX W HCPCS: Performed by: NURSE PRACTITIONER

## 2024-05-05 PROCEDURE — 82962 GLUCOSE BLOOD TEST: CPT

## 2024-05-05 PROCEDURE — 6370000000 HC RX 637 (ALT 250 FOR IP): Performed by: HOSPITALIST

## 2024-05-05 RX ORDER — ALPRAZOLAM 0.25 MG/1
0.25 TABLET ORAL
Status: COMPLETED | OUTPATIENT
Start: 2024-05-05 | End: 2024-05-05

## 2024-05-05 RX ORDER — OXYCODONE HYDROCHLORIDE 5 MG/1
5 TABLET ORAL EVERY 4 HOURS PRN
Status: DISCONTINUED | OUTPATIENT
Start: 2024-05-05 | End: 2024-05-07 | Stop reason: HOSPADM

## 2024-05-05 RX ADMIN — SODIUM CHLORIDE, PRESERVATIVE FREE 10 ML: 5 INJECTION INTRAVENOUS at 08:32

## 2024-05-05 RX ADMIN — OXYCODONE 5 MG: 5 TABLET ORAL at 18:40

## 2024-05-05 RX ADMIN — BENZONATATE 100 MG: 100 CAPSULE ORAL at 08:33

## 2024-05-05 RX ADMIN — METOPROLOL TARTRATE 25 MG: 25 TABLET, FILM COATED ORAL at 21:15

## 2024-05-05 RX ADMIN — METOPROLOL TARTRATE 12.5 MG: 25 TABLET, FILM COATED ORAL at 08:31

## 2024-05-05 RX ADMIN — METFORMIN HYDROCHLORIDE 500 MG: 500 TABLET ORAL at 08:31

## 2024-05-05 RX ADMIN — ACETAMINOPHEN 650 MG: 325 TABLET ORAL at 18:01

## 2024-05-05 RX ADMIN — SODIUM CHLORIDE, PRESERVATIVE FREE 10 ML: 5 INJECTION INTRAVENOUS at 21:54

## 2024-05-05 RX ADMIN — ENOXAPARIN SODIUM 40 MG: 100 INJECTION SUBCUTANEOUS at 08:31

## 2024-05-05 RX ADMIN — ALPRAZOLAM 0.25 MG: 0.25 TABLET ORAL at 11:37

## 2024-05-05 RX ADMIN — METFORMIN HYDROCHLORIDE 500 MG: 500 TABLET ORAL at 16:39

## 2024-05-05 RX ADMIN — BENZONATATE 100 MG: 100 CAPSULE ORAL at 18:00

## 2024-05-05 ASSESSMENT — PAIN DESCRIPTION - LOCATION
LOCATION: SHOULDER;RIB CAGE
LOCATION: RIB CAGE;SHOULDER

## 2024-05-05 ASSESSMENT — PAIN SCALES - GENERAL
PAINLEVEL_OUTOF10: 0
PAINLEVEL_OUTOF10: 9
PAINLEVEL_OUTOF10: 9
PAINLEVEL_OUTOF10: 0

## 2024-05-05 ASSESSMENT — PAIN DESCRIPTION - ORIENTATION
ORIENTATION: RIGHT
ORIENTATION: RIGHT

## 2024-05-05 ASSESSMENT — PAIN DESCRIPTION - DESCRIPTORS: DESCRIPTORS: ACHING;DISCOMFORT

## 2024-05-05 ASSESSMENT — PAIN SCALES - WONG BAKER: WONGBAKER_NUMERICALRESPONSE: NO HURT

## 2024-05-06 ENCOUNTER — APPOINTMENT (OUTPATIENT)
Facility: HOSPITAL | Age: 59
DRG: 167 | End: 2024-05-06

## 2024-05-06 PROBLEM — R06.02 SOB (SHORTNESS OF BREATH): Status: ACTIVE | Noted: 2024-05-06

## 2024-05-06 PROBLEM — J91.0 MALIGNANT PLEURAL EFFUSION: Status: ACTIVE | Noted: 2024-05-06

## 2024-05-06 PROBLEM — E11.65 TYPE 2 DIABETES MELLITUS WITH HYPERGLYCEMIA, WITHOUT LONG-TERM CURRENT USE OF INSULIN (HCC): Status: ACTIVE | Noted: 2024-05-06

## 2024-05-06 PROBLEM — F41.9 ANXIETY: Status: ACTIVE | Noted: 2024-05-06

## 2024-05-06 LAB
ALBUMIN SERPL-MCNC: 2.7 G/DL (ref 3.5–5)
ALBUMIN/GLOB SERPL: 0.7 (ref 1.1–2.2)
ALP SERPL-CCNC: 181 U/L (ref 45–117)
ALT SERPL-CCNC: 78 U/L (ref 12–78)
ANION GAP SERPL CALC-SCNC: 7 MMOL/L (ref 5–15)
AST SERPL-CCNC: 27 U/L (ref 15–37)
BACTERIA SPEC CULT: NORMAL
BILIRUB SERPL-MCNC: 0.5 MG/DL (ref 0.2–1)
BUN SERPL-MCNC: 20 MG/DL (ref 6–20)
BUN/CREAT SERPL: 16 (ref 12–20)
CALCIUM SERPL-MCNC: 8.8 MG/DL (ref 8.5–10.1)
CHLORIDE SERPL-SCNC: 107 MMOL/L (ref 97–108)
CO2 SERPL-SCNC: 21 MMOL/L (ref 21–32)
COMMENT:: NORMAL
CREAT SERPL-MCNC: 1.22 MG/DL (ref 0.7–1.3)
GLOBULIN SER CALC-MCNC: 3.8 G/DL (ref 2–4)
GLUCOSE BLD STRIP.AUTO-MCNC: 104 MG/DL (ref 65–117)
GLUCOSE BLD STRIP.AUTO-MCNC: 160 MG/DL (ref 65–117)
GLUCOSE BLD STRIP.AUTO-MCNC: 163 MG/DL (ref 65–117)
GLUCOSE SERPL-MCNC: 167 MG/DL (ref 65–100)
POTASSIUM SERPL-SCNC: 4 MMOL/L (ref 3.5–5.1)
PROT SERPL-MCNC: 6.5 G/DL (ref 6.4–8.2)
SERVICE CMNT-IMP: ABNORMAL
SERVICE CMNT-IMP: ABNORMAL
SERVICE CMNT-IMP: NORMAL
SERVICE CMNT-IMP: NORMAL
SODIUM SERPL-SCNC: 135 MMOL/L (ref 136–145)
SPECIMEN HOLD: NORMAL

## 2024-05-06 PROCEDURE — 71045 X-RAY EXAM CHEST 1 VIEW: CPT

## 2024-05-06 PROCEDURE — 2500000003 HC RX 250 WO HCPCS: Performed by: STUDENT IN AN ORGANIZED HEALTH CARE EDUCATION/TRAINING PROGRAM

## 2024-05-06 PROCEDURE — 94760 N-INVAS EAR/PLS OXIMETRY 1: CPT

## 2024-05-06 PROCEDURE — 76942 ECHO GUIDE FOR BIOPSY: CPT

## 2024-05-06 PROCEDURE — 2700000000 HC OXYGEN THERAPY PER DAY

## 2024-05-06 PROCEDURE — 6370000000 HC RX 637 (ALT 250 FOR IP): Performed by: NURSE PRACTITIONER

## 2024-05-06 PROCEDURE — 80053 COMPREHEN METABOLIC PANEL: CPT

## 2024-05-06 PROCEDURE — 99221 1ST HOSP IP/OBS SF/LOW 40: CPT | Performed by: CLINICAL NURSE SPECIALIST

## 2024-05-06 PROCEDURE — 2580000003 HC RX 258: Performed by: NURSE PRACTITIONER

## 2024-05-06 PROCEDURE — 1100000003 HC PRIVATE W/ TELEMETRY

## 2024-05-06 PROCEDURE — 0JH60WZ INSERTION OF TOTALLY IMPLANTABLE VASCULAR ACCESS DEVICE INTO CHEST SUBCUTANEOUS TISSUE AND FASCIA, OPEN APPROACH: ICD-10-PCS | Performed by: STUDENT IN AN ORGANIZED HEALTH CARE EDUCATION/TRAINING PROGRAM

## 2024-05-06 PROCEDURE — 0W993ZZ DRAINAGE OF RIGHT PLEURAL CAVITY, PERCUTANEOUS APPROACH: ICD-10-PCS | Performed by: STUDENT IN AN ORGANIZED HEALTH CARE EDUCATION/TRAINING PROGRAM

## 2024-05-06 PROCEDURE — 99232 SBSQ HOSP IP/OBS MODERATE 35: CPT | Performed by: STUDENT IN AN ORGANIZED HEALTH CARE EDUCATION/TRAINING PROGRAM

## 2024-05-06 PROCEDURE — 2580000003 HC RX 258: Performed by: STUDENT IN AN ORGANIZED HEALTH CARE EDUCATION/TRAINING PROGRAM

## 2024-05-06 PROCEDURE — 02H633Z INSERTION OF INFUSION DEVICE INTO RIGHT ATRIUM, PERCUTANEOUS APPROACH: ICD-10-PCS | Performed by: STUDENT IN AN ORGANIZED HEALTH CARE EDUCATION/TRAINING PROGRAM

## 2024-05-06 PROCEDURE — 76937 US GUIDE VASCULAR ACCESS: CPT

## 2024-05-06 PROCEDURE — 36415 COLL VENOUS BLD VENIPUNCTURE: CPT

## 2024-05-06 PROCEDURE — 82962 GLUCOSE BLOOD TEST: CPT

## 2024-05-06 PROCEDURE — 99223 1ST HOSP IP/OBS HIGH 75: CPT | Performed by: NURSE PRACTITIONER

## 2024-05-06 PROCEDURE — 6370000000 HC RX 637 (ALT 250 FOR IP): Performed by: HOSPITALIST

## 2024-05-06 PROCEDURE — 6360000002 HC RX W HCPCS: Performed by: STUDENT IN AN ORGANIZED HEALTH CARE EDUCATION/TRAINING PROGRAM

## 2024-05-06 RX ORDER — LORAZEPAM 0.5 MG/1
0.5 TABLET ORAL EVERY 4 HOURS PRN
Status: DISCONTINUED | OUTPATIENT
Start: 2024-05-06 | End: 2024-05-07 | Stop reason: HOSPADM

## 2024-05-06 RX ORDER — MIDAZOLAM HYDROCHLORIDE 2 MG/2ML
INJECTION, SOLUTION INTRAMUSCULAR; INTRAVENOUS PRN
Status: COMPLETED | OUTPATIENT
Start: 2024-05-06 | End: 2024-05-06

## 2024-05-06 RX ORDER — HEPARIN SODIUM 200 [USP'U]/100ML
200 INJECTION, SOLUTION INTRAVENOUS ONCE
Status: DISCONTINUED | OUTPATIENT
Start: 2024-05-06 | End: 2024-05-07 | Stop reason: HOSPADM

## 2024-05-06 RX ORDER — HEPARIN 100 UNIT/ML
300 SYRINGE INTRAVENOUS ONCE
Status: COMPLETED | OUTPATIENT
Start: 2024-05-06 | End: 2024-05-06

## 2024-05-06 RX ORDER — FENTANYL CITRATE 50 UG/ML
INJECTION, SOLUTION INTRAMUSCULAR; INTRAVENOUS PRN
Status: COMPLETED | OUTPATIENT
Start: 2024-05-06 | End: 2024-05-06

## 2024-05-06 RX ORDER — LORAZEPAM 0.5 MG/1
0.5 TABLET ORAL EVERY 4 HOURS PRN
Qty: 20 TABLET | Refills: 0 | Status: SHIPPED | OUTPATIENT
Start: 2024-05-06 | End: 2024-06-05

## 2024-05-06 RX ORDER — LIDOCAINE HCL/EPINEPHRINE/PF 2%-1:200K
20 VIAL (ML) INJECTION ONCE
Status: COMPLETED | OUTPATIENT
Start: 2024-05-06 | End: 2024-05-06

## 2024-05-06 RX ORDER — OXYCODONE HYDROCHLORIDE 5 MG/1
10 TABLET ORAL EVERY 4 HOURS PRN
Status: DISCONTINUED | OUTPATIENT
Start: 2024-05-06 | End: 2024-05-07 | Stop reason: HOSPADM

## 2024-05-06 RX ORDER — LIDOCAINE HYDROCHLORIDE 20 MG/ML
20 INJECTION, SOLUTION INFILTRATION; PERINEURAL ONCE
Status: COMPLETED | OUTPATIENT
Start: 2024-05-06 | End: 2024-05-06

## 2024-05-06 RX ORDER — OXYCODONE HYDROCHLORIDE 5 MG/1
5 TABLET ORAL EVERY 4 HOURS PRN
Qty: 30 TABLET | Refills: 0 | Status: SHIPPED | OUTPATIENT
Start: 2024-05-06 | End: 2024-05-20

## 2024-05-06 RX ADMIN — FENTANYL CITRATE 25 MCG: 50 INJECTION, SOLUTION INTRAMUSCULAR; INTRAVENOUS at 09:46

## 2024-05-06 RX ADMIN — Medication 300 UNITS: at 10:01

## 2024-05-06 RX ADMIN — LIDOCAINE HYDROCHLORIDE,EPINEPHRINE BITARTRATE 10 ML: 20; .005 INJECTION, SOLUTION EPIDURAL; INFILTRATION; INTRACAUDAL; PERINEURAL at 10:00

## 2024-05-06 RX ADMIN — LIDOCAINE HYDROCHLORIDE 10 ML: 20 INJECTION, SOLUTION INFILTRATION; PERINEURAL at 09:37

## 2024-05-06 RX ADMIN — METOPROLOL TARTRATE 25 MG: 25 TABLET, FILM COATED ORAL at 11:13

## 2024-05-06 RX ADMIN — WATER 2000 MG: 1 INJECTION INTRAMUSCULAR; INTRAVENOUS; SUBCUTANEOUS at 08:52

## 2024-05-06 RX ADMIN — SODIUM CHLORIDE, PRESERVATIVE FREE 10 ML: 5 INJECTION INTRAVENOUS at 11:14

## 2024-05-06 RX ADMIN — METFORMIN HYDROCHLORIDE 500 MG: 500 TABLET ORAL at 16:19

## 2024-05-06 RX ADMIN — MIDAZOLAM HYDROCHLORIDE 1 MG: 1 INJECTION, SOLUTION INTRAMUSCULAR; INTRAVENOUS at 09:48

## 2024-05-06 RX ADMIN — SODIUM CHLORIDE, PRESERVATIVE FREE 10 ML: 5 INJECTION INTRAVENOUS at 22:00

## 2024-05-06 RX ADMIN — OXYCODONE 5 MG: 5 TABLET ORAL at 23:09

## 2024-05-06 RX ADMIN — MIDAZOLAM HYDROCHLORIDE 1 MG: 1 INJECTION, SOLUTION INTRAMUSCULAR; INTRAVENOUS at 09:45

## 2024-05-06 RX ADMIN — METOPROLOL TARTRATE 25 MG: 25 TABLET, FILM COATED ORAL at 21:59

## 2024-05-06 RX ADMIN — MIDAZOLAM HYDROCHLORIDE 1 MG: 1 INJECTION, SOLUTION INTRAMUSCULAR; INTRAVENOUS at 09:50

## 2024-05-06 RX ADMIN — FENTANYL CITRATE 25 MCG: 50 INJECTION, SOLUTION INTRAMUSCULAR; INTRAVENOUS at 09:49

## 2024-05-06 RX ADMIN — METFORMIN HYDROCHLORIDE 500 MG: 500 TABLET ORAL at 11:13

## 2024-05-06 ASSESSMENT — PAIN DESCRIPTION - LOCATION: LOCATION: BACK;SHOULDER

## 2024-05-06 ASSESSMENT — PAIN SCALES - GENERAL
PAINLEVEL_OUTOF10: 6
PAINLEVEL_OUTOF10: 0

## 2024-05-06 ASSESSMENT — PAIN - FUNCTIONAL ASSESSMENT: PAIN_FUNCTIONAL_ASSESSMENT: NONE - DENIES PAIN

## 2024-05-06 ASSESSMENT — PAIN DESCRIPTION - ORIENTATION: ORIENTATION: RIGHT

## 2024-05-06 NOTE — PALLIATIVE CARE DISCHARGE
Goals of Care/Treatment Preferences    The Palliative Medicine team was consulted as part of your/your loved one's care in the hospital. Our team is a supportive service; we strive to relieve suffering and improve quality of life.    We reviewed advance care planning information, which includes the following:    Primary Decision Maker: Yolanda Kowalski - Ex-Spouse - [phone]    Secondary Decision Maker: Iris Iglesias - Step Child - [phone]  Patient's Healthcare Decision Maker is:: Named in Scanned ACP Document  Confirm Advance Directive: Yes, on file (completed on 5/6)  Patient Would Like to Complete Advance Directive: Yes  Does the patient have other document types?: Other (Comment) (No)         We reviewed / discussed your code status as:   Code Status: Full Code     “Full Code” means perform CPR in the event of cardiac arrest.      “DNR” means do NOT perform CPR in the event of cardiac arrest.      “Partial Code” means you have specific preferences, please discuss with your healthcare team.      “No Order” means this issue was not addressed / resolved during your stay      Someone from our outpatient palliative office will contact you for an appointment to follow up for support and symptom management.   The office number is [phone] if you need to reach someone    Pain:   take tylenol 650 mg every 6 hours as needed.  If pain is not controlled take oxycodone 5 mg for moderate pain and 10 mg for more severe pain every 4 hours as needed.     Anxiety:  use lorazepam (Ativan) 0.5 mg every 4 hours as needed for anxiety.    The outpatient palliative clinic may talk to you more about a medication to take on a regular basis to help with any anxiety    Bowel regimen:   pain medications like oxycodone can make you constipated.  It is important to have a bowel movement at least every other day.  If not, you may purchase senna 8.2 mg tablets and take 2 at bedtime.   Miralax is another laxative that works in a

## 2024-05-06 NOTE — CARE COORDINATION
Care Management Initial Assessment       RUR: 13%  Readmission? No  1st IM letter given? No  1st  letter given: No    Pt is self pay.    CM completed assessment with pt, with spouse at bedside.  Pt is known to reside in a one story home.  Pt will require new PCP, and [facility] ([facility]).  Pt is known to be independent with ADLs and no drive.  Pt reported no DME, HHC, SNF.    Pt will have drain in place.  Pt will require Onc Clearance.  CM will continue to follow.    PATSY Jason CM  [phone]       05/06/24 4886   Service Assessment   Patient Orientation Alert and Oriented   Cognition Alert   History Provided By Patient   Primary Caregiver Self   Support Systems Spouse/Significant Other   PCP Verified by CM Yes   Last Visit to PCP Within last 3 months   Prior Functional Level Independent in ADLs/IADLs   Current Functional Level Independent in ADLs/IADLs   Can patient return to prior living arrangement Yes   Ability to make needs known: Fair   Family able to assist with home care needs: Yes   Would you like for me to discuss the discharge plan with any other family members/significant others, and if so, who? Yes   Financial Resources Other (Comment)  (self pay)   Social/Functional History   Lives With Spouse   Type of Home House   Active  Yes   Mode of Transportation Family;Car   Discharge Planning   Type of Residence House   Patient expects to be discharged to: House

## 2024-05-06 NOTE — DIABETES MGMT
[facility]  PRO[facility] FOR DIABETES HEALTH  DIABETES MANAGEMENT CONSULT    Consulted by  Bal Delgado APRN - NP  for advanced nursing evaluation and care for inpatient blood glucose management.    Evaluation and Action Plan   Fortunato Kowalski is a 58 year old gentleman who was admitted with a malignant pleural effusion.  He was hospitalized three weeks ago with a large right-sided pleural effusion s/p thoracentesis and new diagnosis of stage IV metastatic noncell lung cancer.  He was slated to start chemotherapy next week.     His glucose was noted to be elevated at 172 and 225 on admission.  An A1C was obtained and elevated at 6.6% indicating new onset diabetes.  He shares that he has never been told that he has had elevated sugars before but doesn't routinely follow with any health care provider. He does have a history of diabetes, father with Type 2 Diabetes.  He was previously working as a long distance . He reports drinking 30-35 cups of coffee each day with sugar.  Meals were limited to quick fast-food items when on the road, snacking on little grazyna cakes.      This admission, glucose has ranged 126-225.  Metformin 500mg BID has started which is a reasonable strategy and can continue on discharge.  When chemotherapy starts next week, he is prescribed decadron 20mg the evening before chemotherapy every 21 days. We discussed screening for steroid induced hyperglycemia 24hrs after each decadron dose.     Action Plan    Continue carb consistent diet  Nursing to teach how to check a blood sugar.   Reasonable to continue metformin, hold for CT with contrast or medical instability        Diabetes Discharge Plan   Medication  Reasonable to continue metformin 500mg BID with new onset Type 2 Diabetes, A1C 6.6%   Referral  [x]        Outpatient diabetes education: Order placed   Additional orders  Will need a FUV with PCP within 1-2 weeks after hospital discharge for ongoing diabetes management   I am most

## 2024-05-06 NOTE — PLAN OF CARE
Problem: Respiratory - Adult  Goal: Achieves optimal ventilation and oxygenation  Outcome: Progressing     Problem: Cardiovascular - Adult  Goal: Maintains optimal cardiac output and hemodynamic stability  Outcome: Progressing  Goal: Absence of cardiac dysrhythmias or at baseline  Outcome: Progressing     Problem: Skin/Tissue Integrity - Adult  Goal: Skin integrity remains intact  Outcome: Progressing     Problem: Pain  Goal: Verbalizes/displays adequate comfort level or baseline comfort level  Outcome: Progressing     Problem: Safety - Adult  Goal: Free from fall injury  Outcome: Progressing

## 2024-05-06 NOTE — ACP (ADVANCE CARE PLANNING)
Advance Care Planning     General Advance Care Planning (ACP) Conversation    Date of Conversation: 5/6/2024  Conducted with: Patient with Decision Making Capacity and Healthcare Decision Maker  Other persons present: None    Healthcare Decision Maker:   Primary Decision Maker: Yolanda Kowalski - Ex-Spouse - [phone]    Secondary Decision Maker: Iris Iglesias - Step Child - [phone]  Click here to complete Healthcare Decision Makers including selection of the Healthcare Decision Maker Relationship (ie \"Primary\").   Mr. Fortunato Kowalski has no biological children. His parents are alive and he stated that he would also trust his father to make medical decisions if he could not make them for himself. He has one sister, who he would not want to be involved in medical decision-making.    He did not wish to complete section II of the AMD today.      Content/Action Overview:  Has ACP document(s) on file - reflects the patient's care preferences  Reviewed DNR/DNI and patient elects Full Code (Attempt Resuscitation)    Mr. Kowalski stated that he is considering making an anatomical donation. He was referred to the website in the AMD to further explore whether this is an option for him and how he could register.    Length of Voluntary ACP Conversation in minutes:  <16 minutes (Non-Billable)    Ivonne Graf LCSW

## 2024-05-06 NOTE — H&P
INTERVENTIONAL RADIOLOGY  Preoperative History and Physical      Patient:  Fortunato Kowalski  :  1965  Age:  58 y.o.  MRN:  [id number]  Today's Date:  2024      CC / HPI   Fortunato Kowalski is a 58 y.o. male with a history of lung carcinoma who presents for port placement for chemotherapy initiation and possible right aspira catheter placement.    PAST MEDICAL HISTORY  No past medical history on file.    PAST SURGICAL HISTORY  Past Surgical History:   Procedure Laterality Date    COLONOSCOPY      CT BIOPSY SOFT TISSUE NECK  04/10/2024    CT BIOPSY LUNG/MEDIASTINUM PERC 4/10/2024 [facility] RAD CT    IR PERC CATH PLEURAL DRAIN W/IMAG  2024    IR PERC CATH PLEURAL DRAIN W/IMAG 2024 Adi Freitas MD [facility] RAD ANGIO IR       SOCIAL HISTORY  Social History     Socioeconomic History    Marital status:      Spouse name: Not on file    Number of children: Not on file    Years of education: Not on file    Highest education level: Not on file   Occupational History    Not on file   Tobacco Use    Smoking status: Every Day     Current packs/day: 2.00     Types: Cigarettes    Smokeless tobacco: Never   Vaping Use    Vaping Use: Every day   Substance and Sexual Activity    Alcohol use: Not Currently    Drug use: Never    Sexual activity: Never   Other Topics Concern    Not on file   Social History Narrative    Not on file     Social Determinants of Health     Financial Resource Strain: Not on file   Food Insecurity: No Food Insecurity (5/3/2024)    Hunger Vital Sign     Worried About Running Out of Food in the Last Year: Never true     Ran Out of Food in the Last Year: Never true   Transportation Needs: No Transportation Needs (5/3/2024)    PRAPARE - Transportation     Lack of Transportation (Medical): No     Lack of Transportation (Non-Medical): No   Physical Activity: Not on file   Stress: Not on file   Social Connections: Not on file   Intimate Partner Violence: Not on file   Housing Stability: Low Risk  (5/3/2024)

## 2024-05-06 NOTE — CONSULTS
[] Yes /  [x] No /  [] No Caregiver Present/Available [] No Caregiver [] Pt Lives at Facility  If \"Yes\" to discuss with social work during IDT    Anticipatory grief assessment:   [x] Normal  / [] Maladaptive     If \"Maladaptive\" to discuss with social work during IDT    ESAS Anxiety: Anxiety Score: 3    ESAS Depression: Depression Score: Not depressed        LAB AND IMAGING FINDINGS:   Objective data reviewed:  labs, records, medication use, vitals, and chart     FINAL COMMENTS   Thank you for allowing Palliative Medicine to participate in the care of Fortunato Kowalski.    Only check if applicable and billing time based rather than MDM  [x] The total encounter time on this service date was 75 minutes which was spent performing a face-to-face encounter and personally completing the provider-level activities documented in the note. This includes time spent prior to the visit and after the visit in direct care of the patient. This time does not include time spent in any separately reportable services.    Electronically signed by   ASHANTI Shore NP  Palliative Care Team  on 5/6/2024 at 12:13 PM      
apira drain placement today   - Plan to start cycle 1 of carboplatin, paclitaxel and keytruda in the OP setting on 5/9  - Hopefully home soon after fluid drainage   - Appreciate Palliative Input     Will continue to follow while remains hospitalized     Signed By: ASHANTI Day - NP           
4/12/2024  EXAM: XR CHEST PORTABLE INDICATION: Interval change COMPARISON: 4/12/2024 FINDINGS: A portable AP radiograph of the chest was obtained at 1256 hours. . Moderate pulmonary edema and small right effusion.. The cardiac and mediastinal contours and pulmonary vascularity are normal.  The bones and soft tissues are grossly within normal limits. Right chest wall emphysema.     No significant change..    IR REMOVE LUNG/PLEURAL DRAIN/CATHETER    Result Date: 4/12/2024  Procedure:  Chest tube removal HISTORY: FIORELLA MACHADO is a 58 years old Male with resolved right pneumothorax. Needs chest tube removed. Procedure Date:  4/12/2024 :  Gilda Lewis NP Attending: Diony Ramirez MD This procedure was performed at the bedside without imaging guidance. The skin was prepped and draped in sterile fashion. ?The right chest thoracostomy tube was transected to release the pigtail and then removed.  Pressure was applied locally at the dermatotomy site. ?A dry sterile occlusive dressing was applied. Complications:  None Estimated Blood Loss:  < 1 ml Specimens:  None Procedure Findings:  Successful removal of the right chest tube. The patient tolerated the procedure without difficulty and remained in stable condition throughout.     XR CHEST PORTABLE    Result Date: 4/12/2024  EXAM: XR CHEST PORTABLE INDICATION: interval change COMPARISON: 4/11/2024 FINDINGS: A portable AP radiograph of the chest was obtained at 417 hours. Mild patchy airspace opacity in the right lung base. The lungs are clear. The cardiac and mediastinal contours and pulmonary vascularity are normal.  The bones and soft tissues are grossly within normal limits.     No residual pneumothorax identified. Mild patchy opacity in the right lung base..    XR CHEST PORTABLE    Result Date: 4/11/2024  PORTABLE CHEST RADIOGRAPH/S: 4/11/2024 12:12 PM INDICATION: Pneumothorax. COMPARISON: 4/11/2024, 4/8/2024. CT chest 4/9/2024. TECHNIQUE: Portable frontal upright

## 2024-05-06 NOTE — DISCHARGE INSTRUCTIONS
[facility] offers four free of charge tele-education classes led by Certified Diabetes Care & Education Specialists from the Diabetes Education & Management Program.  [facility]/[email or web address]-education-[email or web address]-form and [email or web address]

## 2024-05-07 ENCOUNTER — APPOINTMENT (OUTPATIENT)
Facility: HOSPITAL | Age: 59
DRG: 167 | End: 2024-05-07

## 2024-05-07 VITALS
RESPIRATION RATE: 18 BRPM | DIASTOLIC BLOOD PRESSURE: 100 MMHG | WEIGHT: 217 LBS | HEART RATE: 94 BPM | BODY MASS INDEX: 34.06 KG/M2 | OXYGEN SATURATION: 98 % | TEMPERATURE: 97.3 F | HEIGHT: 67 IN | SYSTOLIC BLOOD PRESSURE: 125 MMHG

## 2024-05-07 LAB
EKG ATRIAL RATE: 108 BPM
EKG DIAGNOSIS: NORMAL
EKG P AXIS: 83 DEGREES
EKG P-R INTERVAL: 126 MS
EKG Q-T INTERVAL: 332 MS
EKG QRS DURATION: 72 MS
EKG QTC CALCULATION (BAZETT): 444 MS
EKG R AXIS: 81 DEGREES
EKG T AXIS: -29 DEGREES
EKG VENTRICULAR RATE: 108 BPM
GLUCOSE BLD STRIP.AUTO-MCNC: 146 MG/DL (ref 65–117)
SERVICE CMNT-IMP: ABNORMAL

## 2024-05-07 PROCEDURE — 6370000000 HC RX 637 (ALT 250 FOR IP): Performed by: NURSE PRACTITIONER

## 2024-05-07 PROCEDURE — 82962 GLUCOSE BLOOD TEST: CPT

## 2024-05-07 PROCEDURE — 99232 SBSQ HOSP IP/OBS MODERATE 35: CPT | Performed by: INTERNAL MEDICINE

## 2024-05-07 PROCEDURE — 2580000003 HC RX 258: Performed by: NURSE PRACTITIONER

## 2024-05-07 PROCEDURE — 71045 X-RAY EXAM CHEST 1 VIEW: CPT

## 2024-05-07 PROCEDURE — 6370000000 HC RX 637 (ALT 250 FOR IP): Performed by: HOSPITALIST

## 2024-05-07 RX ADMIN — METFORMIN HYDROCHLORIDE 500 MG: 500 TABLET ORAL at 08:31

## 2024-05-07 RX ADMIN — METOPROLOL TARTRATE 25 MG: 25 TABLET, FILM COATED ORAL at 08:33

## 2024-05-07 RX ADMIN — SODIUM CHLORIDE, PRESERVATIVE FREE 10 ML: 5 INJECTION INTRAVENOUS at 08:30

## 2024-05-07 ASSESSMENT — PAIN SCALES - GENERAL: PAINLEVEL_OUTOF10: 0

## 2024-05-07 NOTE — PLAN OF CARE
Problem: Respiratory - Adult  Goal: Achieves optimal ventilation and oxygenation  Outcome: Adequate for Discharge     Problem: Cardiovascular - Adult  Goal: Maintains optimal cardiac output and hemodynamic stability  Outcome: Adequate for Discharge  Goal: Absence of cardiac dysrhythmias or at baseline  Outcome: Adequate for Discharge     Problem: Skin/Tissue Integrity - Adult  Goal: Skin integrity remains intact  Outcome: Adequate for Discharge     Problem: Pain  Goal: Verbalizes/displays adequate comfort level or baseline comfort level  Outcome: Adequate for Discharge     Problem: Safety - Adult  Goal: Free from fall injury  Outcome: Adequate for Discharge

## 2024-05-07 NOTE — PROGRESS NOTES
Hospitalist Progress Note    NAME:   Fortunato Kowalski   : 1965   MRN: [id number]     Date/Time: 2024 3:21 PM  Patient PCP: Shlomo Veronica MD    Estimated discharge date:   Barriers: small pneumo s/p throacentesis, re-evaluate in AM, clinical improvement      Assessment / Plan:    Recurrent malignant pleural effusion  Recent diagnosis of stage IV metastatic non-small cell lung cancer  Developed small Pneumothorax s/p thoracentesis  CXR : Small right apical pneumothorax   Followed by oncology in OP setting, planned to start chemo on   Recently hospitalized and discharged on  with new diagnosis of malignancy  Underwent thoracentesis with pneumothorax on   Worsening SOB over last several days   CXR :  1. Interval development of a large right pleural effusion  CXR , 1157: Small right apical pneumothorax   CXR , 1451: Probable trapped right lung after thoracentesis.   - s/p bria cath placement and  thoracentesis with 2000 ml fluid removed today c/o IR.  - Aspira drain and port placement today     Hypertension  Not on any medication PTA   Increased metoprolol to 25 mg BID   ECHO from 2024    Left Ventricle: Normal left ventricular systolic function with a visually estimated EF of 60 - 65%. Left ventricle size is normal. Increased wall thickness. Normal wall motion. Grade I diastolic dysfunction with normal LAP.    Right Ventricle: Right ventricle size is normal. Normal systolic function.    Pericardium: There is evidence of epicardial fat. Small (<1 cm) pericardial effusion present.  No indication of cardiac tamponade.    Image quality is adequate.  Monitor BP      Tachycardia  Likely related to malignancy   Continue dose beta blocker with hypertension   Monitor on telemetry      New diagnosis of Type II diabetes   HgbA1c 6.6  Started on low dose metformin  Consult to Diabetes Treatment        Anxiety   One time dose of xanax  Consult to Palliative Care - will need OP follow 
      Hospitalist Progress Note    NAME:   Fortunato Kowalski   : 1965   MRN: [id number]     Date/Time: 2024 11:48 AM  Patient PCP: Shlomo Veronica MD    Estimated discharge date:    Barriers: eval for aspira drain, port placement       Assessment / Plan:  Recurrent malignant pleural effusion  Recent diagnosis of stage IV metastatic non-small cell lung cancer  Followed by oncology in OP setting, planned to start chemo on   Recently hospitalized and discharged on  with new diagnosis of malignancy  Underwent thoracentesis with pneumothorax on   Worsening SOB over last several days   CXR - IMPRESSION:  1. Interval development of a large right pleural effusion  Planned for OP port placement on Monday   Will have radiology assess for aspira drain placement on Monday for recurrent effusion   NPO after MD on Monday      Hypertension  Not on any medication PTA   Increased metoprolol to 25 mg BID   ECHO from 2024    Left Ventricle: Normal left ventricular systolic function with a visually estimated EF of 60 - 65%. Left ventricle size is normal. Increased wall thickness. Normal wall motion. Grade I diastolic dysfunction with normal LAP.    Right Ventricle: Right ventricle size is normal. Normal systolic function.    Pericardium: There is evidence of epicardial fat. Small (<1 cm) pericardial effusion present.  No indication of cardiac tamponade.    Image quality is adequate.  Monitor BP      Tachycardia  Likely related to malignancy   Continue dose beta blocker with hypertension   Monitor on telemetry      New diagnosis of Type II diabetes   HgbA1c 6.6  Started on low dose metformin  Consult to Diabetes Treatment       Anxiety   One time dose of xanax  Consult to Palliative Care - will need OP follow up with metastatic cancer      Tobacco abuse continue nicotine patch    Medical Decision Making:   I personally reviewed labs: hgba1c, bmp, cbc    I personally reviewed imaging:  I personally reviewed 
.I have reviewed discharge instructions with the patient. The patient verbalized understanding. Discharge medications reviewed with patient and appropriate educational materials and side effects teaching were provided. Follow-up appointments reviewed. Opportunity for questions and clarification was provided.  Venous access removed without difficulty.  Patient's belongings gathered and sent with patient. Patient is ready for discharge.     Brenna Rodriguez RN   
0805: pt arrived to angio holding via stretcher. Pt is aox4, vss. Pt has no complaints at this time and is in no distress, just wants to get the fluid off of his chest.     0845: Gilda at bedside for thoracentesis.     0858: removed 2,000 ml of clear, yellow/orange pleural fluid. Pt has developed a slight cough which is normal. Chest xray ordered. No samples were ordered, however sent hold cup in case provider decides they want to test fluid.   
0920: pt is stable post thoracentesis. New IV placed due to original leaking and bruised/bleeding. Removed old IV.     0935: pt on angio table being prepped     Name of Procedure: portcath placement     Sedation medications given: yes     Versed: 3 mg     Fentanyl:  50 mcg     Sedation Tolerated: well     Procedure and sedation times are the same.      Sedation Start: 0945  Sedation End: 1006       Vital Signs:  stable throughout    Any complications related to procedure: none identified at this time     Patient is A&Ox4, on RA, and is in NAD at this time. This patient is at an increased risk of falling because they have received sedating medications. Please evaluate and implement fall precautions/fall prevention practices as appropriate.    1040: attempted to call report. Pt is in no distress at this time and has no complaints. Primary nurse will call me back after leaving contact room. Transport initiated for patient.     1047: report given to primary nurse.     1054: pt enjoying coffee. No complaints at this time and no distress.     1102: called portablle xray to remind them of 2 hour post thora xray to check on small pneumo. Advised of pt going back to room.   
End of Shift Note    Bedside shift change report given to BRYANNA Henriquez (oncoming nurse) by Jacqueline Manuel RN (offgoing nurse).  Report included the following information SBAR, Kardex, MAR, and Recent Results    Shift worked:  7277-0238     Shift summary and any significant changes:    Pt remained stable throughout shift. Scheduled meds given. X2 PRN benzonatate given for coughing and x1 PRN tylenol given for pain- pt reported 9/10 pain; MD made aware of uncontrolled pain and put in new PRN orders. X1 PRN oxycodone given. Pt refused AM blood sugars- Pt educated further on diabetes management by MD and agreed to blood sugars following. Pt tachycardic through shift. Caring rounds completed.       Jacqueline Manuel RN    
End of Shift Note    Bedside shift change report given to YOLA Delgado (oncoming nurse) by Jacqueline Manuel RN (offgoing nurse).  Report included the following information SBAR, Kardex, MAR, and Recent Results    Shift worked:  7429-5164     Shift summary and any significant changes:    Pt had thoracentesis (2000 ml removed) and port placement in AM. Multiple CXR performed s/p, showing small pneumothorax. Pt reported feeling better and being able to breathe more easily after procedure Scheduled meds given; no PRNs were requested; Lovenox shot was declined by pt. Palliative care spoke in length with patient after procedure. Possible discharge tomorrow. Caring rounds completed.      Jacqueline Manuel RN    
End of Shift Note    Bedside shift change report given to YOLA Marin (oncoming nurse) by Jacqueline Manuel RN (offgoing nurse).  Report included the following information SBAR, Kardex, MAR, and Recent Results    Shift worked:  6254-9264     Shift summary and any significant changes:    Pt remained stable throughout shift. Scheduled meds given. Diabetic consult put in d/t increased A1C of 6.6;  Metformin added to scheduled meds. Pt educated on the reasoning of the new medication and consult. X2 PRN tessalon given for cough. Pt remained tachycardic throughout shift; vitals remained stable otherwise. Caring rounds completed.          Jacqueline Manuel RN    
Fortunato Kowalski is a 58 year old gentleman who was admitted with a malignant pleural effusion.  He was hospitalized three weeks ago with a large right-sided pleural effusion s/p thoracentesis and new diagnosis of stage IV metastatic noncell lung cancer.  He was slated to start chemotherapy next week.      His glucose was noted to be elevated at 172 and 225 on admission.  An A1C was obtained and elevated at 6.6% indicating new onset diabetes.  He shares that he has never been told that he has had elevated sugars before but doesn't routinely follow with any health care provider. He does have a history of diabetes, father with Type 2 Diabetes.  He was previously working as a long distance . He reports drinking 30-35 cups of coffee each day with sugar.  Meals were limited to quick fast-food items when on the road, snacking on little grazyna cakes.       This admission, glucose has ranged 126-225.  Metformin 500mg BID has started which is a reasonable strategy and can continue on discharge.  The patient is scheduled to start chemotherapy which will include carboplatin, paclitaxel and Keytruda on May 9, 2024.  He will be receiving Decadron the night before chemo once every 3 weeks.    Examination this is an obese male in no acute distress  Blood pressure 125/100  Pulse 94  Afebrile  98% on room air    Recent laboratory data  Glucose 148 (range 104-183)  Creatinine 1.22    Impression  1.  New onset type 2 diabetes mellitus currently on metformin 500 mg twice daily with a recent A1c of 6.6%  2.  Stage IV metastatic non-small cell lung cancer with scheduled chemotherapy to begin on Thursday    Plan:  1.  Between chemotherapy sessions, metformin 500 mg twice daily should be more than adequate to control his blood sugar  2.  When he received 20 mg of dexamethasone the evening before chemo, I would recommend that he receive at least 20 units of Lantus insulin coincident with the Decadron.  Based on his blood sugars, we 
Palliative Medicine  Per ASHANTI Burgess-NP: Fortunato Kowalski is a 58 y.o.  male with PMHx significant for recent diagnosis of stage IV metastatic noncell lung cancer.  He was recently hospitalized and underwent lung biopsy and thoracentesis for pleural effusion.  He has been followed by oncology in the outpatient setting with plans to start chemotherapy on 2024.  He reports he has worsening shortness of breath especially with exertion and feels like he has fluid on his lungs again.  Chest x-ray reveals recurrent malignant pleural effusion.  He has an outpatient port placement planned for .  He is tolerating room air although slightly tachycardic and conversationally dyspneic.  We will admit the patient for evaluation and possible Aspire drain placement with port placement on Monday. (24)    Code Status: Full Code    [facility]/6/2024     9:26 AM   Demographics   Marital Status    Patient completed an AMD today stating that his ex-spouse Yolanda would be his primary HCDM and her daughter Iris would be secondary HCDM if he were unable to make medical decisions for himself. (See ACP note.) He has no biological children, his parents are living and he has one sister.    Patient / Family Encounter Documentation    Participants (names): Fortunato Kowalski, Yolanda Kowalski, (ex-spouse), Colleen Reyes, Palliative NP, Kimberly Graf, ANGEL    Narrative: Palliative team met with patient and family just after he returned to the floor from procedure. He was alert and oriented x4 and receptive to encounter. He reported no pain, but seemed to be experiencing some sob.  He was pleasant and cooperative and shared that he doesn't feel afraid, but admits feeling \"restless\" and had some trouble sleeping.  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 He has a phone appointment for SS Disability next month. He has applied for Medicaid with assistance from [place] 
NO  Reviewed patient's current orders and MAR    YES  PMH/SH reviewed - no change compared to H&P    Procedures: see electronic medical records for all procedures/Xrays and details which were not copied into this note but were reviewed prior to creation of Plan.      LABS:  I reviewed today's most current labs and imaging studies.  Pertinent labs include:  Recent Labs     05/03/24  1616 05/04/24  0349   WBC 9.3 8.7   HGB 14.0 13.8   HCT 42.8 42.5    341     Recent Labs     05/03/24  1616 05/04/24  0349    134*   K 3.3* 4.5    107   CO2 22 22   GLUCOSE 172* 225*   BUN 14 17   CREATININE 1.27 1.35*   CALCIUM 8.8 8.8   BILITOT 1.1* 1.1*   AST 38* 32   * 101*       Signed: Bal Delgado APRN - NP

## 2024-05-07 NOTE — PLAN OF CARE
Problem: Respiratory - Adult  Goal: Achieves optimal ventilation and oxygenation  5/7/2024 1027 by Brenna Rodriguez RN  Outcome: Completed  Flowsheets (Taken 5/7/2024 0810)  Achieves optimal ventilation and oxygenation: Assess for changes in respiratory status  5/7/2024 0510 by Sandy Patten RN  Outcome: Adequate for Discharge     Problem: Cardiovascular - Adult  Goal: Maintains optimal cardiac output and hemodynamic stability  5/7/2024 1027 by Brenna Rodriguez RN  Outcome: Completed  5/7/2024 0510 by Sandy Patten RN  Outcome: Adequate for Discharge  Goal: Absence of cardiac dysrhythmias or at baseline  5/7/2024 1027 by Brenna Rodriguez RN  Outcome: Completed  5/7/2024 0510 by Sandy Patten RN  Outcome: Adequate for Discharge     Problem: Skin/Tissue Integrity - Adult  Goal: Skin integrity remains intact  5/7/2024 1027 by Brenna Rodriguez RN  Outcome: Completed  Flowsheets  Taken 5/7/2024 1024  Skin Integrity Remains Intact: Monitor for areas of redness and/or skin breakdown  Taken 5/7/2024 0810  Skin Integrity Remains Intact: Monitor for areas of redness and/or skin breakdown  5/7/2024 0510 by Sandy Patten RN  Outcome: Adequate for Discharge     Problem: Pain  Goal: Verbalizes/displays adequate comfort level or baseline comfort level  5/7/2024 1027 by Brenna Rodriguez RN  Outcome: Completed  5/7/2024 0510 by Sandy Patten RN  Outcome: Adequate for Discharge     Problem: Safety - Adult  Goal: Free from fall injury  5/7/2024 1027 by Brenna Rodriguez RN  Outcome: Completed  5/7/2024 0510 by Sandy Patten RN  Outcome: Adequate for Discharge

## 2024-05-07 NOTE — DISCHARGE SUMMARY
Discharge Summary    Name: Fortunato Kowalski  [id number]  YOB: 1965 (Age: 58 y.o.)   Date of Admission: 5/3/2024  Date of Discharge: 5/7/2024  Attending Physician: No att. providers found    Discharge Diagnosis:     Recurrent malignant pleural effusion     Recent diagnosis of stage IV metastatic non-small cell lung cancer     Developed small Pneumothorax s/p thoracentesis     Hypertension     Tachycardia     New diagnosis of Type II diabetes      Anxiety      Tobacco abuse    Consultations:  IP CONSULT TO DIABETES MANAGEMENT  IP CONSULT TO PALLIATIVE CARE      Brief Admission History/Reason for Admission Per Candace Shaver MD:     Fortunato Kowalski is a 58 y.o. male with PMHx significant for recent diagnosis of stage IV metastatic noncell lung cancer. He was recently hospitalized and underwent lung biopsy and thoracentesis for pleural effusion. He has been followed by oncology in the outpatient setting with plans to start chemotherapy on 5/9/2024. He reports he has worsening shortness of breath especially with exertion and feels like he has fluid on his lungs again. Chest x-ray reveals recurrent malignant pleural effusion. He has an outpatient port placement planned for 5/6. He is tolerating room air although slightly tachycardic and conversationally dyspneic. We will admit the patient for evaluation and possible Aspire drain placement with port placement on Monday.     Brief Hospital Course by Main Problems:     Recurrent malignant pleural effusion  Recent diagnosis of stage IV metastatic non-small cell lung cancer  Developed small Pneumothorax s/p thoracentesis  CXR 5/6: Small right apical pneumothorax   Recently hospitalized and discharged on 4/12 with new diagnosis of malignancy  Underwent thoracentesis with pneumothorax on 4/7  Worsening SOB over last several days   CXR 5/03:  1. Interval development of a large right pleural effusion  CXR 5/06, 1157: Small right apical

## 2024-05-09 ENCOUNTER — HOSPITAL ENCOUNTER (OUTPATIENT)
Facility: HOSPITAL | Age: 59
Setting detail: INFUSION SERIES
Discharge: HOME | End: 2024-05-09

## 2024-05-09 ENCOUNTER — OFFICE VISIT (OUTPATIENT)
Age: 59
End: 2024-05-09

## 2024-05-09 VITALS
WEIGHT: 231.5 LBS | HEART RATE: 101 BPM | DIASTOLIC BLOOD PRESSURE: 89 MMHG | SYSTOLIC BLOOD PRESSURE: 121 MMHG | BODY MASS INDEX: 36.34 KG/M2 | TEMPERATURE: 98.7 F | OXYGEN SATURATION: 95 % | RESPIRATION RATE: 20 BRPM | HEIGHT: 67 IN

## 2024-05-09 VITALS
WEIGHT: 231.5 LBS | BODY MASS INDEX: 36.34 KG/M2 | TEMPERATURE: 98.7 F | SYSTOLIC BLOOD PRESSURE: 124 MMHG | RESPIRATION RATE: 20 BRPM | DIASTOLIC BLOOD PRESSURE: 86 MMHG | OXYGEN SATURATION: 94 % | HEIGHT: 67 IN | HEART RATE: 104 BPM

## 2024-05-09 DIAGNOSIS — C34.90 SQUAMOUS CELL CARCINOMA OF LUNG, STAGE IV, UNSPECIFIED LATERALITY (HCC): ICD-10-CM

## 2024-05-09 DIAGNOSIS — C34.90 SQUAMOUS CELL CARCINOMA OF LUNG, STAGE IV, UNSPECIFIED LATERALITY (HCC): Primary | ICD-10-CM

## 2024-05-09 DIAGNOSIS — Z51.11 ENCOUNTER FOR ANTINEOPLASTIC CHEMOTHERAPY: ICD-10-CM

## 2024-05-09 DIAGNOSIS — C34.90 PRIMARY MALIGNANT NEOPLASM OF LUNG METASTATIC TO OTHER SITE, UNSPECIFIED LATERALITY (HCC): ICD-10-CM

## 2024-05-09 DIAGNOSIS — J91.0 MALIGNANT PLEURAL EFFUSION: Primary | ICD-10-CM

## 2024-05-09 LAB
ALBUMIN SERPL-MCNC: 2.9 G/DL (ref 3.5–5)
ALBUMIN/GLOB SERPL: 0.7 (ref 1.1–2.2)
ALP SERPL-CCNC: 160 U/L (ref 45–117)
ALT SERPL-CCNC: 60 U/L (ref 12–78)
ANION GAP SERPL CALC-SCNC: 7 MMOL/L (ref 5–15)
AST SERPL-CCNC: 27 U/L (ref 15–37)
BASOPHILS # BLD: 0 K/UL (ref 0–0.1)
BASOPHILS NFR BLD: 0 % (ref 0–1)
BILIRUB SERPL-MCNC: 1.1 MG/DL (ref 0.2–1)
BUN SERPL-MCNC: 17 MG/DL (ref 6–20)
BUN/CREAT SERPL: 13 (ref 12–20)
CALCIUM SERPL-MCNC: 9.1 MG/DL (ref 8.5–10.1)
CHLORIDE SERPL-SCNC: 103 MMOL/L (ref 97–108)
CO2 SERPL-SCNC: 22 MMOL/L (ref 21–32)
CORTIS SERPL-MCNC: 38.7 UG/DL
CREAT SERPL-MCNC: 1.28 MG/DL (ref 0.7–1.3)
DIFFERENTIAL METHOD BLD: ABNORMAL
EOSINOPHIL # BLD: 0.2 K/UL (ref 0–0.4)
EOSINOPHIL NFR BLD: 2 % (ref 0–7)
ERYTHROCYTE [DISTWIDTH] IN BLOOD BY AUTOMATED COUNT: 14.6 % (ref 11.5–14.5)
GLOBULIN SER CALC-MCNC: 4 G/DL (ref 2–4)
GLUCOSE SERPL-MCNC: 196 MG/DL (ref 65–100)
HBV SURFACE AB SER QL: NONREACTIVE
HBV SURFACE AB SER-ACNC: <3.1 MIU/ML
HBV SURFACE AG SER QL: 0.1 INDEX
HBV SURFACE AG SER QL: NEGATIVE
HCT VFR BLD AUTO: 43 % (ref 36.6–50.3)
HGB BLD-MCNC: 13.9 G/DL (ref 12.1–17)
IMM GRANULOCYTES # BLD AUTO: 0 K/UL (ref 0–0.04)
IMM GRANULOCYTES NFR BLD AUTO: 0 % (ref 0–0.5)
LYMPHOCYTES # BLD: 1.6 K/UL (ref 0.8–3.5)
LYMPHOCYTES NFR BLD: 14 % (ref 12–49)
MCH RBC QN AUTO: 27.7 PG (ref 26–34)
MCHC RBC AUTO-ENTMCNC: 32.3 G/DL (ref 30–36.5)
MCV RBC AUTO: 85.8 FL (ref 80–99)
MONOCYTES # BLD: 1 K/UL (ref 0–1)
MONOCYTES NFR BLD: 9 % (ref 5–13)
NEUTS SEG # BLD: 8.2 K/UL (ref 1.8–8)
NEUTS SEG NFR BLD: 75 % (ref 32–75)
NRBC # BLD: 0 K/UL (ref 0–0.01)
NRBC BLD-RTO: 0 PER 100 WBC
PLATELET # BLD AUTO: 395 K/UL (ref 150–400)
PMV BLD AUTO: 10.9 FL (ref 8.9–12.9)
POTASSIUM SERPL-SCNC: 3.8 MMOL/L (ref 3.5–5.1)
PROT SERPL-MCNC: 6.9 G/DL (ref 6.4–8.2)
RBC # BLD AUTO: 5.01 M/UL (ref 4.1–5.7)
SODIUM SERPL-SCNC: 132 MMOL/L (ref 136–145)
TSH SERPL DL<=0.05 MIU/L-ACNC: 3.16 UIU/ML (ref 0.36–3.74)
WBC # BLD AUTO: 10.9 K/UL (ref 4.1–11.1)

## 2024-05-09 PROCEDURE — 96375 TX/PRO/DX INJ NEW DRUG ADDON: CPT

## 2024-05-09 PROCEDURE — 6360000002 HC RX W HCPCS: Performed by: STUDENT IN AN ORGANIZED HEALTH CARE EDUCATION/TRAINING PROGRAM

## 2024-05-09 PROCEDURE — 36415 COLL VENOUS BLD VENIPUNCTURE: CPT

## 2024-05-09 PROCEDURE — 96415 CHEMO IV INFUSION ADDL HR: CPT

## 2024-05-09 PROCEDURE — 99215 OFFICE O/P EST HI 40 MIN: CPT | Performed by: STUDENT IN AN ORGANIZED HEALTH CARE EDUCATION/TRAINING PROGRAM

## 2024-05-09 PROCEDURE — 96413 CHEMO IV INFUSION 1 HR: CPT

## 2024-05-09 PROCEDURE — 86704 HEP B CORE ANTIBODY TOTAL: CPT

## 2024-05-09 PROCEDURE — 86706 HEP B SURFACE ANTIBODY: CPT

## 2024-05-09 PROCEDURE — 96417 CHEMO IV INFUS EACH ADDL SEQ: CPT

## 2024-05-09 PROCEDURE — 82533 TOTAL CORTISOL: CPT

## 2024-05-09 PROCEDURE — 84443 ASSAY THYROID STIM HORMONE: CPT

## 2024-05-09 PROCEDURE — 80053 COMPREHEN METABOLIC PANEL: CPT

## 2024-05-09 PROCEDURE — A4216 STERILE WATER/SALINE, 10 ML: HCPCS | Performed by: STUDENT IN AN ORGANIZED HEALTH CARE EDUCATION/TRAINING PROGRAM

## 2024-05-09 PROCEDURE — 87340 HEPATITIS B SURFACE AG IA: CPT

## 2024-05-09 PROCEDURE — 2580000003 HC RX 258: Performed by: STUDENT IN AN ORGANIZED HEALTH CARE EDUCATION/TRAINING PROGRAM

## 2024-05-09 PROCEDURE — 85025 COMPLETE CBC W/AUTO DIFF WBC: CPT

## 2024-05-09 PROCEDURE — 2500000003 HC RX 250 WO HCPCS: Performed by: STUDENT IN AN ORGANIZED HEALTH CARE EDUCATION/TRAINING PROGRAM

## 2024-05-09 PROCEDURE — 96367 TX/PROPH/DG ADDL SEQ IV INF: CPT

## 2024-05-09 RX ORDER — ACETAMINOPHEN 325 MG/1
650 TABLET ORAL
Status: CANCELLED | OUTPATIENT
Start: 2024-05-09

## 2024-05-09 RX ORDER — LIDOCAINE AND PRILOCAINE 25; 25 MG/G; MG/G
CREAM TOPICAL
Qty: 5 G | Refills: 2 | Status: SHIPPED | OUTPATIENT
Start: 2024-05-09

## 2024-05-09 RX ORDER — SODIUM CHLORIDE 9 MG/ML
5-250 INJECTION, SOLUTION INTRAVENOUS PRN
Status: CANCELLED | OUTPATIENT
Start: 2024-05-09

## 2024-05-09 RX ORDER — SODIUM CHLORIDE 9 MG/ML
5-250 INJECTION, SOLUTION INTRAVENOUS PRN
Status: DISCONTINUED | OUTPATIENT
Start: 2024-05-09 | End: 2024-05-10 | Stop reason: HOSPADM

## 2024-05-09 RX ORDER — PROCHLORPERAZINE MALEATE 5 MG/1
5 TABLET ORAL EVERY 6 HOURS PRN
Qty: 120 TABLET | Refills: 3 | Status: SHIPPED | OUTPATIENT
Start: 2024-05-09

## 2024-05-09 RX ORDER — ONDANSETRON 4 MG/1
4 TABLET, ORALLY DISINTEGRATING ORAL EVERY 8 HOURS PRN
Qty: 42 TABLET | Refills: 0 | Status: SHIPPED | OUTPATIENT
Start: 2024-05-09 | End: 2024-05-23

## 2024-05-09 RX ORDER — HEPARIN 100 UNIT/ML
500 SYRINGE INTRAVENOUS PRN
Status: CANCELLED | OUTPATIENT
Start: 2024-05-09

## 2024-05-09 RX ORDER — DIPHENHYDRAMINE HYDROCHLORIDE 50 MG/ML
50 INJECTION INTRAMUSCULAR; INTRAVENOUS ONCE
Status: COMPLETED | OUTPATIENT
Start: 2024-05-09 | End: 2024-05-09

## 2024-05-09 RX ORDER — MEPERIDINE HYDROCHLORIDE 25 MG/ML
12.5 INJECTION INTRAMUSCULAR; INTRAVENOUS; SUBCUTANEOUS PRN
Status: CANCELLED | OUTPATIENT
Start: 2024-05-09

## 2024-05-09 RX ORDER — SODIUM CHLORIDE 9 MG/ML
INJECTION, SOLUTION INTRAVENOUS CONTINUOUS
Status: CANCELLED | OUTPATIENT
Start: 2024-05-09

## 2024-05-09 RX ORDER — ALBUTEROL SULFATE 90 UG/1
4 AEROSOL, METERED RESPIRATORY (INHALATION) PRN
Status: CANCELLED | OUTPATIENT
Start: 2024-05-09

## 2024-05-09 RX ORDER — PALONOSETRON 0.05 MG/ML
0.25 INJECTION, SOLUTION INTRAVENOUS ONCE
Status: CANCELLED | OUTPATIENT
Start: 2024-05-09 | End: 2024-05-09

## 2024-05-09 RX ORDER — SODIUM CHLORIDE 0.9 % (FLUSH) 0.9 %
5-40 SYRINGE (ML) INJECTION PRN
Status: DISCONTINUED | OUTPATIENT
Start: 2024-05-09 | End: 2024-05-10 | Stop reason: HOSPADM

## 2024-05-09 RX ORDER — DIPHENHYDRAMINE HYDROCHLORIDE 50 MG/ML
50 INJECTION INTRAMUSCULAR; INTRAVENOUS ONCE
Status: CANCELLED | OUTPATIENT
Start: 2024-05-09 | End: 2024-05-09

## 2024-05-09 RX ORDER — SODIUM CHLORIDE 0.9 % (FLUSH) 0.9 %
5-40 SYRINGE (ML) INJECTION PRN
Status: CANCELLED | OUTPATIENT
Start: 2024-05-09

## 2024-05-09 RX ORDER — DIPHENHYDRAMINE HYDROCHLORIDE 50 MG/ML
50 INJECTION INTRAMUSCULAR; INTRAVENOUS
Status: CANCELLED | OUTPATIENT
Start: 2024-05-09

## 2024-05-09 RX ORDER — PALONOSETRON 0.05 MG/ML
0.25 INJECTION, SOLUTION INTRAVENOUS ONCE
Status: COMPLETED | OUTPATIENT
Start: 2024-05-09 | End: 2024-05-09

## 2024-05-09 RX ORDER — ONDANSETRON 2 MG/ML
8 INJECTION INTRAMUSCULAR; INTRAVENOUS
Status: CANCELLED | OUTPATIENT
Start: 2024-05-09

## 2024-05-09 RX ORDER — EPINEPHRINE 1 MG/ML
0.3 INJECTION, SOLUTION, CONCENTRATE INTRAVENOUS PRN
Status: CANCELLED | OUTPATIENT
Start: 2024-05-09

## 2024-05-09 RX ADMIN — SODIUM CHLORIDE 150 MG: 9 INJECTION, SOLUTION INTRAVENOUS at 10:50

## 2024-05-09 RX ADMIN — SODIUM CHLORIDE 100 ML/HR: 9 INJECTION, SOLUTION INTRAVENOUS at 10:03

## 2024-05-09 RX ADMIN — SODIUM CHLORIDE 500 MG: 9 INJECTION, SOLUTION INTRAVENOUS at 15:22

## 2024-05-09 RX ADMIN — PACLITAXEL 378 MG: 6 INJECTION, SOLUTION, CONCENTRATE INTRAVENOUS at 12:16

## 2024-05-09 RX ADMIN — DIPHENHYDRAMINE HYDROCHLORIDE 50 MG: 50 INJECTION, SOLUTION INTRAMUSCULAR; INTRAVENOUS at 11:12

## 2024-05-09 RX ADMIN — DEXAMETHASONE SODIUM PHOSPHATE 12 MG: 4 INJECTION, SOLUTION INTRAMUSCULAR; INTRAVENOUS at 10:28

## 2024-05-09 RX ADMIN — SODIUM CHLORIDE 200 MG: 9 INJECTION, SOLUTION INTRAVENOUS at 11:35

## 2024-05-09 RX ADMIN — SODIUM CHLORIDE, PRESERVATIVE FREE 10 ML: 5 INJECTION INTRAVENOUS at 15:54

## 2024-05-09 RX ADMIN — SODIUM CHLORIDE, PRESERVATIVE FREE 10 ML: 5 INJECTION INTRAVENOUS at 10:00

## 2024-05-09 RX ADMIN — PALONOSETRON 0.25 MG: 0.05 INJECTION, SOLUTION INTRAVENOUS at 10:03

## 2024-05-09 RX ADMIN — FAMOTIDINE 20 MG: 10 INJECTION, SOLUTION INTRAVENOUS at 10:07

## 2024-05-09 NOTE — PROGRESS NOTES
[facility] Visit Note    Pt arrived to [facility] in wheelchair in no acute distress at 0823 for Keytruda/Carboplatin/Taxol C1D1.  Assessment unremarkable except dyspnea with exertion and cough.  R chest port accessed without issue and positive blood return noted.  Labs obtained- CBC with diff, CMP, TSH, Cortisol, and Hep B testing.  Pt to MD office for follow-up appt.    Patient denied having any symptoms of COVID-19, i.e. SOB, coughing, fever, or generally not feeling well.      /89   Pulse (!) 103   Temp 98.7 °F (37.1 °C) (Temporal)   Resp 22   Ht 1.702 m (5' 7\")   Wt 105 kg (231 lb 8 oz)   SpO2 94%   BMI 36.26 kg/m²     Recent Results (from the past 12 hour(s))   CBC with Auto Differential    Collection Time: 05/09/24  8:32 AM   Result Value Ref Range    WBC 10.9 4.1 - 11.1 K/uL    RBC 5.01 4.10 - 5.70 M/uL    Hemoglobin 13.9 12.1 - 17.0 g/dL    Hematocrit 43.0 36.6 - 50.3 %    MCV 85.8 80.0 - 99.0 FL    MCH 27.7 26.0 - 34.0 PG    MCHC 32.3 30.0 - 36.5 g/dL    RDW 14.6 (H) 11.5 - 14.5 %    Platelets 395 150 - 400 K/uL    MPV 10.9 8.9 - 12.9 FL    Nucleated RBCs 0.0 0  WBC    nRBC 0.00 0.00 - 0.01 K/uL    Neutrophils % 75 32 - 75 %    Lymphocytes % 14 12 - 49 %    Monocytes % 9 5 - 13 %    Eosinophils % 2 0 - 7 %    Basophils % 0 0 - 1 %    Immature Granulocytes % 0 0.0 - 0.5 %    Neutrophils Absolute 8.2 (H) 1.8 - 8.0 K/UL    Lymphocytes Absolute 1.6 0.8 - 3.5 K/UL    Monocytes Absolute 1.0 0.0 - 1.0 K/UL    Eosinophils Absolute 0.2 0.0 - 0.4 K/UL    Basophils Absolute 0.0 0.0 - 0.1 K/UL    Immature Granulocytes Absolute 0.0 0.00 - 0.04 K/UL    Differential Type AUTOMATED     Comprehensive Metabolic Panel    Collection Time: 05/09/24  8:32 AM   Result Value Ref Range    Sodium 132 (L) 136 - 145 mmol/L    Potassium 3.8 3.5 - 5.1 mmol/L    Chloride 103 97 - 108 mmol/L    CO2 22 21 - 32 mmol/L    Anion Gap 7 5 - 15 mmol/L    Glucose 196 (H) 65 - 100 mg/dL    BUN 17 6 -  5/30/24 @ 0800.

## 2024-05-09 NOTE — PROGRESS NOTES
Fortunato Kowalski is a 58 y.o. 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 Pt receiving Carbo, Taxol, Keytruda today; cycle  day 1. Pt repot fatigue. Dry cough and SOB; O2 sats 95% on R/A. Pt report trouble urinating yesterday; no pain, no burning.         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 Have you been to the ER, urgent care clinic since your last visit?  Hospitalized since your last visit? Yes; [facility]    2. Have you seen or consulted any other health care providers outside of the [facility] since your last visit?  Include any pap smears or colon screening. No    
PERC CATH PLEURAL DRAIN W/IMAG  04/08/2024    IR PERC CATH PLEURAL DRAIN W/IMAG 4/8/2024 Adi Freitas MD [facility]    IR PORT PLACEMENT EQUAL OR GREATER THAN 5 YEARS  5/6/2024    IR PORT PLACEMENT EQUAL OR GREATER THAN 5 YEARS 5/6/2024 Adi Freitas MD [facility]       Social History     Socioeconomic History    Marital status:      Spouse name: None    Number of children: None    Years of education: None    Highest education level: None   Tobacco Use    Smoking status: Every Day     Current packs/day: 2.00     Types: Cigarettes    Smokeless tobacco: Never   Vaping Use    Vaping Use: Every day   Substance and Sexual Activity    Alcohol use: Not Currently    Drug use: Never    Sexual activity: Never     Social Determinants of Health     Food Insecurity: No Food Insecurity (5/3/2024)    Hunger Vital Sign     Worried About Running Out of Food in the Last Year: Never true     Ran Out of Food in the Last Year: Never true   Transportation Needs: No Transportation Needs (5/3/2024)    PRAPARE - Transportation     Lack of Transportation (Medical): No     Lack of Transportation (Non-Medical): No   Housing Stability: Low Risk  (5/3/2024)    Housing Stability Vital Sign     Unable to Pay for Housing in the Last Year: No     Number of Places Lived in the Last Year: 2     Unstable Housing in the Last Year: No       Current Outpatient Medications   Medication Sig Dispense Refill    lidocaine-prilocaine (EMLA) 2.5-2.5 % cream Apply topically as needed. 5 g 2    ondansetron (ZOFRAN-ODT) 4 MG disintegrating tablet Place 1 tablet under the tongue every 8 hours as needed for Nausea or Vomiting 42 tablet 0    prochlorperazine (COMPAZINE) 5 MG tablet Take 1 tablet by mouth every 6 hours as needed for Nausea 120 tablet 3    metoprolol tartrate (LOPRESSOR) 25 MG tablet Take 1 tablet by mouth daily 60 tablet 1    metFORMIN (GLUCOPHAGE) 500 MG tablet Take 1 tablet by mouth 2 times daily (with meals) 60 tablet 1

## 2024-05-09 NOTE — DISCHARGE INSTRUCTIONS
Learning About Chemotherapy Side Effects and Safety  What is chemotherapy?  Chemotherapy uses medicines to kill cancer cells. It's often called \"chemo.\" Chemo may slow cancer growth, stop cancer from spreading, or help get rid of the cancer.  Chemo can be given at different locations, such as a hospital, a doctor's office, or a clinic. Sometimes chemo treatments may be done at home.  You may get chemo in \"cycles.\" This means that you get a number of treatments over a set period of time. Then you take a break before you start again.  Chemo helps to treat many kinds of cancer. But it can also affect healthy cells along with the cancer cells. This is why some types of chemo cause side effects, like nausea, losing your hair, or feeling tired.  What are the side effects of chemotherapy?  Side effects depend on which medicines you take, how much you take, and how the medicines affect you. Your doctor can tell you what to expect when you take these medicines. Some of them may cause symptoms such as:  Fatigue.  Nausea.  Vomiting.  A rash.  Hair loss.  Pain or tingling in your hands or the soles of your feet.  Chemo treatment can be hard. It can keep you from doing the things you were doing every day, like going to work or school. But keep in mind that most side effects don't last. They will go away after you finish the treatment.  And many side effects can be managed with medicine. Your doctor will tell you what to do if you have side effects. You'll also learn which ones you need to tell your doctor about right away.  How can you use chemotherapy safely?  Chemo medicines can stay in your body fluids (vomit, urine, or stool) for several days. These medicines contain strong chemicals, so they can be harmful if someone touches waste from your body.  If you take chemo at home, any of your clothes or bed linens or cloth diapers that have medicine or body fluids on them need to be handled separately from other laundry. Have  caregivers use gloves when they wash your bed linens and clothes. Bed linens and cloth diapers should be machine washed twice in hot water, using regular detergent. Keep the medicine out of the reach of children.  When using the bathroom during the first 48 hours after each treatment:  Sit on the toilet seat to prevent splashing.  Put the toilet lid down before you flush.  Always flush twice after you use the toilet.  Couples should use a condom during sex while a partner is getting chemo treatments and for several days after treatment ends.  Follow-up care is a key part of your treatment and safety. Be sure to make and go to all appointments, and call your doctor if you are having problems. It's also a good idea to know your test results and keep a list of the medicines you take.  Where can you learn more?  Go to https://www.[employer].net/patientEd and enter Z450 to learn more about \"Learning About Chemotherapy Side Effects and Safety.\"  Current as of: October 25, 2023               Content Version: 14.0  © 2006-2024 [employer].   Care instructions adapted under license by [employer]. If you have questions about a medical condition or this instruction, always ask your healthcare professional. [employer] disclaims any warranty or liability for your use of this information.         Cancer Treatment and Side Effects: Care Instructions  Your Care Instructions     Cancer and its treatment can cause problems that you have to watch for after you leave the clinic or hospital. You may feel very tired. You may have pain. Medicines and other treatments can reduce or stop some of these problems.  You can work with your doctor to find the right solutions to your cancer-related problems. One of the best things you can do is take good care of yourself.  Follow-up care is a key part of your treatment and safety. Be sure to make and go to all appointments, and call your doctor if you are having

## 2024-05-10 LAB — HBV CORE AB SERPL QL IA: NEGATIVE

## 2024-05-10 RX ORDER — LISINOPRIL 5 MG/1
5 TABLET ORAL DAILY
Qty: 90 TABLET | Refills: 3 | OUTPATIENT
Start: 2024-05-10

## 2024-05-10 RX ORDER — EZETIMIBE 10 MG/1
10 TABLET ORAL DAILY
Qty: 90 TABLET | Refills: 3 | OUTPATIENT
Start: 2024-05-10

## 2024-05-13 ENCOUNTER — TELEPHONE (OUTPATIENT)
Age: 59
End: 2024-05-13

## 2024-05-13 ENCOUNTER — HOSPITAL ENCOUNTER (INPATIENT)
Facility: HOSPITAL | Age: 59
LOS: 9 days | Discharge: HOME HEALTH | DRG: 163 | End: 2024-05-22
Attending: STUDENT IN AN ORGANIZED HEALTH CARE EDUCATION/TRAINING PROGRAM | Admitting: INTERNAL MEDICINE
Payer: COMMERCIAL

## 2024-05-13 ENCOUNTER — APPOINTMENT (OUTPATIENT)
Facility: HOSPITAL | Age: 59
DRG: 163 | End: 2024-05-13
Payer: COMMERCIAL

## 2024-05-13 DIAGNOSIS — J91.0 MALIGNANT PLEURAL EFFUSION: ICD-10-CM

## 2024-05-13 DIAGNOSIS — J90 PLEURAL EFFUSION: ICD-10-CM

## 2024-05-13 DIAGNOSIS — C34.90 SQUAMOUS CELL CARCINOMA OF LUNG, STAGE IV, UNSPECIFIED LATERALITY (HCC): ICD-10-CM

## 2024-05-13 DIAGNOSIS — R06.02 SHORTNESS OF BREATH: ICD-10-CM

## 2024-05-13 DIAGNOSIS — R06.02 SOB (SHORTNESS OF BREATH): ICD-10-CM

## 2024-05-13 DIAGNOSIS — R06.00 DYSPNEA, UNSPECIFIED TYPE: ICD-10-CM

## 2024-05-13 DIAGNOSIS — J90 RECURRENT PLEURAL EFFUSION: Primary | ICD-10-CM

## 2024-05-13 DIAGNOSIS — R09.89 DIMINISHED PULSES IN LOWER EXTREMITY: ICD-10-CM

## 2024-05-13 DIAGNOSIS — J95.811 POSTPROCEDURAL PNEUMOTHORAX: ICD-10-CM

## 2024-05-13 LAB
ALBUMIN SERPL-MCNC: 3 G/DL (ref 3.5–5)
ALBUMIN/GLOB SERPL: 0.9 (ref 1.1–2.2)
ALP SERPL-CCNC: 144 U/L (ref 45–117)
ALT SERPL-CCNC: 61 U/L (ref 12–78)
ANION GAP SERPL CALC-SCNC: 8 MMOL/L (ref 5–15)
AST SERPL-CCNC: 35 U/L (ref 15–37)
BASOPHILS # BLD: 0 K/UL (ref 0–0.1)
BASOPHILS NFR BLD: 0 % (ref 0–1)
BILIRUB SERPL-MCNC: 1.6 MG/DL (ref 0.2–1)
BUN SERPL-MCNC: 35 MG/DL (ref 6–20)
BUN/CREAT SERPL: 25 (ref 12–20)
CALCIUM SERPL-MCNC: 9 MG/DL (ref 8.5–10.1)
CHLORIDE SERPL-SCNC: 100 MMOL/L (ref 97–108)
CO2 SERPL-SCNC: 22 MMOL/L (ref 21–32)
CREAT SERPL-MCNC: 1.41 MG/DL (ref 0.7–1.3)
DIFFERENTIAL METHOD BLD: ABNORMAL
EKG ATRIAL RATE: 108 BPM
EKG DIAGNOSIS: NORMAL
EKG P AXIS: 70 DEGREES
EKG P-R INTERVAL: 124 MS
EKG Q-T INTERVAL: 314 MS
EKG QRS DURATION: 66 MS
EKG QTC CALCULATION (BAZETT): 420 MS
EKG R AXIS: 61 DEGREES
EKG T AXIS: -87 DEGREES
EKG VENTRICULAR RATE: 108 BPM
EOSINOPHIL # BLD: 0.2 K/UL (ref 0–0.4)
EOSINOPHIL NFR BLD: 1 % (ref 0–7)
ERYTHROCYTE [DISTWIDTH] IN BLOOD BY AUTOMATED COUNT: 14.6 % (ref 11.5–14.5)
GLOBULIN SER CALC-MCNC: 3.5 G/DL (ref 2–4)
GLUCOSE BLD STRIP.AUTO-MCNC: 176 MG/DL (ref 65–117)
GLUCOSE SERPL-MCNC: 160 MG/DL (ref 65–100)
HCT VFR BLD AUTO: 43.7 % (ref 36.6–50.3)
HGB BLD-MCNC: 14.2 G/DL (ref 12.1–17)
IMM GRANULOCYTES # BLD AUTO: 0.1 K/UL (ref 0–0.04)
IMM GRANULOCYTES NFR BLD AUTO: 1 % (ref 0–0.5)
LYMPHOCYTES # BLD: 1.1 K/UL (ref 0.8–3.5)
LYMPHOCYTES NFR BLD: 8 % (ref 12–49)
MCH RBC QN AUTO: 27.7 PG (ref 26–34)
MCHC RBC AUTO-ENTMCNC: 32.5 G/DL (ref 30–36.5)
MCV RBC AUTO: 85.4 FL (ref 80–99)
MONOCYTES # BLD: 0.2 K/UL (ref 0–1)
MONOCYTES NFR BLD: 1 % (ref 5–13)
NEUTS SEG # BLD: 11.7 K/UL (ref 1.8–8)
NEUTS SEG NFR BLD: 89 % (ref 32–75)
NRBC # BLD: 0 K/UL (ref 0–0.01)
NRBC BLD-RTO: 0 PER 100 WBC
NT PRO BNP: 1059 PG/ML
PLATELET # BLD AUTO: 284 K/UL (ref 150–400)
PMV BLD AUTO: 11.9 FL (ref 8.9–12.9)
POTASSIUM SERPL-SCNC: 4.9 MMOL/L (ref 3.5–5.1)
PROT SERPL-MCNC: 6.5 G/DL (ref 6.4–8.2)
RBC # BLD AUTO: 5.12 M/UL (ref 4.1–5.7)
SERVICE CMNT-IMP: ABNORMAL
SODIUM SERPL-SCNC: 130 MMOL/L (ref 136–145)
TROPONIN I SERPL HS-MCNC: 6 NG/L (ref 0–76)
WBC # BLD AUTO: 13.3 K/UL (ref 4.1–11.1)

## 2024-05-13 PROCEDURE — 83880 ASSAY OF NATRIURETIC PEPTIDE: CPT

## 2024-05-13 PROCEDURE — 80053 COMPREHEN METABOLIC PANEL: CPT

## 2024-05-13 PROCEDURE — 82962 GLUCOSE BLOOD TEST: CPT

## 2024-05-13 PROCEDURE — 84484 ASSAY OF TROPONIN QUANT: CPT

## 2024-05-13 PROCEDURE — 36415 COLL VENOUS BLD VENIPUNCTURE: CPT

## 2024-05-13 PROCEDURE — 93005 ELECTROCARDIOGRAM TRACING: CPT | Performed by: STUDENT IN AN ORGANIZED HEALTH CARE EDUCATION/TRAINING PROGRAM

## 2024-05-13 PROCEDURE — 2580000003 HC RX 258: Performed by: INTERNAL MEDICINE

## 2024-05-13 PROCEDURE — 1100000003 HC PRIVATE W/ TELEMETRY

## 2024-05-13 PROCEDURE — 99285 EMERGENCY DEPT VISIT HI MDM: CPT

## 2024-05-13 PROCEDURE — 71045 X-RAY EXAM CHEST 1 VIEW: CPT

## 2024-05-13 PROCEDURE — 6370000000 HC RX 637 (ALT 250 FOR IP): Performed by: INTERNAL MEDICINE

## 2024-05-13 PROCEDURE — 6360000002 HC RX W HCPCS: Performed by: STUDENT IN AN ORGANIZED HEALTH CARE EDUCATION/TRAINING PROGRAM

## 2024-05-13 PROCEDURE — 85025 COMPLETE CBC W/AUTO DIFF WBC: CPT

## 2024-05-13 PROCEDURE — 93970 EXTREMITY STUDY: CPT

## 2024-05-13 PROCEDURE — 2500000003 HC RX 250 WO HCPCS: Performed by: STUDENT IN AN ORGANIZED HEALTH CARE EDUCATION/TRAINING PROGRAM

## 2024-05-13 RX ORDER — LORAZEPAM 1 MG/1
1 TABLET ORAL EVERY 4 HOURS PRN
Status: DISCONTINUED | OUTPATIENT
Start: 2024-05-13 | End: 2024-05-20

## 2024-05-13 RX ORDER — HYDROMORPHONE HYDROCHLORIDE 1 MG/ML
1 INJECTION, SOLUTION INTRAMUSCULAR; INTRAVENOUS; SUBCUTANEOUS
Status: DISCONTINUED | OUTPATIENT
Start: 2024-05-13 | End: 2024-05-13

## 2024-05-13 RX ORDER — ACETAMINOPHEN 325 MG/1
650 TABLET ORAL EVERY 6 HOURS PRN
Status: DISCONTINUED | OUTPATIENT
Start: 2024-05-13 | End: 2024-05-22 | Stop reason: HOSPADM

## 2024-05-13 RX ORDER — SODIUM CHLORIDE 0.9 % (FLUSH) 0.9 %
5-40 SYRINGE (ML) INJECTION PRN
Status: DISCONTINUED | OUTPATIENT
Start: 2024-05-13 | End: 2024-05-17 | Stop reason: SDUPTHER

## 2024-05-13 RX ORDER — DEXTROSE MONOHYDRATE 100 MG/ML
INJECTION, SOLUTION INTRAVENOUS CONTINUOUS PRN
Status: DISCONTINUED | OUTPATIENT
Start: 2024-05-13 | End: 2024-05-22 | Stop reason: HOSPADM

## 2024-05-13 RX ORDER — OXYCODONE HYDROCHLORIDE 5 MG/1
5 TABLET ORAL EVERY 6 HOURS PRN
Status: DISCONTINUED | OUTPATIENT
Start: 2024-05-13 | End: 2024-05-22 | Stop reason: HOSPADM

## 2024-05-13 RX ORDER — NICOTINE 21 MG/24HR
1 PATCH, TRANSDERMAL 24 HOURS TRANSDERMAL EVERY 24 HOURS
Status: DISCONTINUED | OUTPATIENT
Start: 2024-05-13 | End: 2024-05-22 | Stop reason: HOSPADM

## 2024-05-13 RX ORDER — OXYCODONE HYDROCHLORIDE 5 MG/1
10 TABLET ORAL EVERY 6 HOURS PRN
Status: DISCONTINUED | OUTPATIENT
Start: 2024-05-13 | End: 2024-05-22 | Stop reason: HOSPADM

## 2024-05-13 RX ORDER — HYDRALAZINE HYDROCHLORIDE 20 MG/ML
20 INJECTION INTRAMUSCULAR; INTRAVENOUS EVERY 6 HOURS PRN
Status: DISCONTINUED | OUTPATIENT
Start: 2024-05-13 | End: 2024-05-22 | Stop reason: HOSPADM

## 2024-05-13 RX ORDER — GLUCAGON 1 MG/ML
1 KIT INJECTION PRN
Status: DISCONTINUED | OUTPATIENT
Start: 2024-05-13 | End: 2024-05-22 | Stop reason: HOSPADM

## 2024-05-13 RX ORDER — INSULIN LISPRO 100 [IU]/ML
0-4 INJECTION, SOLUTION INTRAVENOUS; SUBCUTANEOUS NIGHTLY
Status: DISCONTINUED | OUTPATIENT
Start: 2024-05-13 | End: 2024-05-22 | Stop reason: HOSPADM

## 2024-05-13 RX ORDER — ACETAMINOPHEN 650 MG/1
650 SUPPOSITORY RECTAL EVERY 6 HOURS PRN
Status: DISCONTINUED | OUTPATIENT
Start: 2024-05-13 | End: 2024-05-22 | Stop reason: HOSPADM

## 2024-05-13 RX ORDER — POLYETHYLENE GLYCOL 3350 17 G/17G
17 POWDER, FOR SOLUTION ORAL DAILY
Status: DISCONTINUED | OUTPATIENT
Start: 2024-05-14 | End: 2024-05-17 | Stop reason: SDUPTHER

## 2024-05-13 RX ORDER — ONDANSETRON 4 MG/1
4 TABLET, ORALLY DISINTEGRATING ORAL EVERY 8 HOURS PRN
Status: DISCONTINUED | OUTPATIENT
Start: 2024-05-13 | End: 2024-05-22 | Stop reason: HOSPADM

## 2024-05-13 RX ORDER — INSULIN LISPRO 100 [IU]/ML
0-8 INJECTION, SOLUTION INTRAVENOUS; SUBCUTANEOUS
Status: DISCONTINUED | OUTPATIENT
Start: 2024-05-14 | End: 2024-05-22 | Stop reason: HOSPADM

## 2024-05-13 RX ORDER — BISACODYL 10 MG
10 SUPPOSITORY, RECTAL RECTAL DAILY PRN
Status: DISCONTINUED | OUTPATIENT
Start: 2024-05-13 | End: 2024-05-17 | Stop reason: SDUPTHER

## 2024-05-13 RX ORDER — ENOXAPARIN SODIUM 100 MG/ML
30 INJECTION SUBCUTANEOUS 2 TIMES DAILY
Status: DISCONTINUED | OUTPATIENT
Start: 2024-05-14 | End: 2024-05-22 | Stop reason: HOSPADM

## 2024-05-13 RX ORDER — LORAZEPAM 2 MG/ML
0.5 INJECTION INTRAMUSCULAR ONCE
Status: COMPLETED | OUTPATIENT
Start: 2024-05-13 | End: 2024-05-13

## 2024-05-13 RX ORDER — HYDROMORPHONE HYDROCHLORIDE 1 MG/ML
0.5 INJECTION, SOLUTION INTRAMUSCULAR; INTRAVENOUS; SUBCUTANEOUS
Status: COMPLETED | OUTPATIENT
Start: 2024-05-13 | End: 2024-05-13

## 2024-05-13 RX ORDER — PACLITAXEL 6 MG/ML
INJECTION, SOLUTION INTRAVENOUS
COMMUNITY

## 2024-05-13 RX ORDER — ONDANSETRON 2 MG/ML
4 INJECTION INTRAMUSCULAR; INTRAVENOUS EVERY 6 HOURS PRN
Status: DISCONTINUED | OUTPATIENT
Start: 2024-05-13 | End: 2024-05-15 | Stop reason: SDUPTHER

## 2024-05-13 RX ORDER — ONDANSETRON 2 MG/ML
4 INJECTION INTRAMUSCULAR; INTRAVENOUS EVERY 6 HOURS PRN
Status: DISCONTINUED | OUTPATIENT
Start: 2024-05-13 | End: 2024-05-22 | Stop reason: HOSPADM

## 2024-05-13 RX ORDER — SODIUM CHLORIDE 0.9 % (FLUSH) 0.9 %
5-40 SYRINGE (ML) INJECTION EVERY 12 HOURS SCHEDULED
Status: DISCONTINUED | OUTPATIENT
Start: 2024-05-13 | End: 2024-05-17 | Stop reason: SDUPTHER

## 2024-05-13 RX ORDER — IPRATROPIUM BROMIDE AND ALBUTEROL SULFATE 2.5; .5 MG/3ML; MG/3ML
1 SOLUTION RESPIRATORY (INHALATION) EVERY 4 HOURS PRN
Status: DISCONTINUED | OUTPATIENT
Start: 2024-05-13 | End: 2024-05-22 | Stop reason: HOSPADM

## 2024-05-13 RX ORDER — CARBOPLATIN 10 MG/ML
INJECTION INTRAVENOUS
COMMUNITY
Start: 2024-05-09

## 2024-05-13 RX ORDER — SODIUM CHLORIDE 9 MG/ML
INJECTION, SOLUTION INTRAVENOUS PRN
Status: DISCONTINUED | OUTPATIENT
Start: 2024-05-13 | End: 2024-05-22 | Stop reason: HOSPADM

## 2024-05-13 RX ADMIN — SODIUM CHLORIDE, PRESERVATIVE FREE 10 ML: 5 INJECTION INTRAVENOUS at 20:08

## 2024-05-13 RX ADMIN — LORAZEPAM 1 MG: 1 TABLET ORAL at 20:07

## 2024-05-13 RX ADMIN — HYDROMORPHONE HYDROCHLORIDE 0.5 MG: 1 INJECTION, SOLUTION INTRAMUSCULAR; INTRAVENOUS; SUBCUTANEOUS at 14:03

## 2024-05-13 RX ADMIN — LORAZEPAM 0.5 MG: 2 INJECTION INTRAMUSCULAR; INTRAVENOUS at 13:26

## 2024-05-13 RX ADMIN — METOPROLOL TARTRATE 25 MG: 25 TABLET, FILM COATED ORAL at 22:43

## 2024-05-13 RX ADMIN — OXYCODONE 10 MG: 5 TABLET ORAL at 20:06

## 2024-05-13 ASSESSMENT — PAIN DESCRIPTION - PAIN TYPE: TYPE: ACUTE PAIN;CHRONIC PAIN

## 2024-05-13 ASSESSMENT — PAIN - FUNCTIONAL ASSESSMENT: PAIN_FUNCTIONAL_ASSESSMENT: ACTIVITIES ARE NOT PREVENTED

## 2024-05-13 ASSESSMENT — PAIN DESCRIPTION - ORIENTATION: ORIENTATION: RIGHT

## 2024-05-13 ASSESSMENT — PAIN DESCRIPTION - LOCATION: LOCATION: BACK

## 2024-05-13 ASSESSMENT — PAIN SCALES - GENERAL: PAINLEVEL_OUTOF10: 10

## 2024-05-13 ASSESSMENT — PAIN DESCRIPTION - DESCRIPTORS: DESCRIPTORS: ACHING

## 2024-05-13 NOTE — TELEPHONE ENCOUNTER
Pt is Ex-Spouse had called to let Dr. Veronica know that they brought the baker in the emergency department. She said that he is breathing heavy    #  [phone]

## 2024-05-13 NOTE — PROCEDURES
TRANSFER - IN REPORT:    Verbal report received from [facility] on Fortunato Kowalski  being received from ED for routine progression of patient care      Report consisted of patient's Situation, Background, Assessment and   Recommendations(SBAR).     Information from the following report(s) Nurse Handoff Report, Intake/Output, and MAR was reviewed with the receiving nurse.    Opportunity for questions and clarification was provided.         moderate risk surgical team to determine prophylactic intervention

## 2024-05-13 NOTE — ED PROVIDER NOTES
SubCUTAneous Held 5/14/24 1711)   insulin lispro (HUMALOG,ADMELOG) injection vial 0-4 Units ( SubCUTAneous Not Given 5/13/24 2236)   ipratropium 0.5 mg-albuterol 2.5 mg (DUONEB) nebulizer solution 1 Dose (has no administration in time range)   heparin 2 units/mL solution in 0.9% sodium chloride (30 mLs Irrigation Bolus from Bag 5/14/24 1243)   guaiFENesin (MUCINEX) extended release tablet 600 mg (600 mg Oral Given 5/14/24 1137)   benzonatate (TESSALON) capsule 100 mg (has no administration in time range)   albuterol (PROVENTIL) nebulizer solution 2.5 mg (has no administration in time range)   LORazepam (ATIVAN) injection 0.5 mg (0.5 mg IntraVENous Given 5/13/24 1326)   HYDROmorphone HCl PF (DILAUDID) injection 0.5 mg (0.5 mg IntraVENous Given 5/13/24 1403)   lidocaine 2 % injection 20 mL (8 mLs IntraDERmal Given 5/14/24 1242)       CONSULTS: (Who and What was discussed)  IP CONSULT TO HOSPITALIST  IP CONSULT TO PULMONOLOGY  IP CONSULT TO ONCOLOGY  IP CONSULT TO ONCOLOGY      Chronic Conditions: stage iv lung cancer    Social Determinants affecting Dx or Tx: None    Counseling:   Records Reviewed (source and summary of external notes): Nursing Notes, Previous Radiology Studies, and Previous Laboratory Studies    CC/HPI Summary, DDx, ED Course, and Reassessment: Patient presenting with chief complaint of progressively worsening shortness of breath, lower extremity edema bilaterally, tachypnea, tachycardia.  He is not hypoxic.  Differentials include worsening pleural effusion, PE, CHF, electrolyte abnormality, anemia.  Will obtain basic labs CBC, CMP, troponin, BNP, chest x-ray.  Chest x-ray appeared consistent with his previous chest x-rays, right lung volume loss with a persistent right-sided pneumo that is stable from previous.  This was postprocedural consistent with trapped lung.  Consider PE although this seems less likely given his diffuse edema, no unilateral leg swelling but is on differential given tachypnea

## 2024-05-13 NOTE — ED NOTES
Pt provided with urinal per requesting. Pt using independently to void. Pt resting quietly in stretcher. Bed locked and in low position, side rails x2, call bell within reach.

## 2024-05-13 NOTE — TELEPHONE ENCOUNTER
PHI verified and HIPPA verified by two patient identifiers.    Pt's spouse reports B/L LE swelling and SOB. Pt in ER.

## 2024-05-13 NOTE — ED NOTES
Verbal (telephone) report given to Tenzin (oncoming nurse) by Roxie (offgoing nurse). Report included the following information Nurse Handoff Report, ED Encounter Summary, ED SBAR, Adult Overview, MAR, Recent Results, and Neuro Assessment, and outstanding orders to be completed.

## 2024-05-14 ENCOUNTER — APPOINTMENT (OUTPATIENT)
Facility: HOSPITAL | Age: 59
DRG: 163 | End: 2024-05-14
Payer: COMMERCIAL

## 2024-05-14 ENCOUNTER — TELEPHONE (OUTPATIENT)
Age: 59
End: 2024-05-14

## 2024-05-14 ENCOUNTER — APPOINTMENT (OUTPATIENT)
Facility: HOSPITAL | Age: 59
DRG: 163 | End: 2024-05-14
Attending: INTERNAL MEDICINE
Payer: COMMERCIAL

## 2024-05-14 PROBLEM — C34.90 METASTATIC NON-SMALL CELL LUNG CANCER (HCC): Status: ACTIVE | Noted: 2024-05-14

## 2024-05-14 PROBLEM — J93.9 PNEUMOTHORAX: Status: ACTIVE | Noted: 2024-05-14

## 2024-05-14 LAB
ALBUMIN SERPL-MCNC: 2.8 G/DL (ref 3.5–5)
ALBUMIN SERPL-MCNC: 2.9 G/DL (ref 3.5–5)
ALBUMIN/GLOB SERPL: 0.7 (ref 1.1–2.2)
ALBUMIN/GLOB SERPL: 0.8 (ref 1.1–2.2)
ALP SERPL-CCNC: 128 U/L (ref 45–117)
ALP SERPL-CCNC: 132 U/L (ref 45–117)
ALT SERPL-CCNC: 55 U/L (ref 12–78)
ALT SERPL-CCNC: 55 U/L (ref 12–78)
ANION GAP SERPL CALC-SCNC: 10 MMOL/L (ref 5–15)
ANION GAP SERPL CALC-SCNC: 8 MMOL/L (ref 5–15)
AST SERPL-CCNC: 26 U/L (ref 15–37)
AST SERPL-CCNC: 31 U/L (ref 15–37)
BASOPHILS # BLD: 0 K/UL (ref 0–0.1)
BASOPHILS # BLD: 0 K/UL (ref 0–0.1)
BASOPHILS NFR BLD: 0 % (ref 0–1)
BASOPHILS NFR BLD: 0 % (ref 0–1)
BILIRUB SERPL-MCNC: 1.3 MG/DL (ref 0.2–1)
BILIRUB SERPL-MCNC: 1.4 MG/DL (ref 0.2–1)
BUN SERPL-MCNC: 39 MG/DL (ref 6–20)
BUN SERPL-MCNC: 40 MG/DL (ref 6–20)
BUN/CREAT SERPL: 32 (ref 12–20)
BUN/CREAT SERPL: 32 (ref 12–20)
CALCIUM SERPL-MCNC: 8.4 MG/DL (ref 8.5–10.1)
CALCIUM SERPL-MCNC: 8.7 MG/DL (ref 8.5–10.1)
CHLORIDE SERPL-SCNC: 100 MMOL/L (ref 97–108)
CHLORIDE SERPL-SCNC: 99 MMOL/L (ref 97–108)
CO2 SERPL-SCNC: 20 MMOL/L (ref 21–32)
CO2 SERPL-SCNC: 20 MMOL/L (ref 21–32)
CREAT SERPL-MCNC: 1.23 MG/DL (ref 0.7–1.3)
CREAT SERPL-MCNC: 1.25 MG/DL (ref 0.7–1.3)
DIFFERENTIAL METHOD BLD: ABNORMAL
DIFFERENTIAL METHOD BLD: ABNORMAL
EOSINOPHIL # BLD: 0.1 K/UL (ref 0–0.4)
EOSINOPHIL # BLD: 0.2 K/UL (ref 0–0.4)
EOSINOPHIL NFR BLD: 1 % (ref 0–7)
EOSINOPHIL NFR BLD: 2 % (ref 0–7)
ERYTHROCYTE [DISTWIDTH] IN BLOOD BY AUTOMATED COUNT: 14.6 % (ref 11.5–14.5)
ERYTHROCYTE [DISTWIDTH] IN BLOOD BY AUTOMATED COUNT: 14.7 % (ref 11.5–14.5)
GLOBULIN SER CALC-MCNC: 3.6 G/DL (ref 2–4)
GLOBULIN SER CALC-MCNC: 3.8 G/DL (ref 2–4)
GLUCOSE BLD STRIP.AUTO-MCNC: 127 MG/DL (ref 65–117)
GLUCOSE BLD STRIP.AUTO-MCNC: 134 MG/DL (ref 65–117)
GLUCOSE BLD STRIP.AUTO-MCNC: 161 MG/DL (ref 65–117)
GLUCOSE BLD STRIP.AUTO-MCNC: 181 MG/DL (ref 65–117)
GLUCOSE BLD STRIP.AUTO-MCNC: 191 MG/DL (ref 65–117)
GLUCOSE SERPL-MCNC: 131 MG/DL (ref 65–100)
GLUCOSE SERPL-MCNC: 139 MG/DL (ref 65–100)
HCT VFR BLD AUTO: 41.6 % (ref 36.6–50.3)
HCT VFR BLD AUTO: 44.9 % (ref 36.6–50.3)
HGB BLD-MCNC: 13.8 G/DL (ref 12.1–17)
HGB BLD-MCNC: 14.5 G/DL (ref 12.1–17)
IMM GRANULOCYTES # BLD AUTO: 0.1 K/UL (ref 0–0.04)
IMM GRANULOCYTES # BLD AUTO: 0.1 K/UL (ref 0–0.04)
IMM GRANULOCYTES NFR BLD AUTO: 1 % (ref 0–0.5)
IMM GRANULOCYTES NFR BLD AUTO: 1 % (ref 0–0.5)
LYMPHOCYTES # BLD: 0.8 K/UL (ref 0.8–3.5)
LYMPHOCYTES # BLD: 1.1 K/UL (ref 0.8–3.5)
LYMPHOCYTES NFR BLD: 10 % (ref 12–49)
LYMPHOCYTES NFR BLD: 9 % (ref 12–49)
MCH RBC QN AUTO: 27.5 PG (ref 26–34)
MCH RBC QN AUTO: 28 PG (ref 26–34)
MCHC RBC AUTO-ENTMCNC: 32.3 G/DL (ref 30–36.5)
MCHC RBC AUTO-ENTMCNC: 33.2 G/DL (ref 30–36.5)
MCV RBC AUTO: 84.4 FL (ref 80–99)
MCV RBC AUTO: 85 FL (ref 80–99)
MONOCYTES # BLD: 0.1 K/UL (ref 0–1)
MONOCYTES # BLD: 0.1 K/UL (ref 0–1)
MONOCYTES NFR BLD: 1 % (ref 5–13)
MONOCYTES NFR BLD: 1 % (ref 5–13)
NEUTS SEG # BLD: 10 K/UL (ref 1.8–8)
NEUTS SEG # BLD: 8.3 K/UL (ref 1.8–8)
NEUTS SEG NFR BLD: 86 % (ref 32–75)
NEUTS SEG NFR BLD: 88 % (ref 32–75)
NRBC # BLD: 0 K/UL (ref 0–0.01)
NRBC # BLD: 0 K/UL (ref 0–0.01)
NRBC BLD-RTO: 0 PER 100 WBC
NRBC BLD-RTO: 0 PER 100 WBC
PLATELET # BLD AUTO: 225 K/UL (ref 150–400)
PLATELET # BLD AUTO: 253 K/UL (ref 150–400)
PLATELET COMMENT: ABNORMAL
PMV BLD AUTO: 12 FL (ref 8.9–12.9)
POTASSIUM SERPL-SCNC: 4.8 MMOL/L (ref 3.5–5.1)
POTASSIUM SERPL-SCNC: 5.1 MMOL/L (ref 3.5–5.1)
PROT SERPL-MCNC: 6.5 G/DL (ref 6.4–8.2)
PROT SERPL-MCNC: 6.6 G/DL (ref 6.4–8.2)
RBC # BLD AUTO: 4.93 M/UL (ref 4.1–5.7)
RBC # BLD AUTO: 5.28 M/UL (ref 4.1–5.7)
RBC MORPH BLD: ABNORMAL
SERVICE CMNT-IMP: ABNORMAL
SODIUM SERPL-SCNC: 128 MMOL/L (ref 136–145)
SODIUM SERPL-SCNC: 129 MMOL/L (ref 136–145)
WBC # BLD AUTO: 11.5 K/UL (ref 4.1–11.1)
WBC # BLD AUTO: 9.4 K/UL (ref 4.1–11.1)

## 2024-05-14 PROCEDURE — 94760 N-INVAS EAR/PLS OXIMETRY 1: CPT

## 2024-05-14 PROCEDURE — 6370000000 HC RX 637 (ALT 250 FOR IP): Performed by: INTERNAL MEDICINE

## 2024-05-14 PROCEDURE — 80053 COMPREHEN METABOLIC PANEL: CPT

## 2024-05-14 PROCEDURE — 1100000003 HC PRIVATE W/ TELEMETRY

## 2024-05-14 PROCEDURE — C1729 CATH, DRAINAGE: HCPCS

## 2024-05-14 PROCEDURE — 36415 COLL VENOUS BLD VENIPUNCTURE: CPT

## 2024-05-14 PROCEDURE — 2709999900 IR GUIDED PERC PLEURAL DRAIN W CATH INSERT

## 2024-05-14 PROCEDURE — 2700000000 HC OXYGEN THERAPY PER DAY

## 2024-05-14 PROCEDURE — 6370000000 HC RX 637 (ALT 250 FOR IP): Performed by: NURSE PRACTITIONER

## 2024-05-14 PROCEDURE — 2580000003 HC RX 258: Performed by: INTERNAL MEDICINE

## 2024-05-14 PROCEDURE — 99232 SBSQ HOSP IP/OBS MODERATE 35: CPT | Performed by: STUDENT IN AN ORGANIZED HEALTH CARE EDUCATION/TRAINING PROGRAM

## 2024-05-14 PROCEDURE — 71045 X-RAY EXAM CHEST 1 VIEW: CPT

## 2024-05-14 PROCEDURE — 71250 CT THORAX DX C-: CPT

## 2024-05-14 PROCEDURE — 0W993ZZ DRAINAGE OF RIGHT PLEURAL CAVITY, PERCUTANEOUS APPROACH: ICD-10-PCS | Performed by: INTERNAL MEDICINE

## 2024-05-14 PROCEDURE — 6360000002 HC RX W HCPCS: Performed by: NURSE PRACTITIONER

## 2024-05-14 PROCEDURE — 32551 INSERTION OF CHEST TUBE: CPT

## 2024-05-14 PROCEDURE — 85025 COMPLETE CBC W/AUTO DIFF WBC: CPT

## 2024-05-14 PROCEDURE — 82962 GLUCOSE BLOOD TEST: CPT

## 2024-05-14 PROCEDURE — 2500000003 HC RX 250 WO HCPCS: Performed by: STUDENT IN AN ORGANIZED HEALTH CARE EDUCATION/TRAINING PROGRAM

## 2024-05-14 PROCEDURE — 93308 TTE F-UP OR LMTD: CPT

## 2024-05-14 RX ORDER — LIDOCAINE HYDROCHLORIDE 20 MG/ML
20 INJECTION, SOLUTION INFILTRATION; PERINEURAL ONCE
Status: COMPLETED | OUTPATIENT
Start: 2024-05-14 | End: 2024-05-14

## 2024-05-14 RX ORDER — BENZONATATE 100 MG/1
100 CAPSULE ORAL 3 TIMES DAILY PRN
Status: DISCONTINUED | OUTPATIENT
Start: 2024-05-14 | End: 2024-05-22 | Stop reason: HOSPADM

## 2024-05-14 RX ORDER — BUMETANIDE 0.25 MG/ML
1 INJECTION INTRAMUSCULAR; INTRAVENOUS 2 TIMES DAILY
Status: DISCONTINUED | OUTPATIENT
Start: 2024-05-14 | End: 2024-05-22 | Stop reason: HOSPADM

## 2024-05-14 RX ORDER — GUAIFENESIN 600 MG/1
600 TABLET, EXTENDED RELEASE ORAL 2 TIMES DAILY
Status: DISCONTINUED | OUTPATIENT
Start: 2024-05-14 | End: 2024-05-22 | Stop reason: HOSPADM

## 2024-05-14 RX ORDER — HEPARIN SODIUM 200 [USP'U]/100ML
200 INJECTION, SOLUTION INTRAVENOUS ONCE
Status: DISCONTINUED | OUTPATIENT
Start: 2024-05-14 | End: 2024-05-22 | Stop reason: HOSPADM

## 2024-05-14 RX ADMIN — OXYCODONE 10 MG: 5 TABLET ORAL at 15:23

## 2024-05-14 RX ADMIN — GUAIFENESIN 600 MG: 600 TABLET, EXTENDED RELEASE ORAL at 11:37

## 2024-05-14 RX ADMIN — LORAZEPAM 1 MG: 1 TABLET ORAL at 00:23

## 2024-05-14 RX ADMIN — BUMETANIDE 1 MG: 0.25 INJECTION INTRAMUSCULAR; INTRAVENOUS at 18:43

## 2024-05-14 RX ADMIN — GUAIFENESIN 600 MG: 600 TABLET, EXTENDED RELEASE ORAL at 21:25

## 2024-05-14 RX ADMIN — LORAZEPAM 1 MG: 1 TABLET ORAL at 05:32

## 2024-05-14 RX ADMIN — HEPARIN SODIUM 30 ML: 200 INJECTION, SOLUTION INTRAVENOUS at 12:43

## 2024-05-14 RX ADMIN — OXYCODONE 10 MG: 5 TABLET ORAL at 02:27

## 2024-05-14 RX ADMIN — ACETAMINOPHEN 650 MG: 325 TABLET ORAL at 00:22

## 2024-05-14 RX ADMIN — SODIUM CHLORIDE, PRESERVATIVE FREE 10 ML: 5 INJECTION INTRAVENOUS at 08:53

## 2024-05-14 RX ADMIN — LIDOCAINE HYDROCHLORIDE 8 ML: 20 INJECTION, SOLUTION INFILTRATION; PERINEURAL at 12:42

## 2024-05-14 RX ADMIN — OXYCODONE 10 MG: 5 TABLET ORAL at 21:23

## 2024-05-14 RX ADMIN — SODIUM CHLORIDE, PRESERVATIVE FREE 10 ML: 5 INJECTION INTRAVENOUS at 21:26

## 2024-05-14 RX ADMIN — LORAZEPAM 1 MG: 1 TABLET ORAL at 23:36

## 2024-05-14 RX ADMIN — LORAZEPAM 1 MG: 1 TABLET ORAL at 09:53

## 2024-05-14 RX ADMIN — METOPROLOL TARTRATE 25 MG: 25 TABLET, FILM COATED ORAL at 08:52

## 2024-05-14 ASSESSMENT — PAIN DESCRIPTION - DESCRIPTORS
DESCRIPTORS: ACHING
DESCRIPTORS: ACHING;STABBING;THROBBING

## 2024-05-14 ASSESSMENT — PAIN DESCRIPTION - ORIENTATION
ORIENTATION: RIGHT
ORIENTATION: POSTERIOR

## 2024-05-14 ASSESSMENT — PAIN SCALES - GENERAL
PAINLEVEL_OUTOF10: 10

## 2024-05-14 ASSESSMENT — PAIN DESCRIPTION - LOCATION
LOCATION: BACK
LOCATION: ABDOMEN
LOCATION: FLANK

## 2024-05-14 NOTE — TELEPHONE ENCOUNTER
Carl Rosario  Oncology Social Work Encounter    [x] Med-Onc [facility] [] Med-Onc [facility] [] Med-Onc [facility] [] Rad-Onc [facility] [] Rad-Onc 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  Yolanda will call me if she find the pay stub in the truck.     Referral/Handouts:         Insurance/Entitlements referral  Financial/Medication assistance referral     Plan: Assist pt with Medicaid application and BS FAA.

## 2024-05-14 NOTE — PLAN OF CARE
Patient A&O X4, VSS, 2LNC.   Pain management as needed.  Pt resting with eyes closed overnight.   Will continue to monitor.      Problem: Discharge Planning  Goal: Discharge to home or other facility with appropriate resources  Outcome: Progressing     Problem: Safety - Adult  Goal: Free from fall injury  Outcome: Progressing     Problem: Pain  Goal: Verbalizes/displays adequate comfort level or baseline comfort level  Outcome: Progressing  Flowsheets (Taken 5/13/2024 1950)  Verbalizes/displays adequate comfort level or baseline comfort level: Encourage patient to monitor pain and request assistance

## 2024-05-14 NOTE — CARE COORDINATION
Care Management Initial Assessment       RUR: 21%  Readmission? No D/C on 5/7/24  Re admit on 5/13/24  1st IM letter given? No  1st  letter given: No    CM introduce self, explain role and confirmed demographics with pt and pt's ex spouse-Yolanda Kowalski.    Pt lives with his ex spouse in a duplex with four steps to enter.    No hx of home health, SNF or inpatient rehab.    No DME reported.    Pt uses the [email or web address] in [facility].    At the time of d/c pt's ex spouse will transport.    CM will follow and assist with d/c planning.       05/14/24 1217   Service Assessment   Patient Orientation Alert and Oriented   Cognition Alert   History Provided By Patient;Significant Other;Medical Record   Primary Caregiver Self   Support Systems Spouse/Significant Other;Children   Patient's Healthcare Decision Maker is: Named in Scanned ACP Document   PCP Verified by CM Yes   Last Visit to PCP Within last 3 months   Prior Functional Level Independent in ADLs/IADLs   Current Functional Level Assistance with the following:;Cooking;Housework;Shopping;Bathing;Dressing;Toileting   Can patient return to prior living arrangement Yes   Family able to assist with home care needs: Yes   Would you like for me to discuss the discharge plan with any other family members/significant others, and if so, who? Yes  (Br-dfbyzz-Qenig Baker)   Financial Resources Other (Comment)  (pt's ex-spouse is working with getting pt Medicaid)   Community Resources None   Social/Functional History   Lives With Significant other   Type of Home Apartment   Home Equipment None   Active  Yes   Discharge Planning   Type of Residence Apartment   Current Services Prior To Admission None   Patient expects to be discharged to: Apartment     Readmission Assessment  Number of Days since last admission?: 1-7 days  Previous Disposition: Home with Family  Who is being Interviewed: Patient  What was the patient's/caregiver's perception as to why they think they

## 2024-05-15 ENCOUNTER — APPOINTMENT (OUTPATIENT)
Facility: HOSPITAL | Age: 59
DRG: 163 | End: 2024-05-15
Payer: COMMERCIAL

## 2024-05-15 LAB
ANION GAP SERPL CALC-SCNC: 8 MMOL/L (ref 5–15)
BASOPHILS # BLD: 0 K/UL (ref 0–0.1)
BASOPHILS NFR BLD: 1 % (ref 0–1)
BUN SERPL-MCNC: 43 MG/DL (ref 6–20)
BUN/CREAT SERPL: 33 (ref 12–20)
CALCIUM SERPL-MCNC: 8.4 MG/DL (ref 8.5–10.1)
CHLORIDE SERPL-SCNC: 100 MMOL/L (ref 97–108)
CO2 SERPL-SCNC: 19 MMOL/L (ref 21–32)
CREAT SERPL-MCNC: 1.3 MG/DL (ref 0.7–1.3)
DIFFERENTIAL METHOD BLD: ABNORMAL
EOSINOPHIL # BLD: 0.2 K/UL (ref 0–0.4)
EOSINOPHIL NFR BLD: 2 % (ref 0–7)
ERYTHROCYTE [DISTWIDTH] IN BLOOD BY AUTOMATED COUNT: 14.5 % (ref 11.5–14.5)
GLUCOSE BLD STRIP.AUTO-MCNC: 140 MG/DL (ref 65–117)
GLUCOSE BLD STRIP.AUTO-MCNC: 144 MG/DL (ref 65–117)
GLUCOSE BLD STRIP.AUTO-MCNC: 157 MG/DL (ref 65–117)
GLUCOSE BLD STRIP.AUTO-MCNC: 162 MG/DL (ref 65–117)
GLUCOSE SERPL-MCNC: 125 MG/DL (ref 65–100)
HCT VFR BLD AUTO: 41.4 % (ref 36.6–50.3)
HGB BLD-MCNC: 13.5 G/DL (ref 12.1–17)
IMM GRANULOCYTES # BLD AUTO: 0.1 K/UL (ref 0–0.04)
IMM GRANULOCYTES NFR BLD AUTO: 1 % (ref 0–0.5)
LYMPHOCYTES # BLD: 1 K/UL (ref 0.8–3.5)
LYMPHOCYTES NFR BLD: 14 % (ref 12–49)
MCH RBC QN AUTO: 27.6 PG (ref 26–34)
MCHC RBC AUTO-ENTMCNC: 32.6 G/DL (ref 30–36.5)
MCV RBC AUTO: 84.7 FL (ref 80–99)
MONOCYTES # BLD: 0.1 K/UL (ref 0–1)
MONOCYTES NFR BLD: 1 % (ref 5–13)
NEUTS SEG # BLD: 6.1 K/UL (ref 1.8–8)
NEUTS SEG NFR BLD: 81 % (ref 32–75)
NRBC # BLD: 0 K/UL (ref 0–0.01)
NRBC BLD-RTO: 0 PER 100 WBC
PLATELET # BLD AUTO: 223 K/UL (ref 150–400)
PMV BLD AUTO: 12.1 FL (ref 8.9–12.9)
POTASSIUM SERPL-SCNC: 4.5 MMOL/L (ref 3.5–5.1)
RBC # BLD AUTO: 4.89 M/UL (ref 4.1–5.7)
SERVICE CMNT-IMP: ABNORMAL
SODIUM SERPL-SCNC: 127 MMOL/L (ref 136–145)
WBC # BLD AUTO: 7.5 K/UL (ref 4.1–11.1)

## 2024-05-15 PROCEDURE — 6360000002 HC RX W HCPCS: Performed by: INTERNAL MEDICINE

## 2024-05-15 PROCEDURE — 86923 COMPATIBILITY TEST ELECTRIC: CPT

## 2024-05-15 PROCEDURE — 2580000003 HC RX 258: Performed by: INTERNAL MEDICINE

## 2024-05-15 PROCEDURE — 85025 COMPLETE CBC W/AUTO DIFF WBC: CPT

## 2024-05-15 PROCEDURE — 71045 X-RAY EXAM CHEST 1 VIEW: CPT

## 2024-05-15 PROCEDURE — 36415 COLL VENOUS BLD VENIPUNCTURE: CPT

## 2024-05-15 PROCEDURE — 86900 BLOOD TYPING SEROLOGIC ABO: CPT

## 2024-05-15 PROCEDURE — 86901 BLOOD TYPING SEROLOGIC RH(D): CPT

## 2024-05-15 PROCEDURE — 6370000000 HC RX 637 (ALT 250 FOR IP): Performed by: INTERNAL MEDICINE

## 2024-05-15 PROCEDURE — 80048 BASIC METABOLIC PNL TOTAL CA: CPT

## 2024-05-15 PROCEDURE — 6360000002 HC RX W HCPCS: Performed by: NURSE PRACTITIONER

## 2024-05-15 PROCEDURE — 82962 GLUCOSE BLOOD TEST: CPT

## 2024-05-15 PROCEDURE — P9047 ALBUMIN (HUMAN), 25%, 50ML: HCPCS | Performed by: INTERNAL MEDICINE

## 2024-05-15 PROCEDURE — 86850 RBC ANTIBODY SCREEN: CPT

## 2024-05-15 PROCEDURE — 6370000000 HC RX 637 (ALT 250 FOR IP): Performed by: NURSE PRACTITIONER

## 2024-05-15 PROCEDURE — 2060000000 HC ICU INTERMEDIATE R&B

## 2024-05-15 RX ORDER — 0.9 % SODIUM CHLORIDE 0.9 %
250 INTRAVENOUS SOLUTION INTRAVENOUS ONCE
Status: COMPLETED | OUTPATIENT
Start: 2024-05-15 | End: 2024-05-15

## 2024-05-15 RX ORDER — SODIUM CHLORIDE 9 MG/ML
INJECTION, SOLUTION INTRAVENOUS CONTINUOUS
Status: DISCONTINUED | OUTPATIENT
Start: 2024-05-15 | End: 2024-05-16

## 2024-05-15 RX ORDER — ALBUMIN (HUMAN) 12.5 G/50ML
25 SOLUTION INTRAVENOUS ONCE
Status: COMPLETED | OUTPATIENT
Start: 2024-05-15 | End: 2024-05-15

## 2024-05-15 RX ADMIN — SODIUM CHLORIDE, PRESERVATIVE FREE 10 ML: 5 INJECTION INTRAVENOUS at 19:54

## 2024-05-15 RX ADMIN — GUAIFENESIN 600 MG: 600 TABLET, EXTENDED RELEASE ORAL at 09:04

## 2024-05-15 RX ADMIN — SODIUM CHLORIDE 250 ML: 9 INJECTION, SOLUTION INTRAVENOUS at 17:04

## 2024-05-15 RX ADMIN — POLYETHYLENE GLYCOL 3350 17 G: 17 POWDER, FOR SOLUTION ORAL at 09:03

## 2024-05-15 RX ADMIN — OXYCODONE 5 MG: 5 TABLET ORAL at 03:00

## 2024-05-15 RX ADMIN — ALBUMIN (HUMAN) 25 G: 0.25 INJECTION, SOLUTION INTRAVENOUS at 17:19

## 2024-05-15 RX ADMIN — GUAIFENESIN 600 MG: 600 TABLET, EXTENDED RELEASE ORAL at 19:54

## 2024-05-15 RX ADMIN — BUMETANIDE 1 MG: 0.25 INJECTION INTRAMUSCULAR; INTRAVENOUS at 09:03

## 2024-05-15 RX ADMIN — SODIUM CHLORIDE: 9 INJECTION, SOLUTION INTRAVENOUS at 17:57

## 2024-05-15 RX ADMIN — METOPROLOL TARTRATE 25 MG: 25 TABLET, FILM COATED ORAL at 09:04

## 2024-05-15 RX ADMIN — BENZONATATE 100 MG: 100 CAPSULE ORAL at 13:05

## 2024-05-15 RX ADMIN — BENZONATATE 100 MG: 100 CAPSULE ORAL at 19:54

## 2024-05-15 RX ADMIN — OXYCODONE 5 MG: 5 TABLET ORAL at 22:11

## 2024-05-15 RX ADMIN — SODIUM CHLORIDE, PRESERVATIVE FREE 10 ML: 5 INJECTION INTRAVENOUS at 09:05

## 2024-05-15 RX ADMIN — LORAZEPAM 1 MG: 1 TABLET ORAL at 21:45

## 2024-05-15 ASSESSMENT — PAIN DESCRIPTION - LOCATION
LOCATION: FLANK
LOCATION: BACK

## 2024-05-15 ASSESSMENT — PAIN SCALES - GENERAL
PAINLEVEL_OUTOF10: 4
PAINLEVEL_OUTOF10: 9
PAINLEVEL_OUTOF10: 5
PAINLEVEL_OUTOF10: 0
PAINLEVEL_OUTOF10: 2
PAINLEVEL_OUTOF10: 0
PAINLEVEL_OUTOF10: 10

## 2024-05-15 ASSESSMENT — PAIN DESCRIPTION - ORIENTATION
ORIENTATION: RIGHT
ORIENTATION: RIGHT;MID

## 2024-05-15 ASSESSMENT — PAIN SCALES - WONG BAKER
WONGBAKER_NUMERICALRESPONSE: NO HURT
WONGBAKER_NUMERICALRESPONSE: NO HURT

## 2024-05-15 ASSESSMENT — PAIN DESCRIPTION - DESCRIPTORS
DESCRIPTORS: SHARP
DESCRIPTORS: ACHING

## 2024-05-15 NOTE — PLAN OF CARE
Problem: Discharge Planning  Goal: Discharge to home or other facility with appropriate resources  Outcome: Progressing  Flowsheets (Taken 5/15/2024 0805)  Discharge to home or other facility with appropriate resources:   Identify barriers to discharge with patient and caregiver   Arrange for needed discharge resources and transportation as appropriate   Identify discharge learning needs (meds, wound care, etc)   Refer to discharge planning if patient needs post-hospital services based on physician order or complex needs related to functional status, cognitive ability or social support system     Problem: Safety - Adult  Goal: Free from fall injury  Outcome: Progressing     Problem: Pain  Goal: Verbalizes/displays adequate comfort level or baseline comfort level  Outcome: Progressing  Flowsheets (Taken 5/15/2024 0808)  Verbalizes/displays adequate comfort level or baseline comfort level:   Encourage patient to monitor pain and request assistance   Assess pain using appropriate pain scale   Administer analgesics based on type and severity of pain and evaluate response   Implement non-pharmacological measures as appropriate and evaluate response   Consider cultural and social influences on pain and pain management   Notify Licensed Independent Practitioner if interventions unsuccessful or patient reports new pain     Problem: Chronic Conditions and Co-morbidities  Goal: Patient's chronic conditions and co-morbidity symptoms are monitored and maintained or improved  Outcome: Progressing  Flowsheets (Taken 5/15/2024 0805)  Care Plan - Patient's Chronic Conditions and Co-Morbidity Symptoms are Monitored and Maintained or Improved:   Monitor and assess patient's chronic conditions and comorbid symptoms for stability, deterioration, or improvement   Collaborate with multidisciplinary team to address chronic and comorbid conditions and prevent exacerbation or deterioration   Update acute care plan with appropriate goals

## 2024-05-16 ENCOUNTER — HOSPITAL ENCOUNTER (OUTPATIENT)
Facility: HOSPITAL | Age: 59
Discharge: HOME | End: 2024-05-18
Attending: THORACIC SURGERY (CARDIOTHORACIC VASCULAR SURGERY)

## 2024-05-16 ENCOUNTER — APPOINTMENT (OUTPATIENT)
Facility: HOSPITAL | Age: 59
DRG: 163 | End: 2024-05-16
Payer: COMMERCIAL

## 2024-05-16 ENCOUNTER — ANESTHESIA EVENT (OUTPATIENT)
Facility: HOSPITAL | Age: 59
End: 2024-05-16
Payer: MEDICAID

## 2024-05-16 ENCOUNTER — ANESTHESIA (OUTPATIENT)
Facility: HOSPITAL | Age: 59
End: 2024-05-16
Payer: MEDICAID

## 2024-05-16 PROBLEM — Z98.890 S/P PERICARDIAL WINDOW CREATION: Status: ACTIVE | Noted: 2024-05-16

## 2024-05-16 LAB
ABO + RH BLD: NORMAL
ANION GAP SERPL CALC-SCNC: 10 MMOL/L (ref 5–15)
BASOPHILS # BLD: 0 K/UL (ref 0–0.1)
BASOPHILS NFR BLD: 1 % (ref 0–1)
BLD PROD TYP BPU: NORMAL
BLD PROD TYP BPU: NORMAL
BLOOD BANK DISPENSE STATUS: NORMAL
BLOOD BANK DISPENSE STATUS: NORMAL
BLOOD GROUP ANTIBODIES SERPL: NORMAL
BPU ID: NORMAL
BPU ID: NORMAL
BUN SERPL-MCNC: 44 MG/DL (ref 6–20)
BUN/CREAT SERPL: 33 (ref 12–20)
CALCIUM SERPL-MCNC: 8.1 MG/DL (ref 8.5–10.1)
CHLORIDE SERPL-SCNC: 97 MMOL/L (ref 97–108)
CO2 SERPL-SCNC: 19 MMOL/L (ref 21–32)
CREAT SERPL-MCNC: 1.33 MG/DL (ref 0.7–1.3)
CREAT UR-MCNC: 132 MG/DL
CROSSMATCH RESULT: NORMAL
CROSSMATCH RESULT: NORMAL
DIFFERENTIAL METHOD BLD: ABNORMAL
EOSINOPHIL # BLD: 0.1 K/UL (ref 0–0.4)
EOSINOPHIL NFR BLD: 2 % (ref 0–7)
ERYTHROCYTE [DISTWIDTH] IN BLOOD BY AUTOMATED COUNT: 14.4 % (ref 11.5–14.5)
FUNGUS STAIN: NORMAL
GLUCOSE BLD STRIP.AUTO-MCNC: 133 MG/DL (ref 65–117)
GLUCOSE BLD STRIP.AUTO-MCNC: 133 MG/DL (ref 65–117)
GLUCOSE BLD STRIP.AUTO-MCNC: 138 MG/DL (ref 65–117)
GLUCOSE BLD STRIP.AUTO-MCNC: 161 MG/DL (ref 65–117)
GLUCOSE BLD STRIP.AUTO-MCNC: 168 MG/DL (ref 65–117)
GLUCOSE SERPL-MCNC: 151 MG/DL (ref 65–100)
HCT VFR BLD AUTO: 39.1 % (ref 36.6–50.3)
HGB BLD-MCNC: 13.1 G/DL (ref 12.1–17)
IMM GRANULOCYTES # BLD AUTO: 0.1 K/UL (ref 0–0.04)
IMM GRANULOCYTES NFR BLD AUTO: 2 % (ref 0–0.5)
LYMPHOCYTES # BLD: 1 K/UL (ref 0.8–3.5)
LYMPHOCYTES NFR BLD: 17 % (ref 12–49)
MCH RBC QN AUTO: 27.9 PG (ref 26–34)
MCHC RBC AUTO-ENTMCNC: 33.5 G/DL (ref 30–36.5)
MCV RBC AUTO: 83.4 FL (ref 80–99)
MONOCYTES # BLD: 0.1 K/UL (ref 0–1)
MONOCYTES NFR BLD: 2 % (ref 5–13)
NEUTS SEG # BLD: 4.4 K/UL (ref 1.8–8)
NEUTS SEG NFR BLD: 76 % (ref 32–75)
NRBC # BLD: 0 K/UL (ref 0–0.01)
NRBC BLD-RTO: 0 PER 100 WBC
OSMOLALITY UR: 665 MOSM/KG H2O
PLATELET # BLD AUTO: 191 K/UL (ref 150–400)
PMV BLD AUTO: 12 FL (ref 8.9–12.9)
POTASSIUM SERPL-SCNC: 3.9 MMOL/L (ref 3.5–5.1)
RBC # BLD AUTO: 4.69 M/UL (ref 4.1–5.7)
SERVICE CMNT-IMP: ABNORMAL
SODIUM SERPL-SCNC: 126 MMOL/L (ref 136–145)
SODIUM SERPL-SCNC: 128 MMOL/L (ref 136–145)
SODIUM SERPL-SCNC: 131 MMOL/L (ref 136–145)
SODIUM UR-SCNC: 7 MMOL/L
SODIUM UR-SCNC: <5 MMOL/L
SPECIMEN EXP DATE BLD: NORMAL
SPECIMEN HOLD: NORMAL
SPECIMEN SOURCE: NORMAL
UNIT DIVISION: 0
UNIT DIVISION: 0
WBC # BLD AUTO: 5.7 K/UL (ref 4.1–11.1)

## 2024-05-16 PROCEDURE — 2580000003 HC RX 258: Performed by: INTERNAL MEDICINE

## 2024-05-16 PROCEDURE — 6370000000 HC RX 637 (ALT 250 FOR IP): Performed by: PHYSICIAN ASSISTANT

## 2024-05-16 PROCEDURE — 76770 US EXAM ABDO BACK WALL COMP: CPT

## 2024-05-16 PROCEDURE — 2580000003 HC RX 258: Performed by: PHYSICIAN ASSISTANT

## 2024-05-16 PROCEDURE — 84300 ASSAY OF URINE SODIUM: CPT

## 2024-05-16 PROCEDURE — 88341 IMHCHEM/IMCYTCHM EA ADD ANTB: CPT

## 2024-05-16 PROCEDURE — 71045 X-RAY EXAM CHEST 1 VIEW: CPT

## 2024-05-16 PROCEDURE — 2580000003 HC RX 258

## 2024-05-16 PROCEDURE — 88305 TISSUE EXAM BY PATHOLOGIST: CPT

## 2024-05-16 PROCEDURE — 3600000008 HC SURGERY OHS BASE: Performed by: THORACIC SURGERY (CARDIOTHORACIC VASCULAR SURGERY)

## 2024-05-16 PROCEDURE — 2100000001 HC CVICU R&B

## 2024-05-16 PROCEDURE — 88342 IMHCHEM/IMCYTCHM 1ST ANTB: CPT

## 2024-05-16 PROCEDURE — A4216 STERILE WATER/SALINE, 10 ML: HCPCS | Performed by: PHYSICIAN ASSISTANT

## 2024-05-16 PROCEDURE — 2500000003 HC RX 250 WO HCPCS

## 2024-05-16 PROCEDURE — C1729 CATH, DRAINAGE: HCPCS | Performed by: THORACIC SURGERY (CARDIOTHORACIC VASCULAR SURGERY)

## 2024-05-16 PROCEDURE — 2500000003 HC RX 250 WO HCPCS: Performed by: INTERNAL MEDICINE

## 2024-05-16 PROCEDURE — 02BN3ZX EXCISION OF PERICARDIUM, PERCUTANEOUS APPROACH, DIAGNOSTIC: ICD-10-PCS | Performed by: THORACIC SURGERY (CARDIOTHORACIC VASCULAR SURGERY)

## 2024-05-16 PROCEDURE — 2709999900 HC NON-CHARGEABLE SUPPLY: Performed by: THORACIC SURGERY (CARDIOTHORACIC VASCULAR SURGERY)

## 2024-05-16 PROCEDURE — 0W9D30Z DRAINAGE OF PERICARDIAL CAVITY WITH DRAINAGE DEVICE, PERCUTANEOUS APPROACH: ICD-10-PCS | Performed by: THORACIC SURGERY (CARDIOTHORACIC VASCULAR SURGERY)

## 2024-05-16 PROCEDURE — 3600000018 HC SURGERY OHS ADDTL 15MIN: Performed by: THORACIC SURGERY (CARDIOTHORACIC VASCULAR SURGERY)

## 2024-05-16 PROCEDURE — 2700000000 HC OXYGEN THERAPY PER DAY

## 2024-05-16 PROCEDURE — 6360000002 HC RX W HCPCS

## 2024-05-16 PROCEDURE — 80048 BASIC METABOLIC PNL TOTAL CA: CPT

## 2024-05-16 PROCEDURE — 93922 UPR/L XTREMITY ART 2 LEVELS: CPT

## 2024-05-16 PROCEDURE — 2580000003 HC RX 258: Performed by: THORACIC SURGERY (CARDIOTHORACIC VASCULAR SURGERY)

## 2024-05-16 PROCEDURE — 36415 COLL VENOUS BLD VENIPUNCTURE: CPT

## 2024-05-16 PROCEDURE — 83935 ASSAY OF URINE OSMOLALITY: CPT

## 2024-05-16 PROCEDURE — 85025 COMPLETE CBC W/AUTO DIFF WBC: CPT

## 2024-05-16 PROCEDURE — 2500000003 HC RX 250 WO HCPCS: Performed by: PHYSICIAN ASSISTANT

## 2024-05-16 PROCEDURE — 82962 GLUCOSE BLOOD TEST: CPT

## 2024-05-16 PROCEDURE — 3700000000 HC ANESTHESIA ATTENDED CARE: Performed by: THORACIC SURGERY (CARDIOTHORACIC VASCULAR SURGERY)

## 2024-05-16 PROCEDURE — 6360000002 HC RX W HCPCS: Performed by: THORACIC SURGERY (CARDIOTHORACIC VASCULAR SURGERY)

## 2024-05-16 PROCEDURE — 82570 ASSAY OF URINE CREATININE: CPT

## 2024-05-16 PROCEDURE — 3700000001 HC ADD 15 MINUTES (ANESTHESIA): Performed by: THORACIC SURGERY (CARDIOTHORACIC VASCULAR SURGERY)

## 2024-05-16 PROCEDURE — B24BZZ4 ULTRASONOGRAPHY OF HEART WITH AORTA, TRANSESOPHAGEAL: ICD-10-PCS | Performed by: THORACIC SURGERY (CARDIOTHORACIC VASCULAR SURGERY)

## 2024-05-16 PROCEDURE — 84295 ASSAY OF SERUM SODIUM: CPT

## 2024-05-16 PROCEDURE — 6370000000 HC RX 637 (ALT 250 FOR IP): Performed by: INTERNAL MEDICINE

## 2024-05-16 PROCEDURE — 88112 CYTOPATH CELL ENHANCE TECH: CPT

## 2024-05-16 RX ORDER — SODIUM CHLORIDE 450 MG/100ML
INJECTION, SOLUTION INTRAVENOUS CONTINUOUS
Status: DISCONTINUED | OUTPATIENT
Start: 2024-05-16 | End: 2024-05-16

## 2024-05-16 RX ORDER — DIPHENHYDRAMINE HYDROCHLORIDE 50 MG/ML
25 INJECTION INTRAMUSCULAR; INTRAVENOUS ONCE
Status: COMPLETED | OUTPATIENT
Start: 2024-05-16 | End: 2024-05-16

## 2024-05-16 RX ORDER — CEFAZOLIN SODIUM 1 G/3ML
INJECTION, POWDER, FOR SOLUTION INTRAMUSCULAR; INTRAVENOUS PRN
Status: DISCONTINUED | OUTPATIENT
Start: 2024-05-16 | End: 2024-05-16 | Stop reason: SDUPTHER

## 2024-05-16 RX ORDER — LIDOCAINE 4 G/G
2 PATCH TOPICAL DAILY
Status: DISCONTINUED | OUTPATIENT
Start: 2024-05-16 | End: 2024-05-22 | Stop reason: HOSPADM

## 2024-05-16 RX ORDER — SODIUM CHLORIDE 0.9 % (FLUSH) 0.9 %
5-40 SYRINGE (ML) INJECTION EVERY 12 HOURS SCHEDULED
Status: DISCONTINUED | OUTPATIENT
Start: 2024-05-16 | End: 2024-05-22 | Stop reason: HOSPADM

## 2024-05-16 RX ORDER — SUCCINYLCHOLINE/SOD CL,ISO/PF 200MG/10ML
SYRINGE (ML) INTRAVENOUS PRN
Status: DISCONTINUED | OUTPATIENT
Start: 2024-05-16 | End: 2024-05-16 | Stop reason: SDUPTHER

## 2024-05-16 RX ORDER — POLYETHYLENE GLYCOL 3350 17 G/17G
17 POWDER, FOR SOLUTION ORAL DAILY
Status: DISCONTINUED | OUTPATIENT
Start: 2024-05-16 | End: 2024-05-22 | Stop reason: HOSPADM

## 2024-05-16 RX ORDER — FAMOTIDINE 20 MG/1
20 TABLET, FILM COATED ORAL 2 TIMES DAILY
Status: DISPENSED | OUTPATIENT
Start: 2024-05-16 | End: 2024-05-21

## 2024-05-16 RX ORDER — ONDANSETRON 2 MG/ML
INJECTION INTRAMUSCULAR; INTRAVENOUS PRN
Status: DISCONTINUED | OUTPATIENT
Start: 2024-05-16 | End: 2024-05-16 | Stop reason: SDUPTHER

## 2024-05-16 RX ORDER — CHLORHEXIDINE GLUCONATE ORAL RINSE 1.2 MG/ML
15 SOLUTION DENTAL 2 TIMES DAILY
Status: DISCONTINUED | OUTPATIENT
Start: 2024-05-16 | End: 2024-05-22 | Stop reason: HOSPADM

## 2024-05-16 RX ORDER — DIPHENHYDRAMINE HCL 25 MG
25 CAPSULE ORAL NIGHTLY PRN
Status: DISCONTINUED | OUTPATIENT
Start: 2024-05-17 | End: 2024-05-22 | Stop reason: HOSPADM

## 2024-05-16 RX ORDER — SODIUM CHLORIDE 9 MG/ML
INJECTION, SOLUTION INTRAVENOUS PRN
Status: DISCONTINUED | OUTPATIENT
Start: 2024-05-16 | End: 2024-05-22 | Stop reason: HOSPADM

## 2024-05-16 RX ORDER — SODIUM CHLORIDE 0.9 % (FLUSH) 0.9 %
5-40 SYRINGE (ML) INJECTION PRN
Status: DISCONTINUED | OUTPATIENT
Start: 2024-05-16 | End: 2024-05-22 | Stop reason: HOSPADM

## 2024-05-16 RX ORDER — SODIUM CHLORIDE, SODIUM LACTATE, POTASSIUM CHLORIDE, CALCIUM CHLORIDE 600; 310; 30; 20 MG/100ML; MG/100ML; MG/100ML; MG/100ML
INJECTION, SOLUTION INTRAVENOUS CONTINUOUS PRN
Status: DISCONTINUED | OUTPATIENT
Start: 2024-05-16 | End: 2024-05-16 | Stop reason: SDUPTHER

## 2024-05-16 RX ORDER — BUPIVACAINE HYDROCHLORIDE 2.5 MG/ML
INJECTION, SOLUTION EPIDURAL; INFILTRATION; INTRACAUDAL PRN
Status: DISCONTINUED | OUTPATIENT
Start: 2024-05-16 | End: 2024-05-16 | Stop reason: ALTCHOICE

## 2024-05-16 RX ORDER — ONDANSETRON 2 MG/ML
4 INJECTION INTRAMUSCULAR; INTRAVENOUS EVERY 4 HOURS PRN
Status: DISCONTINUED | OUTPATIENT
Start: 2024-05-16 | End: 2024-05-17 | Stop reason: SDUPTHER

## 2024-05-16 RX ORDER — PHENYLEPHRINE HCL IN 0.9% NACL 0.4MG/10ML
SYRINGE (ML) INTRAVENOUS PRN
Status: DISCONTINUED | OUTPATIENT
Start: 2024-05-16 | End: 2024-05-16 | Stop reason: SDUPTHER

## 2024-05-16 RX ORDER — BISACODYL 10 MG
10 SUPPOSITORY, RECTAL RECTAL DAILY PRN
Status: DISCONTINUED | OUTPATIENT
Start: 2024-05-16 | End: 2024-05-22 | Stop reason: HOSPADM

## 2024-05-16 RX ORDER — SENNA AND DOCUSATE SODIUM 50; 8.6 MG/1; MG/1
1 TABLET, FILM COATED ORAL 2 TIMES DAILY
Status: DISCONTINUED | OUTPATIENT
Start: 2024-05-16 | End: 2024-05-22 | Stop reason: HOSPADM

## 2024-05-16 RX ORDER — SODIUM CHLORIDE 9 MG/ML
INJECTION, SOLUTION INTRAVENOUS CONTINUOUS
Status: DISCONTINUED | OUTPATIENT
Start: 2024-05-16 | End: 2024-05-16

## 2024-05-16 RX ORDER — ACETAMINOPHEN 325 MG/1
975 TABLET ORAL EVERY 6 HOURS SCHEDULED
Status: DISCONTINUED | OUTPATIENT
Start: 2024-05-16 | End: 2024-05-22 | Stop reason: HOSPADM

## 2024-05-16 RX ORDER — ETOMIDATE 2 MG/ML
INJECTION INTRAVENOUS PRN
Status: DISCONTINUED | OUTPATIENT
Start: 2024-05-16 | End: 2024-05-16 | Stop reason: SDUPTHER

## 2024-05-16 RX ORDER — MIDAZOLAM HYDROCHLORIDE 1 MG/ML
INJECTION INTRAMUSCULAR; INTRAVENOUS PRN
Status: DISCONTINUED | OUTPATIENT
Start: 2024-05-16 | End: 2024-05-16 | Stop reason: SDUPTHER

## 2024-05-16 RX ORDER — ROCURONIUM BROMIDE 10 MG/ML
INJECTION, SOLUTION INTRAVENOUS PRN
Status: DISCONTINUED | OUTPATIENT
Start: 2024-05-16 | End: 2024-05-16 | Stop reason: SDUPTHER

## 2024-05-16 RX ORDER — IPRATROPIUM BROMIDE AND ALBUTEROL SULFATE 2.5; .5 MG/3ML; MG/3ML
1 SOLUTION RESPIRATORY (INHALATION) ONCE
Status: DISCONTINUED | OUTPATIENT
Start: 2024-05-16 | End: 2024-05-16 | Stop reason: HOSPADM

## 2024-05-16 RX ORDER — FENTANYL CITRATE 50 UG/ML
INJECTION, SOLUTION INTRAMUSCULAR; INTRAVENOUS PRN
Status: DISCONTINUED | OUTPATIENT
Start: 2024-05-16 | End: 2024-05-16 | Stop reason: SDUPTHER

## 2024-05-16 RX ADMIN — DIPHENHYDRAMINE HYDROCHLORIDE 25 MG: 50 INJECTION, SOLUTION INTRAMUSCULAR; INTRAVENOUS at 12:16

## 2024-05-16 RX ADMIN — GUAIFENESIN 600 MG: 600 TABLET, EXTENDED RELEASE ORAL at 21:05

## 2024-05-16 RX ADMIN — CEFAZOLIN 2 G: 1 INJECTION, POWDER, FOR SOLUTION INTRAMUSCULAR; INTRAVENOUS; PARENTERAL at 09:55

## 2024-05-16 RX ADMIN — FENTANYL CITRATE 50 MCG: 50 INJECTION, SOLUTION INTRAMUSCULAR; INTRAVENOUS at 10:28

## 2024-05-16 RX ADMIN — SODIUM CHLORIDE, SODIUM LACTATE, POTASSIUM CHLORIDE, CALCIUM CHLORIDE: 600; 310; 30; 20 INJECTION, SOLUTION INTRAVENOUS at 09:40

## 2024-05-16 RX ADMIN — SODIUM CHLORIDE, PRESERVATIVE FREE 10 ML: 5 INJECTION INTRAVENOUS at 21:06

## 2024-05-16 RX ADMIN — SODIUM CHLORIDE, PRESERVATIVE FREE 10 ML: 5 INJECTION INTRAVENOUS at 08:03

## 2024-05-16 RX ADMIN — MIDAZOLAM HYDROCHLORIDE 1 MG: 1 INJECTION, SOLUTION INTRAMUSCULAR; INTRAVENOUS at 09:41

## 2024-05-16 RX ADMIN — ETOMIDATE 20 MG: 2 INJECTION, SOLUTION INTRAVENOUS at 10:03

## 2024-05-16 RX ADMIN — ACETAMINOPHEN 975 MG: 325 TABLET ORAL at 11:44

## 2024-05-16 RX ADMIN — SENNOSIDES AND DOCUSATE SODIUM 1 TABLET: 50; 8.6 TABLET ORAL at 21:05

## 2024-05-16 RX ADMIN — SODIUM CHLORIDE, POTASSIUM CHLORIDE, SODIUM LACTATE AND CALCIUM CHLORIDE: 600; 310; 30; 20 INJECTION, SOLUTION INTRAVENOUS at 09:40

## 2024-05-16 RX ADMIN — SODIUM CHLORIDE: 9 INJECTION, SOLUTION INTRAVENOUS at 05:35

## 2024-05-16 RX ADMIN — 0.12% CHLORHEXIDINE GLUCONATE 15 ML: 1.2 RINSE ORAL at 21:05

## 2024-05-16 RX ADMIN — SENNOSIDES AND DOCUSATE SODIUM 1 TABLET: 50; 8.6 TABLET ORAL at 12:15

## 2024-05-16 RX ADMIN — ACETAMINOPHEN 975 MG: 325 TABLET ORAL at 18:31

## 2024-05-16 RX ADMIN — 0.12% CHLORHEXIDINE GLUCONATE 15 ML: 1.2 RINSE ORAL at 11:43

## 2024-05-16 RX ADMIN — SODIUM CHLORIDE 5 ML/HR: 9 INJECTION, SOLUTION INTRAVENOUS at 18:27

## 2024-05-16 RX ADMIN — SUGAMMADEX 400 MG: 100 INJECTION, SOLUTION INTRAVENOUS at 10:52

## 2024-05-16 RX ADMIN — SODIUM CHLORIDE 1500 MG: 9 INJECTION, SOLUTION INTRAVENOUS at 18:29

## 2024-05-16 RX ADMIN — IPRATROPIUM BROMIDE AND ALBUTEROL SULFATE 1 DOSE: .5; 3 SOLUTION RESPIRATORY (INHALATION) at 09:13

## 2024-05-16 RX ADMIN — ROCURONIUM BROMIDE 30 MG: 10 INJECTION INTRAVENOUS at 10:10

## 2024-05-16 RX ADMIN — ONDANSETRON 4 MG: 2 INJECTION INTRAMUSCULAR; INTRAVENOUS at 10:38

## 2024-05-16 RX ADMIN — MUPIROCIN: 20 OINTMENT TOPICAL at 11:58

## 2024-05-16 RX ADMIN — FAMOTIDINE 20 MG: 20 TABLET, FILM COATED ORAL at 21:05

## 2024-05-16 RX ADMIN — PHENYLEPHRINE HYDROCHLORIDE 120 MCG/MIN: 10 INJECTION INTRAVENOUS at 09:54

## 2024-05-16 RX ADMIN — SODIUM BICARBONATE: 84 INJECTION, SOLUTION INTRAVENOUS at 13:14

## 2024-05-16 RX ADMIN — MUPIROCIN: 20 OINTMENT TOPICAL at 21:08

## 2024-05-16 RX ADMIN — SODIUM CHLORIDE, PRESERVATIVE FREE 10 ML: 5 INJECTION INTRAVENOUS at 11:45

## 2024-05-16 RX ADMIN — Medication 200 MG: at 10:04

## 2024-05-16 RX ADMIN — Medication 120 MCG: at 10:03

## 2024-05-16 RX ADMIN — POLYETHYLENE GLYCOL 3350 17 G: 17 POWDER, FOR SOLUTION ORAL at 12:16

## 2024-05-16 RX ADMIN — FAMOTIDINE 20 MG: 10 INJECTION, SOLUTION INTRAVENOUS at 11:43

## 2024-05-16 RX ADMIN — SODIUM CHLORIDE, PRESERVATIVE FREE 10 ML: 5 INJECTION INTRAVENOUS at 21:04

## 2024-05-16 ASSESSMENT — ENCOUNTER SYMPTOMS: SHORTNESS OF BREATH: 1

## 2024-05-16 ASSESSMENT — PAIN DESCRIPTION - ORIENTATION: ORIENTATION: ANTERIOR

## 2024-05-16 ASSESSMENT — PAIN SCALES - GENERAL
PAINLEVEL_OUTOF10: 0
PAINLEVEL_OUTOF10: 0
PAINLEVEL_OUTOF10: 3

## 2024-05-16 ASSESSMENT — PAIN DESCRIPTION - DESCRIPTORS: DESCRIPTORS: SORE

## 2024-05-16 ASSESSMENT — LIFESTYLE VARIABLES: SMOKING_STATUS: 1

## 2024-05-16 ASSESSMENT — PAIN DESCRIPTION - LOCATION: LOCATION: CHEST;INCISION

## 2024-05-16 NOTE — ANESTHESIA PROCEDURE NOTES
Arterial Line:    An arterial line was placed using surface landmarks, in the pre-op for the following indication(s): continuous blood pressure monitoring.    A 20 gauge (size), 1 and 3/4 inch (length), Arrow (type) catheter was placed, Seldinger technique used, into the right radial artery, secured by suture, tape and Tegaderm.  Anesthesia type: Local  Local infiltration: Injection    Events:  patient tolerated procedure well with no complications.    Additional notes:  Collateral perfusion verified, sterile US guided   Performed: Anesthesiologist   Preanesthetic Checklist  Completed: patient identified, IV checked, risks and benefits discussed, surgical/procedural consents, equipment checked, pre-op evaluation, timeout performed, anesthesia consent given, oxygen available and monitors applied/VS acknowledged

## 2024-05-16 NOTE — CONSENT
Informed Consent for Blood Component Transfusion Note    I have discussed with the patient the rationale for blood component transfusion; its benefits in treating or preventing fatigue, organ damage, or death; and its risk which includes mild transfusion reactions, rare risk of blood borne infection, or more serious but rare reactions. I have discussed the alternatives to transfusion, including the risk and consequences of not receiving transfusion. The patient had an opportunity to ask questions and had agreed to proceed with transfusion of blood components.    Electronically signed by ASHANTI Pineda NP on 5/16/24 at 2:33 PM EDT

## 2024-05-16 NOTE — ANESTHESIA PROCEDURE NOTES
Procedure Performed: DARIO       Start Time:        End Time:      Preanesthesia Checklist:  Patient identified, IV assessed, risks and benefits discussed, monitors and equipment assessed, procedure being performed at surgeon's request and anesthesia consent obtained.    General Procedure Information  Diagnostic Indications for Echo:  assessment of surgical repair, hemodynamic monitoring, suspected pericardial effusion and assessment of valve function  Location performed:  OR  Intubated  Bite block not placed  Heart visualized  Probe Insertion:  Easy  Probe Type:  Mulitplane  Modalities:  2D and color flow mapping    Echocardiographic and Doppler Measurements    Ventricles    Right Ventricle:  Hypertrophy present.  Thrombus not present.  Global function normal.    Left Ventricle:  Hypertrophy present.  Thrombus not present.  Global Function normal.      Ventricular Regional Function:  1- Basal Anteroseptal:  normal  2- Basal Anterior:  normal  3- Basal Anterolateral:  normal  4- Basal Inferolateral:  normal  5- Basal Inferior:  normal  6- Basal Inferoseptal:  normal  7- Mid Anteroseptal:  normal  8- Mid Anterior:  normal  9- Mid Anterolateral:  normal  10- Mid Inferolateral:  normal  11- Mid Inferior:  normal  12- Mid Inferoseptal:  normal  13- Apical Anterior:  normal  14- Apical Lateral:  normal  15- Apical Inferior:  normal  16- Apical Septal:  normal  17- [place]:  normal      Valves    Aortic Valve:  Annulus normal.  Stenosis not present.  Regurgitation none.  Leaflet motions normal.      Mitral Valve:  Annulus normal.  Stenosis not present.  Leaflet motions normal.      Tricuspid Valve:  Annulus normal.  Stenosis not present.  Leaflet motions normal.    Pulmonic Valve:  Annulus normal.  Stenosis not present.  Regurgitation none.    Other Valve Findings:       Trace TR and MR    Aorta    Ascending Aorta:  Size normal.  Diameter 3.2 cm.  Dissection not present.    Aortic Arch:  Size normal.  Diameter 2.8 cm.

## 2024-05-16 NOTE — PERIOP NOTE
Status Flushed;Normal saline locked;Brisk blood return;Capped 05/16/24 0325   Line Care Cap changed;Ports disinfected 05/16/24 0325   Phlebitis Assessment No symptoms 05/16/24 0325   Infiltration Assessment 0 05/16/24 0325   Alcohol Cap Used Yes 05/16/24 0325   Dressing Status Clean, dry & intact 05/16/24 0325   Dressing Type Transparent 05/16/24 0325       Arterial Line 05/16/24 Radial (Active)        Opportunity for questions and clarification was provided.      Patient transported with:  Monitor and O2 @ 6lpm accompanied by CRNA, CLAUDE and RN Circulator    1030    Total amount of Pericardial Fluid released = 750 Mls

## 2024-05-16 NOTE — ANESTHESIA PRE PROCEDURE
1.702 m (5' 7.01\")                                                 BP Readings from Last 3 Encounters:   05/16/24 97/71   05/09/24 124/86   05/09/24 121/89       NPO Status:                                                   Date of last liquid consumption: 05/15/24                        Date of last solid food consumption: 05/15/24    BMI:   Wt Readings from Last 3 Encounters:   05/15/24 100.1 kg (220 lb 10.9 oz)   05/09/24 105 kg (231 lb 8 oz)   05/09/24 105 kg (231 lb 8 oz)     Body mass index is 34.56 kg/m².    CBC:   Lab Results   Component Value Date/Time    WBC 5.7 05/16/2024 02:19 AM    RBC 4.69 05/16/2024 02:19 AM    HGB 13.1 05/16/2024 02:19 AM    HCT 39.1 05/16/2024 02:19 AM    MCV 83.4 05/16/2024 02:19 AM    RDW 14.4 05/16/2024 02:19 AM     05/16/2024 02:19 AM       CMP:   Lab Results   Component Value Date/Time     05/16/2024 02:19 AM    K 3.9 05/16/2024 02:19 AM    CL 97 05/16/2024 02:19 AM    CO2 19 05/16/2024 02:19 AM    BUN 44 05/16/2024 02:19 AM    CREATININE 1.33 05/16/2024 02:19 AM    LABGLOM 63 05/16/2024 02:19 AM    LABGLOM 83 04/12/2024 04:47 AM    GLUCOSE 151 05/16/2024 02:19 AM    CALCIUM 8.1 05/16/2024 02:19 AM    BILITOT 1.4 05/14/2024 01:48 PM    ALKPHOS 132 05/14/2024 01:48 PM    AST 31 05/14/2024 01:48 PM    ALT 55 05/14/2024 01:48 PM       POC Tests:   Recent Labs     05/15/24  2202   POCGLU 157*       Coags: No results found for: \"PROTIME\", \"INR\", \"APTT\"    HCG (If Applicable): No results found for: \"PREGTESTUR\", \"PREGSERUM\", \"HCG\", \"HCGQUANT\"     ABGs:   Lab Results   Component Value Date/Time    [address] 25 04/06/2024 05:38 PM        Type & Screen (If Applicable):  No results found for: \"LABABO\"    Drug/Infectious Status (If Applicable):  No results found for: \"HIV\", \"HEPCAB\"    COVID-19 Screening (If Applicable): No results found for: \"COVID19\"        Anesthesia Evaluation  Patient summary reviewed and Nursing notes reviewed   no history of anesthetic complications:

## 2024-05-16 NOTE — BRIEF OP NOTE
Brief Postoperative Note      Patient: Fortunato Kowalski  YOB: 1965  MRN: [id number]    Date of Procedure: 5/16/2024    Pre-Op Diagnosis Codes:     * Pericardial effusion [I31.39]    Post-Op Diagnosis: Same       Procedure(s):  PERICARDIAL DRAINAGE with PERICARDIAL BIOPSY    Surgeon(s):  Kun Flores MD    Assistant:  Physician Assistant: Danish Kim PA-C    Anesthesia: General    Estimated Blood Loss (mL): Minimal    Complications: None    Specimens:   ID Type Source Tests Collected by Time Destination   1 : Pericardial Fluid Body Fluid Heart CYTOLOGY, NON-GYN Kun Flores MD 5/16/2024 1025    2 : Pericardium Tissue Heart SURGICAL PATHOLOGY Kun Flores MD 5/16/2024 1027        Implants:  * No implants in log *      Drains:   Chest Tube Right (Active)   $ Chest tube insertion $ Yes 05/14/24 2033   Chest Tube Airleak No 05/16/24 0745   Status Gravity 05/16/24 0745   Suction To water seal 05/16/24 0745   Y Connector Used No 05/16/24 0745   Drainage Description Serous 05/16/24 0745   Dressing Status Clean, dry & intact 05/16/24 0745   Chest Tube Dressing Dry 05/16/24 0745   Site Assessment Clean, dry & intact 05/16/24 0745   Surrounding Skin Clean;Dry;Intact 05/16/24 0745   Output (ml) 70 ml 05/15/24 1843       Closed/Suction Drain Inferior;Medial;Midline Pericardial  (Active)       Findings:  Infection Present At Time Of Surgery (PATOS) (choose all levels that have infection present):  No infection present  Other Findings: 750cc serosanguinous fluid drained from pericardium    Electronically signed by Danish Kim PA-C on 5/16/2024 at 11:00 AM

## 2024-05-16 NOTE — ANESTHESIA POSTPROCEDURE EVALUATION
Department of Anesthesiology  Postprocedure Note    Patient: Fortunato Kowalski  MRN: [id number]  YOB: 1965  Date of evaluation: 5/16/2024    Procedure Summary       Date: 05/16/24 Room / Location: [facility] / [facility]    Anesthesia Start: 0940 Anesthesia Stop: 1120    Procedure: DARIO BY DR CHILDRESS - PERICARDIAL WINDOW (Chest) Diagnosis:       Pericardial effusion      (Pericardial effusion [I31.39])    Surgeons: Kun Flores MD Responsible Provider: Kaleb Childress DO    Anesthesia Type: General ASA Status: 4            Anesthesia Type: General    Arthur Phase I:      Arthur Phase II:      Anesthesia Post Evaluation    Patient location during evaluation: PACU  Patient participation: complete - patient participated  Level of consciousness: awake and alert  Pain score: 0  Airway patency: patent  Nausea & Vomiting: no nausea  Cardiovascular status: hemodynamically stable  Respiratory status: acceptable  Hydration status: euvolemic  Comments: Seen, no complaints   Pain management: adequate    No notable events documented.

## 2024-05-17 ENCOUNTER — APPOINTMENT (OUTPATIENT)
Facility: HOSPITAL | Age: 59
DRG: 163 | End: 2024-05-17
Payer: COMMERCIAL

## 2024-05-17 LAB
ANION GAP SERPL CALC-SCNC: 6 MMOL/L (ref 5–15)
BASOPHILS # BLD: 0 K/UL (ref 0–0.1)
BASOPHILS NFR BLD: 1 % (ref 0–1)
BUN SERPL-MCNC: 34 MG/DL (ref 6–20)
BUN/CREAT SERPL: 29 (ref 12–20)
CALCIUM SERPL-MCNC: 7.4 MG/DL (ref 8.5–10.1)
CHLORIDE SERPL-SCNC: 99 MMOL/L (ref 97–108)
CO2 SERPL-SCNC: 25 MMOL/L (ref 21–32)
CREAT SERPL-MCNC: 1.18 MG/DL (ref 0.7–1.3)
CYTOLOGY-NON GYN: NORMAL
DIFFERENTIAL METHOD BLD: ABNORMAL
ECHO BSA: 2.21 M2
EOSINOPHIL # BLD: 0.2 K/UL (ref 0–0.4)
EOSINOPHIL NFR BLD: 4 % (ref 0–7)
ERYTHROCYTE [DISTWIDTH] IN BLOOD BY AUTOMATED COUNT: 14 % (ref 11.5–14.5)
GLUCOSE BLD STRIP.AUTO-MCNC: 133 MG/DL (ref 65–117)
GLUCOSE BLD STRIP.AUTO-MCNC: 139 MG/DL (ref 65–117)
GLUCOSE BLD STRIP.AUTO-MCNC: 184 MG/DL (ref 65–117)
GLUCOSE BLD STRIP.AUTO-MCNC: 199 MG/DL (ref 65–117)
GLUCOSE SERPL-MCNC: 117 MG/DL (ref 65–100)
HCT VFR BLD AUTO: 37 % (ref 36.6–50.3)
HGB BLD-MCNC: 12.2 G/DL (ref 12.1–17)
HISTORY CHECK: NORMAL
IMM GRANULOCYTES # BLD AUTO: 0 K/UL (ref 0–0.04)
IMM GRANULOCYTES NFR BLD AUTO: 1 % (ref 0–0.5)
LYMPHOCYTES # BLD: 1 K/UL (ref 0.8–3.5)
LYMPHOCYTES NFR BLD: 26 % (ref 12–49)
MCH RBC QN AUTO: 27.2 PG (ref 26–34)
MCHC RBC AUTO-ENTMCNC: 33 G/DL (ref 30–36.5)
MCV RBC AUTO: 82.4 FL (ref 80–99)
MONOCYTES # BLD: 0.2 K/UL (ref 0–1)
MONOCYTES NFR BLD: 5 % (ref 5–13)
NEUTS SEG # BLD: 2.4 K/UL (ref 1.8–8)
NEUTS SEG NFR BLD: 63 % (ref 32–75)
NRBC # BLD: 0 K/UL (ref 0–0.01)
NRBC BLD-RTO: 0 PER 100 WBC
PLATELET # BLD AUTO: 198 K/UL (ref 150–400)
PMV BLD AUTO: 11.6 FL (ref 8.9–12.9)
POTASSIUM SERPL-SCNC: 3.8 MMOL/L (ref 3.5–5.1)
RBC # BLD AUTO: 4.49 M/UL (ref 4.1–5.7)
SERVICE CMNT-IMP: ABNORMAL
SODIUM SERPL-SCNC: 129 MMOL/L (ref 136–145)
SODIUM SERPL-SCNC: 130 MMOL/L (ref 136–145)
SODIUM SERPL-SCNC: 130 MMOL/L (ref 136–145)
SODIUM SERPL-SCNC: 131 MMOL/L (ref 136–145)
VAS LEFT ABI: 0.91
VAS LEFT ARM BP: 95 MMHG
VAS LEFT DORSALIS PEDIS BP: 82 MMHG
VAS LEFT PTA BP: 86 MMHG
VAS LEFT TBI: 0.67
VAS LEFT TOE PRESSURE: 64 MMHG
VAS RIGHT ABI: 1.07
VAS RIGHT DORSALIS PEDIS BP: 102 MMHG
VAS RIGHT PTA BP: 98 MMHG
VAS RIGHT TBI: 0.94
VAS RIGHT TOE PRESSURE: 89 MMHG
WBC # BLD AUTO: 3.8 K/UL (ref 4.1–11.1)

## 2024-05-17 PROCEDURE — 82962 GLUCOSE BLOOD TEST: CPT

## 2024-05-17 PROCEDURE — 2500000003 HC RX 250 WO HCPCS: Performed by: INTERNAL MEDICINE

## 2024-05-17 PROCEDURE — 2060000000 HC ICU INTERMEDIATE R&B

## 2024-05-17 PROCEDURE — 84295 ASSAY OF SERUM SODIUM: CPT

## 2024-05-17 PROCEDURE — 2580000003 HC RX 258: Performed by: PHYSICIAN ASSISTANT

## 2024-05-17 PROCEDURE — 6370000000 HC RX 637 (ALT 250 FOR IP): Performed by: PHYSICIAN ASSISTANT

## 2024-05-17 PROCEDURE — 80048 BASIC METABOLIC PNL TOTAL CA: CPT

## 2024-05-17 PROCEDURE — 2580000003 HC RX 258: Performed by: INTERNAL MEDICINE

## 2024-05-17 PROCEDURE — 71045 X-RAY EXAM CHEST 1 VIEW: CPT

## 2024-05-17 PROCEDURE — 85025 COMPLETE CBC W/AUTO DIFF WBC: CPT

## 2024-05-17 PROCEDURE — 93922 UPR/L XTREMITY ART 2 LEVELS: CPT | Performed by: SURGERY

## 2024-05-17 PROCEDURE — 6360000002 HC RX W HCPCS: Performed by: PHYSICIAN ASSISTANT

## 2024-05-17 PROCEDURE — 36415 COLL VENOUS BLD VENIPUNCTURE: CPT

## 2024-05-17 RX ORDER — SODIUM CHLORIDE 9 MG/ML
INJECTION, SOLUTION INTRAVENOUS CONTINUOUS
Status: DISCONTINUED | OUTPATIENT
Start: 2024-05-17 | End: 2024-05-20

## 2024-05-17 RX ADMIN — 0.12% CHLORHEXIDINE GLUCONATE 15 ML: 1.2 RINSE ORAL at 20:26

## 2024-05-17 RX ADMIN — SODIUM BICARBONATE: 84 INJECTION, SOLUTION INTRAVENOUS at 02:39

## 2024-05-17 RX ADMIN — 0.12% CHLORHEXIDINE GLUCONATE 15 ML: 1.2 RINSE ORAL at 10:10

## 2024-05-17 RX ADMIN — SODIUM CHLORIDE, PRESERVATIVE FREE 10 ML: 5 INJECTION INTRAVENOUS at 10:15

## 2024-05-17 RX ADMIN — FAMOTIDINE 20 MG: 20 TABLET, FILM COATED ORAL at 20:27

## 2024-05-17 RX ADMIN — ENOXAPARIN SODIUM 30 MG: 100 INJECTION SUBCUTANEOUS at 20:32

## 2024-05-17 RX ADMIN — SODIUM CHLORIDE: 9 INJECTION, SOLUTION INTRAVENOUS at 11:43

## 2024-05-17 RX ADMIN — SENNOSIDES AND DOCUSATE SODIUM 1 TABLET: 50; 8.6 TABLET ORAL at 20:27

## 2024-05-17 RX ADMIN — SODIUM CHLORIDE, PRESERVATIVE FREE 10 ML: 5 INJECTION INTRAVENOUS at 20:27

## 2024-05-17 RX ADMIN — GUAIFENESIN 600 MG: 600 TABLET, EXTENDED RELEASE ORAL at 10:11

## 2024-05-17 RX ADMIN — GUAIFENESIN 600 MG: 600 TABLET, EXTENDED RELEASE ORAL at 20:26

## 2024-05-17 RX ADMIN — MUPIROCIN: 20 OINTMENT TOPICAL at 10:12

## 2024-05-17 RX ADMIN — ACETAMINOPHEN 975 MG: 325 TABLET ORAL at 23:09

## 2024-05-17 RX ADMIN — ACETAMINOPHEN 650 MG: 325 TABLET ORAL at 14:19

## 2024-05-17 RX ADMIN — MUPIROCIN: 20 OINTMENT TOPICAL at 20:27

## 2024-05-17 ASSESSMENT — PAIN SCALES - GENERAL: PAINLEVEL_OUTOF10: 4

## 2024-05-17 ASSESSMENT — PAIN SCALES - WONG BAKER: WONGBAKER_NUMERICALRESPONSE: NO HURT

## 2024-05-17 ASSESSMENT — PAIN DESCRIPTION - DESCRIPTORS: DESCRIPTORS: DISCOMFORT

## 2024-05-17 ASSESSMENT — PAIN DESCRIPTION - ORIENTATION: ORIENTATION: RIGHT

## 2024-05-17 ASSESSMENT — PAIN DESCRIPTION - LOCATION: LOCATION: CHEST;RIB CAGE

## 2024-05-17 NOTE — CARE COORDINATION
Transition of Care Plan:    RUR: 21%  Prior Level of Functioning:   Disposition:   If SNF or IPR: Date FOC offered:   Date FOC received:   Accepting facility:   Date authorization started with reference number:   Date authorization received and expires:   Follow up appointments: PCP, Specialist  DME needed: none  Transportation at discharge: Family to provide transportation   IM/IMM Medicare/ letter given: 2nd IM Letter to be given  Is patient a [employer] and connected with VA? N/A   If yes, was  transfer form completed and VA notified?   Caregiver Contact: Yolanda Kowalski (Ex-Spouse)                                      [phone] (Home Phone)   Discharge Caregiver contacted prior to discharge?   Cm to discuss with pt  Care Conference needed? none  Barriers to discharge: Medical clearance    CM reviewed pt chart. Pt not medically stable for d/c at this time.    AROLDO Kc,RN  Care   [facility]  Phone: [phone]

## 2024-05-17 NOTE — PLAN OF CARE
Problem: Discharge Planning  Goal: Discharge to home or other facility with appropriate resources  Outcome: Progressing  Flowsheets (Taken 5/16/2024 2000 by Jayna Harper, RN)  Discharge to home or other facility with appropriate resources:   Identify barriers to discharge with patient and caregiver   Arrange for needed discharge resources and transportation as appropriate   Identify discharge learning needs (meds, wound care, etc)   Arrange for interpreters to assist at discharge as needed   Refer to discharge planning if patient needs post-hospital services based on physician order or complex needs related to functional status, cognitive ability or social support system     Problem: Safety - Adult  Goal: Free from fall injury  Outcome: Progressing  Flowsheets (Taken 5/16/2024 2000 by Jayna Harper, RN)  Free From Fall Injury:   Instruct family/caregiver on patient safety   Based on caregiver fall risk screen, instruct family/caregiver to ask for assistance with transferring infant if caregiver noted to have fall risk factors     Problem: Pain  Goal: Verbalizes/displays adequate comfort level or baseline comfort level  Outcome: Progressing  Flowsheets (Taken 5/16/2024 2010 by Jayna Harper, RN)  Verbalizes/displays adequate comfort level or baseline comfort level:   Encourage patient to monitor pain and request assistance   Assess pain using appropriate pain scale   Administer analgesics based on type and severity of pain and evaluate response   Implement non-pharmacological measures as appropriate and evaluate response   Consider cultural and social influences on pain and pain management   Notify Licensed Independent Practitioner if interventions unsuccessful or patient reports new pain     Problem: Chronic Conditions and Co-morbidities  Goal: Patient's chronic conditions and co-morbidity symptoms are monitored and maintained or improved  Outcome: Progressing  Flowsheets (Taken 5/16/2024 2000 by Leroy

## 2024-05-18 ENCOUNTER — APPOINTMENT (OUTPATIENT)
Facility: HOSPITAL | Age: 59
DRG: 163 | End: 2024-05-18
Payer: COMMERCIAL

## 2024-05-18 LAB
ANION GAP SERPL CALC-SCNC: 6 MMOL/L (ref 5–15)
BASOPHILS # BLD: 0 K/UL (ref 0–0.1)
BASOPHILS NFR BLD: 1 % (ref 0–1)
BUN SERPL-MCNC: 18 MG/DL (ref 6–20)
BUN/CREAT SERPL: 18 (ref 12–20)
CALCIUM SERPL-MCNC: 7.2 MG/DL (ref 8.5–10.1)
CHLORIDE SERPL-SCNC: 99 MMOL/L (ref 97–108)
CO2 SERPL-SCNC: 26 MMOL/L (ref 21–32)
CREAT SERPL-MCNC: 1.01 MG/DL (ref 0.7–1.3)
DIFFERENTIAL METHOD BLD: ABNORMAL
EOSINOPHIL # BLD: 0.2 K/UL (ref 0–0.4)
EOSINOPHIL NFR BLD: 5 % (ref 0–7)
ERYTHROCYTE [DISTWIDTH] IN BLOOD BY AUTOMATED COUNT: 14.1 % (ref 11.5–14.5)
GLUCOSE BLD STRIP.AUTO-MCNC: 113 MG/DL (ref 65–117)
GLUCOSE BLD STRIP.AUTO-MCNC: 132 MG/DL (ref 65–117)
GLUCOSE BLD STRIP.AUTO-MCNC: 166 MG/DL (ref 65–117)
GLUCOSE BLD STRIP.AUTO-MCNC: 213 MG/DL (ref 65–117)
GLUCOSE SERPL-MCNC: 161 MG/DL (ref 65–100)
HCT VFR BLD AUTO: 34.7 % (ref 36.6–50.3)
HGB BLD-MCNC: 11.5 G/DL (ref 12.1–17)
IMM GRANULOCYTES # BLD AUTO: 0 K/UL (ref 0–0.04)
IMM GRANULOCYTES NFR BLD AUTO: 1 % (ref 0–0.5)
LYMPHOCYTES # BLD: 1 K/UL (ref 0.8–3.5)
LYMPHOCYTES NFR BLD: 27 % (ref 12–49)
MAGNESIUM SERPL-MCNC: 1.8 MG/DL (ref 1.6–2.4)
MCH RBC QN AUTO: 27.3 PG (ref 26–34)
MCHC RBC AUTO-ENTMCNC: 33.1 G/DL (ref 30–36.5)
MCV RBC AUTO: 82.4 FL (ref 80–99)
MONOCYTES # BLD: 0.4 K/UL (ref 0–1)
MONOCYTES NFR BLD: 10 % (ref 5–13)
NEUTS SEG # BLD: 2 K/UL (ref 1.8–8)
NEUTS SEG NFR BLD: 56 % (ref 32–75)
NRBC # BLD: 0 K/UL (ref 0–0.01)
NRBC BLD-RTO: 0 PER 100 WBC
PLATELET # BLD AUTO: 196 K/UL (ref 150–400)
PMV BLD AUTO: 11.3 FL (ref 8.9–12.9)
POTASSIUM SERPL-SCNC: 3.5 MMOL/L (ref 3.5–5.1)
RBC # BLD AUTO: 4.21 M/UL (ref 4.1–5.7)
SERVICE CMNT-IMP: ABNORMAL
SERVICE CMNT-IMP: NORMAL
SODIUM SERPL-SCNC: 131 MMOL/L (ref 136–145)
SODIUM SERPL-SCNC: 132 MMOL/L (ref 136–145)
SODIUM SERPL-SCNC: 132 MMOL/L (ref 136–145)
SODIUM SERPL-SCNC: 133 MMOL/L (ref 136–145)
WBC # BLD AUTO: 3.5 K/UL (ref 4.1–11.1)

## 2024-05-18 PROCEDURE — 36415 COLL VENOUS BLD VENIPUNCTURE: CPT

## 2024-05-18 PROCEDURE — 71045 X-RAY EXAM CHEST 1 VIEW: CPT

## 2024-05-18 PROCEDURE — 6370000000 HC RX 637 (ALT 250 FOR IP): Performed by: PHYSICIAN ASSISTANT

## 2024-05-18 PROCEDURE — 6360000002 HC RX W HCPCS: Performed by: PHYSICIAN ASSISTANT

## 2024-05-18 PROCEDURE — 85025 COMPLETE CBC W/AUTO DIFF WBC: CPT

## 2024-05-18 PROCEDURE — 2580000003 HC RX 258: Performed by: INTERNAL MEDICINE

## 2024-05-18 PROCEDURE — 80048 BASIC METABOLIC PNL TOTAL CA: CPT

## 2024-05-18 PROCEDURE — 84295 ASSAY OF SERUM SODIUM: CPT

## 2024-05-18 PROCEDURE — 2580000003 HC RX 258: Performed by: PHYSICIAN ASSISTANT

## 2024-05-18 PROCEDURE — 2700000000 HC OXYGEN THERAPY PER DAY

## 2024-05-18 PROCEDURE — 82962 GLUCOSE BLOOD TEST: CPT

## 2024-05-18 PROCEDURE — 2060000000 HC ICU INTERMEDIATE R&B

## 2024-05-18 PROCEDURE — 83735 ASSAY OF MAGNESIUM: CPT

## 2024-05-18 RX ADMIN — POLYETHYLENE GLYCOL 3350 17 G: 17 POWDER, FOR SOLUTION ORAL at 08:13

## 2024-05-18 RX ADMIN — GUAIFENESIN 600 MG: 600 TABLET, EXTENDED RELEASE ORAL at 08:14

## 2024-05-18 RX ADMIN — LORAZEPAM 1 MG: 1 TABLET ORAL at 11:37

## 2024-05-18 RX ADMIN — MUPIROCIN: 20 OINTMENT TOPICAL at 08:21

## 2024-05-18 RX ADMIN — FAMOTIDINE 20 MG: 20 TABLET, FILM COATED ORAL at 08:14

## 2024-05-18 RX ADMIN — SODIUM CHLORIDE, PRESERVATIVE FREE 10 ML: 5 INJECTION INTRAVENOUS at 08:18

## 2024-05-18 RX ADMIN — SODIUM CHLORIDE, PRESERVATIVE FREE 10 ML: 5 INJECTION INTRAVENOUS at 20:12

## 2024-05-18 RX ADMIN — SENNOSIDES AND DOCUSATE SODIUM 1 TABLET: 50; 8.6 TABLET ORAL at 20:11

## 2024-05-18 RX ADMIN — SODIUM CHLORIDE: 9 INJECTION, SOLUTION INTRAVENOUS at 22:52

## 2024-05-18 RX ADMIN — SODIUM CHLORIDE: 9 INJECTION, SOLUTION INTRAVENOUS at 02:25

## 2024-05-18 RX ADMIN — ENOXAPARIN SODIUM 30 MG: 100 INJECTION SUBCUTANEOUS at 20:39

## 2024-05-18 RX ADMIN — ACETAMINOPHEN 975 MG: 325 TABLET ORAL at 23:48

## 2024-05-18 RX ADMIN — ACETAMINOPHEN 975 MG: 325 TABLET ORAL at 11:37

## 2024-05-18 RX ADMIN — FAMOTIDINE 20 MG: 20 TABLET, FILM COATED ORAL at 20:11

## 2024-05-18 RX ADMIN — 0.12% CHLORHEXIDINE GLUCONATE 15 ML: 1.2 RINSE ORAL at 20:11

## 2024-05-18 RX ADMIN — 0.12% CHLORHEXIDINE GLUCONATE 15 ML: 1.2 RINSE ORAL at 08:27

## 2024-05-18 RX ADMIN — LORAZEPAM 1 MG: 1 TABLET ORAL at 22:15

## 2024-05-18 RX ADMIN — SENNOSIDES AND DOCUSATE SODIUM 1 TABLET: 50; 8.6 TABLET ORAL at 08:14

## 2024-05-18 RX ADMIN — MUPIROCIN: 20 OINTMENT TOPICAL at 20:11

## 2024-05-18 RX ADMIN — ENOXAPARIN SODIUM 30 MG: 100 INJECTION SUBCUTANEOUS at 08:13

## 2024-05-18 RX ADMIN — GUAIFENESIN 600 MG: 600 TABLET, EXTENDED RELEASE ORAL at 20:11

## 2024-05-18 ASSESSMENT — PAIN SCALES - GENERAL
PAINLEVEL_OUTOF10: 0
PAINLEVEL_OUTOF10: 1
PAINLEVEL_OUTOF10: 0

## 2024-05-18 NOTE — PROCEDURES
Patient was assisted in a recumbent position. Pericardial drain dressing removed, site cleaned with CHG, sutures x 1 cut, Pericardial drain x 1 removed without difficulty. New occlusive dressing placed. VS stable. RN updated.

## 2024-05-19 ENCOUNTER — APPOINTMENT (OUTPATIENT)
Facility: HOSPITAL | Age: 59
DRG: 163 | End: 2024-05-19
Payer: COMMERCIAL

## 2024-05-19 LAB
ANION GAP SERPL CALC-SCNC: 5 MMOL/L (ref 5–15)
BUN SERPL-MCNC: 11 MG/DL (ref 6–20)
BUN/CREAT SERPL: 13 (ref 12–20)
CALCIUM SERPL-MCNC: 7.9 MG/DL (ref 8.5–10.1)
CHLORIDE SERPL-SCNC: 103 MMOL/L (ref 97–108)
CO2 SERPL-SCNC: 26 MMOL/L (ref 21–32)
CREAT SERPL-MCNC: 0.86 MG/DL (ref 0.7–1.3)
ERYTHROCYTE [DISTWIDTH] IN BLOOD BY AUTOMATED COUNT: 14.4 % (ref 11.5–14.5)
GLUCOSE BLD STRIP.AUTO-MCNC: 115 MG/DL (ref 65–117)
GLUCOSE BLD STRIP.AUTO-MCNC: 136 MG/DL (ref 65–117)
GLUCOSE SERPL-MCNC: 143 MG/DL (ref 65–100)
HCT VFR BLD AUTO: 37 % (ref 36.6–50.3)
HGB BLD-MCNC: 12.4 G/DL (ref 12.1–17)
MAGNESIUM SERPL-MCNC: 1.8 MG/DL (ref 1.6–2.4)
MCH RBC QN AUTO: 27.7 PG (ref 26–34)
MCHC RBC AUTO-ENTMCNC: 33.5 G/DL (ref 30–36.5)
MCV RBC AUTO: 82.6 FL (ref 80–99)
NRBC # BLD: 0 K/UL (ref 0–0.01)
NRBC BLD-RTO: 0 PER 100 WBC
PHOSPHATE SERPL-MCNC: 1.8 MG/DL (ref 2.6–4.7)
PLATELET # BLD AUTO: 251 K/UL (ref 150–400)
PMV BLD AUTO: 10.7 FL (ref 8.9–12.9)
POTASSIUM SERPL-SCNC: 3.8 MMOL/L (ref 3.5–5.1)
RBC # BLD AUTO: 4.48 M/UL (ref 4.1–5.7)
SERVICE CMNT-IMP: ABNORMAL
SERVICE CMNT-IMP: NORMAL
SODIUM SERPL-SCNC: 133 MMOL/L (ref 136–145)
SODIUM SERPL-SCNC: 134 MMOL/L (ref 136–145)
WBC # BLD AUTO: 2.6 K/UL (ref 4.1–11.1)

## 2024-05-19 PROCEDURE — 36415 COLL VENOUS BLD VENIPUNCTURE: CPT

## 2024-05-19 PROCEDURE — 6360000002 HC RX W HCPCS: Performed by: PHYSICIAN ASSISTANT

## 2024-05-19 PROCEDURE — 84100 ASSAY OF PHOSPHORUS: CPT

## 2024-05-19 PROCEDURE — 2500000003 HC RX 250 WO HCPCS: Performed by: INTERNAL MEDICINE

## 2024-05-19 PROCEDURE — 2580000003 HC RX 258: Performed by: PHYSICIAN ASSISTANT

## 2024-05-19 PROCEDURE — 6370000000 HC RX 637 (ALT 250 FOR IP): Performed by: INTERNAL MEDICINE

## 2024-05-19 PROCEDURE — 6370000000 HC RX 637 (ALT 250 FOR IP): Performed by: PHYSICIAN ASSISTANT

## 2024-05-19 PROCEDURE — 2060000000 HC ICU INTERMEDIATE R&B

## 2024-05-19 PROCEDURE — 82962 GLUCOSE BLOOD TEST: CPT

## 2024-05-19 PROCEDURE — 71045 X-RAY EXAM CHEST 1 VIEW: CPT

## 2024-05-19 PROCEDURE — 2580000003 HC RX 258: Performed by: INTERNAL MEDICINE

## 2024-05-19 PROCEDURE — 83735 ASSAY OF MAGNESIUM: CPT

## 2024-05-19 PROCEDURE — 84295 ASSAY OF SERUM SODIUM: CPT

## 2024-05-19 PROCEDURE — 80048 BASIC METABOLIC PNL TOTAL CA: CPT

## 2024-05-19 PROCEDURE — 85027 COMPLETE CBC AUTOMATED: CPT

## 2024-05-19 RX ADMIN — ENOXAPARIN SODIUM 30 MG: 100 INJECTION SUBCUTANEOUS at 20:18

## 2024-05-19 RX ADMIN — SENNOSIDES AND DOCUSATE SODIUM 1 TABLET: 50; 8.6 TABLET ORAL at 08:46

## 2024-05-19 RX ADMIN — MUPIROCIN: 20 OINTMENT TOPICAL at 20:18

## 2024-05-19 RX ADMIN — POTASSIUM & SODIUM PHOSPHATES POWDER PACK 280-160-250 MG 1000 MG: 280-160-250 PACK at 14:43

## 2024-05-19 RX ADMIN — FAMOTIDINE 20 MG: 20 TABLET, FILM COATED ORAL at 20:18

## 2024-05-19 RX ADMIN — SODIUM CHLORIDE, PRESERVATIVE FREE 10 ML: 5 INJECTION INTRAVENOUS at 20:20

## 2024-05-19 RX ADMIN — FAMOTIDINE 20 MG: 20 TABLET, FILM COATED ORAL at 08:46

## 2024-05-19 RX ADMIN — ENOXAPARIN SODIUM 30 MG: 100 INJECTION SUBCUTANEOUS at 08:45

## 2024-05-19 RX ADMIN — MUPIROCIN: 20 OINTMENT TOPICAL at 08:46

## 2024-05-19 RX ADMIN — GUAIFENESIN 600 MG: 600 TABLET, EXTENDED RELEASE ORAL at 08:46

## 2024-05-19 RX ADMIN — ACETAMINOPHEN 975 MG: 325 TABLET ORAL at 12:28

## 2024-05-19 RX ADMIN — SODIUM PHOSPHATE, MONOBASIC, MONOHYDRATE AND SODIUM PHOSPHATE, DIBASIC, ANHYDROUS 30 MMOL: 142; 276 INJECTION, SOLUTION INTRAVENOUS at 16:53

## 2024-05-19 RX ADMIN — 0.12% CHLORHEXIDINE GLUCONATE 15 ML: 1.2 RINSE ORAL at 20:19

## 2024-05-19 RX ADMIN — 0.12% CHLORHEXIDINE GLUCONATE 15 ML: 1.2 RINSE ORAL at 08:46

## 2024-05-19 RX ADMIN — SODIUM CHLORIDE, PRESERVATIVE FREE 10 ML: 5 INJECTION INTRAVENOUS at 08:46

## 2024-05-19 RX ADMIN — LORAZEPAM 1 MG: 1 TABLET ORAL at 19:08

## 2024-05-19 RX ADMIN — ACETAMINOPHEN 975 MG: 325 TABLET ORAL at 18:27

## 2024-05-19 RX ADMIN — GUAIFENESIN 600 MG: 600 TABLET, EXTENDED RELEASE ORAL at 20:18

## 2024-05-19 RX ADMIN — SENNOSIDES AND DOCUSATE SODIUM 1 TABLET: 50; 8.6 TABLET ORAL at 20:18

## 2024-05-19 ASSESSMENT — PAIN SCALES - GENERAL
PAINLEVEL_OUTOF10: 0

## 2024-05-19 ASSESSMENT — PAIN SCALES - WONG BAKER: WONGBAKER_NUMERICALRESPONSE: NO HURT

## 2024-05-19 NOTE — PLAN OF CARE
Problem: Discharge Planning  Goal: Discharge to home or other facility with appropriate resources  Outcome: Progressing     Problem: Safety - Adult  Goal: Free from fall injury  Outcome: Progressing  Flowsheets (Taken 5/18/2024 1931)  Free From Fall Injury: Based on caregiver fall risk screen, instruct family/caregiver to ask for assistance with transferring infant if caregiver noted to have fall risk factors     Problem: Pain  Goal: Verbalizes/displays adequate comfort level or baseline comfort level  Outcome: Progressing     Problem: Chronic Conditions and Co-morbidities  Goal: Patient's chronic conditions and co-morbidity symptoms are monitored and maintained or improved  Outcome: Progressing

## 2024-05-20 ENCOUNTER — APPOINTMENT (OUTPATIENT)
Facility: HOSPITAL | Age: 59
DRG: 163 | End: 2024-05-20
Payer: COMMERCIAL

## 2024-05-20 PROBLEM — J98.19 TRAPPED LUNG: Status: ACTIVE | Noted: 2024-05-20

## 2024-05-20 PROBLEM — C34.91 NSCLC OF RIGHT LUNG (HCC): Status: ACTIVE | Noted: 2024-05-20

## 2024-05-20 PROBLEM — I31.39 PERICARDIAL EFFUSION: Status: ACTIVE | Noted: 2024-05-20

## 2024-05-20 PROBLEM — R45.89 ANXIETY ABOUT HEALTH: Status: ACTIVE | Noted: 2024-05-20

## 2024-05-20 PROBLEM — J94.8 HYDROPNEUMOTHORAX: Status: ACTIVE | Noted: 2024-05-20

## 2024-05-20 PROBLEM — Z71.89 GOALS OF CARE, COUNSELING/DISCUSSION: Status: ACTIVE | Noted: 2024-05-20

## 2024-05-20 LAB
ANION GAP SERPL CALC-SCNC: 8 MMOL/L (ref 5–15)
BUN SERPL-MCNC: 14 MG/DL (ref 6–20)
BUN/CREAT SERPL: 17 (ref 12–20)
CALCIUM SERPL-MCNC: 7.3 MG/DL (ref 8.5–10.1)
CHLORIDE SERPL-SCNC: 106 MMOL/L (ref 97–108)
CO2 SERPL-SCNC: 25 MMOL/L (ref 21–32)
CREAT SERPL-MCNC: 0.82 MG/DL (ref 0.7–1.3)
ERYTHROCYTE [DISTWIDTH] IN BLOOD BY AUTOMATED COUNT: 14.6 % (ref 11.5–14.5)
GLUCOSE BLD STRIP.AUTO-MCNC: 106 MG/DL (ref 65–117)
GLUCOSE BLD STRIP.AUTO-MCNC: 175 MG/DL (ref 65–117)
GLUCOSE BLD STRIP.AUTO-MCNC: 199 MG/DL (ref 65–117)
GLUCOSE SERPL-MCNC: 159 MG/DL (ref 65–100)
HCT VFR BLD AUTO: 36.7 % (ref 36.6–50.3)
HGB BLD-MCNC: 12.1 G/DL (ref 12.1–17)
MAGNESIUM SERPL-MCNC: 1.7 MG/DL (ref 1.6–2.4)
MCH RBC QN AUTO: 27.6 PG (ref 26–34)
MCHC RBC AUTO-ENTMCNC: 33 G/DL (ref 30–36.5)
MCV RBC AUTO: 83.6 FL (ref 80–99)
NRBC # BLD: 0 K/UL (ref 0–0.01)
NRBC BLD-RTO: 0 PER 100 WBC
PHOSPHATE SERPL-MCNC: 3 MG/DL (ref 2.6–4.7)
PLATELET # BLD AUTO: 261 K/UL (ref 150–400)
PMV BLD AUTO: 10.3 FL (ref 8.9–12.9)
POTASSIUM SERPL-SCNC: 3.9 MMOL/L (ref 3.5–5.1)
RBC # BLD AUTO: 4.39 M/UL (ref 4.1–5.7)
SERVICE CMNT-IMP: ABNORMAL
SERVICE CMNT-IMP: ABNORMAL
SERVICE CMNT-IMP: NORMAL
SODIUM SERPL-SCNC: 139 MMOL/L (ref 136–145)
WBC # BLD AUTO: 3 K/UL (ref 4.1–11.1)

## 2024-05-20 PROCEDURE — 71045 X-RAY EXAM CHEST 1 VIEW: CPT

## 2024-05-20 PROCEDURE — 6370000000 HC RX 637 (ALT 250 FOR IP): Performed by: PHYSICIAN ASSISTANT

## 2024-05-20 PROCEDURE — 6370000000 HC RX 637 (ALT 250 FOR IP): Performed by: NURSE PRACTITIONER

## 2024-05-20 PROCEDURE — 85027 COMPLETE CBC AUTOMATED: CPT

## 2024-05-20 PROCEDURE — 6360000002 HC RX W HCPCS: Performed by: INTERNAL MEDICINE

## 2024-05-20 PROCEDURE — 84100 ASSAY OF PHOSPHORUS: CPT

## 2024-05-20 PROCEDURE — 82962 GLUCOSE BLOOD TEST: CPT

## 2024-05-20 PROCEDURE — 6360000002 HC RX W HCPCS: Performed by: PHYSICIAN ASSISTANT

## 2024-05-20 PROCEDURE — 36415 COLL VENOUS BLD VENIPUNCTURE: CPT

## 2024-05-20 PROCEDURE — 2700000000 HC OXYGEN THERAPY PER DAY

## 2024-05-20 PROCEDURE — 99223 1ST HOSP IP/OBS HIGH 75: CPT | Performed by: NURSE PRACTITIONER

## 2024-05-20 PROCEDURE — 83735 ASSAY OF MAGNESIUM: CPT

## 2024-05-20 PROCEDURE — 2580000003 HC RX 258: Performed by: PHYSICIAN ASSISTANT

## 2024-05-20 PROCEDURE — 2060000000 HC ICU INTERMEDIATE R&B

## 2024-05-20 PROCEDURE — 80048 BASIC METABOLIC PNL TOTAL CA: CPT

## 2024-05-20 PROCEDURE — 99232 SBSQ HOSP IP/OBS MODERATE 35: CPT | Performed by: STUDENT IN AN ORGANIZED HEALTH CARE EDUCATION/TRAINING PROGRAM

## 2024-05-20 RX ORDER — MAGNESIUM SULFATE IN WATER 40 MG/ML
2000 INJECTION, SOLUTION INTRAVENOUS ONCE
Status: COMPLETED | OUTPATIENT
Start: 2024-05-20 | End: 2024-05-20

## 2024-05-20 RX ORDER — CITALOPRAM 20 MG/1
10 TABLET ORAL DAILY
Status: DISCONTINUED | OUTPATIENT
Start: 2024-05-20 | End: 2024-05-22 | Stop reason: HOSPADM

## 2024-05-20 RX ORDER — ALPRAZOLAM 0.5 MG/1
1 TABLET ORAL EVERY 4 HOURS PRN
Status: DISCONTINUED | OUTPATIENT
Start: 2024-05-20 | End: 2024-05-22 | Stop reason: HOSPADM

## 2024-05-20 RX ADMIN — GUAIFENESIN 600 MG: 600 TABLET, EXTENDED RELEASE ORAL at 09:13

## 2024-05-20 RX ADMIN — CITALOPRAM HYDROBROMIDE 10 MG: 20 TABLET ORAL at 13:33

## 2024-05-20 RX ADMIN — ENOXAPARIN SODIUM 30 MG: 100 INJECTION SUBCUTANEOUS at 09:13

## 2024-05-20 RX ADMIN — ACETAMINOPHEN 975 MG: 325 TABLET ORAL at 06:45

## 2024-05-20 RX ADMIN — FAMOTIDINE 20 MG: 20 TABLET, FILM COATED ORAL at 19:42

## 2024-05-20 RX ADMIN — MAGNESIUM SULFATE HEPTAHYDRATE 2000 MG: 40 INJECTION, SOLUTION INTRAVENOUS at 09:14

## 2024-05-20 RX ADMIN — FAMOTIDINE 20 MG: 20 TABLET, FILM COATED ORAL at 09:13

## 2024-05-20 RX ADMIN — ALPRAZOLAM 1 MG: 0.5 TABLET ORAL at 19:42

## 2024-05-20 RX ADMIN — SODIUM CHLORIDE, PRESERVATIVE FREE 10 ML: 5 INJECTION INTRAVENOUS at 09:13

## 2024-05-20 RX ADMIN — ACETAMINOPHEN 975 MG: 325 TABLET ORAL at 23:44

## 2024-05-20 RX ADMIN — 0.12% CHLORHEXIDINE GLUCONATE 15 ML: 1.2 RINSE ORAL at 09:13

## 2024-05-20 RX ADMIN — MINERAL OIL, PETROLATUM, PHENYLEPHRINE HCI: .25; 14; 74.9 OINTMENT TOPICAL at 09:13

## 2024-05-20 RX ADMIN — 0.12% CHLORHEXIDINE GLUCONATE 15 ML: 1.2 RINSE ORAL at 19:42

## 2024-05-20 RX ADMIN — LORAZEPAM 1 MG: 1 TABLET ORAL at 01:38

## 2024-05-20 RX ADMIN — SENNOSIDES AND DOCUSATE SODIUM 1 TABLET: 50; 8.6 TABLET ORAL at 19:42

## 2024-05-20 RX ADMIN — POLYETHYLENE GLYCOL 3350 17 G: 17 POWDER, FOR SOLUTION ORAL at 09:12

## 2024-05-20 RX ADMIN — DIPHENHYDRAMINE HYDROCHLORIDE 25 MG: 25 CAPSULE ORAL at 19:55

## 2024-05-20 RX ADMIN — LORAZEPAM 1 MG: 1 TABLET ORAL at 10:49

## 2024-05-20 RX ADMIN — ALPRAZOLAM 1 MG: 0.5 TABLET ORAL at 23:45

## 2024-05-20 RX ADMIN — MUPIROCIN: 20 OINTMENT TOPICAL at 09:14

## 2024-05-20 RX ADMIN — MUPIROCIN: 20 OINTMENT TOPICAL at 19:43

## 2024-05-20 RX ADMIN — ACETAMINOPHEN 975 MG: 325 TABLET ORAL at 00:17

## 2024-05-20 ASSESSMENT — PAIN SCALES - GENERAL
PAINLEVEL_OUTOF10: 0
PAINLEVEL_OUTOF10: 1
PAINLEVEL_OUTOF10: 0

## 2024-05-20 ASSESSMENT — PAIN DESCRIPTION - LOCATION: LOCATION: CHEST

## 2024-05-20 ASSESSMENT — PAIN DESCRIPTION - DESCRIPTORS: DESCRIPTORS: SORE

## 2024-05-20 ASSESSMENT — PAIN DESCRIPTION - ORIENTATION: ORIENTATION: RIGHT;LOWER

## 2024-05-20 NOTE — OP NOTE
DO DAVID Zhao MD PBW/MARILEE  D:  05/20/2024 10:47:42  T:  05/20/2024 15:01:53  JOB #:  [id number]

## 2024-05-21 ENCOUNTER — APPOINTMENT (OUTPATIENT)
Facility: HOSPITAL | Age: 59
DRG: 163 | End: 2024-05-21
Payer: COMMERCIAL

## 2024-05-21 LAB
ANION GAP SERPL CALC-SCNC: 7 MMOL/L (ref 5–15)
BUN SERPL-MCNC: 11 MG/DL (ref 6–20)
BUN/CREAT SERPL: 13 (ref 12–20)
CALCIUM SERPL-MCNC: 7.4 MG/DL (ref 8.5–10.1)
CHLORIDE SERPL-SCNC: 108 MMOL/L (ref 97–108)
CO2 SERPL-SCNC: 23 MMOL/L (ref 21–32)
CREAT SERPL-MCNC: 0.83 MG/DL (ref 0.7–1.3)
ERYTHROCYTE [DISTWIDTH] IN BLOOD BY AUTOMATED COUNT: 15 % (ref 11.5–14.5)
GLUCOSE BLD STRIP.AUTO-MCNC: 168 MG/DL (ref 65–117)
GLUCOSE SERPL-MCNC: 124 MG/DL (ref 65–100)
HCT VFR BLD AUTO: 37.4 % (ref 36.6–50.3)
HGB BLD-MCNC: 12.2 G/DL (ref 12.1–17)
MAGNESIUM SERPL-MCNC: 1.7 MG/DL (ref 1.6–2.4)
MCH RBC QN AUTO: 27.4 PG (ref 26–34)
MCHC RBC AUTO-ENTMCNC: 32.6 G/DL (ref 30–36.5)
MCV RBC AUTO: 84 FL (ref 80–99)
NRBC # BLD: 0 K/UL (ref 0–0.01)
NRBC BLD-RTO: 0 PER 100 WBC
PHOSPHATE SERPL-MCNC: 2.6 MG/DL (ref 2.6–4.7)
PLATELET # BLD AUTO: 299 K/UL (ref 150–400)
PMV BLD AUTO: 10.1 FL (ref 8.9–12.9)
POTASSIUM SERPL-SCNC: 3.6 MMOL/L (ref 3.5–5.1)
RBC # BLD AUTO: 4.45 M/UL (ref 4.1–5.7)
SERVICE CMNT-IMP: ABNORMAL
SODIUM SERPL-SCNC: 138 MMOL/L (ref 136–145)
WBC # BLD AUTO: 3.1 K/UL (ref 4.1–11.1)

## 2024-05-21 PROCEDURE — 6370000000 HC RX 637 (ALT 250 FOR IP): Performed by: PHYSICIAN ASSISTANT

## 2024-05-21 PROCEDURE — 80048 BASIC METABOLIC PNL TOTAL CA: CPT

## 2024-05-21 PROCEDURE — 75989 ABSCESS DRAINAGE UNDER X-RAY: CPT

## 2024-05-21 PROCEDURE — 0W9930Z DRAINAGE OF RIGHT PLEURAL CAVITY WITH DRAINAGE DEVICE, PERCUTANEOUS APPROACH: ICD-10-PCS | Performed by: INTERNAL MEDICINE

## 2024-05-21 PROCEDURE — 2500000003 HC RX 250 WO HCPCS: Performed by: STUDENT IN AN ORGANIZED HEALTH CARE EDUCATION/TRAINING PROGRAM

## 2024-05-21 PROCEDURE — 6360000002 HC RX W HCPCS: Performed by: STUDENT IN AN ORGANIZED HEALTH CARE EDUCATION/TRAINING PROGRAM

## 2024-05-21 PROCEDURE — 2580000003 HC RX 258: Performed by: PHYSICIAN ASSISTANT

## 2024-05-21 PROCEDURE — 2580000003 HC RX 258: Performed by: STUDENT IN AN ORGANIZED HEALTH CARE EDUCATION/TRAINING PROGRAM

## 2024-05-21 PROCEDURE — 6370000000 HC RX 637 (ALT 250 FOR IP): Performed by: NURSE PRACTITIONER

## 2024-05-21 PROCEDURE — 82962 GLUCOSE BLOOD TEST: CPT

## 2024-05-21 PROCEDURE — 83735 ASSAY OF MAGNESIUM: CPT

## 2024-05-21 PROCEDURE — 6360000002 HC RX W HCPCS: Performed by: INTERNAL MEDICINE

## 2024-05-21 PROCEDURE — 36415 COLL VENOUS BLD VENIPUNCTURE: CPT

## 2024-05-21 PROCEDURE — 71045 X-RAY EXAM CHEST 1 VIEW: CPT

## 2024-05-21 PROCEDURE — 84100 ASSAY OF PHOSPHORUS: CPT

## 2024-05-21 PROCEDURE — 2060000000 HC ICU INTERMEDIATE R&B

## 2024-05-21 PROCEDURE — 85027 COMPLETE CBC AUTOMATED: CPT

## 2024-05-21 RX ORDER — MIDAZOLAM HYDROCHLORIDE 1 MG/ML
5 INJECTION INTRAMUSCULAR; INTRAVENOUS
Status: DISCONTINUED | OUTPATIENT
Start: 2024-05-21 | End: 2024-05-21

## 2024-05-21 RX ORDER — FENTANYL CITRATE 50 UG/ML
100 INJECTION, SOLUTION INTRAMUSCULAR; INTRAVENOUS
Status: DISCONTINUED | OUTPATIENT
Start: 2024-05-21 | End: 2024-05-21

## 2024-05-21 RX ORDER — LIDOCAINE HYDROCHLORIDE 20 MG/ML
20 INJECTION, SOLUTION INFILTRATION; PERINEURAL ONCE
Status: COMPLETED | OUTPATIENT
Start: 2024-05-21 | End: 2024-05-21

## 2024-05-21 RX ORDER — CLINDAMYCIN PHOSPHATE 600 MG/50ML
600 INJECTION, SOLUTION INTRAVENOUS ONCE
Status: DISCONTINUED | OUTPATIENT
Start: 2024-05-21 | End: 2024-05-21 | Stop reason: CLARIF

## 2024-05-21 RX ORDER — CLINDAMYCIN PHOSPHATE 600 MG/50ML
600 INJECTION, SOLUTION INTRAVENOUS ONCE
Status: COMPLETED | OUTPATIENT
Start: 2024-05-21 | End: 2024-05-21

## 2024-05-21 RX ORDER — SODIUM CHLORIDE 9 MG/ML
INJECTION, SOLUTION INTRAVENOUS CONTINUOUS
Status: DISCONTINUED | OUTPATIENT
Start: 2024-05-21 | End: 2024-05-21

## 2024-05-21 RX ORDER — HEPARIN SODIUM 200 [USP'U]/100ML
200 INJECTION, SOLUTION INTRAVENOUS ONCE
Status: COMPLETED | OUTPATIENT
Start: 2024-05-21 | End: 2024-05-21

## 2024-05-21 RX ORDER — POTASSIUM CHLORIDE 20 MEQ/1
40 TABLET, EXTENDED RELEASE ORAL ONCE
Status: DISCONTINUED | OUTPATIENT
Start: 2024-05-21 | End: 2024-05-22 | Stop reason: HOSPADM

## 2024-05-21 RX ORDER — MAGNESIUM SULFATE IN WATER 40 MG/ML
2000 INJECTION, SOLUTION INTRAVENOUS ONCE
Status: COMPLETED | OUTPATIENT
Start: 2024-05-21 | End: 2024-05-21

## 2024-05-21 RX ADMIN — LIDOCAINE HYDROCHLORIDE 10 ML: 20 INJECTION, SOLUTION INFILTRATION; PERINEURAL at 16:57

## 2024-05-21 RX ADMIN — 0.12% CHLORHEXIDINE GLUCONATE 15 ML: 1.2 RINSE ORAL at 08:36

## 2024-05-21 RX ADMIN — FENTANYL CITRATE 50 MCG: 50 INJECTION INTRAMUSCULAR; INTRAVENOUS at 16:50

## 2024-05-21 RX ADMIN — GUAIFENESIN 600 MG: 600 TABLET, EXTENDED RELEASE ORAL at 22:00

## 2024-05-21 RX ADMIN — HEPARIN SODIUM IN SODIUM CHLORIDE 500 ML: 200 INJECTION INTRAVENOUS at 16:57

## 2024-05-21 RX ADMIN — MIDAZOLAM 0.5 MG: 1 INJECTION INTRAMUSCULAR; INTRAVENOUS at 16:55

## 2024-05-21 RX ADMIN — SODIUM CHLORIDE: 9 INJECTION, SOLUTION INTRAVENOUS at 16:50

## 2024-05-21 RX ADMIN — MIDAZOLAM 2 MG: 1 INJECTION INTRAMUSCULAR; INTRAVENOUS at 16:50

## 2024-05-21 RX ADMIN — OXYCODONE 10 MG: 5 TABLET ORAL at 18:08

## 2024-05-21 RX ADMIN — SENNOSIDES AND DOCUSATE SODIUM 1 TABLET: 50; 8.6 TABLET ORAL at 22:00

## 2024-05-21 RX ADMIN — CLINDAMYCIN PHOSPHATE 600 MG: 600 INJECTION, SOLUTION INTRAVENOUS at 16:28

## 2024-05-21 RX ADMIN — MAGNESIUM SULFATE HEPTAHYDRATE 2000 MG: 40 INJECTION, SOLUTION INTRAVENOUS at 10:31

## 2024-05-21 RX ADMIN — 0.12% CHLORHEXIDINE GLUCONATE 15 ML: 1.2 RINSE ORAL at 22:00

## 2024-05-21 RX ADMIN — SODIUM CHLORIDE, PRESERVATIVE FREE 10 ML: 5 INJECTION INTRAVENOUS at 22:01

## 2024-05-21 RX ADMIN — CITALOPRAM HYDROBROMIDE 10 MG: 20 TABLET ORAL at 08:36

## 2024-05-21 RX ADMIN — ACETAMINOPHEN 975 MG: 325 TABLET ORAL at 18:15

## 2024-05-21 RX ADMIN — ALPRAZOLAM 1 MG: 0.5 TABLET ORAL at 22:09

## 2024-05-21 RX ADMIN — FENTANYL CITRATE 50 MCG: 50 INJECTION INTRAMUSCULAR; INTRAVENOUS at 16:55

## 2024-05-21 ASSESSMENT — PAIN SCALES - GENERAL
PAINLEVEL_OUTOF10: 10
PAINLEVEL_OUTOF10: 3
PAINLEVEL_OUTOF10: 10
PAINLEVEL_OUTOF10: 0
PAINLEVEL_OUTOF10: 10
PAINLEVEL_OUTOF10: 6

## 2024-05-21 ASSESSMENT — PAIN DESCRIPTION - ORIENTATION
ORIENTATION: RIGHT

## 2024-05-21 ASSESSMENT — PAIN DESCRIPTION - LOCATION
LOCATION: CHEST
LOCATION: CHEST
LOCATION: RIB CAGE

## 2024-05-21 ASSESSMENT — PAIN DESCRIPTION - DESCRIPTORS
DESCRIPTORS: SORE

## 2024-05-21 ASSESSMENT — PAIN DESCRIPTION - PAIN TYPE: TYPE: ACUTE PAIN

## 2024-05-21 ASSESSMENT — PAIN - FUNCTIONAL ASSESSMENT: PAIN_FUNCTIONAL_ASSESSMENT: ACTIVITIES ARE NOT PREVENTED

## 2024-05-21 NOTE — H&P
lung is accumulating\"    HISTORY OF PRESENT ILLNESS:     Fortunato Kowalski is a 58 y.o.  male     Recently diagnosed diabetes mellitus type 2  Hypertension    Recently hospitalized 4/6 to 4/12/2024 with new diagnosis of malignancy   Presented with weeks of dry cough and increasing SOB    Admit CXR opacification of the right mid and lower hemithorax, new   Suspected right pleural effusion. The left lung is clear  Seen by Dr Almeida  Underwent thoracentesis ~ 2000 cc out with moderate pneumothorax on 4/7   Non diagnostic  cytology, but exudative  Chest CT April 8, 2024  reviewed personally  1. Superior mediastinal/pericardial mass with multiple nodules noted in the  epicardial fat pad and a small complex appearing pericardial effusion consistent  with malignancy.  2. Pleural-based implants in the right lung with a small right effusion and  persistent right pneumothorax.  3. Lung nodules in the right upper lobe. Airspace disease in the bilateral lower  lobes may represent pneumonia or aspiration.    Right CT placed by IR 4/8/2024, removed on 4/12/2024   Fine needle aspiration 4/10 of right pleural based mass        Pathology CYTOLOGIC INTERPRETATION:         Lung, right, Core biopsy with touch preparation:          Non-small cell carcinoma, immunophenotype consistent with squamous cell carcinoma          Core biopsy shows similar features.   Referred to Dr Veronica     Readmitted 5/3 to 5/7/2024 with worsening ARAYA   Chest x-ray reveals recurrent malignant pleural effusion.     No mentioned of persistent PTX   Right thoracentesis ~ 2000 cc out   Postprocedure developed a right apical pneumothorax    Serial chest x-ray stable, no chest tube was placed   Right port was placed    Chemotherapy last week, 1st treatment with pembrolizumab, carboplatin, paclitaxel    Increasing SOB over last week, \"feels like I have fluid around my lung again\"   Shortness of breath worse with walking   No persistent chest pain or pleuritic chest pain   Dry 
05/15/24 2211    oxyCODONE (ROXICODONE) immediate release tablet 10 mg  10 mg Oral Q6H PRN Danish Kim PA-C   10 mg at 05/14/24 2123    0.9 % sodium chloride infusion   IntraVENous PRN Danish Kim PA-C   Stopped at 05/16/24 1830    [Held by provider] enoxaparin Sodium (LOVENOX) injection 30 mg  30 mg SubCUTAneous BID Danish Kim PA-C   30 mg at 05/20/24 0913    ondansetron (ZOFRAN-ODT) disintegrating tablet 4 mg  4 mg Oral Q8H PRN Danish Kim PA-C        Or    ondansetron (ZOFRAN) injection 4 mg  4 mg IntraVENous Q6H PRN Danish Kim PA-C        acetaminophen (TYLENOL) tablet 650 mg  650 mg Oral Q6H PRN Danish Kim PA-C   650 mg at 05/17/24 1419    Or    acetaminophen (TYLENOL) suppository 650 mg  650 mg Rectal Q6H PRN Danish Kim PA-C        [Held by provider] metoprolol tartrate (LOPRESSOR) tablet 25 mg  25 mg Oral Daily Gideon Hewitt Jr., MD   25 mg at 05/15/24 0904    nicotine (NICODERM CQ) 21 MG/24HR 1 patch  1 patch TransDERmal Q24H Danish Kim PA-C   1 patch at 05/20/24 1954    hydrALAZINE (APRESOLINE) injection 20 mg  20 mg IntraVENous Q6H PRN Danish Kim PA-C        glucose chewable tablet 16 g  4 tablet Oral PRN Danish Kim PA-C        dextrose bolus 10% 125 mL  125 mL IntraVENous PRN Danish Kim PA-C        Or    dextrose bolus 10% 250 mL  250 mL IntraVENous PRN Danish Kim PA-C        glucagon injection 1 mg  1 mg SubCUTAneous PRN Danish Kim PA-C        dextrose 10 % infusion   IntraVENous Continuous PRN Danish Kim PA-C        insulin lispro (HUMALOG,ADMELOG) injection vial 0-8 Units  0-8 Units SubCUTAneous TID WC Danish Kim PA-C        insulin lispro (HUMALOG,ADMELOG) injection vial 0-4 Units  0-4 Units SubCUTAneous Nightly Danish Kim PA-C        ipratropium 0.5 mg-albuterol 2.5 mg (DUONEB) nebulizer solution 1 Dose  1 Dose Inhalation Q4H PRN Danish Kim, CLAUDE   1

## 2024-05-21 NOTE — CARE COORDINATION
Transition of Care Plan:    RUR: 19%  Prior Level of Functioning: Independent  Disposition: Home  Follow up appointments: PCP, specialist  DME needed: none  Transportation at discharge: Ex- Wife to provide transportation  IM/IMM Medicare/ letter given: N/A- [employer] HMO / Medicaid  Is patient a  and connected with VA? N/A   If yes, was Model transfer form completed and VA notified?   Caregiver Contact:   Discharge Caregiver contacted prior to discharge? CM to discuss with pt  Care Conference needed? none  Barriers to discharge: Medical Clearance, Pulmonary Clearance    CM reviewed pt chart. Pt not medically stable for d/c    Plans for IP pleural drainage catheter insertion today.    AROLDO Kc,RN  Care   [facility]  Phone: [phone]

## 2024-05-21 NOTE — CONSULTS
Cardiology Consult Note    CC: Dyspnea; lung CA    PCP:Shlomo Veronica MD  Reason for consult:  pericardial effusion  Requesting MD:  Dr. Good  Admit Date: 5/13/2024   Today's Date: 5/15/2024      Cardiac Assessment/Plan:   1) Pericardial effusion, large on echo yesterday, read by other group; we are consulted late this afternoon.          *likely malignant and needs pericardial window; discussed with Dr. Flores (NPO p MN)  2) HTN: marginal to low BP here; Not Sx.    3) Stage IV metastatic non-small lung cancer; recurrent pleural effusio; Right hydropneumothorax since last month; now with pigtail catheter.  4) COPD, probable  5) DM  6) Anxiety  7) Diverticulitis.    Metastatic lung CA w recurrent pleural effusion; now w/ progressive pericardial effusion.  Current low BP (85) but Asx; pulsus paradox 10.    Rec: needs pericardial window (effusion will likely recur if just pericardiocentesis); timing per Dr Flores.  D/C BP meds/diuretics; Albumin x 1 and IVF for now;   Move to higher level care if BP not improved and still full code; pressors as needed.       Echo 4/10/24:    Left Ventricle: Normal left ventricular systolic function with a visually estimated EF of 60 - 65%. Left ventricle size is normal. Increased wall thickness. Normal wall motion. Grade I diastolic dysfunction with normal LAP.    Right Ventricle: Right ventricle size is normal. Normal systolic function.    Pericardium: There is evidence of epicardial fat. Small (<1 cm) pericardial effusion present.  No indication of cardiac tampona    Echo 5/14/24 (by RCA):    Left Ventricle: Normal left ventricular systolic function with a visually estimated EF of 55 - 60%. Not well visualized. Left ventricle size is normal. Unable to assess wall thickness. Unable to assess wall motion.    Pericardium: Large (>2 cm) pericardial effusion present.    IVC/SVC: IVC diameter is greater than 21 mm and decreases less than 50% during inspiration; therefore the 
Cardiac Surgery   Consult    Subjective:      Fortunato Kowalski is a 58 y.o. male who was referred for cardiac evaluation from Dr Mclaughlin. 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 He presented 5/13/24 with worsening SOB. A CT scan of the chest showed a large Rt side hydropneomothorax as well as a large pericardial effusion. Transthoracic echocardiogram showed a large pericardial effusion without associated tamponade physiology. Cardiac surgery is being consulted for pericardial fluid evacuation via pericardial window.    Cardiac Testing  ECHO 5/14/24:    Left Ventricle: Normal left ventricular systolic function with a visually estimated EF of 55 - 60%. Not well visualized. Left ventricle size is normal. Unable to assess wall thickness. Unable to assess wall motion.    Pericardium: Large (>2 cm) pericardial effusion present.    IVC/SVC: IVC diameter is greater than 21 mm and decreases less than 50% during inspiration; therefore the estimated right atrial pressure is elevated (~15 mmHg).    Image quality is adequate.    No past medical history on file.  Past Surgical History:   Procedure Laterality Date    COLONOSCOPY      CT BIOPSY SOFT TISSUE NECK  04/10/2024    CT BIOPSY LUNG/MEDIASTINUM PERC 4/10/2024 [facility]    IR PERC CATH PLEURAL DRAIN W/IMAG  04/08/2024    IR PERC CATH PLEURAL DRAIN W/IMAG 4/8/2024 Adi Freitas MD [facility] IR    IR PORT PLACEMENT EQUAL OR GREATER THAN 5 YEARS  5/6/2024    IR PORT PLACEMENT EQUAL OR GREATER THAN 5 YEARS 5/6/2024 Adi Freitas MD [facility]      Social History     Tobacco Use    Smoking status: Every Day     Current packs/day: 2.00     Types: Cigarettes    Smokeless tobacco: Never   Substance Use Topics    Alcohol use: Not Currently      Family History   Problem Relation Age of Onset    Unknown Mother     Unknown Father     Cancer Maternal Grandmother  
Nephrology Consult Note     [facility]                Phone - [phone]   Patient: Fortunato Kowalski   YOB: 1965    Date- 5/16/2024  MRN: [id number]             CONSULTING PHYSICIAN: Dr. Lovelace  REASON FOR CONSULTATION: Hyponatremia  ADMIT DATE:5/13/2024 PATIENT PCP:Shlomo Veronica MD     IMPRESSION & PLAN:   BEVERLY stage I(secondary to hypotension)  Nongap acidosis  Hyponatremia(suspect secondary to IVVD)(urine sodium less than 7)  Pericardial effusion status post pericardial window  Right hydropneumothorax  Antibiotic drug reaction  Cardiogenic shock  Recurrent malignant pleural effusion  Stage IV metastatic non-small cell lung cancer  Type 2 diabetes    PLAN-  -Start on bicarb gtt.  -Check sodium every 6 hours  -BMP in the morning  -Chest x-ray in the morning  -Avoid blood pressure medications in view of labile blood pressure.  -IV Benadryl for drug reaction  -Thank you for the consult       Principal Problem:    Recurrent pleural effusion  Active Problems:    Pneumothorax    Metastatic non-small cell lung cancer (HCC)    S/P pericardial window creation  Resolved Problems:    * No resolved hospital problems. *      [x] High complexity decision making was performed  [x] Patient is at high-risk of decompensation with multiple organ involvement    Subjective:   HPI: Fortunato Kowalski is a 58 y.o. male who has past medical history significant for metastatic stage IV non-small cell lung cancer, recurrent malignant pleural effusion, hypertension, type 2 diabetes, anxiety who came to the ED with complaints of ongoing shortness of breath for last 1 week.  Initial workup in the ED showed persistent right-sided pneumothorax.  He currently is on chemotherapy for his stage IV lung cancer.  He recently had 2 L of volume open thoracocentesis.  Initial workup shows persistent large pleural effusion with hydropneumothorax.  Repeat echo done in this admission showed increased pericardial 
Palliative Medicine  Patient Name: Fortunato Kowalski  YOB: 1965  MRN: [id number]  Age: 58 y.o.  Gender: male    Referred to our clinic in anticipation of discharge tomorrow- we will follow up outpatient    ASHANTI Beasley NP    
Palliative Medicine  Patient Name: Fortunato Kowalski  YOB: 1965  MRN: [id number]  Age: 58 y.o.  Gender: male    Date of Initial Consult: 5/20/24  Date of Service: 5/20/2024  Time: 1:27 PM  Provider: ASHANTI Beasley NP  Hospital Day: 8  Admit Date: 5/13/2024  Referring Provider:  Gabrielle Dyson MD    Reasons for Consultation:  Goals of Care    HISTORY OF PRESENT ILLNESS (HPI):   Fortunato Kowalski is a 58 y.o. male with a past medical history of DM2, NSCLC of the right lung on chemotherapy, who was admitted on 5/13/2024 from home with a diagnosis of right hydropneumothorax in the setting of recurrent malignant pleural effusions 2/2 stage IV metastatic non small cell right lung cancer (squamous).     5/14:  To IR for chest tube placement- 1L of gluid removed.  Found to have a large pericardial effusion on Echo  5/16:  Taken to the OR for pericardial window and pericardial biopsy- 750ml of fluid drained from pericardium  5/17:  Has a trapped right lung despite Plan was to transfer to [facility] for talc pleurodesis through existing right chest tube to try and preserve his remaining lung volume.  Needs Thoracic surgery expertise  5/18: Pericardial drain removed  5/19:  Patient refused transfer- but its not clear why    5/20- sever anxiety today.  Anxious to get home, anxious about financial burden, anxious about procedures that \"might not work\"    Psychosocial: Lives with his ex- wife Yolanda. Has 4 little granddaughters he is very close with      PALLIATIVE DIAGNOSES:    Goals of care  Anxiety  Pericardial effusion s/p pericardial window  Hydropneumothorax s/o thoracentesis and chest tube  Right atpical trapped lung  Stage IV NSCLC (right)    ASSESSMENT AND PLAN:   Prior to meeting patient I did an extensive review of the chart, spoke with oncology, pulmonary, and cardiac surgery  Met with Mr Kowalski along with his ex wife Yolanda- we had a long talk about everything going on.  He has MANY stressors that are invoking 
Please see consult note from earlier today.   
MD    dextrose bolus 10% 125 mL, 125 mL, IntraVENous, PRN **OR** dextrose bolus 10% 250 mL, 250 mL, IntraVENous, PRN, Gideon Hewitt Jr., MD    glucagon injection 1 mg, 1 mg, SubCUTAneous, PRN, Gideon Hewitt Jr., MD    dextrose 10 % infusion, , IntraVENous, Continuous PRN, Gideon Hewitt Jr., MD    insulin lispro (HUMALOG,ADMELOG) injection vial 0-8 Units, 0-8 Units, SubCUTAneous, TID WC, Gideon Hewitt Jr., MD    insulin lispro (HUMALOG,ADMELOG) injection vial 0-4 Units, 0-4 Units, SubCUTAneous, Nightly, Gideon Hewitt Jr., MD    ipratropium 0.5 mg-albuterol 2.5 mg (DUONEB) nebulizer solution 1 Dose, 1 Dose, Inhalation, Q4H PRN, Gideon Hewitt Jr., MD      Documented Home Medications:  Prior to Admission medications    Medication Sig Start Date End Date Taking? Authorizing Provider   CARBOplatin (PARAPLATIN) 600 MG/60ML chemo injection Infuse intravenously 500mg Every 3 weeks for 4 cycles 5/9/24  Yes Phillip Cain MD   PACLitaxel (TAXOL) 300 MG/50ML chemo injection Infuse intravenously 378mg Every 3 weeks for 4 cycles   Yes Phillip Cain MD   sodium chloride 0.9 % SOLN 100 mL with pembrolizumab 100 MG/4ML SOLN 200 mg Infuse 200 mg intravenously Every 3 weeks for 4 cycles   Yes Phillip Cain MD   ondansetron (ZOFRAN-ODT) 4 MG disintegrating tablet Place 1 tablet under the tongue every 8 hours as needed for Nausea or Vomiting 5/9/24 5/23/24  Shlomo Veronica MD   prochlorperazine (COMPAZINE) 5 MG tablet Take 1 tablet by mouth every 6 hours as needed for Nausea 5/9/24   Shlomo Veronica MD   metoprolol tartrate (LOPRESSOR) 25 MG tablet Take 1 tablet by mouth daily 5/7/24   Viky Harvey APRN - NP   metFORMIN (GLUCOPHAGE) 500 MG tablet Take 1 tablet by mouth 2 times daily (with meals) 5/7/24   Viky Harvey APRN - NP   oxyCODONE (ROXICODONE) 5 MG immediate release tablet Take 1 tablet by mouth every 4 hours as needed for Pain (take one tablet for moderate pain, take two 
MD Benny        bisacodyl (DULCOLAX) suppository 10 mg  10 mg Rectal Daily PRN Gideon Hewitt Jr., MD        acetaminophen (TYLENOL) tablet 650 mg  650 mg Oral Q6H PRN Gideon Hewitt Jr., MD   650 mg at 05/14/24 0022    Or    acetaminophen (TYLENOL) suppository 650 mg  650 mg Rectal Q6H PRN Gideon Hewitt Jr., MD        metoprolol tartrate (LOPRESSOR) tablet 25 mg  25 mg Oral Daily iGdeon Hewitt Jr., MD   25 mg at 05/14/24 0852    nicotine (NICODERM CQ) 21 MG/24HR 1 patch  1 patch TransDERmal Q24H Gideon Hewitt Jr., MD   1 patch at 05/13/24 2007    hydrALAZINE (APRESOLINE) injection 20 mg  20 mg IntraVENous Q6H PRN Gideon Hewitt Jr., MD        glucose chewable tablet 16 g  4 tablet Oral PRN Gideon Hewitt Jr., MD        dextrose bolus 10% 125 mL  125 mL IntraVENous PRN Gideon Hewitt Jr., MD        Or    dextrose bolus 10% 250 mL  250 mL IntraVENous PRN Gideon Hewitt Jr., MD        glucagon injection 1 mg  1 mg SubCUTAneous PRN Gideon Hewitt Jr., MD        dextrose 10 % infusion   IntraVENous Continuous PRN Gideon Hewitt Jr., MD        insulin lispro (HUMALOG,ADMELOG) injection vial 0-8 Units  0-8 Units SubCUTAneous TID WC Gideon Hewitt Jr., MD        insulin lispro (HUMALOG,ADMELOG) injection vial 0-4 Units  0-4 Units SubCUTAneous Nightly Gideon Hewitt Jr., MD        ipratropium 0.5 mg-albuterol 2.5 mg (DUONEB) nebulizer solution 1 Dose  1 Dose Inhalation Q4H PRN Gideon Hewitt Jr., MD           Allergies   Allergen Reactions    Hydrocodone Itching    Morphine Itching     Itching and doesn't work             Physical Exam:   /88   Pulse (!) 107   Temp 97.7 °F (36.5 °C) (Oral)   Resp 20   Wt 105 kg (231 lb 7.7 oz)   SpO2 94%   BMI 36.26 kg/m²   ECOG PS: 1-2  General: alert, cooperative, mild respiratory distress, wearing O2, conversationally SOB    Mental  status: normal mood, behavior, speech, dress, motor activity, and thought processes, able to follow

## 2024-05-22 ENCOUNTER — TELEPHONE (OUTPATIENT)
Age: 59
End: 2024-05-22

## 2024-05-22 ENCOUNTER — APPOINTMENT (OUTPATIENT)
Facility: HOSPITAL | Age: 59
DRG: 163 | End: 2024-05-22
Payer: COMMERCIAL

## 2024-05-22 VITALS
HEART RATE: 96 BPM | SYSTOLIC BLOOD PRESSURE: 97 MMHG | WEIGHT: 232.59 LBS | BODY MASS INDEX: 36.51 KG/M2 | OXYGEN SATURATION: 97 % | RESPIRATION RATE: 19 BRPM | HEIGHT: 67 IN | TEMPERATURE: 97.4 F | DIASTOLIC BLOOD PRESSURE: 69 MMHG

## 2024-05-22 LAB
ANION GAP SERPL CALC-SCNC: 6 MMOL/L (ref 5–15)
BUN SERPL-MCNC: 12 MG/DL (ref 6–20)
BUN/CREAT SERPL: 13 (ref 12–20)
CALCIUM SERPL-MCNC: 8 MG/DL (ref 8.5–10.1)
CHLORIDE SERPL-SCNC: 104 MMOL/L (ref 97–108)
CO2 SERPL-SCNC: 26 MMOL/L (ref 21–32)
CREAT SERPL-MCNC: 0.91 MG/DL (ref 0.7–1.3)
ERYTHROCYTE [DISTWIDTH] IN BLOOD BY AUTOMATED COUNT: 15 % (ref 11.5–14.5)
GLUCOSE SERPL-MCNC: 115 MG/DL (ref 65–100)
HCT VFR BLD AUTO: 37.7 % (ref 36.6–50.3)
HGB BLD-MCNC: 12 G/DL (ref 12.1–17)
MAGNESIUM SERPL-MCNC: 1.9 MG/DL (ref 1.6–2.4)
MCH RBC QN AUTO: 27.4 PG (ref 26–34)
MCHC RBC AUTO-ENTMCNC: 31.8 G/DL (ref 30–36.5)
MCV RBC AUTO: 86.1 FL (ref 80–99)
NRBC # BLD: 0 K/UL (ref 0–0.01)
NRBC BLD-RTO: 0 PER 100 WBC
PHOSPHATE SERPL-MCNC: 3.6 MG/DL (ref 2.6–4.7)
PLATELET # BLD AUTO: 300 K/UL (ref 150–400)
PMV BLD AUTO: 9.9 FL (ref 8.9–12.9)
POTASSIUM SERPL-SCNC: 4 MMOL/L (ref 3.5–5.1)
RBC # BLD AUTO: 4.38 M/UL (ref 4.1–5.7)
SODIUM SERPL-SCNC: 136 MMOL/L (ref 136–145)
WBC # BLD AUTO: 3.4 K/UL (ref 4.1–11.1)

## 2024-05-22 PROCEDURE — 71045 X-RAY EXAM CHEST 1 VIEW: CPT

## 2024-05-22 PROCEDURE — 36415 COLL VENOUS BLD VENIPUNCTURE: CPT

## 2024-05-22 PROCEDURE — 83735 ASSAY OF MAGNESIUM: CPT

## 2024-05-22 PROCEDURE — 6370000000 HC RX 637 (ALT 250 FOR IP): Performed by: PHYSICIAN ASSISTANT

## 2024-05-22 PROCEDURE — 84100 ASSAY OF PHOSPHORUS: CPT

## 2024-05-22 PROCEDURE — 85027 COMPLETE CBC AUTOMATED: CPT

## 2024-05-22 PROCEDURE — 80048 BASIC METABOLIC PNL TOTAL CA: CPT

## 2024-05-22 PROCEDURE — 6370000000 HC RX 637 (ALT 250 FOR IP): Performed by: NURSE PRACTITIONER

## 2024-05-22 PROCEDURE — 2580000003 HC RX 258: Performed by: PHYSICIAN ASSISTANT

## 2024-05-22 RX ORDER — BISACODYL 10 MG
10 SUPPOSITORY, RECTAL RECTAL DAILY PRN
Qty: 10 SUPPOSITORY | Refills: 2 | Status: SHIPPED | COMMUNITY
Start: 2024-05-22 | End: 2024-06-21

## 2024-05-22 RX ORDER — POLYETHYLENE GLYCOL 3350 17 G/17G
17 POWDER, FOR SOLUTION ORAL DAILY
Qty: 60 PACKET | Refills: 0 | Status: SHIPPED | COMMUNITY
Start: 2024-05-23 | End: 2024-07-22

## 2024-05-22 RX ORDER — OXYCODONE HYDROCHLORIDE 5 MG/1
5-10 TABLET ORAL EVERY 6 HOURS PRN
Qty: 30 TABLET | Refills: 0 | Status: SHIPPED | OUTPATIENT
Start: 2024-05-22 | End: 2024-06-01

## 2024-05-22 RX ORDER — ALPRAZOLAM 1 MG/1
1 TABLET ORAL 3 TIMES DAILY PRN
Qty: 30 TABLET | Refills: 0 | Status: SHIPPED | OUTPATIENT
Start: 2024-05-22 | End: 2024-06-21

## 2024-05-22 RX ORDER — HEPARIN 100 UNIT/ML
300 SYRINGE INTRAVENOUS PRN
Status: DISCONTINUED | OUTPATIENT
Start: 2024-05-22 | End: 2024-05-22 | Stop reason: HOSPADM

## 2024-05-22 RX ORDER — CITALOPRAM HYDROBROMIDE 10 MG/1
10 TABLET ORAL DAILY
Qty: 30 TABLET | Refills: 3 | Status: SHIPPED | OUTPATIENT
Start: 2024-05-23

## 2024-05-22 RX ORDER — ACETAMINOPHEN 325 MG/1
650 TABLET ORAL EVERY 6 HOURS PRN
Qty: 120 TABLET | Refills: 3 | Status: SHIPPED | COMMUNITY
Start: 2024-05-22

## 2024-05-22 RX ADMIN — OXYCODONE 10 MG: 5 TABLET ORAL at 00:13

## 2024-05-22 RX ADMIN — OXYCODONE 5 MG: 5 TABLET ORAL at 11:49

## 2024-05-22 RX ADMIN — ACETAMINOPHEN 975 MG: 325 TABLET ORAL at 00:12

## 2024-05-22 RX ADMIN — SENNOSIDES AND DOCUSATE SODIUM 1 TABLET: 50; 8.6 TABLET ORAL at 08:57

## 2024-05-22 RX ADMIN — 0.12% CHLORHEXIDINE GLUCONATE 15 ML: 1.2 RINSE ORAL at 08:59

## 2024-05-22 RX ADMIN — ACETAMINOPHEN 975 MG: 325 TABLET ORAL at 06:13

## 2024-05-22 RX ADMIN — CITALOPRAM HYDROBROMIDE 10 MG: 20 TABLET ORAL at 08:58

## 2024-05-22 RX ADMIN — ACETAMINOPHEN 975 MG: 325 TABLET ORAL at 11:50

## 2024-05-22 RX ADMIN — GUAIFENESIN 600 MG: 600 TABLET, EXTENDED RELEASE ORAL at 08:58

## 2024-05-22 RX ADMIN — SODIUM CHLORIDE, PRESERVATIVE FREE 10 ML: 5 INJECTION INTRAVENOUS at 08:55

## 2024-05-22 ASSESSMENT — PAIN SCALES - GENERAL
PAINLEVEL_OUTOF10: 0
PAINLEVEL_OUTOF10: 10
PAINLEVEL_OUTOF10: 4
PAINLEVEL_OUTOF10: 5
PAINLEVEL_OUTOF10: 5

## 2024-05-22 ASSESSMENT — PAIN DESCRIPTION - DESCRIPTORS: DESCRIPTORS: ACHING

## 2024-05-22 ASSESSMENT — PAIN DESCRIPTION - LOCATION
LOCATION: FLANK
LOCATION: OTHER (COMMENT)

## 2024-05-22 ASSESSMENT — PAIN - FUNCTIONAL ASSESSMENT: PAIN_FUNCTIONAL_ASSESSMENT: ACTIVITIES ARE NOT PREVENTED

## 2024-05-22 ASSESSMENT — PAIN DESCRIPTION - ORIENTATION
ORIENTATION: RIGHT
ORIENTATION: RIGHT

## 2024-05-22 NOTE — CARE COORDINATION
2:05pm  [facility] has accepted for HH services.     1:35pm  All HH agencies have declined that CM sent referral to. A mass HH referral was sent to over 140 agencies and waiting for response.       11:15am  CM made room visit with patient and wife to discuss dcp and need for hh services as pt will be dc'd with aspira drain. Pt and wife agreeable to hh services, no preferences in agency. Referrals sent and pending.     GIANNI Neumann, CM  [phone]

## 2024-05-22 NOTE — PLAN OF CARE
Problem: Discharge Planning  Goal: Discharge to home or other facility with appropriate resources  5/21/2024 2052 by Ramirez Doherty RN  Outcome: Progressing  5/21/2024 1841 by Cb Fox RN  Outcome: Progressing     Problem: Safety - Adult  Goal: Free from fall injury  5/21/2024 2052 by Ramirez Doherty RN  Outcome: Progressing  5/21/2024 1841 by Cb Fox RN  Outcome: Progressing     Problem: Pain  Goal: Verbalizes/displays adequate comfort level or baseline comfort level  5/21/2024 2052 by Ramirez Doherty RN  Outcome: Progressing  5/21/2024 1841 by Cb Fox RN  Outcome: Progressing     Problem: Chronic Conditions and Co-morbidities  Goal: Patient's chronic conditions and co-morbidity symptoms are monitored and maintained or improved  5/21/2024 2052 by Ramirez Doherty RN  Outcome: Progressing  5/21/2024 1841 by Cb Fox RN  Outcome: Progressing

## 2024-05-22 NOTE — TELEPHONE ENCOUNTER
Carl Rosario  Oncology Social Work Encounter    [x] Med-Onc [facility] [] Med-Onc [facility] [] Med-Onc [facility] [] Rad-Onc [facility] [] Rad-Onc [facility] [] Rad-Onc [facility] [] Rad-Onc [facility] [] [facility]    Patient: Fortunato Kowalski    Encounter Type:    [] Initial SW Encounter  [x] Patient Initiated  [] Referral  [] Distress/PHQ Screening  [] Other:      Concern(s)/Barrier(s) to Care:     Narrative: pt/ex wife Yolanda called to inform us pt had received a txt stating Medicaid had been approve 5/17/24.  SW asked pcg Yolanda to call his  and ask about Medicaid# and effective day (does it go back 90 days). Pt has hospital bills that started 4/5/24.      Referral/Handouts:   Insurance/Entitlements referral    Plan: help pt to update billing and office with Medicaid # for rebilling purposes.

## 2024-05-22 NOTE — PLAN OF CARE
Problem: Discharge Planning  Goal: Discharge to home or other facility with appropriate resources  5/22/2024 1546 by Cristina Zavala) RN  Outcome: Adequate for Discharge  5/22/2024 1400 by Cristina Zavala), RN  Outcome: Progressing     Problem: Safety - Adult  Goal: Free from fall injury  5/22/2024 1546 by Cristina Zavala), RN  Outcome: Adequate for Discharge  5/22/2024 1400 by Cristina Zavala), RN  Outcome: Progressing     Problem: Pain  Goal: Verbalizes/displays adequate comfort level or baseline comfort level  5/22/2024 1546 by Cristina Zavala), RN  Outcome: Adequate for Discharge  5/22/2024 1400 by Cristina Zavala), RN  Outcome: Progressing     Problem: Chronic Conditions and Co-morbidities  Goal: Patient's chronic conditions and co-morbidity symptoms are monitored and maintained or improved  5/22/2024 1546 by Cristina Zavala), RN  Outcome: Adequate for Discharge  5/22/2024 1400 by Cristina Zavala) RN  Outcome: Progressing

## 2024-05-22 NOTE — PROGRESS NOTES
Hospitalist Progress Note    NAME:   Fortunato Kowalski   : 1965   MRN: [id number]     Date/Time: 2024 1:57 PM  Patient PCP: Shlomo Veronica MD    Estimated discharge date: TBD  Barriers: Had pericardial window today      Assessment / Plan:    Right hydropneumothorax since early 2024 POA with SOB  Recurrent malignant pleural effusion POA recent thoracentesis 2024  Stage IV metastatic non-small cell right lung cancer(squamous) POA 2024  Found in early April to have a large right pleural effusion   CXR: Persistent right-sided pneumothorax stable to slightly increased in volume compared to the previous exam. There is right lung volume loss.  Chest CT:    1. Right middle and lower lobe collapse with patchy right lung consolidation. Patchy groundglass opacities in the left lower lobe likely  infectious/inflammatory.  2. Moderate right hydropneumothorax with large right pleural effusion.  3. Stable mediastinal lymphadenopathy.   4. Increased moderate to large pericardial effusion.   -May have trapped lung  -Pain and anxiety mgmt  -Mucenix, Tessalon pearls, IS  -PRN nebs, O2 and nebs as needed  -S/P right chest tube   -Discussed with Dr Almeida.  Patient may need a pleurodesis, he will discuss with thoracic surgery at [facility]     Large pericardial effusion  -CT chest and Echo reviewed  -S/P pericardial drainage with pericardial biopsy .  Path pending  -cardiac surgery following.  May remove pericardial chest tube over the weekend.   Okay to transfer out of unit to stepdown     Hyponatremia  BEVERLY:   Non gap acidosis  -holding bumex  -off bicarb infusion ---> changed to NS  -following Na closely.  128-131  -nephrology help appreciated    Hypertension  -hydralazine IV prn     Type II diabetes POA  -Recent HgbA1c 6.6  -Hold metformin  -monitor BS AC&hxs with SSI and CC diet  -close monitor - add basal prn    BLE edema:   -Dopplers No DVT    Anxiety: PRN ativan  -supportive care     Tobacco abuse 
      Hospitalist Progress Note    NAME:   Fortunato Kowalski   : 1965   MRN: [id number]     Date/Time: 2024 9:03 AM  Patient PCP: Shlomo Veronica MD    Estimated discharge date:   Barriers: Pleurx catheter on         Assessment / Plan:  Right hydropneumothorax since early 2024 POA with SOB  Recurrent malignant pleural effusion POA recent thoracentesis 2024  Stage IV metastatic non-small cell right lung cancer (squamous) POA 2024  Found in early April to have a large right pleural effusion   CXR: Persistent right-sided pneumothorax stable to slightly increased in volume compared to the previous exam. There is right lung volume loss.  Chest CT:    1. Right middle and lower lobe collapse with patchy right lung consolidation. Patchy groundglass opacities in the left lower lobe likely  infectious/inflammatory.  2. Moderate right hydropneumothorax with large right pleural effusion.  3. Stable mediastinal lymphadenopathy.   4. Increased moderate to large pericardial effusion.   -May have trapped lung  -Pain and anxiety mgmt  -Mucenix, Tessalon pearls, IS  -PRN nebs, O2 and nebs as needed  -S/P right chest tube   - Patient may need a pleurodesis  : Pericardial drain was taken out yesterday.  Patient still has right-sided chest tube that is draining a lot of fluid.  Pulmonology was planning to transfer him to [facility] but patient wants to talk to pulmonology and not do any aggressive procedure and rather stick with chemotherapy for now.  Heme-onc has seen the patient and plan to see him outpatient.  : Pulmonology, heme-onc recommending pleurodesis.  Although we have thoracic surgeon in-house, not credentialed yet to do procedures.  Will need to transfer to [facility].  Initiated transfer process.  : I spoke to the thoracic surgeon at [facility] who recommended Pleurx catheter for the patient.  Patient is planned for Pleurx catheter today.  He has been 
      Hospitalist Progress Note    NAME:   Fortunato Kowalski   : 1965   MRN: [id number]     Date/Time: 2024 9:30 AM  Patient PCP: Shlomo Veronica MD    Estimated discharge date: 1 to 2 days  Barriers: cardiothoracic surgery clearance, pulmonology clearance, improvement in sodium        Assessment / Plan:  Right hydropneumothorax since early 2024 POA with SOB  Recurrent malignant pleural effusion POA recent thoracentesis 2024  Stage IV metastatic non-small cell right lung cancer (squamous) POA 2024  Found in early April to have a large right pleural effusion   CXR: Persistent right-sided pneumothorax stable to slightly increased in volume compared to the previous exam. There is right lung volume loss.  Chest CT:    1. Right middle and lower lobe collapse with patchy right lung consolidation. Patchy groundglass opacities in the left lower lobe likely  infectious/inflammatory.  2. Moderate right hydropneumothorax with large right pleural effusion.  3. Stable mediastinal lymphadenopathy.   4. Increased moderate to large pericardial effusion.   -May have trapped lung  -Pain and anxiety mgmt  -Mucenix, Tessalon pearls, IS  -PRN nebs, O2 and nebs as needed  -S/P right chest tube   - Patient may need a pleurodesis  : Pericardial drain was taken out yesterday.  Patient still has right-sided chest tube that is draining a lot of fluid.  Pulmonology was planning to transfer him to [facility] but patient wants to talk to pulmonology and not do any aggressive procedure and rather stick with chemotherapy for now.  Heme-onc has seen the patient and plan to see him outpatient.     Large pericardial effusion  -CT chest and Echo reviewed  -S/P pericardial drainage with pericardial biopsy .  Path pending  -cardiac surgery following.  May remove pericardial chest tube over the weekend.   Okay to transfer out of unit to stepdown     BEVERLY  Non gap 
      Hospitalist Progress Note    NAME:   Fortunato Kowalski   : 1965   MRN: [id number]     Admit date: 2024    Discharge date: 24    PCP: Shlomo Veronica MD    Discharge Diagnoses:    Discharge Medications:  Current Discharge Medication List        START taking these medications    Details   acetaminophen (TYLENOL) 325 MG tablet Take 2 tablets by mouth every 6 hours as needed for Pain or Fever  Qty: 120 tablet, Refills: 3      ALPRAZolam (XANAX) 1 MG tablet Take 1 tablet by mouth 3 times daily as needed for Anxiety for up to 30 days. Do not take within 2 hours of an oxycodone dose Max Daily Amount: 3 mg  Qty: 30 tablet, Refills: 0    Associated Diagnoses: Squamous cell carcinoma of lung, stage IV, unspecified laterality (HCC)      citalopram (CELEXA) 10 MG tablet Take 1 tablet by mouth daily  Qty: 30 tablet, Refills: 3      polyethylene glycol (GLYCOLAX) 17 g packet Take 1 packet by mouth daily  Qty: 60 packet, Refills: 0      bisacodyl (DULCOLAX) 10 MG suppository Place 1 suppository rectally daily as needed for Constipation  Qty: 10 suppository, Refills: 2           CONTINUE these medications which have CHANGED    Details   oxyCODONE (ROXICODONE) 5 MG immediate release tablet Take 1-2 tablets by mouth every 6 hours as needed for Pain (take one tablet for moderate pain, take two tablets for severe pain) for up to 10 days. Max Daily Amount: 40 mg  Qty: 30 tablet, Refills: 0    Comments: Reduce doses taken as pain becomes manageable  Associated Diagnoses: Squamous cell carcinoma of lung, stage IV, unspecified laterality (HCC)           CONTINUE these medications which have NOT CHANGED    Details   CARBOplatin (PARAPLATIN) 600 MG/60ML chemo injection Infuse intravenously 500mg Every 3 weeks for 4 cycles      PACLitaxel (TAXOL) 300 MG/50ML chemo injection Infuse intravenously 378mg Every 3 weeks for 4 cycles      sodium chloride 0.9 % SOLN 100 mL with pembrolizumab 100 MG/4ML SOLN 200 mg Infuse 200 mg 
      Hospitalist Progress Note    NAME:   Fortunato Kowalski   : 1965   MRN: [id number]     Date/Time: 2024 5:44 PM  Patient PCP: Shlomo Veronica MD    Estimated discharge date: TBD  Barriers: Had pericardial window today      Assessment / Plan:    Right hydropneumothorax since early 2024 POA with SOB  Recurrent malignant pleural effusion POA recent thoracentesis 2024  Stage IV metastatic non-small cell right lung cancer(squamous) POA 2024  Found in early April to have a large right pleural effusion   CXR: Persistent right-sided pneumothorax stable to slightly increased in volume compared to the previous exam. There is right lung volume loss.  Chest CT:    1. Right middle and lower lobe collapse with patchy right lung consolidation. Patchy groundglass opacities in the left lower lobe likely  infectious/inflammatory.  2. Moderate right hydropneumothorax with large right pleural effusion.  3. Stable mediastinal lymphadenopathy.   4. Increased moderate to large pericardial effusion.   -May have trapped lung  -Pain and anxiety mgmt  -Mucenix, Tessalon pearls, IS  -PRN nebs, O2 and nebs as needed  -S/P right chest tube   - Patient may need a pleurodesis       Large pericardial effusion  -CT chest and Echo reviewed  -S/P pericardial drainage with pericardial biopsy .  Path pending  -cardiac surgery following.  May remove pericardial chest tube over the weekend.   Okay to transfer out of unit to stepdown     Hyponatremia  BEVERLY:   Non gap acidosis  -holding bumex  -off bicarb infusion ---> changed to NS  -following Na closely.  128-131  -nephrology help appreciated    Hypertension  -hydralazine IV prn     Type II diabetes POA  -Recent HgbA1c 6.6  -Hold metformin  -monitor BS AC&hxs with SSI and CC diet  -close monitor - add basal prn    BLE edema:   -Dopplers No DVT    Anxiety: PRN ativan  -supportive care     Tobacco abuse   -Smoking cessation education provided  -Nicotine patch 
      Hospitalist Progress Note    NAME:   Fortunato Kowalski   : 1965   MRN: [id number]     Date/Time: 2024 8:36 AM  Patient PCP: Shlomo Veronica MD    Estimated discharge date: 1 to 2 days  Barriers: Needs to be transferred to [facility] for pleurodesis, pulmonology clearance, heme-onc clearance        Assessment / Plan:  Right hydropneumothorax since early 2024 POA with SOB  Recurrent malignant pleural effusion POA recent thoracentesis 2024  Stage IV metastatic non-small cell right lung cancer (squamous) POA 2024  Found in early April to have a large right pleural effusion   CXR: Persistent right-sided pneumothorax stable to slightly increased in volume compared to the previous exam. There is right lung volume loss.  Chest CT:    1. Right middle and lower lobe collapse with patchy right lung consolidation. Patchy groundglass opacities in the left lower lobe likely  infectious/inflammatory.  2. Moderate right hydropneumothorax with large right pleural effusion.  3. Stable mediastinal lymphadenopathy.   4. Increased moderate to large pericardial effusion.   -May have trapped lung  -Pain and anxiety mgmt  -Mucenix, Tessalon pearls, IS  -PRN nebs, O2 and nebs as needed  -S/P right chest tube   - Patient may need a pleurodesis  : Pericardial drain was taken out yesterday.  Patient still has right-sided chest tube that is draining a lot of fluid.  Pulmonology was planning to transfer him to [facility] but patient wants to talk to pulmonology and not do any aggressive procedure and rather stick with chemotherapy for now.  Heme-onc has seen the patient and plan to see him outpatient.  : Pulmonology, heme-onc recommending pleurodesis.  Although we have thoracic surgeon in-house, not credentialed yet to do procedures.  Will need to transfer to [facility].  Initiated transfer process.     Large pericardial effusion  -CT chest and Echo reviewed  -S/P 
      Hospitalist Progress Note    NAME:   Fortunato Kowalski   : 1965   MRN: [id number]     Date/Time: 2024 11:55 AM  Patient PCP: Shlomo Veronica MD    Estimated discharge date: 2-3 days  Barriers: pending work up, consultant eval and input, clinical improvement      Assessment / Plan:    Right hydropneumothorax since early 2024 POA with SOB  Recurrent malignant pleural effusion POA recent thoracentesis 2024  Stage IV metastatic non-small cell right lung cancer(squamous) POA 2024  Found in early April to have a large right pleural effusion     CXR:   Persistent right-sided pneumothorax stable to slightly increased in volume compared to the previous exam. There is right lung volume loss.  Not sure the best approach to the hydropneumothorax              May have trapped lung              Some discussion ED about placing a Pleurx catheter versus a chest tube to drain the air    Chest CT:   1. Right middle and lower lobe collapse with patchy right lung consolidation.  Patchy groundglass opacities in the left lower lobe likely  infectious/inflammatory.  2. Moderate right hydropneumothorax with large right pleural effusion.  3. Stable mediastinal lymphadenopathy.  4. Increased moderate to large pericardial effusion.     -Pain and anxiety mgmt  -Mucenix, Tessalon pearls, IS  -PRN nebs, O2 and nebs as needed  -NPO until consultant eval  -Consult pulmonology, oncology, IR eval requested     Hyponatremia: avoid offendings  -monitor with daily lab    BEVERLY: Cr normalized  -monitor       Hypertension  ECHO from 2024    Left Ventricle: Normal left ventricular systolic function with a visually estimated EF of 60 - 65%. Left ventricle size is normal. Increased wall thickness. Normal wall motion. Grade I diastolic dysfunction with normal LAP.    Right Ventricle: Right ventricle size is normal. Normal systolic function.    Pericardium: There is evidence of epicardial fat. Small (<1 cm) pericardial effusion 
      Hospitalist Progress Note    NAME:   Fortunato Kowalski   : 1965   MRN: [id number]     Date/Time: 5/15/2024 11:52 AM  Patient PCP: Shlomo Veronica MD    Estimated discharge date: greater than 24 hours  Barriers: Clinical stability. Pulmonary and Heme/Onc following. Oxygen challenge prior to discharge. CM to follow for discharge planning.    Patient transferred to higher level of care for continued care and monitoring secondary to hypotension and need for pericardial window. MAP stable. Wife and patient notified. Cardiology following. CTS consulted.       Assessment / Plan:  Right hydropneumothorax since early 2024 - POA        Recurrent malignant pleural effusion - POA        Recent thoracentesis - 24        Stage IV metastatic non-small cell right lung cancer (squamous) 2024 - POA        Tobacco use    - chest xray on 5/15/24 shows:  Small right-sided pleural effusion  Decreased alveolar opacity compatible with decreasing edema  - CT of chest on 24 shows:  Right middle and lower lobe collapse with patchy right lung consolidation. Patchy groundglass opacities in the left lower lobe likely infectious/inflammatory.  Moderate right hydropneumothorax with large right pleural effusion  Stable mediastinal lymphadenopathy  Increased moderate to large pericardial effusion  - continue guaifenesin   - continue nicotine patch  - prn tessalon and nebulizer treatment  - oxygen challenge prior to discharge  - right pigtail catheter, monitor drainage  - pulmonary following    2. Hyponatremia       BEVERLY- resolved  - Na+ 127  - creatinine 1.30  - will order urine creat, urine sodium  - will order renal ultrasound  - continue IVF  - bumex held  - monitor lab work  - nephrology consulted    3. Hypotension      Pericardial effusion  - pro BNP 1,059 24   - echo limited on 24 shows:  EF of 55%-60%  Large (<2cm) pericardial effusion present  - bumex and metoprolol held   - daily weights  - strict I&O  - 
    [facility]  [address] [place] [id number]  W: [phone] F: [phone]    Reason for Visit:   Fortunato Kowalski is a 58 y.o. male who is seen in consultation at the request of Dr. Dyson for evaluation of lung cancer. He is now readmitted with worsening SOB.       Hematology / Oncology Treatment Information:     Hematological/Oncological Diagnosis: Stage IV metastatic squamous cell carcinoma of the lung    Date of Diagnosis: 4/10/2024    Oncology/Hematology Treatment Course:   Treatment course:   1) Plan for chemo/immunotherapy with carboplatin/taxol/pembrolizumab - first cycle 5/9/2024      Initial Presentation  (HPI):     History of Present Illness  The patient is a 58-year-old gentleman who I originally saw as an inpatient for suspected lung malignancy with pleural-based nodules. He originally presented to the emergency department with complaints of shortness of breath and a large right-sided pleural effusion. He had an irregular area of pleural thickening on the medial aspect of his right upper lobe measuring about 5.1 cm x 3.8 cm x 3.4 cm that projected over the anterior mediastinum. He also had a large right-sided, pleural effusion with associated collapse of the right lower lobe and right middle lobe. He underwent thoracentesis on 04/07/2025 and had a biopsy of the right-sided pleural-based lung mass on 04/10/2025. Biopsy returned recently showing squamous cell carcinoma. Additional imaging includes CT abdomen and pelvis that shows no evidence of distant metastatic disease. His functional status improved after therapeutic and diagnostic thoracentesis. He was also suspected to have pneumonia and was treated empirically with antibiotics. He was discharged from the hospital on 04/12/2025. He presents to my clinic today for discussion of results and treatment options for management of metastatic non-small cell lung 
  Physician Progress Note      PATIENT:               FIORELLA MACHADO  CSN #:                  [id number]  :                       1965  ADMIT DATE:       2024 11:24 AM  DISCH DATE:  RESPONDING  PROVIDER #:        Sylwia Martinez NP          QUERY TEXT:    Noted documentation of Acute Kidney Injury in Hospitalist Progress Note on   . SCr on admit was 1.41 and trended down to 1.25. Per KDIGO this does not   meet criteria. Previous Cr earlier this month was 1.28, 1.22, 1.21.  In order   to support the diagnosis of BEVERLY, please include additional clinical indicators   in your documentation.? Or please document if the diagnosis of BEVERLY has been   ruled out after further study.      The medical record reflects the following:  Risk Factors: Cancer, DM    Clinical Indicators:  Hosp Note  -    BEVERLY: Cr normalized  -monitor    Cr 1.41 - 1.25      Treatment: monitor labs      Defined by Kidney Disease Improving Global Outcomes (KDIGO) clinical practice   guideline for acute kidney injury:  -Increase in SCr by greater than or equal to 0.3 mg/dl within 48 hours; or  -Increase or decrease in SCr to greater than or equal to 1.5 times baseline,   which is known or presumed to have occurred within the prior 7 days; or  -Urine volume < 0.5ml/kg/h for 6 hours.      Thank you,  AROLDO Padilla,RN  Clinical Documentation  [email or web address]  Ph: [phone]  or via Perfect[employer]  Options provided:  -- Acute kidney injury evidenced by, Please document evidence as well as a   numerical baseline creatinine, if known.  -- Currently resolved acute kidney injury was evidenced by, Please document   evidence as well as a numerical baseline creatinine, if known.  -- Acute kidney injury ruled out after study  -- Other - I will add my own diagnosis  -- Disagree - Not applicable / Not valid  -- Disagree - Clinically unable to determine / Unknown  -- Refer to Clinical Documentation Reviewer    PROVIDER RESPONSE 
  Pulmonary, Critical Care, and Sleep Medicine      Name: Fortunato Kowalski MRN: [id number]   : 1965 Hospital: [facility]   Date: 2024  Admission date: 2024 Hospital Day: 10   Patient PCP: Shlomo Veronica MD    History:   Pt is acutely ill and unstable. Medical records and data reviewed. Pt seen in consultation    IMPRESSION:   Complicated right hydropneumothorax since early 2024 : Combination of postthoracentesis pneumothorax and probable malignant pleural effusion  Recurrent right pleural effusion most recent thoracentesis last week  Pleural fluid cytology with atypical cells 2024 and 2024  Status post right pigtail catheter 2024  Stage IV metastatic non-small cell lung cancer treatment initiated by Dr. Veronica 2024; diagnosed by CT-guided biopsy 4/10/2024 of a right pleural-based mass; patient also with 3.9 x 3.5 cm superior mediastinal mass and other right nodules.  Pericardial effusion-enlarging; s/p pericardial window  Probable COPD with no sign of acute exacerbation  Hypertension  Type 2 diabetes mellitus  Active tobacco use  Anxiety  History of diverticulitis status post surgery years ago  Body mass index is 36.43 kg/m².  Prognosis guarded       RECOMMENDATIONS/PLAN:     Considerations were for talc pleurodesis through existing right chest tube : d/w Dr. Mosqueda  :now s/p tunneled drain placement.   Bronchial hygiene with respiratory therapy techniques, Incentive spirometer;   Continue bronchodilators  DVT prophylaxis  Prescription drug management with home med reconciliation reviewed  Thank you for asking us to see pt while in the hospital   Difficult situation   Will ask CM to fax form for drain supplies  D/W family at bedside - drain 3x/ week to start - has f/u scheduled w/ dr. Almeida and will plan to repeat CXR prior     Interval history:    24 : Up in chair. Tunneled drain placed yesterday. Form filled out for supplies. On RA.     24  Case 
  Pulmonary, Critical Care, and Sleep Medicine      Name: Fortunato Kowalski MRN: [id number]   : 1965 Hospital: [facility]   Date: 5/15/2024  Admission date: 2024 Hospital Day: 3   Patient PCP: Shlomo Veronica MD    History:   Pt is acutely ill and unstable. Medical records and data reviewed. Pt seen in consultation    IMPRESSION:   Complicated right hydropneumothorax since early 2024 : Combination of postthoracentesis pneumothorax and probable malignant pleural effusion  Recurrent right pleural effusion most recent thoracentesis last week  Pleural fluid cytology with atypical cells 2024 and 2024  Status post right pigtail catheter 2024  Stage IV metastatic non-small cell lung cancer treatment initiated by Dr. Veronica 2024; diagnosed by CT-guided biopsy 4/10/2024 of a right pleural-based mass; patient also with 3.9 x 3.5 cm superior mediastinal mass and other right nodules.  Pericardial effusion-enlarging; significance not clear  Probable COPD with no sign of acute exacerbation  Hypertension  Type 2 diabetes mellitus  Active tobacco use  Anxiety  History of diverticulitis status post surgery years ago  Body mass index is 36.26 kg/m².  Prognosis guarded       RECOMMENDATIONS/PLAN:   Pigtail catheter for drainage of hydropneumothorax if possible   Like to see if there is any reexpansion of right lung versus entrapped lung   Echocardiogram to look at the pericardial effusion  If lung does not fully inflate will arrange for tunneled pleural catheter on right side but need to make sure there is no active air leak ideally  If lung fully inflates, and pericardial effusion is ok, could consider VATS pleurodesis  Antitussives  Bronchial hygiene with respiratory therapy techniques, Incentive spirometer;   Adjust bronchodilators  DVT prophylaxis  Prescription drug management with home med reconciliation reviewed  Thanks you for asking us to see pt while in the hospital 
  Pulmonary, Critical Care, and Sleep Medicine      Name: Fortunato Kowalski MRN: [id number]   : 1965 Hospital: [facility]   Date: 2024  Admission date: 2024 Hospital Day: 9   Patient PCP: Shlomo Veronica MD    History:   Pt is acutely ill and unstable. Medical records and data reviewed. Pt seen in consultation    IMPRESSION:   Complicated right hydropneumothorax since early 2024 : Combination of postthoracentesis pneumothorax and probable malignant pleural effusion  Recurrent right pleural effusion most recent thoracentesis last week  Pleural fluid cytology with atypical cells 2024 and 2024  Status post right pigtail catheter 2024  Stage IV metastatic non-small cell lung cancer treatment initiated by Dr. Veronica 2024; diagnosed by CT-guided biopsy 4/10/2024 of a right pleural-based mass; patient also with 3.9 x 3.5 cm superior mediastinal mass and other right nodules.  Pericardial effusion-enlarging; s/p pericardial window  Probable COPD with no sign of acute exacerbation  Hypertension  Type 2 diabetes mellitus  Active tobacco use  Anxiety  History of diverticulitis status post surgery years ago  Body mass index is 36.66 kg/m².  Prognosis guarded       RECOMMENDATIONS/PLAN:   Continue right chest tube drainage of hydropneumothorax   Considerations were for talc pleurodesis through existing right chest tube : d/w Dr. Mosqueda this am : plans now are for tunneled drain placement.   Will see again as needed while inpt and arrange outpt follow up  Bronchial hygiene with respiratory therapy techniques, Incentive spirometer;   Adjust bronchodilators  DVT prophylaxis  Prescription drug management with home med reconciliation reviewed  Thank you for asking us to see pt while in the hospital   Difficult situation      Interval history:    24  Case d/w Dr. Mosqueda this am. Plans now are for tunneled drain placement for drainage of pleural fluid at home. Will not address 
 Patient is A&Ox4, on RA, and  has labored breathing. Placed on 4 L NC for WOB, sats 92% on RA. Patient has no complaints of pain at this time. Call bell is within reach, bed locked, and lowered at this time. Patient awaiting to be consented for ordered procedure at this time.    Name of Procedure: image guided chest tube placement on right lung     Sedation medications given: none     Vital Signs:  VSS throughout     Fluids Removed: 2 liters removed, leave chest tube clamped until direction from MD hernandez     Samples sent to lab: none ordered      Any complications related to procedure: none identified at this time     Patient is A&Ox4, on 2 L NC, and is in NAD at this time. CXR completed.    1300 - attempt to call report, report given to Dilia   
0700 - report received from YOLA Soares.     0800 - patient assessment completed. See flow sheets for data.     1200 - reassessment completed. No changes noted.     1400 - report given to CMSU nurse.   
0700 received report from Sarah    Patient tolerated walking well. Ate food well.   Goal for today is to watch the patient for today until Pulmonology can follow up with him tomorrow.   PT calm and agreeable.     1600 Hospitalist rounds. PT will have consult with palliative     1900 report given to YOLA Whaley    
0740: Bedside and Verbal shift change report given to Mesha Mclaughlin RN (oncoming nurse) by Ashley Thakur RN  (offgoing nurse). Report included the following information Nurse Handoff Report, Index, Adult Overview, Surgery Report, Intake/Output, MAR, Recent Results, Cardiac Rhythm SR-ST, Alarm Parameters, Quality Measures, and Neuro Assessment.     0745: Jb Almeida MD, Pulmonology, at the bedside to examine the patient. MD ordered to unclamp R chest tube and keep on water seal.     0805: Assessment completed. Pt up to bathroom and return to bed.     1000: Pt up to bathroom and return to bed.     1217: Notified Jb Almeida MD that CXR completed and chest tube output is 1350 cc of serosanginous fluid. MD advised to clamp CT and unclamp in 4 hours. Pt repositioned in bed.     1300: PT/OT worked with the patient.     1400: Pt repositioned in the bed.     1528: Notified Jb Almeida MD, Pulmonary, that pt's BP is low but just rechecked and it is improved. Jb Almeida MD advised to unclamp at 1600 and remain unclamped overnight.     1600: Reassessment completed. Pt repositioned in bed.     1614: Unclamped Chest tube. Chest tube level at 1400.     1618: Rashaad Mclaughlin MD, Cardiology consulted for large pericardial effusion.     1623: Benton Bunch MD and Rashaad Freitas MD, Nephrology consulted for hyponatremia.     1655: Rashaad Mclaughlin MD at the bedside to examine the patient. Manual blood pressure SBP in the 80s. 250 cc of NS fluid bolus given and started IVF NS at 75 cc/hr. Albumin ordered.     1725: Sylwia Martinez, NP placed transfer order to stepdown unit for softer BP and needing pericardial window tomorrow.     1728: GALA Kim, PA, Cardiovascular Surgery, consulted for pericardial window.     1800: Pt repositioned in bed. Set up continuous pulse ox on pts telemetry and telemetry notified.     1844: Verbal report completed by Mesha Mclaughlin RN to YOLA Bruce on PCU.     1852: Pt transported in stable 
1110 patient arrived from OR  On  assessment noted diffuse rush and redness throughout the body.  Anesthesiology and PA GALA mentioned that reaction occurred post antibiotic administration.  GALA Kim ordered benadryl PIV 25 mg.   1145 CHG bath competed  1200 Swallow evaluation completed.  1216 Benadryl 25 mg  PIV given  1225 Dr Stapleton at bedside  1300- patient ambulated with assistance of stretcher to the bathroom.Patient assisted to the chair  1425 US tech at bedside for retroperitoneal US  1520 Patient  assisted to bed for .vascular duplex  1700 Patient stated that pneumatic compression devices causes numbness in his right thigh. Full Neuro assessment  completed.Pneumatic compression devices removed. Patient stated:\" I dont have any numbness anymore\"  1715 Contacted pharmacy with concerns regarding Ancef administration.(Patient came with allergic reaction from OR with skin erythema,and expiratory wheezing,that resolved after administration of benadryl PIV).Lizbeth,MPH,discontinue the order and ordered Vancomycin.  Allergies updated.  Patient remains in ST with PAC,PVC.  Report on the patient was given to YOLA Dorantes  
1546-4799  Bedside and Verbal shift change report given to YOLA Finch (oncoming nurse) by YOLA Whaley (offgoing nurse). Report included the following information SBAR, Kardex, Intake/Output, MAR, Accordion, Recent Results, Med Rec Status and Cardiac Rhythm Sinus tachycardia.    Patient feel anxious > Ativan 1 mg PO PRN given, tachypnea some wheezy at left side, clear with rest, back to sleep.    6533-5410  Patient bathed, dressing changed over Pigtail,  blood sample sent,     0113-3967  Patient woke up and tried top go to bathroom, in that time he pulled his PIV and access needle to port A cath, new PIV inserted, chest drain changed and drain 650 ml serous, some air like from time to time notice at am, Bedside and Verbal shift change report given to YOLA Billingsley (oncoming nurse) by YOLA Finch (offgoing nurse). Report included the following information SBAR, Kardex, Intake/Output, MAR, Accordion, Recent Results, Med Rec Status and Cardiac Rhythm ST.    
1700    Name of procedure: Right Pleura (Aspira) Drain Placement    Sedation medications given:    Versed: 2.5 mg    Fentanyl: 100 mcg    Reversal Agent Used: None    Sedation tolerated: Yes    Sedation start: 1650    Sedation end:  1700    Vital Signs: Stable    Fluids removed: ~1000 of jens colored fluid removed    Any complications related to procedure: None    Post Procedure Care Needed/order sets placed in connect care.     Patient is at increased fall risk due to medication given.    5792    TRANSFER - OUT REPORT:    Verbal report given to YOLA Chambers on Fortunato Kowalski  being transferred to [facility] for routine post-op       Report consisted of patient's Situation, Background, Assessment and   Recommendations(SBAR).     Information from the following report(s) Nurse Handoff Report was reviewed with the receiving nurse.      Opportunity for questions and clarification was provided.      Patient transported with site CDI, tele box on and working, and drain supplies to take home.    Please make sure pt/family gets the orange folder to give to PCP or HH Agency so that supplies can be reordered.            
1900 - Bedside shift change report given to Sarah ROBERTSON RN (oncoming nurse) by Sorin Agosto RN (offgoing nurse). Report included the following information Nurse Handoff Report, Intake/Output, MAR, Recent Results, and Cardiac Rhythm ST .     1915 - Assisted pt to ambulate to bathroom, pt tolerated movement well. VSS.     1931 - Shift assessment complete, see flowsheets for details.     2215 - Pt reports feeling anxious, pt medicated with PRN ativan 1mg PO at this time, see MAR for details.     2300 - Offered pt O2 for sleep but pt refused, will continue to monitor spo2. VSS    2345 - Reassessment complete, no changes to previous assessment. Pt resting comfortably at this time. Pt independent with incentive spirometer use, provided encouragement.     0427 - Reassessment complete, no changes to previous assessment.     0700 - Bedside shift change report given to Sorin Agosto RN (oncoming nurse) by Sarah ROBERTSON RN (offgoing nurse). Report included the following information Nurse Handoff Report, Index, Intake/Output, MAR, Recent Results, and Cardiac Rhythm ST .       
1900 - Bedside shift change report given to Sarah ROBERTSON RN (oncoming nurse) by YOLA Ontiveros (offgoing nurse). Report included the following information Nurse Handoff Report, Index, Adult Overview, Intake/Output, MAR, Recent Results, and Cardiac Rhythm ST .     2000 - Shift assessment complete, see flowsheets for details.     0000 - Reassessment complete, no changes to previous assessment.     0410 - Reassessment complete, no changes to previous assessment.     0600 - Summary  - R pigtail CT - 320 mL/overnight  - CT - 12 mL/overnight    0700 - Bedside shift change report given to YOLA Rowell (oncoming nurse) by YOLA Smith (offgoing nurse). Report included the following information Nurse Handoff Report, Index, Intake/Output, MAR, Recent Results, and Cardiac Rhythm ST .       
4 Eyes Skin Assessment     NAME:  Fortunato Kowalski  YOB: 1965  MEDICAL RECORD NUMBER:  [id number]    The patient is being assessed for  Transfer to New Unit    I agree that at least one RN has performed a thorough Head to Toe Skin Assessment on the patient. ALL assessment sites listed below have been assessed.      Areas assessed by both nurses:    Head, Face, Ears, Shoulders, Back, Chest, Arms, Elbows, Hands, Sacrum. Buttock, Coccyx, Ischium, and Legs. Feet and Heels        Does the Patient have a Wound? No noted wound(s)       Wilian Prevention initiated by RN: No  Wound Care Orders initiated by RN: No    Pressure Injury (Stage 3,4, Unstageable, DTI, NWPT, and Complex wounds) if present, place Wound referral order by RN under : No    New Ostomies, if present place, Ostomy referral order under : No     Nurse 1 eSignature: Electronically signed by Janis Winston RN on 5/16/24 at 8:08 AM EDT    **SHARE this note so that the co-signing nurse can place an eSignature**    Nurse 2 eSignature: {Esignature:[id number]}    
AVS reviewed with patient and wife. No questions. Educations and demonstration of how to drain Aspira cath. Tele removed. Port deaccessed, with 300 units of heparin flush. Pt transported home by wife. Home health set up at discharge.   
Admission Medication History Technician Note:    Hemodialysis patient: None    Patient preferred pharmacy Confirmed:    [id number] - [place], [place] - [address] - P [phone] - F [phone]  [address] [address]  Phone: [phone] Fax: [phone]      Information obtained from¹: Patient and Rx Query    Comments/Recommendations: Updated PTA meds/reviewed patient's allergies.    1)  Medication issues identified: none    2)  Medication changes (since last review):  Added  + Carboplatin  + Taxol  + Keytruda    Removed    Adjusted    3)  Pertinent Pharmacy Findings:  Identified High Alert Medication Information  Current IV Chemotherapy Regimen CARBOplatin, PACLitaxel, and Keytruda     ¹RxQuery pharmacy benefit data reflects medications filled and processed through the patient's insurance, however this data does NOT capture whether the medication was picked up or is currently being taken by the patient.    Allergies:  Hydrocodone and Morphine    Chief Complaint for this Admission:    Chief Complaint   Patient presents with    Shortness of Breath     Patient arrives via wheelchair c/o SOB x several days. Patient with current hx of stage 4 lung cancer. Wife concerned that patient may need fluid drained off of lungs.      Prior to Admission Medications:   Current Outpatient Medications   Medication Instructions    CARBOplatin (PARAPLATIN) 600 MG/60ML chemo injection IntraVENous, 500mg Every 3 weeks for 4 cycles    dexAMETHasone (DECADRON) 4 MG tablet Take 5 tabs by mouth the evening before chemotherapy treatments, every 21 days.    lidocaine-prilocaine (EMLA) 2.5-2.5 % cream Apply topically to port 1 hour prior to infusion, cover with plastic wrap    LORazepam (ATIVAN) 0.5 mg, Oral, EVERY 4 HOURS PRN    metFORMIN (GLUCOPHAGE) 500 mg, Oral, 2 TIMES DAILY WITH MEALS    metoprolol tartrate (LOPRESSOR) 25 mg, Oral, DAILY    nicotine (NICODERM CQ) 21 MG/24HR 1 patch, 
Attempted to schedule PCP hospital follow up appointment. [facility] will wait to schedule PCP hospital follow since patient had a cardiac procedure on 5/16/24 and cardiac appts have not been scheduled. [facility] will schedule PCP appt after cardiac follow ups have been placed on the AVS. CM notified. [facility] placed [facility] information AVS for patient resource.  Pending patient discharge. Miladis Aponte, Care Management Assistant  
Bedside shift change report given to YOLA Ontiveros  (oncoming nurse) by YOLA Dorantes  (offgoing nurse). Report included the following information Nurse Handoff Report.       1006: Pt wife emptied urinal. Unknown amount by pt and wife. Educated wife and pt that The urine and whatever pt drinks needs to be documented. Pt and wife agreeable and understand reasoning behind needing to know how much pt is taking in and putting out.     1350: Pt ambulated to bathroom with this RN. Pt tolerated ambulation well. NO complaints to chest pain on SOB. Pt wanted to get back in bed to rest.. denied walking the halls at this time.    End of Shift Note    Bedside shift change report given to Night Shift NurseSarah RN (oncoming nurse) by Rama Chacon RN (offgoing nurse).  Report included the following information SBAR    Shift worked:  2630-3513     Shift summary and any significant changes:     No acute changes at this time. Pt has transfer orders for a step down unit. MD wants pt on a 1500 ml fluid restriction for drinks that do not contain sodium.   A total of 700 out in this shift last noted at 1819      Concerns for physician to address:  None at this time      Zone phone for oncoming shift:          Activity:     Number times ambulated in hallways past shift: 0  Number of times OOB to chair past shift: 1    Cardiac:   Cardiac Monitoring: Yes           Access:  Current line(s): PIV and port     Genitourinary:   Urinary status: voiding    Respiratory:      Chronic home O2 use?: NO  Incentive spirometer at bedside: YES       GI:     Current diet:  ADULT ORAL NUTRITION SUPPLEMENT; Breakfast, Dinner; Low Calorie/High Protein Oral Supplement  ADULT DIET; Regular; 3 carb choices (45 gm/meal); Low Fat/Low Chol/High Fiber/ASTON  Passing flatus: YES  Tolerating current diet: YES       Pain Management:   Patient states pain is manageable on current regimen: YES    Skin:     Interventions: float heels, increase time out of bed, PT/OT 
Bedside shift change report given to rex machado (oncoming nurse) by rex spears (offgoing nurse). Report included the following information Nurse Handoff Report, ED Encounter Summary, Adult Overview, MAR, Recent Results, and Cardiac Rhythm sinus tach .     End of Shift Note    Bedside shift change report given to rex spears (oncoming nurse) by STACY SOARES RN (offgoing nurse).  Report included the following information SBAR, ED Summary, Procedure Summary, MAR, Recent Results, and Cardiac Rhythm nsr/sinus tach    Shift worked:  7p-7a     Shift summary and any significant changes:     na     Concerns for physician to address:  na     Zone phone for oncoming shift:          Activity:     Number times ambulated in hallways past shift: 0  Number of times OOB to chair past shift: 0    Cardiac:   Cardiac Monitoring: Yes           Access:  Current line(s): port     Genitourinary:   Urinary status: voiding    Respiratory:      Chronic home O2 use?: NO  Incentive spirometer at bedside: YES       GI:     Current diet:  ADULT DIET; Regular  Passing flatus: YES  Tolerating current diet: YES       Pain Management:   Patient states pain is manageable on current regimen: YES    Skin:     Interventions: increase time out of bed    Patient Safety:  Fall Score:    Interventions: bed/chair alarm       Length of Stay:  Expected LOS: 9  Actual LOS: 8      STACY SOARES RN                            
End of Shift Note    Bedside shift change report given to BRYAN ACEVES (oncoming nurse) by Ralf Ba RN (offgoing nurse).  Report included the following information SBAR, Kardex, and MAR    Shift worked:  7am-1930pm     Shift summary and any significant changes:     Prescribed medication done. Thoracentesis done today. 2 ltr fluids removed. Chest tube placed. Vitals performed. Oxycodone given once for pain. Chest tube is supposed to be clamped till provider see pt.       Ralf Ba RN                            
End of Shift Note    Bedside shift change report given to Jenny ACEVES (oncoming nurse) by Ralf Ba RN (offgoing nurse).  Report included the following information SBAR, Kardex, and MAR    Shift worked:  7am-1930pm     Shift summary and any significant changes:      Pt came from ED. Skin checked. Pt has some redness and edeme on his rt breast. Pt is anxiuos and asked medication for anxiety. Provider notified.  Pt is NPO after midnight. Pt has bowel movement today.       Ralf Ba RN                            
End of Shift Note    Bedside shift change report given to Sarah ACEVES (oncoming nurse) by Janis Winsotn RN (offgoing nurse).  Report included the following information SBAR    Shift worked:  7p-7a     Shift summary and any significant changes:    Pt trying to have bm and just passing blood from hemorrhoid.  Pt reports being very anxious about the procedure in the morning.family members expressed concerns education given. Still having anxiety about surgery.     Concerns for physician to address:  See above     Zone phone for oncoming shift:          Activity:     Number times ambulated in hallways past shift: 0  Number of times OOB to chair past shift: 2    Cardiac:   Cardiac Monitoring: Yes           Access:  Current line(s): PIV     Genitourinary:   Urinary status: voiding    Respiratory:      Chronic home O2 use?: NO  Incentive spirometer at bedside: YES       GI:     Current diet:  Diet NPO  Passing flatus: YES  Tolerating current diet: YES       Pain Management:   Patient states pain is manageable on current regimen: YES    Skin:     Interventions: specialty bed and float heels    Patient Safety:  Fall Score:    Interventions: bed/chair alarm, gripper socks, and pt to call before getting OOB       Length of Stay:  Expected LOS: 4  Actual LOS: 3      Janis Winston RN                            
Hospital follow-up PCP transitional care appointment has been scheduled with Dr. Shlomo Veronica on 5/30/24 at 845 . This is a previously scheduled appt. [facility] placed [facility] information AVS for patient resource. Pending patient discharge.  Spring Hurley, Care Management Assistant   
[facility] FLOOR Progress Note    Admit Date: 2024  POD: 2 Days Post-Op      Procedure:  Procedure(s):  DARIO BY DR CHILDRESS - PERICARDIAL WINDOW      Subjective/interval/overnight events:   Pt seen with Dr. Galindo, sitting on the side of the bed. Family at bedside In , on 2 liters via nasal cannula, afebrile. Vital signs stable. No events overnight. No complaints at present.    Remains on the following infusions: none.  Objective:     /69   Pulse (!) 104   Temp 97.8 °F (36.6 °C) (Oral)   Resp 18   Ht 1.702 m (5' 7.01\")   Wt 102.2 kg (225 lb 4.8 oz)   SpO2 94%   BMI 35.28 kg/m²   Temp (24hrs), Av.9 °F (36.6 °C), Min:97.8 °F (36.6 °C), Max:98.1 °F (36.7 °C)      Last 24hr Input/Output:    Intake/Output Summary (Last 24 hours) at 2024 0710  Last data filed at 2024 0610  Gross per 24 hour   Intake 2791.36 ml   Output 1552 ml   Net 1239.36 ml        Pericardial Drain Output: 12 mL in 12 hours, 40 mL in 24 hours, Serosanguinous    EKG/Rhythm:   Encounter Date: 24   EKG 12 Lead (SOB)   Result Value    Ventricular Rate 108    Atrial Rate 108    P-R Interval 124    QRS Duration 66    Q-T Interval 314    QTc Calculation (Bazett) 420    P Axis 70    R Axis 61    T Axis -87    Diagnosis      Sinus tachycardia with premature ventricular complexes  Low voltage QRS  Nonspecific T wave abnormality  Confirmed by Rhoda HAMILTON, [place] ([id number]) on 2024 5:24:55 PM         Oxygen: As above    CXR: Xray Result (most recent):  XR CHEST PORTABLE 2024    Narrative  EXAM: XR CHEST PORTABLE    INDICATION: Post op open heart surgery    COMPARISON: 2024    FINDINGS: A portable AP radiograph of the chest was obtained at 414 hours. Right  IJ port. Right lower lobe airspace opacity and right effusion.. Cardiomegaly.  Mild pulmonary edema small right apical pneumothorax....  The bones and soft  tissues are grossly within normal limits.    Impression  Right lower lobe airspace opacity and small right 
Na stable at 131. On NS IV and no active medical tx.  Available as needed over weekend.  
Nephrology Progress Note  [facility] / [place] Office  [address]  [place] [address]  Phone - [phone]  Fax - [phone]                 Patient: Fortunato Kowalski                     YOB: 1965        Date- 5/17/2024                                     Admit Date: 5/13/2024   CC: Follow up for hyponatremia          IMPRESSION & PLAN:   BEVERLY stage I(secondary to hypotension)(resolved)  Nongap acidosis  Hyponatremia(suspect secondary to IVVD)(urine sodium less than 7)  Pericardial effusion status post pericardial window  Right hydropneumothorax  Antibiotic drug reaction(resolved)  Cardiogenic shock  Recurrent malignant pleural effusion  Stage IV metastatic non-small cell lung cancer  Type 2 diabetes      PLAN-  Encourage p.o. intake of sodium  DC bicarb gtt., started on normal saline at 75 mL/h  Reviewed his renal ultrasound, revealed bladder distention with no hydro.  Patient reports no issues with urination.  Check labs daily  Spoke to wife at bedside     Subjective:  Interval History:   -Seen and examined today  -Sodium much improved to 129/130 meq    Objective:   Vitals:    05/17/24 0600 05/17/24 0639 05/17/24 0726 05/17/24 1122   BP: 106/81  107/76 (!) 124/55   Pulse: (!) 101  100 (!) 106   Resp: 21 20 22   Temp:   97.8 °F (36.6 °C) 98 °F (36.7 °C)   TempSrc:   Oral Oral   SpO2: 93%  91% 94%   Weight:  102.2 kg (225 lb 4.8 oz)     Height:          I/O last 3 completed shifts:  In: 3920.3 [P.O.:250; I.V.:3600.4; IV Piggyback:69.9]  Out: 3185 [Urine:1400; Drains:110; Other:750; Chest Tube:925]  I/O this shift:  In: 1055.3 [P.O.:520; I.V.:535.3]  Out: 435 [Urine:250; Drains:5; Chest Tube:180]      Physical exam:    GEN: NAD  NECK- no mass  RESP: No wheezing, decreased BS b/l  CVS: S1,S2  RRR  NEURO: Normal speech, Non focal  EXT: No Edema   PSYCH: Normal Mood    Chart reviewed.         Pertinent Notes reviewed.     Data 
Nephrology Progress Note  [facility] / [place] Office  [address]  [place] [address]  Phone - [phone]  Fax - [phone]                 Patient: Fortunato Kowalski                     YOB: 1965        Date- 5/20/2024                                     Admit Date: 5/13/2024   CC: Follow up for hyponatremia          IMPRESSION & PLAN:   BEVERLY stage I(secondary to hypotension)(resolved)  Nongap acidosis  Hyponatremia(suspect secondary to IVVD)(urine sodium less than 7)  Pericardial effusion status post pericardial window(status post pericardial drain removal)  Right hydropneumothorax  Antibiotic drug reaction(resolved)  Cardiogenic shock  Recurrent malignant pleural effusion  Stage IV metastatic non-small cell lung cancer  Type 2 diabetes      PLAN-  Encourage p.o. intake of sodium  DC IV fluids  Sodium back to baseline  Spoke to family at bedside  Renal team will sign off call if needed     Subjective:  Interval History:   -Seen and examined today  -Sodium improved to baseline    Objective:   Vitals:    05/20/24 0630 05/20/24 0650 05/20/24 0700 05/20/24 0800   BP: 111/85   101/82   Pulse: (!) 112 (!) 108 (!) 106 (!) 107   Resp: 24 27 21 27   Temp:    96.8 °F (36 °C)   TempSrc:    Axillary   SpO2: 94% 92% 94%    Weight:       Height:          I/O last 3 completed shifts:  In: 1547.8 [P.O.:440; I.V.:857.8; IV Piggyback:250]  Out: 4125 [Urine:925; Chest Tube:3200]  No intake/output data recorded.      Physical exam:    GEN: NAD  NECK- no mass  RESP: No wheezing, decreased BS b/l  CVS: S1,S2  RRR  NEURO: Normal speech, Non focal  EXT: No Edema   PSYCH: Normal Mood    Chart reviewed.         Pertinent Notes reviewed.     Data Review :  Lab Results   Component Value Date/Time     05/20/2024 03:37 AM    K 3.9 05/20/2024 03:37 AM     05/20/2024 03:37 AM    CO2 25 05/20/2024 03:37 AM    BUN 14 05/20/2024 03:37 AM    
Participated in Palliative IDT where pt was discussed.   
Physical Therapy:   Orders received, chart reviewed, and discussed role of PT with patient. Patient reports he is functioning at his baseline status, only remains limited due to the fluid on his lungs. No skilled PT at this time, will sign off. Please re-consult if patient status changes.     Mi Patten PT, DPT  
Physical therapy services attempted @ 9:20AM. As Reporting DPT arrived to room, Pt presents with observed ~ increased WOB, (-) cough. Therapist in the process of assessing vitals when NP Oncology Team arrived and confirmed pending additional consultation and possible IR to ?remove fluids? once morning CT findings released. Agreed upon MD/PT Team HOLD of therapy services at this time per medical status. PT Team will follow up as time permits and when Pt more medically appropriate and able to tolerate active participation.     BP:  109/55  HR: 100    Kalani Valladares, PT, DPT    
Report received from Jose Cruz ACEVES    Pt c/o anxiety and difficulty getting rest/sleep. Xanax, benadryl and tylenol given.    Pleural chest tube with serous drainage.    Had no IV's or port a cath access upon assessment at shift change.    0300: port a cath accessed with 20 ga 0.75 in needle, labs drawn, flushes easily with good blood return. Saline locked.        Plan for IR pleural drainage catheter insertion later today. Eventual d/c with outpatient oncology f/u.    
Spiritual Care Assessment/Progress Note  [facility]    Name: Fortunato Kowalski MRN: [id number]    Age: 58 y.o.     Sex: male   Language: English     Date: 5/21/2024            Total Time Calculated: 26 min              Spiritual Assessment begun in [facility] CV[facility]  Service Provided For: Patient and family together  Referral/Consult From: Multi-disciplinary team  Encounter Overview/Reason: Spiritual/Emotional Needs    Spiritual beliefs:      [x] Involved in a nabil tradition/spiritual practice:      [] Supported by a nabil community:      [] Claims no spiritual orientation:      [] Seeking spiritual identity:           [] Adheres to an individual form of spirituality:      [] Not able to assess:                Identified resources for coping and support system:   Support System: Family members       [x] Prayer                  [] Devotional reading               [] Music                  [] Guided Imagery     [] Pet visits                                        [] Other: (COMMENT)     Specific area/focus of visit   Encounter:    Crisis:    Spiritual/Emotional needs: Type: Spiritual Support  Ritual, Rites and Sacraments:    Grief, Loss, and Adjustments: Type: Adjustment to illness, Life Adjustments  Ethics/Mediation:    Behavioral Health:    Palliative Care: Type: Palliative Care, Interdisciplinary Rounds  Advance Care Planning:      Plan/Referrals: Continue Support (comment) (chaplains still available.)    Narrative:   Visit was in the CVICU in response to staff request for spiritual/emotional support. Patient appeared uncomfortable in bed, his ex-wife, Yolanda, was present in the room. They were receptive of visit and shared thoughts and concerns about patient's current condition. Patient seemed dejected and indicated that he was having a hard time with the adjustment to life that his medical condition will require. He has worked all his life and wouldn't like to give up working. Yolanda was more optimistic 
Visited pt to assess learning preferences/needs. Noted that pt currently has a chest tube at this time, not currently connected to suction. A female visitor is at bedside.     Visited patient to assess learning needs r/t his care.     Learning readiness assessment:   Patient will   [x] Accept teaching  [] Will not accept teaching  [] Is willing to learn    Verbatims  Preferred method to learn:Listen, read  Goals to learn: Describe a condition   Motivation to learn: \"How to get out of here.\"     Review of positive medication side effects:   [] Lightheadedness  [] Dizziness  [] Nausea  [] Vomiting  [x] Constipation  [] Diarrhea    Factors affecting learning:  [] Cognition  [] Language: English  [x] Engagement in subject - engage to learn  [x] Social  - has wife   [x] Symptoms - Short of Breath with movement    Learning needs  Patient needs assistance with:   [x] Understanding of condition   [x] Understanding of restrictions  [x] Understanding of treatment  [] Understanding of medication     Demonstrates learning:   [x] Describes medication names - oxcycodone  [x] Recognizes medication names  [] Relates treatment to condition   [x] Recalls a medication side effect      MAVERICK-I Nursing Dx   Readiness to Learn about Medication   Nursing interventions  Checks indicate the need to implement.     [x] Focus on these medication classes: Diuretics and Bronchodialator Inhaler  [] Have patient identify medication while taking them  [x]        Review printed medication education material  [x]        Incorporate teaching with caregiver or family member  []        Ask if pt can use medication from home:   [] Have patient demonstrate correct use of shot/inhaler  []         NA                                                                                   Referral   [] Glucose Management  [] Stroke care/teaching  []        Spiritual Care  [] Dietician  []        Pharmacy for redosing/retiming/or formulary med  []     
Current  Protein (g/day): 85-106g (0.8-1gPro/kg)  Method Used for Fluid Requirements: Other (Comment)  Fluid (ml/day): per MD    Nutrition Diagnosis:   No nutrition diagnosis at this time     Nutrition Interventions:   Food and/or Nutrient Delivery: Start Oral Diet  Nutrition Education/Counseling: No recommendation at this time  Coordination of Nutrition Care: Continue to monitor while inpatient, Interdisciplinary Rounds       Goals:     Goals: PO intake 75% or greater, by next RD assessment       Nutrition Monitoring and Evaluation:   Behavioral-Environmental Outcomes: None Identified  Food/Nutrient Intake Outcomes: Diet Advancement/Tolerance  Physical Signs/Symptoms Outcomes: Biochemical Data, Nutrition Focused Physical Findings, Skin, Weight, Fluid Status or Edema    Discharge Planning:    Continue current diet     Ayah Munoz RD, CNSC  Contact: ext [phone]    
Drain:  Current Shift: 05/17 0701 - 05/17 1900  In: 368.3 [P.O.:120; I.V.:248.3]  Out: 120   Last 24 hrs.:   Intake/Output Summary (Last 24 hours) at 5/17/2024 1014  Last data filed at 5/17/2024 0840  Gross per 24 hour   Intake 4288.61 ml   Output 3305 ml   Net 983.61 ml       EXAM:  General:  No acute distress             Lungs:   Clear to auscultation bilaterally.   Incision:  No erythema, drainage or swelling.   Heart:  Regular rate and rhythm, S1, S2 normal, no murmur, click, rub or gallop.   Abdomen:   Soft, non-tender. Bowel sounds normal. No masses,  No organomegaly.   Extremities:  No edema. PPP.    Neurologic:  Gross motor and sensory apparatus intact.     Labs:   Recent Labs     05/17/24  0150 05/17/24  0747   WBC 3.8*  --    HGB 12.2  --    HCT 37.0  --      --    * 129*   K 3.8  --    BUN 34*  --         Assessment:     Principal Problem:    Recurrent pleural effusion  Active Problems:    Pneumothorax    Metastatic non-small cell lung cancer (HCC)    S/P pericardial window creation  Resolved Problems:    * No resolved hospital problems. *       Plan/Recommendations/Medical Decision Making:     Stage IV metastatic squamous cell carcinoma of the lung on 4/10/24.   Care per primary team  Urgent pericardial window and pericardial biopsy with Dr Flores 5/16  Keep pericardial drain until tomorrow.   Okay to transfer to step down unit per primary team discretion.   DVT prophylaxis: SCDs. Ordered lovenox 30 mg IV.     Dispo: Cardiac surgery will continue to follow and plan to remove pericardial chest tube tomorrow if output remains low. Okay to transfer to step down unit per primary team.     Signed By: Gregorio Cunningham PA-C    
drug monitoring: Nephrotoxicity  Discussed case with: Jimi RN, attending      Code Status: full code  DVT Prophylaxis: Lovenox  GI Prophylaxis: no indication     Subjective:     Chief Complaint / Reason for Physician Visit  Follow up NSCLC, hydropneumothorax, pericardial effusion, hyponatremia, BEVERLY, acidosis  Back from pericardial window.  Moved to room 3401    Objective:     VITALS:   Last 24hrs VS reviewed since prior progress note. Most recent are:  Patient Vitals for the past 24 hrs:   BP Temp Temp src Pulse Resp SpO2 Height Weight   05/16/24 1200 -- -- -- -- -- -- 1.702 m (5' 7.01\") 103 kg (227 lb 1.2 oz)   05/16/24 1119 104/65 96.8 °F (36 °C) Temporal (!) 101 20 99 % -- --   05/16/24 1115 104/65 -- -- (!) 106 24 -- -- --   05/16/24 0930 95/71 -- -- 98 -- 95 % -- --   05/16/24 0925 95/68 -- -- 99 -- 97 % -- --   05/16/24 0921 100/62 97.7 °F (36.5 °C) Oral (!) 101 25 99 % -- --   05/16/24 0745 101/77 98.1 °F (36.7 °C) Oral 100 26 99 % -- --   05/16/24 0325 97/71 97.5 °F (36.4 °C) Oral 98 20 96 % -- --   05/15/24 2249 104/75 97.7 °F (36.5 °C) Oral (!) 101 23 95 % -- --   05/15/24 2211 -- -- -- -- 18 -- -- --   05/15/24 1922 103/69 97.9 °F (36.6 °C) Axillary (!) 104 27 96 % 1.702 m (5' 7.01\") --   05/15/24 1758 96/73 -- -- 64 -- 97 % -- --   05/15/24 1711 97/79 -- -- -- -- -- -- --   05/15/24 1528 100/80 97.5 °F (36.4 °C) Oral (!) 101 16 95 % -- --   05/15/24 1519 (!) 89/58 -- -- (!) 101 26 96 % -- --           Intake/Output Summary (Last 24 hours) at 5/16/2024 1432  Last data filed at 5/16/2024 1321  Gross per 24 hour   Intake 850 ml   Output 1163 ml   Net -313 ml          I had a face to face encounter and independently examined this patient on 5/16/2024, as outlined below:  PHYSICAL EXAM:  General: Alert, cooperative  EENT:  EOMI. Anicteric sclerae.  Resp:  Rhonchi  (+) right chest tube  CV:  Regular  rhythm,  No edema  GI:  Soft, Non distended, Non tender.  +Bowel sounds  Neurologic:  Alert and oriented X 3, 
(PEPCID) 20 mg in sodium chloride (PF) 0.9 % 10 mL injection  20 mg IntraVENous BID    mupirocin (BACTROBAN) 2 % ointment   Each Nostril BID    sodium bicarbonate 150 mEq in dextrose 5 % 1,000 mL infusion   IntraVENous Continuous    witch hazel-glycerin (TUCKS) pad   Topical PRN    phenylephrine-mineral oil-petrolatum (PREPARATION H) rectal ointment   Rectal BID PRN    heparin 2 units/mL solution in 0.9% sodium chloride  200 mL Irrigation Once    guaiFENesin (MUCINEX) extended release tablet 600 mg  600 mg Oral BID    benzonatate (TESSALON) capsule 100 mg  100 mg Oral TID PRN    albuterol (PROVENTIL) nebulizer solution 2.5 mg  2.5 mg Nebulization Q4H PRN    [Held by provider] bumetanide (BUMEX) injection 1 mg  1 mg IntraVENous BID    LORazepam (ATIVAN) tablet 1 mg  1 mg Oral Q4H PRN    oxyCODONE (ROXICODONE) immediate release tablet 5 mg  5 mg Oral Q6H PRN    oxyCODONE (ROXICODONE) immediate release tablet 10 mg  10 mg Oral Q6H PRN    0.9 % sodium chloride infusion   IntraVENous PRN    [Held by provider] enoxaparin Sodium (LOVENOX) injection 30 mg  30 mg SubCUTAneous BID    ondansetron (ZOFRAN-ODT) disintegrating tablet 4 mg  4 mg Oral Q8H PRN    Or    ondansetron (ZOFRAN) injection 4 mg  4 mg IntraVENous Q6H PRN    acetaminophen (TYLENOL) tablet 650 mg  650 mg Oral Q6H PRN    Or    acetaminophen (TYLENOL) suppository 650 mg  650 mg Rectal Q6H PRN    [Held by provider] metoprolol tartrate (LOPRESSOR) tablet 25 mg  25 mg Oral Daily    nicotine (NICODERM CQ) 21 MG/24HR 1 patch  1 patch TransDERmal Q24H    hydrALAZINE (APRESOLINE) injection 20 mg  20 mg IntraVENous Q6H PRN    glucose chewable tablet 16 g  4 tablet Oral PRN    dextrose bolus 10% 125 mL  125 mL IntraVENous PRN    Or    dextrose bolus 10% 250 mL  250 mL IntraVENous PRN    glucagon injection 1 mg  1 mg SubCUTAneous PRN    dextrose 10 % infusion   IntraVENous Continuous PRN    insulin lispro (HUMALOG,ADMELOG) injection vial 0-8 Units  0-8 Units SubCUTAneous 
days. Max Daily Amount: 3 mg 24  Colleen Reyes, APRN - NP   nicotine (NICODERM CQ) 21 MG/24HR Place 1 patch onto the skin every 24 hours 24  Jimena Ye, APRN - CNP   lidocaine-prilocaine (EMLA) 2.5-2.5 % cream Apply topically to port 1 hour prior to infusion, cover with plastic wrap 24   Joann Huynh APRN - NP   dexAMETHasone (DECADRON) 4 MG tablet Take 5 tabs by mouth the evening before chemotherapy treatments, every 21 days. 24   Joann Huynh APRN - NP        History: includes:  No past medical history on file.   Past Surgical History:   Procedure Laterality Date    COLONOSCOPY      CT BIOPSY SOFT TISSUE NECK  04/10/2024    CT BIOPSY LUNG/MEDIASTINUM PERC 4/10/2024 [facility] RAD CT    IR PERC CATH PLEURAL DRAIN W/IMAG  2024    IR PERC CATH PLEURAL DRAIN W/IMAG 2024 Adi Freitas MD [facility] ANGIO IR    IR PORT PLACEMENT EQUAL OR GREATER THAN 5 YEARS  2024    IR PORT PLACEMENT EQUAL OR GREATER THAN 5 YEARS 2024 Adi Freitas MD [facility] ANGIO IR      Social History     Tobacco Use    Smoking status: Every Day     Current packs/day: 2.00     Types: Cigarettes    Smokeless tobacco: Never   Substance Use Topics    Alcohol use: Not Currently      Family History   Problem Relation Age of Onset    Unknown Mother     Unknown Father     Cancer Maternal Grandmother     Cancer Maternal Grandfather          ROS:Pertinent items are noted in HPI.    Objective:     Vital Signs: Telemetry:     Intake/Output:   BP (!) 84/54   Pulse 100   Temp 96.8 °F (36 °C) (Temporal)   Resp 19   Ht 1.702 m (5' 7.01\")   Wt 103 kg (227 lb 1.2 oz)   SpO2 92%   BMI 35.56 kg/m²     Temp (24hrs), Av.6 °F (36.4 °C), Min:96.8 °F (36 °C), Max:98.1 °F (36.7 °C)                  Wt Readings from Last 4 Encounters:   24 103 kg (227 lb 1.2 oz)   24 105 kg (231 lb 8 oz)   24 105 kg (231 lb 8 oz)   24 98.4 kg (217 lb)       Patient Vitals for the past 96 
The catheter is ready for use.    XR CHEST PORTABLE    Result Date: 5/6/2024  EXAM: XR CHEST PORTABLE INDICATION: post thora COMPARISON: 5/6/2024 FINDINGS: A portable AP radiograph of the chest was obtained at 1128 hours. Right IJ Port-A-Cath.. Increased airspace opacity in the right lower lobe. Increased pulmonary edema.. Probable right effusion. Cardiomegaly..  The bones and soft tissues are grossly within normal limits.     Persistent right effusion status post thoracentesis. No pneumothorax. Pulmonary edema and right lower lobe airspace opacity with some increase..    XR CHEST PORTABLE    Result Date: 5/6/2024  EXAM:  XR CHEST PORTABLE INDICATION: post thora COMPARISON: May 3 TECHNIQUE: portable chest AP view FINDINGS: Decreased size of the right pleural effusion, small apical pneumothorax without shift.     Small right apical pneumothorax.    XR CHEST (2 VW)    Result Date: 5/3/2024  INDICATION:  SOB, recent diagnosis lung cancer, likely right sided effusion Exam: Chest 2 views. Comparison: None. Findings: Cardiomediastinal silhouette is normal. Pulmonary vasculature is not engorged interval development of a large right pleural effusion with collapse of the right lung. Bony thorax is intact.     1. Interval development of a large right pleural effusion      I personally reviewed laboratory testing, pulmonary imaging, radiology reports, and pulse oximetry data.        Please note that this dictation was completed with [employer], the computer voice recognition software.  Quite often unanticipated grammatical, syntax, homophones, and other interpretive errors are inadvertently transcribed by the computer software.  Please disregard these errors.  Please excuse any errors that have escaped final proofreading  ______________________________________________________________________      Thank you for allowing us to participate in the care of this patient.      This care involved high complexity medical decision making: I 
antibiotics  Reviewed and/or ordered Clinical lab tests  Reviewed images and/or ordered Radiology tests  Called and arranged for Radiologic procedures or interventions  discussed my assessment/management with : Nursing, Radiology, Hospitalist and Family for coordination of care      ASHANTI Zuniga - NP

## 2024-05-22 NOTE — PLAN OF CARE
Problem: Discharge Planning  Goal: Discharge to home or other facility with appropriate resources  5/22/2024 1546 by Cristina Zavala), RN  Outcome: Adequate for Discharge  5/22/2024 1546 by Cristina Zavala (Dagmar), RN  Outcome: Adequate for Discharge  5/22/2024 1400 by Cristina Zavala (Dagmar), RN  Outcome: Progressing     Problem: Safety - Adult  Goal: Free from fall injury  5/22/2024 1546 by Cristina Zavala (Dagmar), RN  Outcome: Adequate for Discharge  5/22/2024 1546 by Cristina Zavala (Dagmar), RN  Outcome: Adequate for Discharge  5/22/2024 1400 by Cristina Zavala (Dagmar), RN  Outcome: Progressing     Problem: Pain  Goal: Verbalizes/displays adequate comfort level or baseline comfort level  5/22/2024 1546 by Cristina Zavala (Dagmar), RN  Outcome: Adequate for Discharge  5/22/2024 1546 by Cristina Zavala (Dagmar), RN  Outcome: Adequate for Discharge  5/22/2024 1400 by Cristina Zavala (Dagmar), RN  Outcome: Progressing     Problem: Chronic Conditions and Co-morbidities  Goal: Patient's chronic conditions and co-morbidity symptoms are monitored and maintained or improved  5/22/2024 1546 by Cristina Zavala (Dagmar), RN  Outcome: Adequate for Discharge  5/22/2024 1546 by Cristina Zavala (Dagmar), RN  Outcome: Adequate for Discharge  5/22/2024 1400 by Cristina Zavala (Dagmar), RN  Outcome: Progressing

## 2024-05-22 NOTE — DISCHARGE INSTRUCTIONS
Drain the right chest Aspira catheter 3 times per week and as needed for symptom relief    Use oxycodone as directed for pain   May cause drowsiness, do not drink alcohol or drive a car or using   Do not take within 2 hours before or after a dose of Xanax    Use alprazolam or Xanax as directed for anxiety   May cause drowsiness, do not drink alcohol or drive a car or using   Do not take within 2 hours before or after a dose of Xanax    Oxycodone may cause constipation, use daily MiraLAX that is available over-the-counter as directed    If no bowel movement on the MiraLAX, can use a Dulcolax suppository 10 mg daily as needed till bowel movement   Also available over-the-counter

## 2024-05-23 ENCOUNTER — TELEPHONE (OUTPATIENT)
Age: 59
End: 2024-05-23

## 2024-05-23 RX ORDER — MEPERIDINE HYDROCHLORIDE 25 MG/ML
12.5 INJECTION INTRAMUSCULAR; INTRAVENOUS; SUBCUTANEOUS PRN
Status: CANCELLED | OUTPATIENT
Start: 2024-05-30

## 2024-05-23 RX ORDER — ACETAMINOPHEN 325 MG/1
650 TABLET ORAL
Status: CANCELLED | OUTPATIENT
Start: 2024-05-30

## 2024-05-23 RX ORDER — SODIUM CHLORIDE 9 MG/ML
5-250 INJECTION, SOLUTION INTRAVENOUS PRN
Status: CANCELLED | OUTPATIENT
Start: 2024-05-30

## 2024-05-23 RX ORDER — DIPHENHYDRAMINE HYDROCHLORIDE 50 MG/ML
50 INJECTION INTRAMUSCULAR; INTRAVENOUS
Status: CANCELLED | OUTPATIENT
Start: 2024-05-30

## 2024-05-23 RX ORDER — SODIUM CHLORIDE 9 MG/ML
INJECTION, SOLUTION INTRAVENOUS CONTINUOUS
Status: CANCELLED | OUTPATIENT
Start: 2024-05-30

## 2024-05-23 RX ORDER — HEPARIN 100 UNIT/ML
500 SYRINGE INTRAVENOUS PRN
Status: CANCELLED | OUTPATIENT
Start: 2024-05-30

## 2024-05-24 ENCOUNTER — TELEPHONE (OUTPATIENT)
Age: 59
End: 2024-05-24

## 2024-05-24 ENCOUNTER — CLINICAL DOCUMENTATION (OUTPATIENT)
Age: 59
End: 2024-05-24

## 2024-05-24 RX ORDER — FUROSEMIDE 40 MG/1
40 TABLET ORAL DAILY
Qty: 10 TABLET | Refills: 0 | Status: SHIPPED | OUTPATIENT
Start: 2024-05-24

## 2024-05-24 NOTE — TELEPHONE ENCOUNTER
Carl Rosario  Oncology Social Work Encounter    [x] Med-Onc [facility] [] Med-Onc [facility] [] Med-Onc [facility] [] Rad-Onc [facility] [] Rad-Onc [facility] [] Rad-Onc [facility] [] Rad-Onc [facility] [] [facility]    Patient: Fortunato Kowalski    Encounter Type:    [] Initial SW Encounter  [] Patient Initiated  [] Referral  [] Distress/PHQ Screening  [x] Other:      Concern(s)/Barrier(s) to Care:     Narrative: continue to help ensure both hospital and medical group billing gets updated with Medicaid # and rebilled from April forward.   CEE called and asked Yolanda to call hospital billing to get bills from April to current processed with Medicaid # she received.     Referral/Handouts:      Insurance/Entitlements referral    Plan:

## 2024-05-24 NOTE — TELEPHONE ENCOUNTER
Pt spouse called because someone said to drain him. Stated she drained a total of  1200 yesterday. Pt is wanting to be drain twice a drain. Stated that when he wakes up he is going to be hollering to be drained,She asked is she draining him too much. She is inquiring about a fluid pill for the pt. Stated that his feet and legs look like they are about to bust. Pt has an appt with PCP on 05.28.24.

## 2024-05-29 ENCOUNTER — TELEPHONE (OUTPATIENT)
Age: 59
End: 2024-05-29

## 2024-05-29 NOTE — TELEPHONE ENCOUNTER
[facility] Rosario  Oncology Social Work Encounter    [x] Med-Onc [facility] [] Med-Onc [facility] [] Med-Onc [facility] [] Rad-Onc [facility] [] Rad-Onc [facility] [] Rad-Onc [facility] [] Rad-Onc [facility] [] [facility]    Patient: Fortunato Kowalski    Encounter Type:    [] Initial SW Encounter  [] Patient Initiated  [] Referral  [] Distress/PHQ Screening  [] Other:      Concern(s)/Barrier(s) to Care:     Narrative: pt does not have a PCP, [place] got [place].      Pt needs aspira drain bags and prescriptions.  RN to call pt. / pt pcg.     Referral/Handouts:       Palliative referral  Primary Care referral      Plan: RN to assist with needs.

## 2024-05-29 NOTE — TELEPHONE ENCOUNTER
Carl Rosario  Oncology Social Work Encounter    [x] Med-Onc [facility] [] Med-Onc [facility] [] Med-Onc [facility] [] Rad-Onc [facility] [] Rad-Onc [facility] [] Rad-Onc [facility] [] Rad[facility]Onc [facility] [] [facility] Center    Patient: Fortunato Kowalski    Encounter Type:    [] Initial SW Encounter  [] Patient Initiated  [] Referral  [] Distress/PHQ Screening  [x] Other:      Concern(s)/Barrier(s) to Care:     Narrative: clarification pt has home health not hospice.     Referral/Handouts:       Home Health referral      Plan:

## 2024-05-30 ENCOUNTER — OFFICE VISIT (OUTPATIENT)
Age: 59
End: 2024-05-30
Payer: COMMERCIAL

## 2024-05-30 ENCOUNTER — HOSPITAL ENCOUNTER (OUTPATIENT)
Facility: HOSPITAL | Age: 59
Setting detail: INFUSION SERIES
Discharge: HOME | End: 2024-05-30
Payer: COMMERCIAL

## 2024-05-30 ENCOUNTER — TELEPHONE (OUTPATIENT)
Age: 59
End: 2024-05-30

## 2024-05-30 VITALS
WEIGHT: 236.2 LBS | TEMPERATURE: 97.9 F | BODY MASS INDEX: 37.07 KG/M2 | RESPIRATION RATE: 28 BRPM | HEIGHT: 67 IN | HEART RATE: 104 BPM | DIASTOLIC BLOOD PRESSURE: 86 MMHG | SYSTOLIC BLOOD PRESSURE: 117 MMHG | OXYGEN SATURATION: 91 %

## 2024-05-30 VITALS
TEMPERATURE: 98 F | DIASTOLIC BLOOD PRESSURE: 74 MMHG | HEART RATE: 101 BPM | HEIGHT: 67 IN | SYSTOLIC BLOOD PRESSURE: 104 MMHG | WEIGHT: 236.11 LBS | OXYGEN SATURATION: 95 % | BODY MASS INDEX: 37.06 KG/M2

## 2024-05-30 DIAGNOSIS — C34.90 SQUAMOUS CELL CARCINOMA OF LUNG, STAGE IV, UNSPECIFIED LATERALITY (HCC): ICD-10-CM

## 2024-05-30 DIAGNOSIS — G89.3 CANCER ASSOCIATED PAIN: Primary | ICD-10-CM

## 2024-05-30 DIAGNOSIS — F41.9 ANXIETY IN CANCER PATIENT: ICD-10-CM

## 2024-05-30 DIAGNOSIS — C34.90 SQUAMOUS CELL CARCINOMA OF LUNG, STAGE IV, UNSPECIFIED LATERALITY (HCC): Primary | ICD-10-CM

## 2024-05-30 DIAGNOSIS — Z51.11 ENCOUNTER FOR ANTINEOPLASTIC CHEMOTHERAPY: ICD-10-CM

## 2024-05-30 LAB
ALBUMIN SERPL-MCNC: 2.3 G/DL (ref 3.5–5)
ALBUMIN/GLOB SERPL: 0.6 (ref 1.1–2.2)
ALP SERPL-CCNC: 154 U/L (ref 45–117)
ALT SERPL-CCNC: 26 U/L (ref 12–78)
ANION GAP SERPL CALC-SCNC: 5 MMOL/L (ref 5–15)
AST SERPL-CCNC: 14 U/L (ref 15–37)
BASOPHILS # BLD: 0.1 K/UL (ref 0–0.1)
BASOPHILS NFR BLD: 1 % (ref 0–1)
BILIRUB SERPL-MCNC: 0.2 MG/DL (ref 0.2–1)
BUN SERPL-MCNC: 14 MG/DL (ref 6–20)
BUN/CREAT SERPL: 13 (ref 12–20)
CALCIUM SERPL-MCNC: 9.2 MG/DL (ref 8.5–10.1)
CHLORIDE SERPL-SCNC: 104 MMOL/L (ref 97–108)
CO2 SERPL-SCNC: 28 MMOL/L (ref 21–32)
CREAT SERPL-MCNC: 1.04 MG/DL (ref 0.7–1.3)
DIFFERENTIAL METHOD BLD: ABNORMAL
EOSINOPHIL # BLD: 0.1 K/UL (ref 0–0.4)
EOSINOPHIL NFR BLD: 1 % (ref 0–7)
ERYTHROCYTE [DISTWIDTH] IN BLOOD BY AUTOMATED COUNT: 16.1 % (ref 11.5–14.5)
GLOBULIN SER CALC-MCNC: 4.1 G/DL (ref 2–4)
GLUCOSE SERPL-MCNC: 189 MG/DL (ref 65–100)
HCT VFR BLD AUTO: 37.5 % (ref 36.6–50.3)
HGB BLD-MCNC: 11.9 G/DL (ref 12.1–17)
IMM GRANULOCYTES # BLD AUTO: 0.1 K/UL (ref 0–0.04)
IMM GRANULOCYTES NFR BLD AUTO: 1 % (ref 0–0.5)
LYMPHOCYTES # BLD: 1.5 K/UL (ref 0.8–3.5)
LYMPHOCYTES NFR BLD: 16 % (ref 12–49)
MCH RBC QN AUTO: 27.1 PG (ref 26–34)
MCHC RBC AUTO-ENTMCNC: 31.7 G/DL (ref 30–36.5)
MCV RBC AUTO: 85.4 FL (ref 80–99)
MONOCYTES # BLD: 0.9 K/UL (ref 0–1)
MONOCYTES NFR BLD: 10 % (ref 5–13)
NEUTS SEG # BLD: 6.5 K/UL (ref 1.8–8)
NEUTS SEG NFR BLD: 71 % (ref 32–75)
NRBC # BLD: 0 K/UL (ref 0–0.01)
NRBC BLD-RTO: 0 PER 100 WBC
PLATELET # BLD AUTO: 334 K/UL (ref 150–400)
PMV BLD AUTO: 9.9 FL (ref 8.9–12.9)
POTASSIUM SERPL-SCNC: 3.9 MMOL/L (ref 3.5–5.1)
PROT SERPL-MCNC: 6.4 G/DL (ref 6.4–8.2)
RBC # BLD AUTO: 4.39 M/UL (ref 4.1–5.7)
SODIUM SERPL-SCNC: 137 MMOL/L (ref 136–145)
WBC # BLD AUTO: 9.2 K/UL (ref 4.1–11.1)

## 2024-05-30 PROCEDURE — 96367 TX/PROPH/DG ADDL SEQ IV INF: CPT

## 2024-05-30 PROCEDURE — 6360000002 HC RX W HCPCS: Performed by: STUDENT IN AN ORGANIZED HEALTH CARE EDUCATION/TRAINING PROGRAM

## 2024-05-30 PROCEDURE — 85025 COMPLETE CBC W/AUTO DIFF WBC: CPT

## 2024-05-30 PROCEDURE — 96415 CHEMO IV INFUSION ADDL HR: CPT

## 2024-05-30 PROCEDURE — 36415 COLL VENOUS BLD VENIPUNCTURE: CPT

## 2024-05-30 PROCEDURE — 96375 TX/PRO/DX INJ NEW DRUG ADDON: CPT

## 2024-05-30 PROCEDURE — 2500000003 HC RX 250 WO HCPCS: Performed by: STUDENT IN AN ORGANIZED HEALTH CARE EDUCATION/TRAINING PROGRAM

## 2024-05-30 PROCEDURE — 96413 CHEMO IV INFUSION 1 HR: CPT

## 2024-05-30 PROCEDURE — 80053 COMPREHEN METABOLIC PANEL: CPT

## 2024-05-30 PROCEDURE — A4216 STERILE WATER/SALINE, 10 ML: HCPCS | Performed by: STUDENT IN AN ORGANIZED HEALTH CARE EDUCATION/TRAINING PROGRAM

## 2024-05-30 PROCEDURE — 99215 OFFICE O/P EST HI 40 MIN: CPT | Performed by: STUDENT IN AN ORGANIZED HEALTH CARE EDUCATION/TRAINING PROGRAM

## 2024-05-30 PROCEDURE — 96417 CHEMO IV INFUS EACH ADDL SEQ: CPT

## 2024-05-30 PROCEDURE — 2580000003 HC RX 258: Performed by: STUDENT IN AN ORGANIZED HEALTH CARE EDUCATION/TRAINING PROGRAM

## 2024-05-30 RX ORDER — DIPHENHYDRAMINE HYDROCHLORIDE 50 MG/ML
50 INJECTION INTRAMUSCULAR; INTRAVENOUS ONCE
Status: COMPLETED | OUTPATIENT
Start: 2024-05-30 | End: 2024-05-30

## 2024-05-30 RX ORDER — DIPHENHYDRAMINE HYDROCHLORIDE 50 MG/ML
50 INJECTION INTRAMUSCULAR; INTRAVENOUS
Status: DISCONTINUED | OUTPATIENT
Start: 2024-05-30 | End: 2024-05-31 | Stop reason: HOSPADM

## 2024-05-30 RX ORDER — ALPRAZOLAM 1 MG/1
1 TABLET ORAL 3 TIMES DAILY PRN
Qty: 63 TABLET | Refills: 0 | Status: SHIPPED | OUTPATIENT
Start: 2024-05-30 | End: 2024-06-20

## 2024-05-30 RX ORDER — SODIUM CHLORIDE 9 MG/ML
5-250 INJECTION, SOLUTION INTRAVENOUS PRN
Status: DISCONTINUED | OUTPATIENT
Start: 2024-05-30 | End: 2024-05-31 | Stop reason: HOSPADM

## 2024-05-30 RX ORDER — ACETAMINOPHEN 325 MG/1
650 TABLET ORAL
Status: DISCONTINUED | OUTPATIENT
Start: 2024-05-30 | End: 2024-05-31 | Stop reason: HOSPADM

## 2024-05-30 RX ORDER — SODIUM CHLORIDE 0.9 % (FLUSH) 0.9 %
5-40 SYRINGE (ML) INJECTION PRN
Status: DISCONTINUED | OUTPATIENT
Start: 2024-05-30 | End: 2024-05-31 | Stop reason: HOSPADM

## 2024-05-30 RX ORDER — PALONOSETRON 0.05 MG/ML
0.25 INJECTION, SOLUTION INTRAVENOUS ONCE
Status: COMPLETED | OUTPATIENT
Start: 2024-05-30 | End: 2024-05-30

## 2024-05-30 RX ORDER — ONDANSETRON 2 MG/ML
8 INJECTION INTRAMUSCULAR; INTRAVENOUS
Status: DISCONTINUED | OUTPATIENT
Start: 2024-05-30 | End: 2024-05-31 | Stop reason: HOSPADM

## 2024-05-30 RX ORDER — ALBUTEROL SULFATE 90 UG/1
4 AEROSOL, METERED RESPIRATORY (INHALATION) PRN
Status: DISCONTINUED | OUTPATIENT
Start: 2024-05-30 | End: 2024-05-31 | Stop reason: HOSPADM

## 2024-05-30 RX ORDER — EPINEPHRINE 1 MG/ML
0.3 INJECTION, SOLUTION INTRAMUSCULAR; SUBCUTANEOUS PRN
Status: DISCONTINUED | OUTPATIENT
Start: 2024-05-30 | End: 2024-05-31 | Stop reason: HOSPADM

## 2024-05-30 RX ORDER — OXYCODONE HYDROCHLORIDE 5 MG/1
5 TABLET ORAL EVERY 6 HOURS PRN
Qty: 56 TABLET | Refills: 0 | Status: SHIPPED | OUTPATIENT
Start: 2024-05-30 | End: 2024-06-13

## 2024-05-30 RX ADMIN — HYDROCORTISONE SODIUM SUCCINATE 100 MG: 100 INJECTION, POWDER, FOR SOLUTION INTRAMUSCULAR; INTRAVENOUS at 11:28

## 2024-05-30 RX ADMIN — SODIUM CHLORIDE 200 MG: 9 INJECTION, SOLUTION INTRAVENOUS at 10:36

## 2024-05-30 RX ADMIN — DIPHENHYDRAMINE HYDROCHLORIDE 50 MG: 50 INJECTION INTRAMUSCULAR; INTRAVENOUS at 10:05

## 2024-05-30 RX ADMIN — PACLITAXEL 378 MG: 6 INJECTION, SOLUTION INTRAVENOUS at 11:12

## 2024-05-30 RX ADMIN — CARBOPLATIN 590 MG: 10 INJECTION, SOLUTION INTRAVENOUS at 15:04

## 2024-05-30 RX ADMIN — PALONOSETRON 0.25 MG: 0.05 INJECTION, SOLUTION INTRAVENOUS at 10:03

## 2024-05-30 RX ADMIN — DEXAMETHASONE SODIUM PHOSPHATE 12 MG: 4 INJECTION, SOLUTION INTRAMUSCULAR; INTRAVENOUS at 10:13

## 2024-05-30 RX ADMIN — SODIUM CHLORIDE 25 ML/HR: 9 INJECTION, SOLUTION INTRAVENOUS at 10:03

## 2024-05-30 RX ADMIN — FAMOTIDINE 20 MG: 10 INJECTION, SOLUTION INTRAVENOUS at 10:06

## 2024-05-30 RX ADMIN — SODIUM CHLORIDE 150 MG: 9 INJECTION, SOLUTION INTRAVENOUS at 10:12

## 2024-05-30 NOTE — TELEPHONE ENCOUNTER
Palliative Medicine Clinic   [place]: [phone])    Patient Name: Fortunato Kowalski  YOB: 1965    Medication Refill Request    Patient received a prescription for Oxy IR 5 mg 1 every 6 hours prn #56 for 14 days from Dr. Glenys espinosa.  Palliative appt is 6/5/24 with Dr. Ramirez.     Shreya Garibay RN  Palliative Medicine

## 2024-05-30 NOTE — TELEPHONE ENCOUNTER
Palliative Medicine Clinic   [place]: [phone] ([phone])    Patient Name: Fortunato Kowalski  YOB: 1965    Medication Refill Request Triage    Requested by:    []  Patient  [x]  Support person:  Yolanda Kowalski    Last Pall Med Clinic Visit:  Visit date not found    Next Pall Med Clinic Visit: 06/05/2024     Preferred Pharmacy: [facility] [place]   Backup Pharmacy:  *    Medications requested:  Oxycodone    Is patient completely out?  []   YES  [x]   NO  If NO, how many pills does patient have left?  Almost out    Other pertinent information shared:  The patient received medication while in the hospital. He has a NP appt with Dr. Ramirez on 6/5/24

## 2024-05-30 NOTE — PROGRESS NOTES
Fortunato Kowalski is a 58 y.o. male here for treatment for met lung cancer.  Pt states treatment went well.  No concerns brought up.     1. Have you been to the ER, urgent care clinic since your last visit?  Hospitalized since your last visit?  5/13/24 - 5/22/24 hydropneumothorax      2. Have you seen or consulted any other health care providers outside of the [facility] since your last visit?  Include any pap smears or colon screening.  no

## 2024-05-30 NOTE — PROGRESS NOTES
Cancer [place] at [facility]  [address] [facility] [address] [place] [id number]  W: [phone] F: [phone]    Reason for Visit:   Fortunato Kowalski is a 58 y.o. male who is seen in consultation at the request of Dr. Dyson for evaluation of lung cancer.      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    Core biopsy shows similar features.       General Categorization   Malignant cells present     Specimen Adequacy   Satisfactory for evaluation.       Comment   Immunohistochemical staining on block 1A shows that the malignant cells   stain as follows:   CK 7: Positive   CK 20: Negative   TTF-1: Negative   P40: Positive   P63: Positive   CA-9: Negative   PAX8: Negative   Napsin A: Negative   NKX 3.1: Negative   CDX2: Negative   WT1: Negative   Claudin-4: Weakly positive   Calretinin: Negative     PDL1 (Keytruda) immunohistochemical testing is ordered on tissue block 1A   with the results to be reported in an addendum.     Initial Presentation  (HPI):       History of Present Illness  The patient is a 58-year-old gentleman who I originally saw as an inpatient for suspected lung malignancy with pleural-based nodules. He originally presented to the emergency department with complaints of shortness of breath and a large right-sided pleural effusion. He had an irregular area of pleural thickening on the medial aspect

## 2024-05-30 NOTE — PROGRESS NOTES
Date  05/30/2024 Action  Given Dose  50 mg Rate   Route  IntraVENous Administered By  Mary Golden RN        famotidine (PEPCID) 20 mg in sodium chloride (PF) 0.9 % 10 mL injection Admin Date  05/30/2024 Action  Given Dose  20 mg Rate   Route  IntraVENous Administered By  Mary Golden RN        fosaprepitant (EMEND) 150 mg in sodium chloride 0.9 % 250 mL IVPB Admin Date  05/30/2024 Action  New Bag Dose  150 mg Rate  840 mL/hr Route  IntraVENous Administered By  Mary Golden RN        hydrocortisone sodium succinate PF (SOLU-CORTEF) injection 100 mg Admin Date  05/30/2024 Action  Given Dose  100 mg Rate   Route  IntraVENous Administered By  Mary Golden RN        PACLitaxel (TAXOL) 378 mg in sodium chloride 0.9 % 500 mL chemo infusion Admin Date  05/30/2024 Action  New Bag Dose  378 mg Rate  204.3 mL/hr Route  IntraVENous Administered By  Mary Golden RN        PACLitaxel (TAXOL) 378 mg in sodium chloride 0.9 % 500 mL chemo infusion Admin Date  05/30/2024 Action  Restarted Dose   Rate  204.3 mL/hr Route  IntraVENous Administered By  Mary Golden RN        palonosetron (ALOXI) injection 0.25 mg Admin Date  05/30/2024 Action  Given Dose  0.25 mg Rate   Route  IntraVENous Administered By  Mary Golden RN        pembrolizumab (KEYTRUDA) 200 mg in sodium chloride 0.9 % 100 mL IVPB Admin Date  05/30/2024 Action  New Bag Dose  200 mg Rate  236 mL/hr Route  IntraVENous Administered By  Mary Golden RN            Two nurses verified prior to administering: Drug name, drug dose, infusion volume or drug volume when prepared in a syringe, rate of administration, route of administration,  expiration dates and/or times, appearance and physical integrity of the drugs, rate set on infusion pump, when used sequencing of drug administration.     Mr. Kowalski tolerated treatment well and [facility]. Port de-accessed, flushed per protocol. He is to  return on 6/20/2024 for his next appointment.    Mary Golden RN  May 30, 2024

## 2024-05-31 ENCOUNTER — TELEPHONE (OUTPATIENT)
Age: 59
End: 2024-05-31

## 2024-05-31 NOTE — TELEPHONE ENCOUNTER
Called patient to advise/confirm upcoming appt with Dr. Ramirez on 6/5/2024  at 9:30  at [facility] Office.  Left a message

## 2024-06-03 ENCOUNTER — TELEPHONE (OUTPATIENT)
Age: 59
End: 2024-06-03

## 2024-06-03 NOTE — TELEPHONE ENCOUNTER
[place] [phone] Pt was in to see the Dr. Last week pt has breathing issue the did not get addressed at his visit pt needs a prescription. Please call the nurse

## 2024-06-03 NOTE — TELEPHONE ENCOUNTER
Pt wife called saying that the Pt  pharmacy which is [facility] said they need prior authorization for the oxycodone (Roxicodone) .    # [phone]

## 2024-06-04 ENCOUNTER — TELEPHONE (OUTPATIENT)
Age: 59
End: 2024-06-04

## 2024-06-04 RX ORDER — FUROSEMIDE 40 MG/1
40 TABLET ORAL DAILY
Qty: 10 TABLET | Refills: 0 | Status: SHIPPED | OUTPATIENT
Start: 2024-06-04

## 2024-06-04 NOTE — TELEPHONE ENCOUNTER
Return call placed to pt's spouse. PHI verified and HIPPA verified by two patient identifiers.    Nurse informed pt's spouse to drain every other day for up to 1 L every other day. Spouse report swelling; NP refilled Lasix. Pt's spouse verbalized understanding. Nurse thanked for call.

## 2024-06-04 NOTE — TELEPHONE ENCOUNTER
Carl Rosario  Oncology Social Work Encounter    [x] Med-Onc [facility] [] Med-Onc [facility] [] Med-Onc [facility] [] Rad-Onc [facility] [] Rad-Onc [facility] [] Rad-Onc [facility] [] Rad-Onc [facility] [] [facility]    Patient: Fortunato Kowalski    Encounter Type:    [] Initial SW Encounter  [x] Patient Initiated  [] Referral  [] Distress/PHQ Screening  [] Other:      Concern(s)/Barrier(s) to Care:     Narrative: PCG concerned [facility] has not called her to say meds are approved.  SW encouraged her to call them.      PCG also inquiring about oxygen.  Pt has an appt with Palliative tomorrow and SW encouraged her to address oxygen and pain meds with Palliative.     Referral/Handouts:   Palliative referral      Plan:

## 2024-06-05 ENCOUNTER — TELEPHONE (OUTPATIENT)
Age: 59
End: 2024-06-05

## 2024-06-05 ENCOUNTER — OFFICE VISIT (OUTPATIENT)
Age: 59
End: 2024-06-05
Payer: COMMERCIAL

## 2024-06-05 VITALS
HEART RATE: 109 BPM | OXYGEN SATURATION: 94 % | HEIGHT: 67 IN | SYSTOLIC BLOOD PRESSURE: 108 MMHG | BODY MASS INDEX: 36.16 KG/M2 | DIASTOLIC BLOOD PRESSURE: 75 MMHG | RESPIRATION RATE: 18 BRPM | WEIGHT: 230.4 LBS

## 2024-06-05 DIAGNOSIS — G89.3 CANCER ASSOCIATED PAIN: ICD-10-CM

## 2024-06-05 DIAGNOSIS — F41.9 ANXIETY: ICD-10-CM

## 2024-06-05 DIAGNOSIS — C34.90 SQUAMOUS CELL CARCINOMA OF LUNG, STAGE IV, UNSPECIFIED LATERALITY (HCC): ICD-10-CM

## 2024-06-05 DIAGNOSIS — C34.90 METASTATIC NON-SMALL CELL LUNG CANCER (HCC): ICD-10-CM

## 2024-06-05 DIAGNOSIS — R06.02 SOB (SHORTNESS OF BREATH): Primary | ICD-10-CM

## 2024-06-05 PROCEDURE — 99205 OFFICE O/P NEW HI 60 MIN: CPT | Performed by: INTERNAL MEDICINE

## 2024-06-05 RX ORDER — IPRATROPIUM BROMIDE AND ALBUTEROL SULFATE 2.5; .5 MG/3ML; MG/3ML
1 SOLUTION RESPIRATORY (INHALATION) EVERY 4 HOURS
Qty: 360 ML | Refills: 4 | Status: SHIPPED | OUTPATIENT
Start: 2024-06-05

## 2024-06-05 RX ORDER — OXYCODONE HYDROCHLORIDE 10 MG/1
10 TABLET ORAL EVERY 6 HOURS PRN
Qty: 120 TABLET | Refills: 0 | Status: SHIPPED | OUTPATIENT
Start: 2024-06-05 | End: 2024-07-05

## 2024-06-05 RX ORDER — BENZONATATE 100 MG/1
100-200 CAPSULE ORAL 3 TIMES DAILY PRN
Qty: 60 CAPSULE | Refills: 0 | Status: SHIPPED | OUTPATIENT
Start: 2024-06-05 | End: 2024-06-15

## 2024-06-05 RX ORDER — ALBUTEROL SULFATE 90 UG/1
2 AEROSOL, METERED RESPIRATORY (INHALATION) EVERY 6 HOURS PRN
Qty: 18 G | Refills: 3 | Status: SHIPPED | OUTPATIENT
Start: 2024-06-05

## 2024-06-05 RX ORDER — CITALOPRAM 20 MG/1
20 TABLET ORAL DAILY
Qty: 30 TABLET | Refills: 0 | Status: SHIPPED | OUTPATIENT
Start: 2024-06-05

## 2024-06-05 ASSESSMENT — PATIENT HEALTH QUESTIONNAIRE - PHQ9
2. FEELING DOWN, DEPRESSED OR HOPELESS: NOT AT ALL
SUM OF ALL RESPONSES TO PHQ QUESTIONS 1-9: 0
1. LITTLE INTEREST OR PLEASURE IN DOING THINGS: NOT AT ALL
SUM OF ALL RESPONSES TO PHQ9 QUESTIONS 1 & 2: 0
SUM OF ALL RESPONSES TO PHQ QUESTIONS 1-9: 0

## 2024-06-05 NOTE — PROGRESS NOTES
Palliative Medicine Outpatient Clinic  Nurse Check in Note  [phone]    Patient Name: Fortunato Kowalski  YOB: 1965      Date of Visit: 06/05/2024  Visit Location:  [facility]    Chief patient or family concern today:     Medications:  Med reconciliation was performed with:  Patient    Requested refills:  None    If prescribed an opioid, does patient have access to naloxone at home?:  Yes  If No, pend naloxone nasal spray    Function and Symptoms:  Use of assist devices:  None    Palliative Performance Status (PPS):   Palliative Performance Scale (PPS)  PPS: 70    ESAS:  Modified-[place] Symptom Assessment Scale (ESAS)  Tiredness Score: Worst possible tiredness  Drowsiness Score: 8  Depression Score: Not depressed  Pain Score: Worst possible pain  Anxiety Score: Worst possible anxiety  Nausea Score: Not nauseated  Appetite Score: Best appetite  Dyspnea Score: Worst possible shortness of breath  Constipation: No  Wellbeing Score: 9  Other Problem Score: Best possible response    Constipation?  No  Last BM: 6/5/2024    Advance Care Planning:  Currently listed healthcare agent:    Primary Decision Maker: Yolanda Kowalski - Ex-Spouse - [phone]    Secondary Decision Maker: Iris Iglesias - Step Child - [phone]    Is there an ACP Note within the past 12 months?  YES  If No, Alert Clinician and/or Social Work      Frida Roman LPN          
Planning:    Primary Decision Maker: Yolanda Kowalski - Ex-Spouse - [phone]    Secondary Decision Maker: Iris Iglesias Child - [phone]            6/5/2024     9:30 AM   Demographics   Marital Status            The Palliative Medicine Team is here to support you and your family.       Sincerely,      Jazmine Ramirez MD   and the Palliative Medicine Team     GOALS OF CARE / TREATMENT PREFERENCES:     GOALS OF CARE:  Patient / health care proxy stated goals: See Patient Instructions / Summary    TREATMENT PREFERENCES:   Code Status:  [x] Attempt Resuscitation       [] Do Not Attempt Resuscitation    Advance Care Planning:  [x] The [facility] Interdisciplinary Team has updated the ACP Navigator with Decision Maker and Patient Capacity    Advance Care Planning   The patient has the following advanced directives on file:  Advance Directives       Power of  Living Will ACP-Advance Directive ACP-Power of     Not on File Filed on 05/06/24 Filed Not on File            The patient has appointed the following active healthcare agents:    Primary Decision Maker: Yolanda Kowalski - Ex-Spouse - [phone]    Secondary Decision Maker: Iris Iglesias Child - [phone]    The Patient has the following current code status:    Code Status: Prior           Other:  (If patient appropriate for POST, consider using PALLPOST smart phrase here)    The palliative care team has discussed with patient / health care proxy about goals of care / treatment preferences for patient.  [====Goals of Care====]     PRESCRIPTIONS GIVEN:     Orders Placed This Encounter   Medications    ipratropium 0.5 mg-albuterol 2.5 mg (DUONEB) 0.5-2.5 (3) MG/3ML SOLN nebulizer solution     Sig: Inhale 3 mLs into the lungs every 4 hours     Dispense:  360 mL     Refill:  4    albuterol sulfate HFA (PROVENTIL;VENTOLIN;PROAIR) 108 (90 Base) MCG/ACT inhaler     Sig: Inhale 2 puffs into the lungs every 6 hours as needed for Wheezing

## 2024-06-05 NOTE — TELEPHONE ENCOUNTER
Pt wife want to know about the lab results that wsa done while the pt was in the hospital.     # [phone]

## 2024-06-05 NOTE — PATIENT INSTRUCTIONS
Dear Fortunato Kowalski ,    It was a pleasure seeing you today    We will see you again in 4 weeks    If labs or imaging tests have been ordered for you today, please call the office  at [phone] 48 hours after completion to obtain the results.        This is the plan we talked about:    Pleurex catheter related referred pain in the mid and low back  - Increase Oxycodone 10 mg every 6 hours as needed for uncontrolled pain    Severe anxiety  - Start Celexa 20 mg at night time    Sleep  - Unable to sleep due to severe anxiety  - Let us try celexa first and if no help, I will add a medication for sleep    Cough and shortness of breath  - Tessalon perles three times a day  - Duonebs and albuterol inhaler    Smoking cessation  - Nicotine gum      This is what you have shared with us about Advance Care Planning:    Primary Decision Maker: Yolanda Kowalski - Ex-Spouse - [phone]    Secondary Decision Maker: Iris Iglesias - Step Child - [phone]            6/5/2024     9:30 AM   Demographics   Marital Status            The Palliative Medicine Team is here to support you and your family.       Sincerely,      Jazmine Ramirez MD   and the Palliative Medicine Team

## 2024-06-10 ENCOUNTER — TELEPHONE (OUTPATIENT)
Age: 59
End: 2024-06-10

## 2024-06-10 NOTE — TELEPHONE ENCOUNTER
Call placed to pt introduce self/ role of  within palliative medicine office.  Pt stated that he has good days and bad days, but has been feeling well since office visit last week.  This  will check in with him periodically as needed.    PATSY Feliciano  [facility] Palliative outpatient   [phone]

## 2024-06-11 NOTE — TELEPHONE ENCOUNTER
Palliative Medicine Clinic   [place]: [phone])    Patient Name: Fortunato Kowalski  YOB: 1965    Medication Refill Request    Patient is scheduled for follow up:  [x]  YES  []   NO  Next [facility] Med Clinic Visit:     PDMP reviewed:  [x] YES   []  System down / Unable  []  NO- Patient fills out of state    Medication:furosemide (LASIX) 40 MG   Dose and directions:Take 1 tablet by mouth daily   Number dispensed:10  Date filled (PDMP or Pharmacy):  # left:        Appropriate for refill:  []  YES  [x]  Early Request - Requires MD/NP Review      Other pertinent information for prescriber:        Not previously ordered by our staff

## 2024-06-11 NOTE — TELEPHONE ENCOUNTER
Palliative Medicine Clinic   [place]: [phone] ([phone])    Patient Name: Fortunato Kowalski  YOB: 1965    Medication Refill Request Triage    Requested by:    []  Patient  [x]  Support person:  wife    Last Pall Med Clinic Visit:  6/5/2024    Next Pall Med Clinic Visit: 7/18/2024     Preferred Pharmacy: [facility]  Backup Pharmacy:  *    Medications requested:  Lasix 40 mg    Is patient completely out?  [x]   YES  []   NO  If NO, how many pills does patient have left?  __    Other pertinent information shared:

## 2024-06-11 NOTE — TELEPHONE ENCOUNTER
Nebulizer still pending , Reached out to DME supplier to follow up on approval , awaiting response

## 2024-06-12 RX ORDER — ACETAMINOPHEN 325 MG/1
650 TABLET ORAL
Status: CANCELLED | OUTPATIENT
Start: 2024-06-20

## 2024-06-12 RX ORDER — ONDANSETRON 2 MG/ML
8 INJECTION INTRAMUSCULAR; INTRAVENOUS
Status: CANCELLED | OUTPATIENT
Start: 2024-06-20

## 2024-06-12 RX ORDER — FUROSEMIDE 40 MG/1
40 TABLET ORAL DAILY
Qty: 10 TABLET | Refills: 0 | Status: SHIPPED | OUTPATIENT
Start: 2024-06-12

## 2024-06-12 RX ORDER — DIPHENHYDRAMINE HYDROCHLORIDE 50 MG/ML
50 INJECTION INTRAMUSCULAR; INTRAVENOUS
Status: CANCELLED | OUTPATIENT
Start: 2024-06-20

## 2024-06-12 RX ORDER — ALBUTEROL SULFATE 90 UG/1
4 AEROSOL, METERED RESPIRATORY (INHALATION) PRN
Status: CANCELLED | OUTPATIENT
Start: 2024-06-20

## 2024-06-12 RX ORDER — SODIUM CHLORIDE 9 MG/ML
5-250 INJECTION, SOLUTION INTRAVENOUS PRN
Status: CANCELLED | OUTPATIENT
Start: 2024-06-20

## 2024-06-12 RX ORDER — SODIUM CHLORIDE 9 MG/ML
INJECTION, SOLUTION INTRAVENOUS CONTINUOUS
Status: CANCELLED | OUTPATIENT
Start: 2024-06-20

## 2024-06-12 RX ORDER — HEPARIN 100 UNIT/ML
500 SYRINGE INTRAVENOUS PRN
Status: CANCELLED | OUTPATIENT
Start: 2024-06-20

## 2024-06-12 RX ORDER — EPINEPHRINE 1 MG/ML
0.3 INJECTION, SOLUTION INTRAMUSCULAR; SUBCUTANEOUS PRN
Status: CANCELLED | OUTPATIENT
Start: 2024-06-20

## 2024-06-20 ENCOUNTER — OFFICE VISIT (OUTPATIENT)
Age: 59
End: 2024-06-20
Payer: COMMERCIAL

## 2024-06-20 ENCOUNTER — HOSPITAL ENCOUNTER (OUTPATIENT)
Facility: HOSPITAL | Age: 59
Setting detail: INFUSION SERIES
Discharge: HOME | End: 2024-06-20
Payer: COMMERCIAL

## 2024-06-20 ENCOUNTER — TELEPHONE (OUTPATIENT)
Age: 59
End: 2024-06-20

## 2024-06-20 VITALS
RESPIRATION RATE: 20 BRPM | HEART RATE: 103 BPM | WEIGHT: 227.2 LBS | TEMPERATURE: 98.9 F | SYSTOLIC BLOOD PRESSURE: 122 MMHG | BODY MASS INDEX: 35.66 KG/M2 | OXYGEN SATURATION: 94 % | DIASTOLIC BLOOD PRESSURE: 85 MMHG | HEIGHT: 67 IN

## 2024-06-20 VITALS
WEIGHT: 227 LBS | HEART RATE: 103 BPM | DIASTOLIC BLOOD PRESSURE: 85 MMHG | HEIGHT: 67 IN | RESPIRATION RATE: 16 BRPM | TEMPERATURE: 98.4 F | BODY MASS INDEX: 35.63 KG/M2 | SYSTOLIC BLOOD PRESSURE: 123 MMHG | OXYGEN SATURATION: 95 %

## 2024-06-20 DIAGNOSIS — C34.90 SQUAMOUS CELL CARCINOMA OF LUNG, STAGE IV, UNSPECIFIED LATERALITY (HCC): Primary | ICD-10-CM

## 2024-06-20 DIAGNOSIS — Z51.11 ENCOUNTER FOR ANTINEOPLASTIC CHEMOTHERAPY: ICD-10-CM

## 2024-06-20 DIAGNOSIS — F41.9 ANXIETY IN CANCER PATIENT: ICD-10-CM

## 2024-06-20 DIAGNOSIS — G89.3 CANCER ASSOCIATED PAIN: ICD-10-CM

## 2024-06-20 LAB
ALBUMIN SERPL-MCNC: 2.5 G/DL (ref 3.5–5)
ALBUMIN/GLOB SERPL: 0.6 (ref 1.1–2.2)
ALP SERPL-CCNC: 131 U/L (ref 45–117)
ALT SERPL-CCNC: 16 U/L (ref 12–78)
ANION GAP SERPL CALC-SCNC: 6 MMOL/L (ref 5–15)
AST SERPL-CCNC: 10 U/L (ref 15–37)
BASOPHILS # BLD: 0.1 K/UL (ref 0–0.1)
BASOPHILS NFR BLD: 1 % (ref 0–1)
BILIRUB SERPL-MCNC: 1.2 MG/DL (ref 0.2–1)
BUN SERPL-MCNC: 12 MG/DL (ref 6–20)
BUN/CREAT SERPL: 12 (ref 12–20)
CALCIUM SERPL-MCNC: 8.9 MG/DL (ref 8.5–10.1)
CHLORIDE SERPL-SCNC: 106 MMOL/L (ref 97–108)
CO2 SERPL-SCNC: 24 MMOL/L (ref 21–32)
CREAT SERPL-MCNC: 1 MG/DL (ref 0.7–1.3)
DIFFERENTIAL METHOD BLD: ABNORMAL
EOSINOPHIL # BLD: 0.1 K/UL (ref 0–0.4)
EOSINOPHIL NFR BLD: 1 % (ref 0–7)
ERYTHROCYTE [DISTWIDTH] IN BLOOD BY AUTOMATED COUNT: 17.9 % (ref 11.5–14.5)
GLOBULIN SER CALC-MCNC: 4.1 G/DL (ref 2–4)
GLUCOSE SERPL-MCNC: 207 MG/DL (ref 65–100)
HCT VFR BLD AUTO: 37.5 % (ref 36.6–50.3)
HGB BLD-MCNC: 12 G/DL (ref 12.1–17)
IMM GRANULOCYTES # BLD AUTO: 0 K/UL (ref 0–0.04)
IMM GRANULOCYTES NFR BLD AUTO: 0 % (ref 0–0.5)
LYMPHOCYTES # BLD: 1.2 K/UL (ref 0.8–3.5)
LYMPHOCYTES NFR BLD: 17 % (ref 12–49)
MCH RBC QN AUTO: 27 PG (ref 26–34)
MCHC RBC AUTO-ENTMCNC: 32 G/DL (ref 30–36.5)
MCV RBC AUTO: 84.5 FL (ref 80–99)
MONOCYTES # BLD: 0.6 K/UL (ref 0–1)
MONOCYTES NFR BLD: 9 % (ref 5–13)
NEUTS SEG # BLD: 5.1 K/UL (ref 1.8–8)
NEUTS SEG NFR BLD: 72 % (ref 32–75)
NRBC # BLD: 0 K/UL (ref 0–0.01)
NRBC BLD-RTO: 0 PER 100 WBC
PLATELET # BLD AUTO: 346 K/UL (ref 150–400)
PMV BLD AUTO: 10.2 FL (ref 8.9–12.9)
POTASSIUM SERPL-SCNC: 3.3 MMOL/L (ref 3.5–5.1)
PROT SERPL-MCNC: 6.6 G/DL (ref 6.4–8.2)
RBC # BLD AUTO: 4.44 M/UL (ref 4.1–5.7)
SODIUM SERPL-SCNC: 136 MMOL/L (ref 136–145)
TSH SERPL DL<=0.05 MIU/L-ACNC: 3.45 UIU/ML (ref 0.36–3.74)
WBC # BLD AUTO: 7.1 K/UL (ref 4.1–11.1)

## 2024-06-20 PROCEDURE — 85025 COMPLETE CBC W/AUTO DIFF WBC: CPT

## 2024-06-20 PROCEDURE — 2580000003 HC RX 258: Performed by: STUDENT IN AN ORGANIZED HEALTH CARE EDUCATION/TRAINING PROGRAM

## 2024-06-20 PROCEDURE — 6360000002 HC RX W HCPCS: Performed by: STUDENT IN AN ORGANIZED HEALTH CARE EDUCATION/TRAINING PROGRAM

## 2024-06-20 PROCEDURE — 96375 TX/PRO/DX INJ NEW DRUG ADDON: CPT

## 2024-06-20 PROCEDURE — 96367 TX/PROPH/DG ADDL SEQ IV INF: CPT

## 2024-06-20 PROCEDURE — 99215 OFFICE O/P EST HI 40 MIN: CPT | Performed by: STUDENT IN AN ORGANIZED HEALTH CARE EDUCATION/TRAINING PROGRAM

## 2024-06-20 PROCEDURE — 96415 CHEMO IV INFUSION ADDL HR: CPT

## 2024-06-20 PROCEDURE — 80053 COMPREHEN METABOLIC PANEL: CPT

## 2024-06-20 PROCEDURE — 2500000003 HC RX 250 WO HCPCS: Performed by: STUDENT IN AN ORGANIZED HEALTH CARE EDUCATION/TRAINING PROGRAM

## 2024-06-20 PROCEDURE — 96413 CHEMO IV INFUSION 1 HR: CPT

## 2024-06-20 PROCEDURE — 36415 COLL VENOUS BLD VENIPUNCTURE: CPT

## 2024-06-20 PROCEDURE — 84443 ASSAY THYROID STIM HORMONE: CPT

## 2024-06-20 PROCEDURE — 96417 CHEMO IV INFUS EACH ADDL SEQ: CPT

## 2024-06-20 RX ORDER — BENZONATATE 100 MG/1
CAPSULE ORAL
COMMUNITY
Start: 2024-06-18

## 2024-06-20 RX ORDER — DIPHENHYDRAMINE HYDROCHLORIDE 50 MG/ML
50 INJECTION INTRAMUSCULAR; INTRAVENOUS ONCE
Status: COMPLETED | OUTPATIENT
Start: 2024-06-20 | End: 2024-06-20

## 2024-06-20 RX ORDER — PALONOSETRON 0.05 MG/ML
0.25 INJECTION, SOLUTION INTRAVENOUS ONCE
Status: COMPLETED | OUTPATIENT
Start: 2024-06-20 | End: 2024-06-20

## 2024-06-20 RX ORDER — SODIUM CHLORIDE 9 MG/ML
5-250 INJECTION, SOLUTION INTRAVENOUS PRN
Status: DISCONTINUED | OUTPATIENT
Start: 2024-06-20 | End: 2024-06-21 | Stop reason: HOSPADM

## 2024-06-20 RX ORDER — SODIUM CHLORIDE 0.9 % (FLUSH) 0.9 %
5-40 SYRINGE (ML) INJECTION PRN
Status: DISCONTINUED | OUTPATIENT
Start: 2024-06-20 | End: 2024-06-21 | Stop reason: HOSPADM

## 2024-06-20 RX ADMIN — DIPHENHYDRAMINE HYDROCHLORIDE 50 MG: 50 INJECTION INTRAMUSCULAR; INTRAVENOUS at 11:35

## 2024-06-20 RX ADMIN — SODIUM CHLORIDE 150 MG: 9 INJECTION, SOLUTION INTRAVENOUS at 10:10

## 2024-06-20 RX ADMIN — SODIUM CHLORIDE 100 ML/HR: 9 INJECTION, SOLUTION INTRAVENOUS at 10:04

## 2024-06-20 RX ADMIN — SODIUM CHLORIDE, PRESERVATIVE FREE 10 ML: 5 INJECTION INTRAVENOUS at 15:21

## 2024-06-20 RX ADMIN — DEXAMETHASONE SODIUM PHOSPHATE 12 MG: 4 INJECTION, SOLUTION INTRAMUSCULAR; INTRAVENOUS at 10:10

## 2024-06-20 RX ADMIN — FAMOTIDINE 20 MG: 10 INJECTION, SOLUTION INTRAVENOUS at 11:29

## 2024-06-20 RX ADMIN — CARBOPLATIN 600 MG: 10 INJECTION, SOLUTION INTRAVENOUS at 14:50

## 2024-06-20 RX ADMIN — SODIUM CHLORIDE, PRESERVATIVE FREE 10 ML: 5 INJECTION INTRAVENOUS at 09:00

## 2024-06-20 RX ADMIN — PACLITAXEL 378 MG: 6 INJECTION, SOLUTION INTRAVENOUS at 11:55

## 2024-06-20 RX ADMIN — PALONOSETRON 0.25 MG: 0.05 INJECTION, SOLUTION INTRAVENOUS at 10:07

## 2024-06-20 RX ADMIN — SODIUM CHLORIDE 200 MG: 9 INJECTION, SOLUTION INTRAVENOUS at 10:51

## 2024-06-20 NOTE — TELEPHONE ENCOUNTER
[facility] FAXED OVER A PRESCRIPTION WANT TO KNOW IF IT HAS BEEN TAKEN CARE OF CHELLE [phone]

## 2024-06-20 NOTE — PROGRESS NOTES
Cancer [place] at [facility]  [address] [place] [address]  W: [phone] F: [phone]    Reason for Visit:   Fortunato Kowalski is a 58 y.o. male who is seen in consultation at the request of Dr. Dyson for evaluation of lung cancer.      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    Core biopsy shows similar features.       General Categorization   Malignant cells present     Specimen Adequacy   Satisfactory for evaluation.       Comment   Immunohistochemical staining on block 1A shows that the malignant cells   stain as follows:   CK 7: Positive   CK 20: Negative   TTF-1: Negative   P40: Positive   P63: Positive   CA-9: Negative   PAX8: Negative   Napsin A: Negative   NKX 3.1: Negative   CDX2: Negative   WT1: Negative   Claudin-4: Weakly positive   Calretinin: Negative     PDL1 (Keytruda) immunohistochemical testing is ordered on tissue block 1A   with the results to be reported in an addendum.     Initial Presentation  (HPI):       History of Present Illness  The patient is a 58-year-old gentleman who I originally saw as an inpatient for suspected lung malignancy with pleural-based nodules. He originally presented to the emergency department with complaints of shortness of breath and a large right-sided pleural effusion. He had an irregular area of pleural thickening on the medial aspect of his

## 2024-06-20 NOTE — PROGRESS NOTES
Fortunato Kowalski is a 58 y.o. male who presents for follow up of   Chief Complaint   Patient presents with    Follow-up     Squamous cell carcinoma of lung, stage IV, unspecified laterality       The patient reports no new clinical symptoms or new complaints since last clinic evaluation. He reports sob and insomnia.He reports a stitch may have come out in his back. Pt also has a cough and fatigue.     Was in Ed for sob 2 weeks ago    No interval surgery or procedures reported    No reported new medication changes reported       Medications reviewed with the patient, and chart updated to reflect changes.

## 2024-06-20 NOTE — PROGRESS NOTES
[facility] Visit Note    Pt arrived to [facility] ambulatory in no acute distress at 0850 for Keytruda/Carboplatin/Taxol C3.  Assessment unremarkable except numbness in fingers on right hand, 2+ edema in lower extremities, productive cough with small amount of white mucus, dyspnea with exertion and sometimes at rest, plus weakness/fatigue.  R chest port accessed without issue and positive blood return noted.  Labs obtained- CBC with diff, CMP, and TSH.    Patient denied having any symptoms of COVID-19, i.e. SOB, coughing, fever, or generally not feeling well.      Patient Vitals for the past 24 hrs:   BP Temp Temp src Pulse Resp SpO2 Height Weight   06/20/24 1521 122/85 -- -- (!) 103 -- -- -- --   06/20/24 0850 108/73 98.9 °F (37.2 °C) Temporal 95 20 94 % 1.702 m (5' 7\") 103.1 kg (227 lb 3.2 oz)        Recent Results (from the past 12 hour(s))   CBC With Auto Differential    Collection Time: 06/20/24  8:50 AM   Result Value Ref Range    WBC 7.1 4.1 - 11.1 K/uL    RBC 4.44 4.10 - 5.70 M/uL    Hemoglobin 12.0 (L) 12.1 - 17.0 g/dL    Hematocrit 37.5 36.6 - 50.3 %    MCV 84.5 80.0 - 99.0 FL    MCH 27.0 26.0 - 34.0 PG    MCHC 32.0 30.0 - 36.5 g/dL    RDW 17.9 (H) 11.5 - 14.5 %    Platelets 346 150 - 400 K/uL    MPV 10.2 8.9 - 12.9 FL    Nucleated RBCs 0.0 0  WBC    nRBC 0.00 0.00 - 0.01 K/uL    Neutrophils % 72 32 - 75 %    Lymphocytes % 17 12 - 49 %    Monocytes % 9 5 - 13 %    Eosinophils % 1 0 - 7 %    Basophils % 1 0 - 1 %    Immature Granulocytes % 0 0.0 - 0.5 %    Neutrophils Absolute 5.1 1.8 - 8.0 K/UL    Lymphocytes Absolute 1.2 0.8 - 3.5 K/UL    Monocytes Absolute 0.6 0.0 - 1.0 K/UL    Eosinophils Absolute 0.1 0.0 - 0.4 K/UL    Basophils Absolute 0.1 0.0 - 0.1 K/UL    Immature Granulocytes Absolute 0.0 0.00 - 0.04 K/UL    Differential Type AUTOMATED     Comprehensive metabolic panel    Collection Time: 06/20/24  8:50 AM   Result Value Ref Range    Sodium 136 136 - 145 mmol/L     Potassium 3.3 (L) 3.5 - 5.1 mmol/L    Chloride 106 97 - 108 mmol/L    CO2 24 21 - 32 mmol/L    Anion Gap 6 5 - 15 mmol/L    Glucose 207 (H) 65 - 100 mg/dL    BUN 12 6 - 20 MG/DL    Creatinine 1.00 0.70 - 1.30 MG/DL    BUN/Creatinine Ratio 12 12 - 20      Est, Glom Filt Rate 88 >60 ml/min/1.73m2    Calcium 8.9 8.5 - 10.1 MG/DL    Total Bilirubin 1.2 (H) 0.2 - 1.0 MG/DL    ALT 16 12 - 78 U/L    AST 10 (L) 15 - 37 U/L    Alk Phosphatase 131 (H) 45 - 117 U/L    Total Protein 6.6 6.4 - 8.2 g/dL    Albumin 2.5 (L) 3.5 - 5.0 g/dL    Globulin 4.1 (H) 2.0 - 4.0 g/dL    Albumin/Globulin Ratio 0.6 (L) 1.1 - 2.2     TSH without Reflex    Collection Time: 06/20/24  8:50 AM   Result Value Ref Range    TSH, 3rd Generation 3.45 0.36 - 3.74 uIU/mL     Two nurses verified prior to administering:  Drug name  Drug dose  Infusion volume or drug volume when prepared in a syringe  Rate of administration  Route of administration  Expiration dates and/or times  Appearance and physical integrity of the drugs  Rate set on infusion pump, when used  Sequencing of drug administration     Medications Administered         0.9 % sodium chloride infusion Admin Date  06/20/2024 Action  New Bag Dose  100 mL/hr Rate  100 mL/hr Route  IntraVENous Administered By  Vianca Lira RN        CARBOplatin (PARAPLATIN) 600 mg in sodium chloride 0.9 % 250 mL chemo IVPB Admin Date  06/20/2024 Action  New Bag Dose  600 mg Rate  670 mL/hr Route  IntraVENous Administered By  Vianca Lira RN        dexAMETHasone (DECADRON) 12 mg in sodium chloride 0.9% 50 mL IVPB Admin Date  06/20/2024 Action  New Bag Dose  12 mg Rate  200 mL/hr Route  IntraVENous Administered By  Vianca Lira RN        diphenhydrAMINE (BENADRYL) injection 50 mg Admin Date  06/20/2024 Action  Given Dose  50 mg Rate   Route  IntraVENous Administered By  Schember, Vianca J, RN        famotidine (PEPCID) 20 mg in sodium chloride (PF) 0.9 % 10 mL injection Admin Date  06/20/2024

## 2024-06-24 ENCOUNTER — TELEPHONE (OUTPATIENT)
Age: 59
End: 2024-06-24

## 2024-06-24 DIAGNOSIS — C34.90 METASTATIC NON-SMALL CELL LUNG CANCER (HCC): ICD-10-CM

## 2024-06-24 RX ORDER — OXYCODONE HYDROCHLORIDE 10 MG/1
10 TABLET ORAL EVERY 6 HOURS PRN
Qty: 60 TABLET | Refills: 0 | Status: SHIPPED | OUTPATIENT
Start: 2024-06-24 | End: 2024-07-09

## 2024-06-24 RX ORDER — BENZONATATE 100 MG/1
100-200 CAPSULE ORAL 3 TIMES DAILY PRN
Qty: 180 CAPSULE | Refills: 1 | Status: SHIPPED | OUTPATIENT
Start: 2024-06-24

## 2024-06-24 NOTE — TELEPHONE ENCOUNTER
Palliative Medicine Clinic   [place]: [phone] ([phone])    Patient Name: Fortunato Kowalski  YOB: 1965    Medication Refill Request    Patient is scheduled for follow up:  [x]  YES  []   NO  Next [facility] Med Clinic Visit:     PDMP reviewed:  [x] YES   []  System down / Unable  []  NO- Patient fills out of state    Medication:oxyCODONE HCl (OXY-IR) 10 MG   Dose and directions:Take 1 tablet by mouth every 6 hours   Number dispensed:120  Date filled (PDMP or Pharmacy):6/9/2024  # left:        Appropriate for refill:  []  YES  [x]  Early Request - Requires MD/NP Review      Other pertinent information for prescriber:

## 2024-06-24 NOTE — TELEPHONE ENCOUNTER
The patient's caregiver Yolanda states she spoke with the pharmacy. They will not fill early unless the doctor authorizes it.

## 2024-06-24 NOTE — TELEPHONE ENCOUNTER
The patient's caregiver, Yolanda states she was giving him his Oxycodone and the cat jumped on the sink. Caused all but 3 of the pills to go down the sink. She is asking for a refill to be called in to [facility] in [place].

## 2024-06-25 ENCOUNTER — TELEPHONE (OUTPATIENT)
Age: 59
End: 2024-06-25

## 2024-06-25 NOTE — TELEPHONE ENCOUNTER
Pt called inquired about paperwork for [employer] because of his disability. Pt inquired about paperwork being completed and faxed back. Advised pt of time frame allotted for nurse and MD to complete paperwork. Pt understood.

## 2024-06-26 NOTE — TELEPHONE ENCOUNTER
[employer] paperwork completed and faxed to Mesha at [employer]

## 2024-06-27 ENCOUNTER — TELEPHONE (OUTPATIENT)
Age: 59
End: 2024-06-27

## 2024-06-27 ENCOUNTER — HOSPITAL ENCOUNTER (OUTPATIENT)
Facility: HOSPITAL | Age: 59
Setting detail: INFUSION SERIES
Discharge: HOME | End: 2024-06-27
Payer: COMMERCIAL

## 2024-06-27 VITALS
RESPIRATION RATE: 22 BRPM | HEART RATE: 100 BPM | TEMPERATURE: 97.9 F | OXYGEN SATURATION: 96 % | SYSTOLIC BLOOD PRESSURE: 130 MMHG | DIASTOLIC BLOOD PRESSURE: 75 MMHG

## 2024-06-27 DIAGNOSIS — C34.90 METASTATIC NON-SMALL CELL LUNG CANCER (HCC): Primary | ICD-10-CM

## 2024-06-27 DIAGNOSIS — F41.9 ANXIETY: Primary | ICD-10-CM

## 2024-06-27 LAB
ALBUMIN SERPL-MCNC: 2.6 G/DL (ref 3.5–5)
ALBUMIN/GLOB SERPL: 0.7 (ref 1.1–2.2)
ALP SERPL-CCNC: 114 U/L (ref 45–117)
ALT SERPL-CCNC: 17 U/L (ref 12–78)
ANION GAP SERPL CALC-SCNC: 6 MMOL/L (ref 5–15)
AST SERPL-CCNC: 12 U/L (ref 15–37)
BASOPHILS # BLD: 0 K/UL (ref 0–0.1)
BASOPHILS NFR BLD: 1 % (ref 0–1)
BILIRUB SERPL-MCNC: 1.3 MG/DL (ref 0.2–1)
BUN SERPL-MCNC: 11 MG/DL (ref 6–20)
BUN/CREAT SERPL: 12 (ref 12–20)
CALCIUM SERPL-MCNC: 8.7 MG/DL (ref 8.5–10.1)
CHLORIDE SERPL-SCNC: 97 MMOL/L (ref 97–108)
CO2 SERPL-SCNC: 28 MMOL/L (ref 21–32)
CREAT SERPL-MCNC: 0.9 MG/DL (ref 0.7–1.3)
DIFFERENTIAL METHOD BLD: ABNORMAL
EOSINOPHIL # BLD: 0.1 K/UL (ref 0–0.4)
EOSINOPHIL NFR BLD: 2 % (ref 0–7)
ERYTHROCYTE [DISTWIDTH] IN BLOOD BY AUTOMATED COUNT: 16.9 % (ref 11.5–14.5)
GLOBULIN SER CALC-MCNC: 3.9 G/DL (ref 2–4)
GLUCOSE SERPL-MCNC: 172 MG/DL (ref 65–100)
HCT VFR BLD AUTO: 36.2 % (ref 36.6–50.3)
HGB BLD-MCNC: 11.5 G/DL (ref 12.1–17)
IMM GRANULOCYTES # BLD AUTO: 0 K/UL (ref 0–0.04)
IMM GRANULOCYTES NFR BLD AUTO: 1 % (ref 0–0.5)
LYMPHOCYTES # BLD: 0.7 K/UL (ref 0.8–3.5)
LYMPHOCYTES NFR BLD: 21 % (ref 12–49)
MCH RBC QN AUTO: 26.7 PG (ref 26–34)
MCHC RBC AUTO-ENTMCNC: 31.8 G/DL (ref 30–36.5)
MCV RBC AUTO: 84.2 FL (ref 80–99)
MONOCYTES # BLD: 0.1 K/UL (ref 0–1)
MONOCYTES NFR BLD: 3 % (ref 5–13)
NEUTS SEG # BLD: 2.4 K/UL (ref 1.8–8)
NEUTS SEG NFR BLD: 72 % (ref 32–75)
NRBC # BLD: 0 K/UL (ref 0–0.01)
NRBC BLD-RTO: 0 PER 100 WBC
PLATELET # BLD AUTO: 202 K/UL (ref 150–400)
PMV BLD AUTO: 11.1 FL (ref 8.9–12.9)
POTASSIUM SERPL-SCNC: 3.3 MMOL/L (ref 3.5–5.1)
PROT SERPL-MCNC: 6.5 G/DL (ref 6.4–8.2)
RBC # BLD AUTO: 4.3 M/UL (ref 4.1–5.7)
RBC MORPH BLD: ABNORMAL
SODIUM SERPL-SCNC: 131 MMOL/L (ref 136–145)
WBC # BLD AUTO: 3.3 K/UL (ref 4.1–11.1)

## 2024-06-27 PROCEDURE — 85025 COMPLETE CBC W/AUTO DIFF WBC: CPT

## 2024-06-27 PROCEDURE — 36415 COLL VENOUS BLD VENIPUNCTURE: CPT

## 2024-06-27 PROCEDURE — 80053 COMPREHEN METABOLIC PANEL: CPT

## 2024-06-27 PROCEDURE — 36591 DRAW BLOOD OFF VENOUS DEVICE: CPT

## 2024-06-27 NOTE — TELEPHONE ENCOUNTER
Yolanda Baldwin, a nurse with [employer] is calling on behalf of the patient. He is asking to have  anxiety medication increased. It is worse at night. Please reach out to the patient to advise.

## 2024-06-27 NOTE — TELEPHONE ENCOUNTER
----- Message from ASHANTI Hwang CNP sent at 6/27/2024  1:16 PM EDT -----  Patient's potassium 3.3, please see if still taking Lasix. May need PO Potassium     ----- Message -----  From: [facility] Incoming Lab For Copath Pdfs  Sent: 6/27/2024  10:30 AM EDT  To: ASHANTI Hwang CNP

## 2024-06-27 NOTE — TELEPHONE ENCOUNTER
Called pt.  HIPAA verified by two patient identifiers.   He verified he is taking lasix 40mg daily.    Please advise if we are to send in potassium prescription for him.

## 2024-06-27 NOTE — TELEPHONE ENCOUNTER
Palliative Medicine  Nursing Note  [phone]  Fax [phone]      Telephone Call  Patient Name: Fortunato Kowalski  YOB: [birth year]    Call returned to Mr. Kowalski and left voice mail message to please return call at his convenience to discuss his increased anxiety at night.      Shreya Garibay RN  Palliative Medicine

## 2024-06-27 NOTE — PROGRESS NOTES
[facility] Note:  Arrived for Port Flush + labs     /75   Pulse 100   Temp 97.9 °F (36.6 °C) (Temporal)   Resp 22   SpO2 96%     Port accessed, labs drawn; CBC, CMP, Pink tube, & flushed per protocol w/o difficulty. Aguirre needle removed.     Tolerated well. Pt denies any acute problems/changes. Discharged from [facility] ambulatory. No distress. Next appt: 7/3/2024

## 2024-06-27 NOTE — TELEPHONE ENCOUNTER
Palliative Medicine  Nursing Note  [phone]  Fax [phone]      Telephone Call  Patient Name: Fortunato Kowalski  YOB: [birth year]    Call returned to Mr. Kowalski who shared that he is having significant difficulty sleeping at night, wakes up every hour on the hour feeling \"panicky and anxious\" and tells himself to calm down over and over. If he can't calm down he will get up and sit in the kitchen for a while. He goes to bed around 10 PM and watches TV for an hour and is fine.  Once the TV goes off and he tries to sleep, the panic sets in. He says he isn't thinking about anything prior to feeling panicky. He denies shortness of breath, difficulty breathing, or increased pain, heart rate or respirations.  Reports that he just feels \"very anxious and panicky inside.\"     He reports that his anxiety/panic feeling is worse now than before starting Celexa. Hasn't noticed any reduction in symptoms with the increase to 20 mg 3 weeks ago. First noticed anxiety after discharged from hospital a month ago. Denies having depression or anxiety in the past or needing any medication for mood.     He takes his Celexa about an hour before bed and has done this for the past month. He finds that he can rest and sleep better during the day, not anxious.     Discussed that Dr. Ramriez recommended an increase in Celexa to 30 mg, can take 1.5 tabs until new prescription needed. Suggested he try Celexa in the morning to see if this minor change could help him at night. Asked that he call palliative in next few days if symptoms are better or worse. Reiterated that it can take a month or more to notice how well the medication might work and dose changes are common to find what works best. He verbalized understanding and was appreciative.       Shreya Garibay, RN  Palliative Medicine

## 2024-06-28 RX ORDER — CITALOPRAM 20 MG/1
20 TABLET ORAL DAILY
Qty: 30 TABLET | Refills: 0 | Status: SHIPPED | OUTPATIENT
Start: 2024-06-28

## 2024-07-01 ENCOUNTER — TELEPHONE (OUTPATIENT)
Age: 59
End: 2024-07-01

## 2024-07-01 NOTE — TELEPHONE ENCOUNTER
Call placed to pt for social work check in.  He stated he had a better weekend; however, upon further discussion he stated he continues to experience panic/anxiety at night.  He is unable to identify any particular thoughts or triggers for this.  We discussed anxiety can be a common response to everything he is going through.  We also discussed 4-7-8 breathing which he will try tonight.  He explained that last night he went to bed at 9:30 and woke up at 12:30, 1:30, 2:45, 3:30.  He stated that he is exhausted during the day and then dreads night time stating he goes to bed anxious thinking about how many times he will wake up.  This  sent a message to palliative RN and MD to update them as well.    PATSY Feliciano  [facility] Palliative outpatient   [phone]

## 2024-07-02 DIAGNOSIS — C34.90 METASTATIC NON-SMALL CELL LUNG CANCER (HCC): ICD-10-CM

## 2024-07-02 DIAGNOSIS — F41.9 ANXIETY: Primary | ICD-10-CM

## 2024-07-02 RX ORDER — SODIUM CHLORIDE 9 MG/ML
5-250 INJECTION, SOLUTION INTRAVENOUS PRN
Status: CANCELLED | OUTPATIENT
Start: 2024-07-11

## 2024-07-02 RX ORDER — FUROSEMIDE 40 MG/1
40 TABLET ORAL DAILY
Qty: 10 TABLET | Refills: 0 | Status: SHIPPED | OUTPATIENT
Start: 2024-07-02

## 2024-07-02 RX ORDER — CITALOPRAM HYDROBROMIDE 10 MG/1
10 TABLET ORAL DAILY
Qty: 30 TABLET | Refills: 3 | Status: SHIPPED | OUTPATIENT
Start: 2024-07-02

## 2024-07-02 RX ORDER — SODIUM CHLORIDE 9 MG/ML
INJECTION, SOLUTION INTRAVENOUS CONTINUOUS
Status: CANCELLED | OUTPATIENT
Start: 2024-07-11

## 2024-07-02 RX ORDER — DIPHENHYDRAMINE HYDROCHLORIDE 50 MG/ML
50 INJECTION INTRAMUSCULAR; INTRAVENOUS
Status: CANCELLED | OUTPATIENT
Start: 2024-07-11

## 2024-07-02 RX ORDER — ACETAMINOPHEN 325 MG/1
650 TABLET ORAL
Status: CANCELLED | OUTPATIENT
Start: 2024-07-11

## 2024-07-02 RX ORDER — ALBUTEROL SULFATE 90 UG/1
2 AEROSOL, METERED RESPIRATORY (INHALATION) EVERY 6 HOURS PRN
Qty: 18 G | Refills: 3 | Status: SHIPPED | OUTPATIENT
Start: 2024-07-02

## 2024-07-02 RX ORDER — HEPARIN 100 UNIT/ML
500 SYRINGE INTRAVENOUS PRN
Status: CANCELLED | OUTPATIENT
Start: 2024-07-11

## 2024-07-02 RX ORDER — ALPRAZOLAM 1 MG/1
1 TABLET ORAL 3 TIMES DAILY PRN
Qty: 63 TABLET | Refills: 0 | Status: SHIPPED | OUTPATIENT
Start: 2024-07-02 | End: 2024-07-23

## 2024-07-02 RX ORDER — BENZONATATE 100 MG/1
100-200 CAPSULE ORAL 3 TIMES DAILY PRN
Qty: 180 CAPSULE | Refills: 1 | Status: SHIPPED | OUTPATIENT
Start: 2024-07-02

## 2024-07-02 RX ORDER — ONDANSETRON 2 MG/ML
8 INJECTION INTRAMUSCULAR; INTRAVENOUS
Status: CANCELLED | OUTPATIENT
Start: 2024-07-11

## 2024-07-02 RX ORDER — EPINEPHRINE 1 MG/ML
0.3 INJECTION, SOLUTION INTRAMUSCULAR; SUBCUTANEOUS PRN
Status: CANCELLED | OUTPATIENT
Start: 2024-07-11

## 2024-07-02 RX ORDER — ALBUTEROL SULFATE 90 UG/1
4 AEROSOL, METERED RESPIRATORY (INHALATION) PRN
Status: CANCELLED | OUTPATIENT
Start: 2024-07-11

## 2024-07-02 NOTE — TELEPHONE ENCOUNTER
Palliative Medicine Clinic   [place]: [phone] ([phone])    Patient Name: Fortunato Kowalski  YOB: 1965    Medication Refill Request Triage    Requested by:    [x]  Patient  []  Support person:      Last Pall Med Clinic Visit:  6/5/2024    Next Pall Med Clinic Visit: 7/18/2024     Preferred Pharmacy: [facility] [place]  Backup Pharmacy:  *    Medications requested:  albuterol inhaler     Is patient completely out?  [x]   YES  []   NO  If NO, how many pills does patient have left?  __    Other pertinent information shared:

## 2024-07-02 NOTE — TELEPHONE ENCOUNTER
Palliative Medicine Clinic   [place]: [phone] ([phone])    Patient Name: Fortunato Kowalski  YOB: 1965    Medication Refill Request    Patient is scheduled for follow up:  [x]  YES  []   NO  Next [facility] Visit: 07/18/24    PDMP reviewed:  [x] YES   []  System down / Unable  []  NO- Patient fills out of state    Medication:ALPRAZolam (XANAX) 1 MG tablet   Dose and directions: Take 1 tablet by mouth 3 times daily as needed for Anxiety for up to 21 days. Do not take within 2 hours of an oxycodone dose   Number dispensed:63  Date filled (PDMP or Pharmacy):05/31/24  # left:unknown    Medication:    benzonatate (TESSALON) 100 MG capsule     Dose and directions:Take 1-2 capsules by mouth 3 times daily as needed for Cough,   Number dispensed:180  Date filled (PDMP or Pharmacy):06/24/24  #left: unknown    Medication:citalopram (CELEXA) 10 MG tablet   Dose and directions:Take 1 tablet by mouth daily,   Number dispensed:30  Date filled (PDMP or Pharmacy):05/22/24  #left:unknown    Medication:furosemide (LASIX) 40 MG tablet   Dose and directions:karen 1 tablet by mouth daily As needed for swelling,   Number dispensed:10  Date filled (PDMP or Pharmacy):06/12/24  #left:unknown    Appropriate for refill:  [x]  YES  []  Early Request - Requires MD/NP Review      Other pertinent information for prescriber:

## 2024-07-02 NOTE — TELEPHONE ENCOUNTER
Palliative Medicine Clinic   [place]: [phone] ([phone])    Patient Name: Fortunato Kowalski  YOB: 1965    Medication Refill Request Triage    Requested by:    []  Patient  [x]  Support person:  Yolanda Dex    Last Pall Med Clinic Visit:  6/5/2024    Next Pall Med Clinic Visit: 07/18/2024     Preferred Pharmacy: [facility] [place]  Backup Pharmacy:  *    Medications requested:  Alprazolam (2 1/2 left), Citalopram (8 left), Furosemide (5 left) and Cough [place].    Is patient completely out?  []   YES  [x]   NO  If NO, how many pills does patient have left?  __    Other pertinent information shared:  Yolanda is also f/u on increasing the dosage for Alprazolam.

## 2024-07-02 NOTE — TELEPHONE ENCOUNTER
[facility] Palliative Medicine Office  Nursing Note  [phone]  Fax [phone]      Name:  Fortunato Kowalski  YOB: 1965    Received outpatient Palliative Medicine referral from Shadia Nova NP to see patient for symptom management and supportive care. Chart  reviewed. Fortunato Kowalski is a 58 y.o. male with stage IV NSCLC.   Patient was hospitalized at [facility]  5/13/24-5/22/24  due to recurrent pleural effusion.  HE was seen by the inpatient Palliative Medicine team during his hospitalization.      Nurse called patient and explained [facility] outpatient Palliative Medicine services. Appointment scheduled for 6/5/24 at 9:30am with Dr. Jazmine Ramirez.     Lissette Jim RN, Gerontological Nursing-BC, [facility]

## 2024-07-03 ENCOUNTER — HOSPITAL ENCOUNTER (OUTPATIENT)
Facility: HOSPITAL | Age: 59
Setting detail: INFUSION SERIES
Discharge: HOME | End: 2024-07-03
Payer: COMMERCIAL

## 2024-07-03 VITALS
DIASTOLIC BLOOD PRESSURE: 69 MMHG | SYSTOLIC BLOOD PRESSURE: 93 MMHG | BODY MASS INDEX: 35.08 KG/M2 | WEIGHT: 224 LBS | HEART RATE: 98 BPM | TEMPERATURE: 98.3 F | OXYGEN SATURATION: 95 % | RESPIRATION RATE: 20 BRPM

## 2024-07-03 DIAGNOSIS — C34.90 METASTATIC NON-SMALL CELL LUNG CANCER (HCC): Primary | ICD-10-CM

## 2024-07-03 LAB
ALBUMIN SERPL-MCNC: 2.5 G/DL (ref 3.5–5)
ALBUMIN/GLOB SERPL: 0.6 (ref 1.1–2.2)
ALP SERPL-CCNC: 120 U/L (ref 45–117)
ALT SERPL-CCNC: 16 U/L (ref 12–78)
ANION GAP SERPL CALC-SCNC: 4 MMOL/L (ref 5–15)
AST SERPL-CCNC: 9 U/L (ref 15–37)
BASOPHILS # BLD: 0 K/UL (ref 0–0.1)
BASOPHILS NFR BLD: 1 % (ref 0–1)
BILIRUB SERPL-MCNC: 0.6 MG/DL (ref 0.2–1)
BUN SERPL-MCNC: 11 MG/DL (ref 6–20)
BUN/CREAT SERPL: 12 (ref 12–20)
CALCIUM SERPL-MCNC: 8.6 MG/DL (ref 8.5–10.1)
CHLORIDE SERPL-SCNC: 105 MMOL/L (ref 97–108)
CO2 SERPL-SCNC: 28 MMOL/L (ref 21–32)
CREAT SERPL-MCNC: 0.94 MG/DL (ref 0.7–1.3)
DIFFERENTIAL METHOD BLD: ABNORMAL
EOSINOPHIL # BLD: 0.1 K/UL (ref 0–0.4)
EOSINOPHIL NFR BLD: 3 % (ref 0–7)
ERYTHROCYTE [DISTWIDTH] IN BLOOD BY AUTOMATED COUNT: 17.5 % (ref 11.5–14.5)
GLOBULIN SER CALC-MCNC: 4 G/DL (ref 2–4)
GLUCOSE SERPL-MCNC: 211 MG/DL (ref 65–100)
HCT VFR BLD AUTO: 35.7 % (ref 36.6–50.3)
HGB BLD-MCNC: 11.3 G/DL (ref 12.1–17)
IMM GRANULOCYTES # BLD AUTO: 0 K/UL (ref 0–0.04)
IMM GRANULOCYTES NFR BLD AUTO: 0 % (ref 0–0.5)
LYMPHOCYTES # BLD: 1.1 K/UL (ref 0.8–3.5)
LYMPHOCYTES NFR BLD: 39 % (ref 12–49)
MCH RBC QN AUTO: 26.9 PG (ref 26–34)
MCHC RBC AUTO-ENTMCNC: 31.7 G/DL (ref 30–36.5)
MCV RBC AUTO: 85 FL (ref 80–99)
MONOCYTES # BLD: 0.8 K/UL (ref 0–1)
MONOCYTES NFR BLD: 27 % (ref 5–13)
NEUTS SEG # BLD: 0.9 K/UL (ref 1.8–8)
NEUTS SEG NFR BLD: 30 % (ref 32–75)
NRBC # BLD: 0 K/UL (ref 0–0.01)
NRBC BLD-RTO: 0 PER 100 WBC
PLATELET # BLD AUTO: 270 K/UL (ref 150–400)
PMV BLD AUTO: 10.3 FL (ref 8.9–12.9)
POTASSIUM SERPL-SCNC: 3.5 MMOL/L (ref 3.5–5.1)
PROT SERPL-MCNC: 6.5 G/DL (ref 6.4–8.2)
RBC # BLD AUTO: 4.2 M/UL (ref 4.1–5.7)
RBC MORPH BLD: ABNORMAL
SODIUM SERPL-SCNC: 137 MMOL/L (ref 136–145)
WBC # BLD AUTO: 2.9 K/UL (ref 4.1–11.1)
WBC MORPH BLD: ABNORMAL

## 2024-07-03 PROCEDURE — 80053 COMPREHEN METABOLIC PANEL: CPT

## 2024-07-03 PROCEDURE — 85025 COMPLETE CBC W/AUTO DIFF WBC: CPT

## 2024-07-03 PROCEDURE — 36415 COLL VENOUS BLD VENIPUNCTURE: CPT

## 2024-07-03 PROCEDURE — 36591 DRAW BLOOD OFF VENOUS DEVICE: CPT

## 2024-07-03 NOTE — PROGRESS NOTES
Outpatient Infusion Center Short Visit Note    Arrived for Port Flush/Labs    BP 93/69   Pulse 98   Temp 98.3 °F (36.8 °C)   Resp 20   Wt 101.6 kg (224 lb)   SpO2 95%   BMI 35.08 kg/m²     Port accessed with positive blood return noted.  Labs drawn per order and sent for processing.  Port flushed per protocol w/o difficulty. Aguirre needle removed. Site covered with band-aid.    Results still pending at time of note.    Pt tolerated well. Pt denies any acute problems/changes.     Discharged from [facility] ambulatory. No distress.     Next appt:   Future Appointments   Date Time Provider Department Center   7/3/2024 11:30 AM CONDE LAB CHAIR 1 [facility]   7/11/2024  8:00 AM [facility] CHEMO CHAIR 8 [facility]   7/11/2024  8:45 AM Shlomo Veronica MD ONC BS [facility]   7/18/2024  8:30 AM [facility] LAB CHAIR 1 [facility]   7/18/2024 10:30 AM Jazmine Ramirez MD [facility] BS AMB   7/25/2024  8:30 AM CONDE LAB CHAIR 1 [facility]   8/1/2024  9:00 AM [facility] CHEMO CHAIR 9 [facility]   8/8/2024  9:30 AM CONDE LAB CHAIR 1 [facility]   8/15/2024  8:30 AM CONDE LAB CHAIR 1 [facility]   8/22/2024  9:00 AM [facility] CHEMO CHAIR 9 [facility]   9/12/2024  9:00 AM [facility] CHEMO CHAIR 10 [facility]

## 2024-07-08 DIAGNOSIS — C34.90 METASTATIC NON-SMALL CELL LUNG CANCER (HCC): ICD-10-CM

## 2024-07-08 NOTE — TELEPHONE ENCOUNTER
Palliative Medicine Clinic   [place]: [phone] ([phone])    Patient Name: Fortunato Kowalski  YOB: 1965    Medication Refill Request    Patient is scheduled for follow up:  [x]  YES  []   NO  Next [facility] Med Clinic Visit:     PDMP reviewed:  [x] YES   []  System down / Unable  []  NO- Patient fills out of state    Medication:oxyCODONE HCl (OXY-IR) 10 MG   Dose and directions:Take 1 tablet by mouth every 6 hour   Number dispensed:60  Date filled (PDMP or Pharmacy):6/24/2024  # left:        Appropriate for refill:  [x]  YES  []  Early Request - Requires MD/NP Review      Other pertinent information for prescriber:

## 2024-07-08 NOTE — TELEPHONE ENCOUNTER
Palliative Medicine Clinic   [place]: [phone] ([phone])    Patient Name: Fortunato Kowalski  YOB: 1965    Medication Refill Request Triage    Requested by:    []  Patient  [x]  Support person:  Yolanda Dex    Last Pall Med Clinic Visit:  6/5/2024    Next Pall Med Clinic Visit: 07/18/2024     Preferred Pharmacy: [facility] [place]  Backup Pharmacy:  *    Medications requested:  Oxycodone 10 mg IR    Is patient completely out?  []   YES  [x]   NO  If NO, how many pills does patient have left?  A few    Other pertinent information shared:

## 2024-07-09 DIAGNOSIS — C34.90 METASTATIC NON-SMALL CELL LUNG CANCER (HCC): ICD-10-CM

## 2024-07-09 RX ORDER — OXYCODONE HYDROCHLORIDE 10 MG/1
10 TABLET ORAL EVERY 6 HOURS PRN
Qty: 60 TABLET | Refills: 0 | Status: SHIPPED | OUTPATIENT
Start: 2024-07-09 | End: 2024-07-24

## 2024-07-09 RX ORDER — OXYCODONE HYDROCHLORIDE 10 MG/1
10 TABLET ORAL EVERY 6 HOURS PRN
Qty: 60 TABLET | Refills: 0 | Status: SHIPPED | OUTPATIENT
Start: 2024-07-09 | End: 2024-07-09 | Stop reason: SDUPTHER

## 2024-07-09 NOTE — TELEPHONE ENCOUNTER
Yolanda went to the pharmacy to  the Oxycodone 10 mg, pharmacy is stating that it is too early for a  # [phone]

## 2024-07-11 ENCOUNTER — OFFICE VISIT (OUTPATIENT)
Age: 59
End: 2024-07-11
Payer: COMMERCIAL

## 2024-07-11 ENCOUNTER — HOSPITAL ENCOUNTER (OUTPATIENT)
Facility: HOSPITAL | Age: 59
Setting detail: INFUSION SERIES
Discharge: HOME | End: 2024-07-11
Payer: COMMERCIAL

## 2024-07-11 VITALS
BODY MASS INDEX: 34.39 KG/M2 | SYSTOLIC BLOOD PRESSURE: 105 MMHG | DIASTOLIC BLOOD PRESSURE: 74 MMHG | HEIGHT: 67 IN | HEART RATE: 103 BPM | RESPIRATION RATE: 18 BRPM | WEIGHT: 219.14 LBS | TEMPERATURE: 97.9 F | OXYGEN SATURATION: 100 %

## 2024-07-11 VITALS
SYSTOLIC BLOOD PRESSURE: 103 MMHG | OXYGEN SATURATION: 92 % | BODY MASS INDEX: 34.4 KG/M2 | HEART RATE: 105 BPM | RESPIRATION RATE: 20 BRPM | WEIGHT: 219.2 LBS | HEIGHT: 67 IN | DIASTOLIC BLOOD PRESSURE: 71 MMHG | TEMPERATURE: 97.9 F

## 2024-07-11 DIAGNOSIS — C34.90 SQUAMOUS CELL CARCINOMA OF LUNG, STAGE IV, UNSPECIFIED LATERALITY (HCC): Primary | ICD-10-CM

## 2024-07-11 DIAGNOSIS — Z51.11 ENCOUNTER FOR ANTINEOPLASTIC CHEMOTHERAPY: ICD-10-CM

## 2024-07-11 DIAGNOSIS — G89.3 CANCER ASSOCIATED PAIN: ICD-10-CM

## 2024-07-11 DIAGNOSIS — F41.9 ANXIETY IN CANCER PATIENT: ICD-10-CM

## 2024-07-11 LAB
ALBUMIN SERPL-MCNC: 2.7 G/DL (ref 3.5–5)
ALBUMIN/GLOB SERPL: 0.6 (ref 1.1–2.2)
ALP SERPL-CCNC: 121 U/L (ref 45–117)
ALT SERPL-CCNC: 14 U/L (ref 12–78)
ANION GAP SERPL CALC-SCNC: 4 MMOL/L (ref 5–15)
AST SERPL-CCNC: 12 U/L (ref 15–37)
BASOPHILS # BLD: 0.1 K/UL (ref 0–0.1)
BASOPHILS NFR BLD: 2 % (ref 0–1)
BILIRUB SERPL-MCNC: 0.5 MG/DL (ref 0.2–1)
BUN SERPL-MCNC: 8 MG/DL (ref 6–20)
BUN/CREAT SERPL: 7 (ref 12–20)
CALCIUM SERPL-MCNC: 9.1 MG/DL (ref 8.5–10.1)
CHLORIDE SERPL-SCNC: 102 MMOL/L (ref 97–108)
CO2 SERPL-SCNC: 29 MMOL/L (ref 21–32)
CREAT SERPL-MCNC: 1.07 MG/DL (ref 0.7–1.3)
DIFFERENTIAL METHOD BLD: ABNORMAL
EOSINOPHIL # BLD: 0.1 K/UL (ref 0–0.4)
EOSINOPHIL NFR BLD: 1 % (ref 0–7)
ERYTHROCYTE [DISTWIDTH] IN BLOOD BY AUTOMATED COUNT: 18.1 % (ref 11.5–14.5)
GLOBULIN SER CALC-MCNC: 4.3 G/DL (ref 2–4)
GLUCOSE SERPL-MCNC: 190 MG/DL (ref 65–100)
HCT VFR BLD AUTO: 37.4 % (ref 36.6–50.3)
HGB BLD-MCNC: 12 G/DL (ref 12.1–17)
IMM GRANULOCYTES # BLD AUTO: 0 K/UL (ref 0–0.04)
IMM GRANULOCYTES NFR BLD AUTO: 1 % (ref 0–0.5)
LYMPHOCYTES # BLD: 1.4 K/UL (ref 0.8–3.5)
LYMPHOCYTES NFR BLD: 20 % (ref 12–49)
MCH RBC QN AUTO: 27 PG (ref 26–34)
MCHC RBC AUTO-ENTMCNC: 32.1 G/DL (ref 30–36.5)
MCV RBC AUTO: 84 FL (ref 80–99)
MONOCYTES # BLD: 0.7 K/UL (ref 0–1)
MONOCYTES NFR BLD: 10 % (ref 5–13)
NEUTS SEG # BLD: 4.5 K/UL (ref 1.8–8)
NEUTS SEG NFR BLD: 66 % (ref 32–75)
NRBC # BLD: 0 K/UL (ref 0–0.01)
NRBC BLD-RTO: 0 PER 100 WBC
PLATELET # BLD AUTO: 348 K/UL (ref 150–400)
PMV BLD AUTO: 10.1 FL (ref 8.9–12.9)
POTASSIUM SERPL-SCNC: 3.8 MMOL/L (ref 3.5–5.1)
PROT SERPL-MCNC: 7 G/DL (ref 6.4–8.2)
RBC # BLD AUTO: 4.45 M/UL (ref 4.1–5.7)
SODIUM SERPL-SCNC: 135 MMOL/L (ref 136–145)
WBC # BLD AUTO: 6.8 K/UL (ref 4.1–11.1)

## 2024-07-11 PROCEDURE — 96415 CHEMO IV INFUSION ADDL HR: CPT

## 2024-07-11 PROCEDURE — 99215 OFFICE O/P EST HI 40 MIN: CPT | Performed by: NURSE PRACTITIONER

## 2024-07-11 PROCEDURE — 96367 TX/PROPH/DG ADDL SEQ IV INF: CPT

## 2024-07-11 PROCEDURE — 96413 CHEMO IV INFUSION 1 HR: CPT

## 2024-07-11 PROCEDURE — 80053 COMPREHEN METABOLIC PANEL: CPT

## 2024-07-11 PROCEDURE — 2500000003 HC RX 250 WO HCPCS: Performed by: STUDENT IN AN ORGANIZED HEALTH CARE EDUCATION/TRAINING PROGRAM

## 2024-07-11 PROCEDURE — 2580000003 HC RX 258: Performed by: STUDENT IN AN ORGANIZED HEALTH CARE EDUCATION/TRAINING PROGRAM

## 2024-07-11 PROCEDURE — 96375 TX/PRO/DX INJ NEW DRUG ADDON: CPT

## 2024-07-11 PROCEDURE — 96417 CHEMO IV INFUS EACH ADDL SEQ: CPT

## 2024-07-11 PROCEDURE — 36415 COLL VENOUS BLD VENIPUNCTURE: CPT

## 2024-07-11 PROCEDURE — 85025 COMPLETE CBC W/AUTO DIFF WBC: CPT

## 2024-07-11 PROCEDURE — 6360000002 HC RX W HCPCS: Performed by: STUDENT IN AN ORGANIZED HEALTH CARE EDUCATION/TRAINING PROGRAM

## 2024-07-11 RX ORDER — PALONOSETRON 0.05 MG/ML
0.25 INJECTION, SOLUTION INTRAVENOUS ONCE
Status: COMPLETED | OUTPATIENT
Start: 2024-07-11 | End: 2024-07-11

## 2024-07-11 RX ORDER — SODIUM CHLORIDE 9 MG/ML
5-250 INJECTION, SOLUTION INTRAVENOUS PRN
Status: DISCONTINUED | OUTPATIENT
Start: 2024-07-11 | End: 2024-07-12 | Stop reason: HOSPADM

## 2024-07-11 RX ORDER — DIPHENHYDRAMINE HYDROCHLORIDE 50 MG/ML
50 INJECTION INTRAMUSCULAR; INTRAVENOUS ONCE
Status: COMPLETED | OUTPATIENT
Start: 2024-07-11 | End: 2024-07-11

## 2024-07-11 RX ORDER — SODIUM CHLORIDE 0.9 % (FLUSH) 0.9 %
5-40 SYRINGE (ML) INJECTION PRN
Status: DISCONTINUED | OUTPATIENT
Start: 2024-07-11 | End: 2024-07-12 | Stop reason: HOSPADM

## 2024-07-11 RX ADMIN — DEXAMETHASONE SODIUM PHOSPHATE 12 MG: 4 INJECTION, SOLUTION INTRAMUSCULAR; INTRAVENOUS at 09:59

## 2024-07-11 RX ADMIN — SODIUM CHLORIDE 200 MG: 9 INJECTION, SOLUTION INTRAVENOUS at 10:47

## 2024-07-11 RX ADMIN — FAMOTIDINE 20 MG: 10 INJECTION, SOLUTION INTRAVENOUS at 10:25

## 2024-07-11 RX ADMIN — PALONOSETRON 0.25 MG: 0.05 INJECTION, SOLUTION INTRAVENOUS at 10:24

## 2024-07-11 RX ADMIN — DIPHENHYDRAMINE HYDROCHLORIDE 50 MG: 50 INJECTION INTRAMUSCULAR; INTRAVENOUS at 10:28

## 2024-07-11 RX ADMIN — CARBOPLATIN 550 MG: 10 INJECTION INTRAVENOUS at 14:42

## 2024-07-11 RX ADMIN — SODIUM CHLORIDE 150 MG: 9 INJECTION, SOLUTION INTRAVENOUS at 09:35

## 2024-07-11 RX ADMIN — SODIUM CHLORIDE 25 ML/HR: 9 INJECTION, SOLUTION INTRAVENOUS at 09:37

## 2024-07-11 RX ADMIN — PACLITAXEL 378 MG: 6 INJECTION, SOLUTION INTRAVENOUS at 11:41

## 2024-07-11 NOTE — PROGRESS NOTES
Name: Fortunato Kowalski    MRN: [id number]         : 1965    Mr. Kowalski Arrived ambulatory and in no distress for C4 D1 of Keytruda/Carboplatin/Taxol Regimen.  Assessment was completed. Reports increased fatigue, frequent cough, and dyspnea on exertion \"no more than normal.\"  Right chest wall port accessed without difficulty, labs drawn & sent for processing.    Patient proceed to appointment with Dr. Jay.    Mr. Kowalski's vitals were reviewed.  Patient Vitals for the past 24 hrs:   BP Temp Temp src Pulse Resp SpO2 Height Weight   24 1524 103/71 -- -- (!) 105 20 92 % -- --   24 0832 105/74 97.9 °F (36.6 °C) Temporal (!) 103 22 100 % -- --   24 0822 -- -- -- -- -- -- 1.702 m (5' 7.01\") 99.4 kg (219 lb 3.2 oz)     Lab results were obtained and reviewed.  Recent Results (from the past 12 hour(s))   Comprehensive metabolic panel    Collection Time: 24  8:24 AM   Result Value Ref Range    Sodium 135 (L) 136 - 145 mmol/L    Potassium 3.8 3.5 - 5.1 mmol/L    Chloride 102 97 - 108 mmol/L    CO2 29 21 - 32 mmol/L    Anion Gap 4 (L) 5 - 15 mmol/L    Glucose 190 (H) 65 - 100 mg/dL    BUN 8 6 - 20 MG/DL    Creatinine 1.07 0.70 - 1.30 MG/DL    BUN/Creatinine Ratio 7 (L) 12 - 20      Est, Glom Filt Rate 81 >60 ml/min/1.73m2    Calcium 9.1 8.5 - 10.1 MG/DL    Total Bilirubin 0.5 0.2 - 1.0 MG/DL    ALT 14 12 - 78 U/L    AST 12 (L) 15 - 37 U/L    Alk Phosphatase 121 (H) 45 - 117 U/L    Total Protein 7.0 6.4 - 8.2 g/dL    Albumin 2.7 (L) 3.5 - 5.0 g/dL    Globulin 4.3 (H) 2.0 - 4.0 g/dL    Albumin/Globulin Ratio 0.6 (L) 1.1 - 2.2     CBC With Auto Differential    Collection Time: 24  8:24 AM   Result Value Ref Range    WBC 6.8 4.1 - 11.1 K/uL    RBC 4.45 4.10 - 5.70 M/uL    Hemoglobin 12.0 (L) 12.1 - 17.0 g/dL    Hematocrit 37.4 36.6 - 50.3 %    MCV 84.0 80.0 - 99.0 FL    MCH 27.0 26.0 - 34.0 PG    MCHC 32.1 30.0 - 36.5 g/dL    RDW 18.1 (H) 11.5 - 14.5 %    Platelets 348 150 - 400 K/uL    MPV 10.1  Basilia HONR RN        palonosetron (ALOXI) injection 0.25 mg Admin Date  07/11/2024 Action  Given Dose  0.25 mg Rate   Route  IntraVENous Administered By  Anisha Lawson RN        pembrolizumab (KEYTRUDA) 200 mg in sodium chloride 0.9 % 100 mL IVPB Admin Date  07/11/2024 Action  New Bag Dose  200 mg Rate  236 mL/hr Route  IntraVENous Administered By  Anisha Lawson RN          Two nurses verified prior to administering: Drug name, drug dose, infusion volume or drug volume when prepared in a syringe, rate of administration, route of administration,  expiration dates and/or times, appearance and physical integrity of the drugs, rate set on infusion pump, when used sequencing of drug administration.     Mr. Kowalski tolerated treatment well and was discharged from [facility]patient [facility]fusion [facility] in stable condition at 1535.   Port de-accessed, flushed & heparinized per protocol. He is to return on 07/18/2024 for his next appointment.    ANISHA LAWSON RN  July 11, 2024

## 2024-07-11 NOTE — PATIENT INSTRUCTIONS
We have ordered imaging to be completed before your next visit. You will receive an automated call 1-2 days after today's visit. Please answer this call in order to schedule the test. If you miss this call or if you'd prefer to schedule on your own please call the scheduling department at [phone].

## 2024-07-11 NOTE — PROGRESS NOTES
Fortunato Kowalski is a 58 y.o. 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 Pt receiving Carboplatin + Taxol + Keytruda today; cycle 4; day 1. Pt report he has passed out 3 times in the last month after he coughing. Blackout doesn't last long but he don't remember what happened when he regains consciousness. Provider made aware.     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 Have you been to the ER, urgent care clinic since your last visit?  Hospitalized since your last visit?No    2. Have you seen or consulted any other health care providers outside of the [facility] since your last visit?  Include any pap smears or colon screening. No      
07/11/2024     CT Chest w/o Contrast 4/8/24:  IMPRESSION:     1. Superior mediastinal/pericardial mass with multiple nodules noted in the  epicardial fat pad and a small complex appearing pericardial effusion consistent  with malignancy.     2. Pleural-based implants in the right lung with a small right effusion and  persistent right pneumothorax.     3. Lung nodules in the right upper lobe. Airspace disease in the bilateral lower  lobes may represent pneumonia or aspiration.    CT C/A/P 4/9/24:  IMPRESSION:  1. Malignant pleural disease with multiple pleural nodules in the right lung and  small right effusion. Decreased right-sided pneumothorax status post right-sided  chest tube placement.     2. Superior mediastinal mass and multiple cardiophrenic nodules     3. Improved aeration of the right lung base. Persistent airspace opacity in the  superior segment of the left lower lobe which is overall improved as well. No  dominant lung masses are identified. There are scattered small nodules in the  right upper lobe    CT Chest w/o Contrast 5/14/24:  IMPRESSION:     1. Right middle and lower lobe collapse with patchy right lung consolidation.  Patchy groundglass opacities in the left lower lobe likely  infectious/inflammatory.  2. Moderate right hydropneumothorax with large right pleural effusion.  3. Stable mediastinal lymphadenopathy.  4. Increased moderate to large pericardial effusion.    Assessment:     Assessment & Plan  1. Stage IV Metastatic non-small cell lung cancer.    The proposed treatment plan includes chemotherapy with carboplatin, Taxol, and pembrolizumab, to be administered every 3 weeks for 4 cycles, followed by maintenance pembrolizumab.    - PDL1 of 0%    - Repeating CT C/A/P now after 4 cycles of chemotherapy     5/9/2024 5/30/2024   Oncology Flowsheet     Day, Cycle Day 1, Cycle 1  Day 1, Cycle 2    CARBOplatin (PARAPLATIN)  mg  590 mg    PACLitaxel 300 MG/50ML (TAXOL)  mg/m2 = 378 mg

## 2024-07-16 ENCOUNTER — TELEPHONE (OUTPATIENT)
Age: 59
End: 2024-07-16

## 2024-07-16 NOTE — TELEPHONE ENCOUNTER
Called patient to advise/confirm upcoming appt with Dr. Ramirez on 7/18/2024  at 10:30  at [facility] Office.  Left a message.

## 2024-07-17 ENCOUNTER — TELEPHONE (OUTPATIENT)
Age: 59
End: 2024-07-17

## 2024-07-17 NOTE — TELEPHONE ENCOUNTER
Palliative Medicine Clinic   [place]: [phone] ([phone])    Patient Name: Fortunato Kowalski  YOB: 1965    Medication Refill Request Triage    Requested by:    []  Patient  [x]  Support person: Yolanda Kowalski     Last Pall Med Clinic Visit:  6/5/2024    Next Pall Med Clinic Visit: 07/18/2024     Preferred Pharmacy: [facility] [place]  Backup Pharmacy:  *    Medications requested:  Cough Medicine (out), Oxycodone (2 left), Furosemide (3 left) and Citalopram (out)    Is patient completely out?  [x]   YES  [x]   NO  If NO, how many pills does patient have left?  __    Other pertinent information shared:  The patient has been taking more than 2 per day of Oxycodone due to stomach pain. He also has been experiencing constipation.

## 2024-07-17 NOTE — TELEPHONE ENCOUNTER
Patient has refill on Tessalon , and Celexa   Patient to contact pharmacy to process    Patient out of Opioids a week early again     Patient to keep in person appointment for tomorrow

## 2024-07-18 ENCOUNTER — OFFICE VISIT (OUTPATIENT)
Age: 59
End: 2024-07-18

## 2024-07-18 VITALS
DIASTOLIC BLOOD PRESSURE: 71 MMHG | SYSTOLIC BLOOD PRESSURE: 104 MMHG | OXYGEN SATURATION: 98 % | BODY MASS INDEX: 34.47 KG/M2 | HEIGHT: 67 IN | RESPIRATION RATE: 18 BRPM | HEART RATE: 101 BPM | WEIGHT: 219.6 LBS | TEMPERATURE: 96.8 F

## 2024-07-18 DIAGNOSIS — Z79.891 LONG TERM (CURRENT) USE OF OPIATE ANALGESIC: ICD-10-CM

## 2024-07-18 DIAGNOSIS — C34.90 METASTATIC NON-SMALL CELL LUNG CANCER (HCC): Primary | ICD-10-CM

## 2024-07-18 DIAGNOSIS — F41.9 ANXIETY: ICD-10-CM

## 2024-07-18 DIAGNOSIS — R06.02 SOB (SHORTNESS OF BREATH): ICD-10-CM

## 2024-07-18 DIAGNOSIS — G89.3 CANCER ASSOCIATED PAIN: ICD-10-CM

## 2024-07-18 RX ORDER — OXYCODONE HYDROCHLORIDE 5 MG/1
5 TABLET ORAL DAILY PRN
Qty: 7 TABLET | Refills: 0 | Status: SHIPPED | OUTPATIENT
Start: 2024-07-18 | End: 2024-07-25

## 2024-07-18 RX ORDER — LACTULOSE 10 G/15ML
10 SOLUTION ORAL EVERY EVENING
Qty: 237 ML | Refills: 1 | Status: SHIPPED | OUTPATIENT
Start: 2024-07-18

## 2024-07-18 NOTE — PROGRESS NOTES
Palliative Medicine Outpatient Clinic  Nurse Check in Note  [phone]    Patient Name: Fortunato Kowalski  YOB: 1965      Date of Visit: 07/18/2024  Visit Location:  [facility]    Chief patient or family concern today:     Patient's Last Palliative Medicine Clinic Visit Date:  6/5/2024    Have you been to an ER or urgent care center since your last visit?  No  Have you been hospitalized since your last visit? No  Have you seen or consulted any health care providers outside of the [facility] system since your last visit?  No  If Yes, alert PSR to request appropriate records from non-[facility] offices    Medications:  Med reconciliation was performed with:  Patient    Requested refills:  Yes- pended for clinician    If prescribed an opioid, does patient have access to naloxone at home?:  NO  If No, pend naloxone nasal spray    Function and Symptoms:  Use of assist devices:  None    Palliative Performance Status (PPS):   Palliative Performance Scale (PPS)  PPS: 70    ESAS:  Modified-[place] Symptom Assessment Scale (ESAS)  Tiredness Score: Not tired  Drowsiness Score: 2  Depression Score: Not depressed  Pain Score: Worst possible pain  Anxiety Score: 8  Nausea Score: Not nauseated  Appetite Score: Best appetite  Dyspnea Score: 9  Constipation: No  Wellbeing Score: 2  Other Problem Score: Best possible response    Constipation?  Yes  Last BM: 7/18/2024    Advance Care Planning:  Currently listed healthcare agent:    Primary Decision Maker: Yolanda Kowalski - Ex-Spouse - [phone]    Secondary Decision Maker: Iris Iglesias - Step Child - [phone]    Is there an ACP Note within the past 12 months?  YES  If No, Alert Clinician and/or Social Work      Frida Roman LPN          
PORT PLACEMENT EQUAL OR GREATER THAN 5 YEARS  5/6/2024    IR PORT PLACEMENT EQUAL OR GREATER THAN 5 YEARS 5/6/2024 Adi Freitas MD [facility] IR      Family History   Problem Relation Age of Onset    Unknown Mother     Unknown Father     Cancer Maternal Grandmother     Cancer Maternal Grandfather       History reviewed, no pertinent family history.  Social History     Tobacco Use    Smoking status: Every Day     Current packs/day: 2.00     Types: Cigarettes    Smokeless tobacco: Never   Substance Use Topics    Alcohol use: Not Currently     Allergies   Allergen Reactions    Cefazolin In Sodium Chloride Hives, Rash and Cough    Hydrocodone Itching    Morphine Itching     Itching and doesn't work      Current Outpatient Medications   Medication Sig    oxyCODONE (ROXICODONE) 5 MG immediate release tablet Take 1 tablet by mouth daily as needed for Pain for up to 7 days. Intended supply: 7 days. Take lowest dose possible to manage pain Max Daily Amount: 5 mg    lactulose (CHRONULAC) 10 GM/15ML solution Take 15 mLs by mouth every evening    oxyCODONE HCl (OXY-IR) 10 MG immediate release tablet Take 1 tablet by mouth every 6 hours as needed for Pain for up to 15 days. Max Daily Amount: 40 mg    benzonatate (TESSALON) 100 MG capsule Take 1-2 capsules by mouth 3 times daily as needed for Cough    citalopram (CELEXA) 10 MG tablet Take 1 tablet by mouth daily    furosemide (LASIX) 40 MG tablet Take 1 tablet by mouth daily As needed for swelling    ALPRAZolam (XANAX) 1 MG tablet Take 1 tablet by mouth 3 times daily as needed for Anxiety for up to 21 days. Do not take within 2 hours of Oxycodone Max Daily Amount: 3 mg    albuterol sulfate HFA (PROVENTIL;VENTOLIN;PROAIR) 108 (90 Base) MCG/ACT inhaler Inhale 2 puffs into the lungs every 6 hours as needed for Wheezing    citalopram (CELEXA) 20 MG tablet Take 1 tablet by mouth daily    ipratropium 0.5 mg-albuterol 2.5 mg (DUONEB) 0.5-2.5 (3) MG/3ML SOLN nebulizer solution

## 2024-07-18 NOTE — PATIENT INSTRUCTIONS
Dear Fortunato Kowalski ,    It was a pleasure seeing you today    We will see you again in 4 weeks    If labs or imaging tests have been ordered for you today, please call the office  at [phone] 48 hours after completion to obtain the results.        This is the plan we talked about:    Pleurex catheter related referred pain in the mid and low back  - Increase Oxycodone 10 mg every 6 hours as needed for uncontrolled pain  - This pain is resolved, now complaining of pain in the abdomen, severe constipation.    Discussed that he is using opioids unnecessarily, some chemical coping, wife agrees, will wean off opioids    Opioid taper  - You have 2 tablets of Oxycodone 10 mg left- take half tab of 5 mg every 8 hours for the next 3 days and then take 5 mg daily for 3 days and then stop.      Severe anxiety  - Start Celexa 20 mg at night time    Sleep  - Unable to sleep due to severe anxiety  - Let us try celexa first and if no help, I will add a medication for sleep    Cough and shortness of breath  - Tessalon perles three times a day  - Duonebs and albuterol inhaler    Smoking cessation  - Nicotine gum      This is what you have shared with us about Advance Care Planning:    Primary Decision Maker: Yolanda Kowalski - Ex-Spouse - [phone]    Secondary Decision Maker: Iris Iglesias - Step Child - [phone]            7/18/2024    10:38 AM   Demographics   Marital Status            The Palliative Medicine Team is here to support you and your family.       Sincerely,      Jazmine Ramirez MD   and the Palliative Medicine Team

## 2024-07-19 ENCOUNTER — TELEPHONE (OUTPATIENT)
Age: 59
End: 2024-07-19

## 2024-07-19 NOTE — TELEPHONE ENCOUNTER
[facility]  Oncology Social Work Encounter    [x] Med-Onc [facility] [] Med-Onc [facility] [] Med-Onc [facility] [] Rad-Onc [facility] [] Rad-Onc [facility] [] Rad-Onc [facility] [] Rad-Onc [facility] [] [facility]    Patient: Fortunato Kowalski    Encounter Type:    [] Initial SW Encounter  [] Patient Initiated  [] Referral  [] Distress/PHQ Screening  [x] Other:      Concern(s)/Barrier(s) to Care:     Narrative: Pt ex spouse Yolanda called concerned about bills she is receiving.  SW encouraged her to call [phone] and to expect a 1 hour wait and provide [employer] # and/or [employer] #.      SW also review pt hospital account with 56K balance/some marked self pay.  Pharmacist will see if ensemble can rebill.      Pt doing much better per Yolanda.     Referral/Handouts:   Complimentary therapies referral  Insurance/Entitlements referral  Financial/Medication assistance referral       Plan:

## 2024-07-22 DIAGNOSIS — F41.9 ANXIETY: ICD-10-CM

## 2024-07-22 RX ORDER — ALPRAZOLAM 1 MG/1
1 TABLET ORAL 2 TIMES DAILY PRN
Qty: 30 TABLET | Refills: 0 | Status: SHIPPED | OUTPATIENT
Start: 2024-07-22 | End: 2024-08-06

## 2024-07-22 NOTE — TELEPHONE ENCOUNTER
Palliative Medicine Clinic   [place]: [phone] ([phone])    Patient Name: Fortunato Kowalski  YOB: 1965    Medication Refill Request    Patient is scheduled for follow up:  [x]  YES  []   NO  Next hospitals Med Clinic Visit:     PDMP reviewed:  [x] YES   []  System down / Unable  []  NO- Patient fills out of state    Medication:ALPRAZolam (XANAX) 1 MG   Dose and directions:Take 1 tablet by mouth 3 times daily   Number dispensed:63  Date filled (PDMP or Pharmacy):  # left:        Appropriate for refill:  [x]  YES  []  Early Request - Requires MD/NP Review      Other pertinent information for prescriber:

## 2024-07-22 NOTE — TELEPHONE ENCOUNTER
Palliative Medicine Clinic   [place]: [phone] ([phone])    Patient Name: Fortunato Kowalski  YOB: 1965    Medication Refill Request Triage    Requested by:    []  Patient  [x]  Support person:Yolanda Kowalski      Last [facility] Med Clinic Visit:  7/18/2024    Next [facility] Med Clinic Visit: 08/20/2024     Pref[facility]  Backup Pharmacy:  *    Medications requested:  Anxiety Medication    Is patient completely out?  [x]   YES  []   NO  If NO, how many pills does patient have left?  __    Other pertinent information shared:

## 2024-07-23 ENCOUNTER — TELEPHONE (OUTPATIENT)
Age: 59
End: 2024-07-23

## 2024-07-23 LAB — DRUGS UR: NORMAL

## 2024-07-23 NOTE — TELEPHONE ENCOUNTER
Palliative Medicine Clinic   [place]: [phone] ([phone])    Patient Name: Fortunato Kowalski  YOB: 1965    Medication Refill Request Triage    Requested by:    []  Patient  [x]  Support person: Yolanda Kowalski     Last [facility] Med Clinic Visit:  7/18/2024    Next [facility] Med Clinic Visit:     [facility] [place] [facility]  Backup Pharmacy:  *    Medications requested:  Anxiety Medication    Is patient completely out?  [x]   YES  []   NO  If NO, how many pills does patient have left?  __    Other pertinent information shared:

## 2024-07-24 ENCOUNTER — TELEPHONE (OUTPATIENT)
Age: 59
End: 2024-07-24

## 2024-07-24 NOTE — TELEPHONE ENCOUNTER
Palliative Medicine Clinic   [place]: [phone] ([phone])    Patient Name: Fortunato Kowalski  YOB: 1965    Medication Refill Request Triage    Requested by:    []  Patient  [x]  Support person: Yolanda Kowalski     Last Pall Med Clinic Visit:  7/18/2024    Next Pall Med Clinic Visit: 08/20/2024     Preferred Pharmacy: [facility] [place]   Backup Pharmacy:  *    Medications requested:  Albuterol inhaler    Is patient completely out?  []   YES  []   NO  If NO, how many pills does patient have left?  __    Other pertinent information shared:  Yolanda would like to cancel the request to refill anxiety medication from yesterday. [facility].

## 2024-07-24 NOTE — TELEPHONE ENCOUNTER
Called Joann, no answer.  Left VM asking for a call back.    Pulse ox-92% at baseline and dropped from 92 to 64% once he sat down it came back up but took a minute. \"I have been having some chest pain this morning and on the right side of my chest where my cancer is\" he said took tylenol which helped some. Denies feeling like his heart is racing.  Pt is not on oxygen.    Case d/w  and pt should go to ER.    Spoke with pt. He is aware to go to ER.     He asked when his next appointment is scheduled for with us. I advised 8/1 at 0900.

## 2024-07-25 ENCOUNTER — HOSPITAL ENCOUNTER (OUTPATIENT)
Facility: HOSPITAL | Age: 59
Discharge: HOME | End: 2024-07-25
Payer: COMMERCIAL

## 2024-07-25 ENCOUNTER — TELEPHONE (OUTPATIENT)
Age: 59
End: 2024-07-25

## 2024-07-25 DIAGNOSIS — C34.90 SQUAMOUS CELL CARCINOMA OF LUNG, STAGE IV, UNSPECIFIED LATERALITY (HCC): ICD-10-CM

## 2024-07-25 PROCEDURE — C1729 CATH, DRAINAGE: HCPCS

## 2024-07-25 PROCEDURE — 6360000004 HC RX CONTRAST MEDICATION: Performed by: NURSE PRACTITIONER

## 2024-07-25 PROCEDURE — 74177 CT ABD & PELVIS W/CONTRAST: CPT

## 2024-07-25 RX ORDER — ONDANSETRON 2 MG/ML
8 INJECTION INTRAMUSCULAR; INTRAVENOUS
Status: CANCELLED | OUTPATIENT
Start: 2024-08-01

## 2024-07-25 RX ORDER — EPINEPHRINE 1 MG/ML
0.3 INJECTION, SOLUTION INTRAMUSCULAR; SUBCUTANEOUS PRN
Status: CANCELLED | OUTPATIENT
Start: 2024-08-01

## 2024-07-25 RX ORDER — SODIUM CHLORIDE 9 MG/ML
5-250 INJECTION, SOLUTION INTRAVENOUS PRN
Status: CANCELLED | OUTPATIENT
Start: 2024-08-01

## 2024-07-25 RX ORDER — ALBUTEROL SULFATE 90 UG/1
4 AEROSOL, METERED RESPIRATORY (INHALATION) PRN
Status: CANCELLED | OUTPATIENT
Start: 2024-08-01

## 2024-07-25 RX ORDER — ACETAMINOPHEN 325 MG/1
650 TABLET ORAL
Status: CANCELLED | OUTPATIENT
Start: 2024-08-01

## 2024-07-25 RX ORDER — HEPARIN 100 UNIT/ML
500 SYRINGE INTRAVENOUS PRN
Status: CANCELLED | OUTPATIENT
Start: 2024-08-01

## 2024-07-25 RX ORDER — SODIUM CHLORIDE 0.9 % (FLUSH) 0.9 %
5-40 SYRINGE (ML) INJECTION PRN
Status: CANCELLED | OUTPATIENT
Start: 2024-08-01

## 2024-07-25 RX ORDER — DIPHENHYDRAMINE HYDROCHLORIDE 50 MG/ML
50 INJECTION INTRAMUSCULAR; INTRAVENOUS
Status: CANCELLED | OUTPATIENT
Start: 2024-08-01

## 2024-07-25 RX ORDER — SODIUM CHLORIDE 9 MG/ML
INJECTION, SOLUTION INTRAVENOUS CONTINUOUS
Status: CANCELLED | OUTPATIENT
Start: 2024-08-01

## 2024-07-25 RX ADMIN — IOPAMIDOL 100 ML: 755 INJECTION, SOLUTION INTRAVENOUS at 12:55

## 2024-07-25 NOTE — TELEPHONE ENCOUNTER
Palliative Medicine  Nursing Note  [phone]  Fax [phone]      Telephone Call  Patient Name: Fortunato Kowalski  YOB: [birth year]    Call returned to Ms. Kowalski who stated that her  had a CT scan today with contrast at [facility] and was noted to have additional fluid around his lung. She said he had 700 ml of fluid drained from his right sided Pleurx catheter. He had also drained 300 ml earlier today at home. As he was leaving the hospital, before getting into his care, he developed severe right sided chest pain rated at 10/10 and nausea and vomiting. He denies any difficulty breathing or shortness of breath, there is no drainage, redness, swelling, or other changes noted at insertion site of Pleurx. He denies fever, or any other aches and pains.     They live an hour from [facility] and are just getting home.  The symptoms continue in severity.    Per Dr. Ramirez, she recommends he go to ER or call 911 so he can be evaluated for any acute changes with catheter or lung.     Call returned to Ms. Kowalski and informed of the above. She verbalized understanding, but wants to give him some Tylenol, let him rest for a bit, and see if the pain and N/V resolve. Reiterated Dr. Ramirez's concerns of seeking emergent care. Mrs. Kowalski states that her  refuses to be seen in their local hospital so she will need to drive him back to [facility] and doubts ambulance service would take him there. Suggested that if they chose to wait to closely monitor symptoms, and any changes or worsening symptoms such as difficulty breathing, shortness of breath, fever, etc., and reinforced seeking urgent care immediately. She verbalized understanding.     Call placed to [facility] CT area and spoke with nurse who verified that an NP did drain over 500 ml of fluid from patients Pleurx without difficulty based on the results of the CT scan.       Shreya Garibay, YOLA  Palliative Medicine

## 2024-07-25 NOTE — PROGRESS NOTES
Notified by CT tech that patient has significant large right pleural effusion with a tunneled pleural catheter  in place. Pt states right pleural catheter has not been putting out as much fluid the past few days. Catheter assessed and flushed with 10 ml of sterile saline.  Immediate fluid returned. Catheter hooked to suction bag with a total of 700 ml of clear yellow pleural fluid removed. Pt tolerated removal without difficulty. Catheter functioning properly. Pt instructed  to call IR if problems reoccur and he is unable to drain fluid appropriately. He was provided with direct number. Pt voiced understanding and was discharged via wheelchair with wife.

## 2024-07-25 NOTE — TELEPHONE ENCOUNTER
Patient in severe pain, he had just came back from getting him stomach drained and flushed. Yolanda is asking if the provider can pain meds for him # [phone]

## 2024-07-30 DIAGNOSIS — F41.9 ANXIETY: ICD-10-CM

## 2024-07-30 RX ORDER — ALPRAZOLAM 1 MG/1
1 TABLET ORAL 2 TIMES DAILY PRN
Qty: 60 TABLET | Refills: 1 | Status: CANCELLED | OUTPATIENT
Start: 2024-08-05 | End: 2024-11-03

## 2024-07-30 NOTE — TELEPHONE ENCOUNTER
Palliative Medicine Clinic   [place]: [phone]    Patient Name: Fortunato Kowalski  YOB: 1965    Medication Refill Request Triage    Requested by:    []  Patient  [x]  Support person:  Yolanda yeimi    Last Pall Med Clinic Visit:  7/18/2024    Next Pall Med Clinic Visit: 8/20/2024     Preferred Pharmacy: [facility]  Backup Pharmacy:  *    Medications requested:  Albuterol inhaler, xanax, tessalon, celexa 20 mg    Is patient completely out?  []   YES  [x]   NO  If NO, how many pills does patient have left?  _Albuterol inhaler 0, xanax 3.5, tessalon 0, celexa 0_    Other pertinent information shared:

## 2024-07-30 NOTE — TELEPHONE ENCOUNTER
Palliative Medicine Clinic   [place]: [phone])    Patient Name: Fortunato Kowalski  YOB: 1965    Medication Refill Request Triage    Requested by:    []  Patient  [x]  Support person:  Yolanda Kowalski    Last [facility] Med Clinic Visit:  7/18/2024    Next [facility] Med Clinic Visit: 08/20/2024     Preferred Pharmacy: [facility]  Backup Pharmacy:  *    Medications requested:  Furosemide    Is patient completely out?  []   YES  [x]   NO  If NO, how many pills does patient have left?  unknown    Other pertinent information shared:

## 2024-07-30 NOTE — TELEPHONE ENCOUNTER
Palliative Medicine Clinic   [place]: [phone] ([phone])    Patient Name: Fortunato Kowalski  YOB: 1965    Medication Refill Request    Patient is scheduled for follow up:  [x]  YES  []   NO  Next [facility] Clinic Visit:     PDMP reviewed:  [x] YES   []  System down / Unable  []  NO- Patient fills out of state    Medication:ALPRAZolam (XANAX) 1 MG   Dose and directions:Take 1 tablet by mouth 2 times daily a   Number dispensed:30  Date filled (PDMP or Pharmacy):  # left:    Medication:citalopram (CELEXA) 20 MG   Dose and directions:Take 1 tablet by mouth daily   Number dispensed:30  Date filled (PDMP or Pharmacy):  #left:    Appropriate for refill:  [x]  YES  []  Early Request - Requires MD/NP Review      Other pertinent information for prescriber:      Tessalon has refills   Inhaler has refills

## 2024-07-30 NOTE — TELEPHONE ENCOUNTER
Yolanda Kowalski called back to advise [facility] says it is too early to fill anxiety medication. The dosage needs to be corrected. He has 3 1/2 left.

## 2024-07-31 NOTE — TELEPHONE ENCOUNTER
Returned call to patients wife  reports patients Xanax  1 mg , BID has been increased to 1.5 mg BID , message forwarded to confirm     Last visit Celexa 10 mg daily , has been increased to 20 mg     Message forwarded to provider for clarification

## 2024-08-01 ENCOUNTER — CLINICAL DOCUMENTATION (OUTPATIENT)
Age: 59
End: 2024-08-01

## 2024-08-01 ENCOUNTER — OFFICE VISIT (OUTPATIENT)
Age: 59
End: 2024-08-01
Payer: COMMERCIAL

## 2024-08-01 ENCOUNTER — HOSPITAL ENCOUNTER (OUTPATIENT)
Facility: HOSPITAL | Age: 59
Setting detail: INFUSION SERIES
Discharge: HOME | End: 2024-08-01
Payer: COMMERCIAL

## 2024-08-01 VITALS
SYSTOLIC BLOOD PRESSURE: 102 MMHG | WEIGHT: 233.1 LBS | HEIGHT: 67 IN | TEMPERATURE: 98.7 F | DIASTOLIC BLOOD PRESSURE: 71 MMHG | OXYGEN SATURATION: 95 % | HEART RATE: 84 BPM | BODY MASS INDEX: 36.58 KG/M2 | RESPIRATION RATE: 18 BRPM

## 2024-08-01 VITALS
HEART RATE: 80 BPM | RESPIRATION RATE: 17 BRPM | DIASTOLIC BLOOD PRESSURE: 74 MMHG | WEIGHT: 233 LBS | HEIGHT: 67 IN | OXYGEN SATURATION: 98 % | TEMPERATURE: 98.2 F | BODY MASS INDEX: 36.57 KG/M2 | SYSTOLIC BLOOD PRESSURE: 104 MMHG

## 2024-08-01 DIAGNOSIS — C34.90 SQUAMOUS CELL CARCINOMA OF LUNG, STAGE IV, UNSPECIFIED LATERALITY (HCC): Primary | ICD-10-CM

## 2024-08-01 LAB
ALBUMIN SERPL-MCNC: 2.6 G/DL (ref 3.5–5)
ALBUMIN/GLOB SERPL: 0.6 (ref 1.1–2.2)
ALP SERPL-CCNC: 133 U/L (ref 45–117)
ALT SERPL-CCNC: 16 U/L (ref 12–78)
ANION GAP SERPL CALC-SCNC: 5 MMOL/L (ref 5–15)
AST SERPL-CCNC: 9 U/L (ref 15–37)
BASOPHILS # BLD: 0.1 K/UL (ref 0–0.1)
BASOPHILS NFR BLD: 2 % (ref 0–1)
BILIRUB SERPL-MCNC: 0.7 MG/DL (ref 0.2–1)
BUN SERPL-MCNC: 11 MG/DL (ref 6–20)
BUN/CREAT SERPL: 11 (ref 12–20)
CALCIUM SERPL-MCNC: 9.2 MG/DL (ref 8.5–10.1)
CHLORIDE SERPL-SCNC: 105 MMOL/L (ref 97–108)
CO2 SERPL-SCNC: 26 MMOL/L (ref 21–32)
CREAT SERPL-MCNC: 1.03 MG/DL (ref 0.7–1.3)
DIFFERENTIAL METHOD BLD: ABNORMAL
EOSINOPHIL # BLD: 0 K/UL (ref 0–0.4)
EOSINOPHIL NFR BLD: 1 % (ref 0–7)
ERYTHROCYTE [DISTWIDTH] IN BLOOD BY AUTOMATED COUNT: 19.6 % (ref 11.5–14.5)
GLOBULIN SER CALC-MCNC: 4.1 G/DL (ref 2–4)
GLUCOSE SERPL-MCNC: 146 MG/DL (ref 65–100)
HCT VFR BLD AUTO: 38.4 % (ref 36.6–50.3)
HGB BLD-MCNC: 11.8 G/DL (ref 12.1–17)
IMM GRANULOCYTES # BLD AUTO: 0 K/UL (ref 0–0.04)
IMM GRANULOCYTES NFR BLD AUTO: 0 % (ref 0–0.5)
LYMPHOCYTES # BLD: 1.4 K/UL (ref 0.8–3.5)
LYMPHOCYTES NFR BLD: 27 % (ref 12–49)
MCH RBC QN AUTO: 25.9 PG (ref 26–34)
MCHC RBC AUTO-ENTMCNC: 30.7 G/DL (ref 30–36.5)
MCV RBC AUTO: 84.4 FL (ref 80–99)
MONOCYTES # BLD: 0.6 K/UL (ref 0–1)
MONOCYTES NFR BLD: 11 % (ref 5–13)
NEUTS SEG # BLD: 3 K/UL (ref 1.8–8)
NEUTS SEG NFR BLD: 59 % (ref 32–75)
NRBC # BLD: 0 K/UL (ref 0–0.01)
NRBC BLD-RTO: 0 PER 100 WBC
PLATELET # BLD AUTO: 321 K/UL (ref 150–400)
PMV BLD AUTO: 10 FL (ref 8.9–12.9)
POTASSIUM SERPL-SCNC: 3.9 MMOL/L (ref 3.5–5.1)
PROT SERPL-MCNC: 6.7 G/DL (ref 6.4–8.2)
RBC # BLD AUTO: 4.55 M/UL (ref 4.1–5.7)
SODIUM SERPL-SCNC: 136 MMOL/L (ref 136–145)
TSH SERPL DL<=0.05 MIU/L-ACNC: 10.3 UIU/ML (ref 0.36–3.74)
WBC # BLD AUTO: 5.1 K/UL (ref 4.1–11.1)

## 2024-08-01 PROCEDURE — 99215 OFFICE O/P EST HI 40 MIN: CPT | Performed by: STUDENT IN AN ORGANIZED HEALTH CARE EDUCATION/TRAINING PROGRAM

## 2024-08-01 PROCEDURE — 36415 COLL VENOUS BLD VENIPUNCTURE: CPT

## 2024-08-01 PROCEDURE — 80053 COMPREHEN METABOLIC PANEL: CPT

## 2024-08-01 PROCEDURE — 36591 DRAW BLOOD OFF VENOUS DEVICE: CPT

## 2024-08-01 PROCEDURE — 85025 COMPLETE CBC W/AUTO DIFF WBC: CPT

## 2024-08-01 PROCEDURE — 84443 ASSAY THYROID STIM HORMONE: CPT

## 2024-08-01 RX ORDER — FAMOTIDINE 10 MG/ML
20 INJECTION, SOLUTION INTRAVENOUS ONCE
OUTPATIENT
Start: 2024-08-07 | End: 2024-08-08

## 2024-08-01 RX ORDER — PALONOSETRON 0.05 MG/ML
0.25 INJECTION, SOLUTION INTRAVENOUS ONCE
OUTPATIENT
Start: 2024-08-07 | End: 2024-08-08

## 2024-08-01 RX ORDER — DIPHENHYDRAMINE HYDROCHLORIDE 50 MG/ML
25 INJECTION INTRAMUSCULAR; INTRAVENOUS ONCE
OUTPATIENT
Start: 2024-08-07 | End: 2024-08-08

## 2024-08-01 RX ORDER — ALPRAZOLAM 1 MG/1
1 TABLET ORAL 2 TIMES DAILY PRN
Qty: 60 TABLET | Refills: 0 | Status: SHIPPED | OUTPATIENT
Start: 2024-08-01 | End: 2024-08-31

## 2024-08-01 RX ORDER — ACETAMINOPHEN 325 MG/1
650 TABLET ORAL ONCE
OUTPATIENT
Start: 2024-08-07 | End: 2024-08-08

## 2024-08-01 RX ORDER — CITALOPRAM 20 MG/1
20 TABLET ORAL DAILY
Qty: 30 TABLET | Refills: 3 | Status: SHIPPED | OUTPATIENT
Start: 2024-08-01

## 2024-08-01 NOTE — PROGRESS NOTES
Pharmacy Note- Oncology Treatment Education    Fortunato Kowalski is a  58 y.o.male  diagnosed with Nonsmall cell lung cancer here today for chemotherapy treatment counseling. Mr. Kowalski is being treated with Trodelvy.   Provided education on sacituzumab govitecan and premedications - atropine, acetaminophen, diphenhydramine, famotidine, palonosetron, and fosaprepitant.    Side effects of oncology treatment reviewed included s/s infection, anemia, appetite changes, thrombocytopenia, fatigue, hair loss/alopecia, bone pain, skin and nail changes, diarrhea/constipation, nausea/vomiting, mouth sores, and peripheral neuropathy.    Patient given ways to manage these side effects and when to contact office.     [facility] Handout of medications provided to patient. Mr. Kowalski verbalized understanding of the information presented and all of the patient's questions were answered.    Marzena Rm, PharmD, BCOP  For Pharmacy Admin Tracking Only    Program: Medical Group  CPA in place:  No  Recommendation Provided To: Patient/Caregiver: 2 via In person  Intervention Accepted By: Patient/Caregiver: 1   Time Spent (min): 15

## 2024-08-01 NOTE — PROGRESS NOTES
Outpatient Infusion Center - Chemotherapy Progress Note    Pt admit to [facility] for C5 in stable condition. Assessment completed.     R chest port accessed with positive blood return. Labs drawn per order and sent. Line flushed, clamped, Curos Cap applied to end clave. Pt over to MD office. Pt returned to [facility], holding treatment today d/t progression. Port de-accessed.    Patient Vitals for the past 12 hrs:   Temp Pulse Resp BP SpO2   08/01/24 0900 98.7 °F (37.1 °C) 84 18 102/71 95 %        Recent Results (from the past 12 hour(s))   TSH without Reflex    Collection Time: 08/01/24  9:03 AM   Result Value Ref Range    TSH, 3rd Generation 10.30 (H) 0.36 - 3.74 uIU/mL   Comprehensive metabolic panel    Collection Time: 08/01/24  9:03 AM   Result Value Ref Range    Sodium 136 136 - 145 mmol/L    Potassium 3.9 3.5 - 5.1 mmol/L    Chloride 105 97 - 108 mmol/L    CO2 26 21 - 32 mmol/L    Anion Gap 5 5 - 15 mmol/L    Glucose 146 (H) 65 - 100 mg/dL    BUN 11 6 - 20 MG/DL    Creatinine 1.03 0.70 - 1.30 MG/DL    BUN/Creatinine Ratio 11 (L) 12 - 20      Est, Glom Filt Rate 85 >60 ml/min/1.73m2    Calcium 9.2 8.5 - 10.1 MG/DL    Total Bilirubin 0.7 0.2 - 1.0 MG/DL    ALT 16 12 - 78 U/L    AST 9 (L) 15 - 37 U/L    Alk Phosphatase 133 (H) 45 - 117 U/L    Total Protein 6.7 6.4 - 8.2 g/dL    Albumin 2.6 (L) 3.5 - 5.0 g/dL    Globulin 4.1 (H) 2.0 - 4.0 g/dL    Albumin/Globulin Ratio 0.6 (L) 1.1 - 2.2     CBC With Auto Differential    Collection Time: 08/01/24  9:03 AM   Result Value Ref Range    WBC 5.1 4.1 - 11.1 K/uL    RBC 4.55 4.10 - 5.70 M/uL    Hemoglobin 11.8 (L) 12.1 - 17.0 g/dL    Hematocrit 38.4 36.6 - 50.3 %    MCV 84.4 80.0 - 99.0 FL    MCH 25.9 (L) 26.0 - 34.0 PG    MCHC 30.7 30.0 - 36.5 g/dL    RDW 19.6 (H) 11.5 - 14.5 %    Platelets 321 150 - 400 K/uL    MPV 10.0 8.9 - 12.9 FL    Nucleated RBCs 0.0 0  WBC    nRBC 0.00 0.00 - 0.01 K/uL    Neutrophils % 59 32 - 75 %    Lymphocytes % 27 12 - 49 %    Monocytes % 11 5 -

## 2024-08-01 NOTE — PROGRESS NOTES
Fortunato Kowalski is a 58 y.o. male who presents for follow up of   Chief Complaint   Patient presents with    Follow-up     Squamous cell carcinoma of lung, stage IV, unspecified laterality        The patient reports no new clinical symptoms or new complaints since last clinic evaluation. He reports pain today 10/10 in abdomen. He continues to have some fatigue,and sob.     No interval hospitalizations reported    No interval surgery or procedures reported    No reported new medication changes reported       Medications reviewed with the patient, and chart updated to reflect changes.        
right-sided pleural effusion. He had an irregular area of pleural thickening on the medial aspect of his right upper lobe measuring about 5.1 cm x 3.8 cm x 3.4 cm that projected over the anterior mediastinum. He also had a large right-sided, pleural effusion with associated collapse of the right lower lobe and right middle lobe. He underwent thoracentesis on 04/07/2024 and had a biopsy of the right-sided pleural-based lung mass on 04/10/2024. Biopsy returned recently showing squamous cell carcinoma. Additional imaging includes CT abdomen and pelvis that shows no evidence of distant metastatic disease. His functional status improved after therapeutic and diagnostic thoracentesis. He was also suspected to have pneumonia and was treated empirically with antibiotics. He was discharged from the hospital on 04/12/2024. He presents to my clinic today for discussion of results and treatment options for management of metastatic non-small cell lung cancer. Relevant social history includes 20-pack-year history of smoking. He currently smokes 2.5 packs per day. He was offered a nicotine patch as an inpatient. The patient works as a long-. He is accompanied by an adult female.    The patient is planning a trip to [place] tomorrow, which will take approximately 8 days. He has expressed curiosity about the stage of his cancer and whether his disability status will necessitate this process. He has a long-standing history of smoking, dating back to his childhood, and is contemplating whether his smoking habit could be exacerbating his condition. He has attempted to quit smoking using nicotine patches from [employer], but found them ineffective. He has not yet tried nicotine gums.      Interval History:   8/1/24  Patient presents to the office with his wife.   Pleurex catheter is still in place, drainage varies. Yesterday there was no output, but varies from 300-700 mL usually.   Patient's biggest complaint today is

## 2024-08-02 ENCOUNTER — TELEPHONE (OUTPATIENT)
Age: 59
End: 2024-08-02

## 2024-08-02 DIAGNOSIS — C34.90 MALIGNANT NEOPLASM OF LUNG, UNSPECIFIED LATERALITY, UNSPECIFIED PART OF LUNG (HCC): Primary | ICD-10-CM

## 2024-08-02 RX ORDER — OXYCODONE HYDROCHLORIDE 10 MG/1
10 TABLET ORAL EVERY 4 HOURS PRN
Qty: 90 TABLET | Refills: 0 | Status: SHIPPED | OUTPATIENT
Start: 2024-08-02 | End: 2024-08-17

## 2024-08-02 NOTE — TELEPHONE ENCOUNTER
Palliative Medicine  Nursing Note  [phone]  Fax [phone]      Telephone Call  Patient Name: Fortunato Kowalski  YOB: [birth year]    Call placed to Mr. Kowalski who states that he has increased pain to his right lung area noted more so this week. The pain is constant and will \"bring him to his knees.\"  He is requesting something to ease his pain. States he was currently on Oxy IR 5 mg but it didn't help, the 10 mg worked better.      Reviewed EMR and noted recent CT from 7/31/24 noted for significant progression of disease to chest.    He notes increased shortness of breath and continues to remove 500-650 ml of yellow clear fluid from his right sided Pleurx catheter each day. While speaking on the phone, patient has audible SOB noted. He states he is taking Furosemide daily for swelling and has manageable BLE edema.     He asked about Xanax 1 mg prescription and mentioned that a NP increased his dose to 1.5 mg 2 x day. Discussed that Dr. Sanches increased his Cymbalta to 20 mg yesterday so keeping the Xanax at 1 tab 2 x day.  New prescriptions were send to pharmacy.      Call returned to Mr. Kowalski and left voice mail message that Dr. Ramirez sent in a prescription for Oxy IR 10 mg 1 every 4 hours prn. Reiterated to please call palliative for any questions or concerns regarding symptom management and medications.     Incoming call from Mr. Kowalski and informed of the above. He verbalized understanding and was appreciative.     Shreya Garibay RN  Palliative Medicine

## 2024-08-02 NOTE — TELEPHONE ENCOUNTER
Pt's wife said the doctor was supposed to call in some pain medication for the pt and nothing has been called in please give them a call...

## 2024-08-02 NOTE — PROGRESS NOTES
Oxycodone 10 mg every 4 hours as needed for pain.  CT from 7/24 reviewed, significant progression of disease.  Follow up with Dr. Gao and me soon

## 2024-08-05 NOTE — TELEPHONE ENCOUNTER
Called because she stated that pt advised her that he has a conversation with Dr. BALDERAS during his last visit about pt getting oxygen for the home. Inquired about if the order was put in for the oxygen for the pt.

## 2024-08-05 NOTE — TELEPHONE ENCOUNTER
Case d/w .  He suggested that PT get consulted and screen patient for oxygen needs.    Called [facility], HIPAA verified by two patient identifiers. She also suggested to have PT evaluate and treat patient, she will fax over order for us to sign it and they will do his assessment then our office will need to send off to an oxygen company for pt to get the oxygen.    Will await fax.

## 2024-08-06 ENCOUNTER — TELEPHONE (OUTPATIENT)
Age: 59
End: 2024-08-06

## 2024-08-06 NOTE — TELEPHONE ENCOUNTER
The patient's spouse Yolanda is requesting a refill for anxiety medicine. Pharmacy states it can not be filled until 8/13. She says he needs it ASAP as he is stressed to the max and talking about giving up. Does not want to start new treatment. Chemo hardly touched the cancer. To be called in to [facility] [place] [facility] # [phone].

## 2024-08-07 ENCOUNTER — HOSPITAL ENCOUNTER (OUTPATIENT)
Facility: HOSPITAL | Age: 59
Setting detail: INFUSION SERIES
Discharge: HOME | End: 2024-08-07

## 2024-08-07 ENCOUNTER — APPOINTMENT (OUTPATIENT)
Age: 59
End: 2024-08-07
Payer: COMMERCIAL

## 2024-08-07 VITALS
RESPIRATION RATE: 20 BRPM | OXYGEN SATURATION: 93 % | HEART RATE: 98 BPM | DIASTOLIC BLOOD PRESSURE: 89 MMHG | SYSTOLIC BLOOD PRESSURE: 131 MMHG | TEMPERATURE: 98 F

## 2024-08-07 DIAGNOSIS — C34.90 SQUAMOUS CELL CARCINOMA OF LUNG, STAGE IV, UNSPECIFIED LATERALITY (HCC): ICD-10-CM

## 2024-08-07 DIAGNOSIS — C34.90 SQUAMOUS CELL CARCINOMA OF LUNG, STAGE IV, UNSPECIFIED LATERALITY (HCC): Primary | ICD-10-CM

## 2024-08-07 RX ORDER — SODIUM CHLORIDE 9 MG/ML
INJECTION, SOLUTION INTRAVENOUS CONTINUOUS
Status: CANCELLED | OUTPATIENT
Start: 2024-08-22

## 2024-08-07 RX ORDER — SODIUM CHLORIDE 9 MG/ML
5-250 INJECTION, SOLUTION INTRAVENOUS PRN
Status: CANCELLED | OUTPATIENT
Start: 2024-08-22

## 2024-08-07 RX ORDER — ACETAMINOPHEN 325 MG/1
650 TABLET ORAL
Status: CANCELLED | OUTPATIENT
Start: 2024-08-15

## 2024-08-07 RX ORDER — FAMOTIDINE 10 MG/ML
20 INJECTION, SOLUTION INTRAVENOUS ONCE
Status: CANCELLED | OUTPATIENT
Start: 2024-08-22 | End: 2024-08-14

## 2024-08-07 RX ORDER — FAMOTIDINE 10 MG/ML
20 INJECTION, SOLUTION INTRAVENOUS
Status: CANCELLED | OUTPATIENT
Start: 2024-08-22

## 2024-08-07 RX ORDER — SODIUM CHLORIDE 9 MG/ML
5-250 INJECTION, SOLUTION INTRAVENOUS PRN
Status: CANCELLED | OUTPATIENT
Start: 2024-08-15

## 2024-08-07 RX ORDER — ALBUTEROL SULFATE 90 UG/1
4 AEROSOL, METERED RESPIRATORY (INHALATION) PRN
Status: CANCELLED | OUTPATIENT
Start: 2024-08-15

## 2024-08-07 RX ORDER — ALBUTEROL SULFATE 90 UG/1
4 AEROSOL, METERED RESPIRATORY (INHALATION) PRN
Status: CANCELLED | OUTPATIENT
Start: 2024-08-22

## 2024-08-07 RX ORDER — PROCHLORPERAZINE EDISYLATE 5 MG/ML
5 INJECTION INTRAMUSCULAR; INTRAVENOUS
Status: CANCELLED | OUTPATIENT
Start: 2024-08-22

## 2024-08-07 RX ORDER — DIPHENHYDRAMINE HYDROCHLORIDE 50 MG/ML
50 INJECTION INTRAMUSCULAR; INTRAVENOUS
Status: CANCELLED | OUTPATIENT
Start: 2024-08-15

## 2024-08-07 RX ORDER — DIPHENHYDRAMINE HYDROCHLORIDE 50 MG/ML
50 INJECTION INTRAMUSCULAR; INTRAVENOUS
Status: CANCELLED | OUTPATIENT
Start: 2024-08-22

## 2024-08-07 RX ORDER — SODIUM CHLORIDE 9 MG/ML
INJECTION, SOLUTION INTRAVENOUS CONTINUOUS
Status: CANCELLED | OUTPATIENT
Start: 2024-08-15

## 2024-08-07 RX ORDER — SODIUM CHLORIDE 0.9 % (FLUSH) 0.9 %
5-40 SYRINGE (ML) INJECTION PRN
Status: CANCELLED | OUTPATIENT
Start: 2024-08-15

## 2024-08-07 RX ORDER — ACETAMINOPHEN 325 MG/1
650 TABLET ORAL ONCE
Status: CANCELLED | OUTPATIENT
Start: 2024-08-22 | End: 2024-08-14

## 2024-08-07 RX ORDER — HEPARIN SODIUM (PORCINE) LOCK FLUSH IV SOLN 100 UNIT/ML 100 UNIT/ML
500 SOLUTION INTRAVENOUS PRN
Status: CANCELLED | OUTPATIENT
Start: 2024-08-15

## 2024-08-07 RX ORDER — ACETAMINOPHEN 325 MG/1
650 TABLET ORAL
Status: CANCELLED | OUTPATIENT
Start: 2024-08-22

## 2024-08-07 RX ORDER — SODIUM CHLORIDE 0.9 % (FLUSH) 0.9 %
5-40 SYRINGE (ML) INJECTION PRN
Status: CANCELLED | OUTPATIENT
Start: 2024-08-22

## 2024-08-07 RX ORDER — FAMOTIDINE 10 MG/ML
20 INJECTION, SOLUTION INTRAVENOUS
Status: CANCELLED | OUTPATIENT
Start: 2024-08-15

## 2024-08-07 RX ORDER — MEPERIDINE HYDROCHLORIDE 50 MG/ML
12.5 INJECTION INTRAMUSCULAR; INTRAVENOUS; SUBCUTANEOUS PRN
Status: CANCELLED | OUTPATIENT
Start: 2024-08-15

## 2024-08-07 RX ORDER — EPINEPHRINE 1 MG/ML
0.3 INJECTION, SOLUTION, CONCENTRATE INTRAVENOUS PRN
Status: CANCELLED | OUTPATIENT
Start: 2024-08-22

## 2024-08-07 RX ORDER — DIPHENHYDRAMINE HYDROCHLORIDE 50 MG/ML
25 INJECTION INTRAMUSCULAR; INTRAVENOUS ONCE
Status: CANCELLED | OUTPATIENT
Start: 2024-08-22 | End: 2024-08-14

## 2024-08-07 RX ORDER — MEPERIDINE HYDROCHLORIDE 50 MG/ML
12.5 INJECTION INTRAMUSCULAR; INTRAVENOUS; SUBCUTANEOUS PRN
Status: CANCELLED | OUTPATIENT
Start: 2024-08-22

## 2024-08-07 RX ORDER — EPINEPHRINE 1 MG/ML
0.3 INJECTION, SOLUTION, CONCENTRATE INTRAVENOUS PRN
Status: CANCELLED | OUTPATIENT
Start: 2024-08-15

## 2024-08-07 RX ORDER — PROCHLORPERAZINE EDISYLATE 5 MG/ML
5 INJECTION INTRAMUSCULAR; INTRAVENOUS
Status: CANCELLED | OUTPATIENT
Start: 2024-08-15

## 2024-08-07 RX ORDER — HEPARIN SODIUM (PORCINE) LOCK FLUSH IV SOLN 100 UNIT/ML 100 UNIT/ML
500 SOLUTION INTRAVENOUS PRN
Status: CANCELLED | OUTPATIENT
Start: 2024-08-22

## 2024-08-07 RX ORDER — ONDANSETRON 2 MG/ML
8 INJECTION INTRAMUSCULAR; INTRAVENOUS
Status: CANCELLED | OUTPATIENT
Start: 2024-08-22

## 2024-08-07 RX ORDER — ONDANSETRON 2 MG/ML
8 INJECTION INTRAMUSCULAR; INTRAVENOUS
Status: CANCELLED | OUTPATIENT
Start: 2024-08-15

## 2024-08-07 RX ORDER — PALONOSETRON 0.05 MG/ML
0.25 INJECTION, SOLUTION INTRAVENOUS ONCE
Status: CANCELLED | OUTPATIENT
Start: 2024-08-22 | End: 2024-08-14

## 2024-08-07 NOTE — PROGRESS NOTES
Patient arrived on time for scheduled appointment to receive C1 of Trodelvy today. Peer to peer insurance review still in progress; therefore, patient's appointment was rescheduled for next Thursday 8/14/2024.

## 2024-08-07 NOTE — TELEPHONE ENCOUNTER
Pt stated he was denied his chemo from [employer] pt said the insurance company wants to do a pier to genesis   [phone] call and schedule they said the medication he picked will not help his lung cancer they want to try something else.

## 2024-08-08 ENCOUNTER — HOSPITAL ENCOUNTER (OUTPATIENT)
Facility: HOSPITAL | Age: 59
Setting detail: INFUSION SERIES
End: 2024-08-08

## 2024-08-12 ENCOUNTER — TELEPHONE (OUTPATIENT)
Age: 59
End: 2024-08-12

## 2024-08-12 ENCOUNTER — CLINICAL DOCUMENTATION (OUTPATIENT)
Age: 59
End: 2024-08-12

## 2024-08-12 NOTE — TELEPHONE ENCOUNTER
Patient called and stated he was returning a call from this office. Do not see a message. Staff is at [facility] so will return a call when they reurn to [facility].

## 2024-08-12 NOTE — TELEPHONE ENCOUNTER
Spoke with Dr. Hunt with [facility] and discussed trial results from EVOKE-01 trial, requesting appeal. He states that the insurance company is not going to approve Trodelvy. Recommend that we go with another treatment. Will discuss other treatment options with the patient during appointment on 8/15/24. Dr. Veronica aware

## 2024-08-14 ENCOUNTER — TELEPHONE (OUTPATIENT)
Age: 59
End: 2024-08-14

## 2024-08-14 NOTE — TELEPHONE ENCOUNTER
Called patient to advise/confirm upcoming appt with Dr. Ramirez on 8/20/2024  at 11:00  at [facility] Office.  Spoke with Yolanda and confirmed appointment.

## 2024-08-15 ENCOUNTER — CLINICAL DOCUMENTATION (OUTPATIENT)
Age: 59
End: 2024-08-15

## 2024-08-15 ENCOUNTER — TELEPHONE (OUTPATIENT)
Age: 59
End: 2024-08-15

## 2024-08-15 ENCOUNTER — OFFICE VISIT (OUTPATIENT)
Age: 59
End: 2024-08-15
Payer: COMMERCIAL

## 2024-08-15 VITALS
RESPIRATION RATE: 16 BRPM | SYSTOLIC BLOOD PRESSURE: 113 MMHG | HEART RATE: 108 BPM | OXYGEN SATURATION: 97 % | HEIGHT: 67 IN | WEIGHT: 214 LBS | BODY MASS INDEX: 33.59 KG/M2 | DIASTOLIC BLOOD PRESSURE: 80 MMHG | TEMPERATURE: 97.7 F

## 2024-08-15 DIAGNOSIS — J91.0 MALIGNANT PLEURAL EFFUSION: ICD-10-CM

## 2024-08-15 DIAGNOSIS — C34.90 SQUAMOUS CELL CARCINOMA OF LUNG, STAGE IV, UNSPECIFIED LATERALITY (HCC): Primary | ICD-10-CM

## 2024-08-15 DIAGNOSIS — F41.9 ANXIETY IN CANCER PATIENT: ICD-10-CM

## 2024-08-15 DIAGNOSIS — G89.3 CANCER ASSOCIATED PAIN: ICD-10-CM

## 2024-08-15 PROCEDURE — 99214 OFFICE O/P EST MOD 30 MIN: CPT | Performed by: STUDENT IN AN ORGANIZED HEALTH CARE EDUCATION/TRAINING PROGRAM

## 2024-08-15 RX ORDER — DEXAMETHASONE 4 MG/1
8 TABLET ORAL 2 TIMES DAILY
Qty: 32 TABLET | Refills: 0 | Status: SHIPPED | OUTPATIENT
Start: 2024-08-15

## 2024-08-15 NOTE — PROGRESS NOTES
Pharmacy Note- Oncology Treatment Education    Fortunato Kowalski is a  58 y.o.male  diagnosed with Lung cancer stage IV here today for chemotherapy treatment counseling. Mr. Kowalski is being treated with Docetaxel every 21 days.   Provided education on docetaxel and premedications - dexamethasone (different directions from previous).    Side effects of oncology treatment reviewed included s/s infection, anemia, appetite changes, thrombocytopenia, fatigue, hair loss/alopecia, bone pain, skin and nail changes, diarrhea/constipation, nausea/vomiting, mouth sores, and neuropathy.    Patient given ways to manage these side effects and when to contact office.     [facility] Handout of medications provided to patient. Mr. Kowalski verbalized understanding of the information presented and all of the patient's questions were answered.    Marzena Rm, PharmD, BCOP  For Pharmacy Admin Tracking Only    Program: Medical Group  CPA in place:  No  Recommendation Provided To: Patient/Caregiver: 2 via In person  Intervention Detail: New Rx: 1, reason: Needs Additional Therapy  Intervention Accepted By: Patient/Caregiver: 1   Time Spent (min): 15

## 2024-08-15 NOTE — TELEPHONE ENCOUNTER
----- Message from Dilia GUERRERO sent at 8/15/2024  2:49 PM EDT -----  Regarding: RE: treatment change  Scheduled. Patient is aware.  ----- Message -----  From: Marzena Rm [facility]  Sent: 8/15/2024  11:42 AM EDT  To: Dilia Dia  Subject: treatment change                                 Changing plan due to ins denial- MD wants him to start next week on what would've been day 8 of the previous plan.

## 2024-08-15 NOTE — PROGRESS NOTES
Fortunato Kowalski is a 58 y.o. male who presents for follow up of   Chief Complaint   Patient presents with    Follow-up     Squamous cell carcinoma of lung, stage IV, unspecified laterality        The patient reports no new clinical symptoms or new complaints since last clinic evaluation. He reports weight loss, fatigue, hair loss, pain in back 10/10 today, coughing, and urinary frequency. He reports tenderness on right side of abdomen where tube is.       No interval hospitalizations reported    No interval surgery or procedures reported    No reported new medication changes reported       Medications reviewed with the patient, and chart updated to reflect changes.

## 2024-08-15 NOTE — PROGRESS NOTES
Cancer [place] at [facility]  [address] [place] [id number]  W: [phone] F: [phone]    Hematology/Oncology Office Note:     Reason for Visit:     Fortunato Kowalski is a 58 y.o. male who is seen in consultation at the request of Dr. Dyson for evaluation of lung cancer.    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    Core biopsy shows similar features.     General Categorization   Malignant cells present     Specimen Adequacy   Satisfactory for evaluation.     Comment   Immunohistochemical staining on block 1A shows that the malignant cells   stain as follows:   CK 7: Positive   CK 20: Negative   TTF-1: Negative   P40: Positive   P63: Positive   CA-9: Negative   PAX8: Negative   Napsin A: Negative   NKX 3.1: Negative   CDX2: Negative   WT1: Negative   Claudin-4: Weakly positive   Calretinin: Negative     PDL1 (Keytruda) immunohistochemical testing is ordered on tissue block 1A   with the results to be reported in an addendum.     Initial Presentation  (HPI):     History of Present Illness  The patient is a 58-year-old gentleman who was originally seen as an inpatient for suspected lung malignancy with pleural-based nodules. He originally presented to the emergency department with complaints of shortness of breath and a large

## 2024-08-16 ENCOUNTER — TELEPHONE (OUTPATIENT)
Age: 59
End: 2024-08-16

## 2024-08-16 LAB
DNA RANGE(S) EXAMINED NAR: NORMAL
GENE DIS ANL INTERP-IMP: POSITIVE
GENE DIS ASSESSED: NORMAL
GENE MUT TESTED BLD/T: 3.2 M/MB
MSI CA SPEC-IMP: NORMAL
REASON FOR STUDY: NORMAL
TEMPUS GERMLINE NOTE: NORMAL
TEMPUS PERTINENTNEGATIVES: NORMAL
TEMPUS PORTAL: NORMAL
TEMPUS TREATMENT IMPLICATIONS NOTE: NORMAL
TEMPUS TRIALCOUNT: 3
TEMPUS TRIALMATCHES1: NORMAL
TEMPUS TRIALMATCHES2: NORMAL
TEMPUS TRIALMATCHES3: NORMAL

## 2024-08-16 NOTE — TELEPHONE ENCOUNTER
Cancer [place] at [facility]   [address] [place] [address]   W: [phone]  F: [phone]      Medical Nutrition Therapy  Nutrition Encounter:    Referral received. Patient with lung cancer started treatment, progression and will be changing to docetaxel q3 weeks.  Called patient. No answer.  Left a message, explaining that RD is available as a resource for any nutrition related concerns.         Ht Readings from Last 1 Encounters:   08/15/24 1.702 m (5' 7\")       Wt Readings from Last 5 Encounters:   08/15/24 97.1 kg (214 lb)   08/01/24 105.7 kg (233 lb 1.6 oz)   08/01/24 105.7 kg (233 lb)   07/18/24 99.6 kg (219 lb 9.6 oz)   07/11/24 99.4 kg (219 lb 3.2 oz)       Signed By: REGIS DAIGLE RD

## 2024-08-20 ENCOUNTER — TELEPHONE (OUTPATIENT)
Age: 59
End: 2024-08-20

## 2024-08-20 ENCOUNTER — OFFICE VISIT (OUTPATIENT)
Age: 59
End: 2024-08-20
Payer: COMMERCIAL

## 2024-08-20 VITALS
OXYGEN SATURATION: 96 % | HEIGHT: 67 IN | HEART RATE: 93 BPM | SYSTOLIC BLOOD PRESSURE: 105 MMHG | BODY MASS INDEX: 34.06 KG/M2 | RESPIRATION RATE: 18 BRPM | TEMPERATURE: 97.2 F | DIASTOLIC BLOOD PRESSURE: 72 MMHG | WEIGHT: 217 LBS

## 2024-08-20 DIAGNOSIS — C34.90 METASTATIC NON-SMALL CELL LUNG CANCER (HCC): Primary | ICD-10-CM

## 2024-08-20 DIAGNOSIS — G89.3 CANCER ASSOCIATED PAIN: ICD-10-CM

## 2024-08-20 DIAGNOSIS — F41.9 ANXIETY: ICD-10-CM

## 2024-08-20 DIAGNOSIS — C34.90 MALIGNANT NEOPLASM OF LUNG, UNSPECIFIED LATERALITY, UNSPECIFIED PART OF LUNG (HCC): ICD-10-CM

## 2024-08-20 DIAGNOSIS — R06.02 SOB (SHORTNESS OF BREATH): ICD-10-CM

## 2024-08-20 PROCEDURE — 99497 ADVNCD CARE PLAN 30 MIN: CPT | Performed by: INTERNAL MEDICINE

## 2024-08-20 PROCEDURE — 99215 OFFICE O/P EST HI 40 MIN: CPT | Performed by: INTERNAL MEDICINE

## 2024-08-20 RX ORDER — OXYCODONE HCL 20 MG/1
20 TABLET, FILM COATED, EXTENDED RELEASE ORAL 2 TIMES DAILY
Qty: 60 TABLET | Refills: 0 | Status: SHIPPED | OUTPATIENT
Start: 2024-08-20 | End: 2024-09-19

## 2024-08-20 RX ORDER — VARENICLINE TARTRATE 1 MG/1
1 TABLET, FILM COATED ORAL 2 TIMES DAILY
Qty: 60 TABLET | Refills: 5 | Status: SHIPPED | OUTPATIENT
Start: 2024-08-20

## 2024-08-20 RX ORDER — OXYCODONE HYDROCHLORIDE 10 MG/1
10 TABLET ORAL EVERY 4 HOURS PRN
Qty: 90 TABLET | Refills: 0 | Status: SHIPPED | OUTPATIENT
Start: 2024-08-20 | End: 2024-09-04

## 2024-08-20 ASSESSMENT — PATIENT HEALTH QUESTIONNAIRE - PHQ9
SUM OF ALL RESPONSES TO PHQ QUESTIONS 1-9: 0
SUM OF ALL RESPONSES TO PHQ9 QUESTIONS 1 & 2: 0
SUM OF ALL RESPONSES TO PHQ QUESTIONS 1-9: 0
2. FEELING DOWN, DEPRESSED OR HOPELESS: NOT AT ALL
SUM OF ALL RESPONSES TO PHQ QUESTIONS 1-9: 0
1. LITTLE INTEREST OR PLEASURE IN DOING THINGS: NOT AT ALL
SUM OF ALL RESPONSES TO PHQ QUESTIONS 1-9: 0

## 2024-08-20 NOTE — PROGRESS NOTES
Palliative Medicine Outpatient Services  [place]: [phone] ([phone])    Patient Name: Fortunato Kowalski  YOB: 1965    Date of Current Visit: 08/20/2024  Location of Current Visit:    [] [facility] Office  [] [facility] Office  [x] [facility] Office  [] Home  []Synchronous real-time A/V virtual visit    Date of Initial Visit: 6/5/2024  Primary Care Physician: Shlomo Veronica MD      SUMMARY:   Fortunato Kowalski is a 58 y.o. year old with a  history of stage IV metastatic squamous cell carcinoma of the lung, malignant pleural effusion status post Pleurx catheter, current and long-term history of smoking initial diagnosis in April 2024, who was referred to Palliative Medicine by Dr. Gao for management of symptoms and psychosocial support.    The patients social history includes lives at home with his wife and great granddaughter that they have full custody of.  Patient struggles with severe anxiety especially with cancer diagnosis, continues to smoke 1 to 2 packs/day.    Current treatment-carboplatin plus paclitaxel plus pembrolizumab every 3 weeks    Completed chemo, now on maintenance Keytruda.  Will get repeat scans in the next few weeks.  Repeat scans show progression of disease, starting docetaxel 8/22/2024       PLAN:     Patient Instructions   Dear Fortunato Kowalski ,    It was a pleasure seeing you today    We will see you again in 4 weeks    If labs or imaging tests have been ordered for you today, please call the office  at [phone] 48 hours after completion to obtain the results.        This is the plan we talked about:    Pleurex catheter related referred pain in the mid and low back  - Increase Oxycodone 10 mg every 4 hours as needed for uncontrolled pain- 600- 800 ml every day  - Taking it every 4 hours day and night, will add       Shortness of breath  - You are very short of breath, extreme debility because of SOB.  - Resting O2 sat of 92% and drop down to 84% while walking  - Will order supplemental O2 at home  -

## 2024-08-20 NOTE — TELEPHONE ENCOUNTER
The patient's wife Yolanda went to  refill from the pharmacy. It was supposed to be Oxycontin 20 mg, but they only had Oxycodone 10 mg.

## 2024-08-20 NOTE — PROGRESS NOTES
Palliative Medicine Outpatient Clinic  Nurse Check in Note  [phone]    Patient Name: Fortunato Kowalski  YOB: 1965      Date of Visit: 08/20/2024  Visit Location:  [facility]    Chief patient or family concern today:     Patient's Last Palliative Medicine Clinic Visit Date:  7/18/2024    Have you been to an ER or urgent care center since your last visit?  No  Have you been hospitalized since your last visit? No  Have you seen or consulted any health care providers outside of the [facility] system since your last visit?  No  If Yes, alert PSR to request appropriate records from non-[facility] offices    Medications:  Med reconciliation was performed with:  Patient    Requested refills:  None    If prescribed an opioid, does patient have access to naloxone at home?:  NO  If No, pend naloxone nasal spray    Function and Symptoms:  Use of assist devices:  None    Palliative Performance Status (PPS):   Palliative Performance Scale (PPS)  PPS: 70    ESAS:  Modified-[place] Symptom Assessment Scale (ESAS)  Tiredness Score: Worst possible tiredness  Drowsiness Score: Worst possible drowsiness  Depression Score: Not depressed  Pain Score: Worst possible pain  Anxiety Score: Worst possible anxiety  Nausea Score: Not nauseated  Appetite Score: 5  Dyspnea Score: 8  Constipation: No  Wellbeing Score: 6  Other Problem Score: Best possible response    Constipation?  No  Last BM:     Advance Care Planning:  Currently listed healthcare agent:    Primary Decision Maker: Yolanda Kowalski - Ex-Spouse - [phone]    Secondary Decision Maker: Iris Iglesias - Step Child - [phone]    Is there an ACP Note within the past 12 months?  YES  If No, Alert Clinician and/or Social Work      Frida Roman LPN

## 2024-08-20 NOTE — ACP (ADVANCE CARE PLANNING)
Advance Care Planning      Palliative Medicine Provider (MD/NP)  Advance Care Planning (ACP) Conversation      Date of Conversation: 08/20/24  The patient and/or authorized decision maker consented to a voluntary Advance Care Planning conversation.   Individuals present for the conversation:   Patient with decision making capacity    Legal Healthcare Agent(s):    Primary Decision Maker: Yolanda Kowalski - Ex-Spouse - [phone]    Secondary Decision Maker: Iris Iglesias - Step Child - [phone]    ACP documents available in EMR prior to discussion:  -Power of  for Healthcare    Primary Palliative Diagnosis(es):      Conversation Summary:      Resuscitation Status:    Code Status: DNR    Outcomes / Completed Documentation:  An explanation of advance directives and their importance was provided and the following forms completed:    -Portable DNR    If new document completed, original was provided to patient and/or family member.    Copy was placed for scanning into the [facility] EMR.      I spent 25 minutes providing separately identifiable ACP services with the patient and/or surrogate decision maker in a voluntary, in-person conversation discussing the patient's wishes and goals as detailed in the above note.       Jazmine Ramirez MD

## 2024-08-20 NOTE — PATIENT INSTRUCTIONS
Dear Fortunato Kowalski ,    It was a pleasure seeing you today    We will see you again in 4 weeks    If labs or imaging tests have been ordered for you today, please call the office  at [phone] 48 hours after completion to obtain the results.        This is the plan we talked about:    Pleurex catheter related referred pain in the mid and low back  - Increase Oxycodone 10 mg every 4 hours as needed for uncontrolled pain- 600- 800 ml every day  - Taking it every 4 hours day and night, will add       Shortness of breath  - You are very short of breath, extreme debility because of SOB.  - Resting O2 sat of 92% and drop down to 84% while walking  - Will order supplemental O2 at home  - YOU CANNOT SMOKE WHILE WEARING YOUR OXYGEN. I AM VERY HESITANT TO ORDER O2 FOR YOU ESPECIALLY BECAUSE YOU HAVE YOUR GREAT GRAND DAUGHTER AT HOME WITH YOU.    Smoking cessation  - Nicotine 21% not working  - Chantix ordered  - Palliative  follow up for accountability    Severe anxiety  - Continue Celexa 20 mg at night time  - On Xanax 1 mg two times a day- you feel this is not enough, but there is significant risk of you getting very drowsy especially with addition of long acting opioid. Will maintain Xanax at this dose.    Sleep  - Unable to sleep due to severe anxiety  - Let us try celexa first and if no help, I will add a medication for sleep    Cough and shortness of breath  - Tessalon perles three times a day  - Duonebs and albuterol inhaler    Goals of care  - Discussed your disease progression, you have worsening symptoms and fatigue.  You understand the disease is not curable, recent progression is not very good news.  You have an AMD, we discussed your wishes regarding CPR and intubation and signed a D DNR today.      This is what you have shared with us about Advance Care Planning:    Primary Decision Maker: Yolanda Kowalski - Ex-Spouse - [phone]    Secondary Decision Maker: Iris Iglesias - Step Child -

## 2024-08-22 ENCOUNTER — HOSPITAL ENCOUNTER (OUTPATIENT)
Facility: HOSPITAL | Age: 59
Setting detail: INFUSION SERIES
Discharge: HOME | End: 2024-08-22
Payer: COMMERCIAL

## 2024-08-22 ENCOUNTER — APPOINTMENT (OUTPATIENT)
Age: 59
End: 2024-08-22
Payer: COMMERCIAL

## 2024-08-22 ENCOUNTER — HOSPITAL ENCOUNTER (OUTPATIENT)
Facility: HOSPITAL | Age: 59
Setting detail: INFUSION SERIES
End: 2024-08-22

## 2024-08-22 ENCOUNTER — OFFICE VISIT (OUTPATIENT)
Age: 59
End: 2024-08-22
Payer: COMMERCIAL

## 2024-08-22 VITALS
HEART RATE: 94 BPM | RESPIRATION RATE: 20 BRPM | SYSTOLIC BLOOD PRESSURE: 102 MMHG | OXYGEN SATURATION: 93 % | DIASTOLIC BLOOD PRESSURE: 49 MMHG | TEMPERATURE: 98.2 F

## 2024-08-22 VITALS
OXYGEN SATURATION: 94 % | TEMPERATURE: 98.1 F | HEIGHT: 67 IN | SYSTOLIC BLOOD PRESSURE: 106 MMHG | HEART RATE: 100 BPM | DIASTOLIC BLOOD PRESSURE: 70 MMHG | RESPIRATION RATE: 17 BRPM | BODY MASS INDEX: 34.06 KG/M2 | WEIGHT: 217 LBS

## 2024-08-22 DIAGNOSIS — Z51.11 ENCOUNTER FOR ANTINEOPLASTIC CHEMOTHERAPY: ICD-10-CM

## 2024-08-22 DIAGNOSIS — G89.3 CANCER ASSOCIATED PAIN: ICD-10-CM

## 2024-08-22 DIAGNOSIS — J91.0 MALIGNANT PLEURAL EFFUSION: ICD-10-CM

## 2024-08-22 DIAGNOSIS — C34.90 SQUAMOUS CELL CARCINOMA OF LUNG, STAGE IV, UNSPECIFIED LATERALITY (HCC): Primary | ICD-10-CM

## 2024-08-22 LAB
ALBUMIN SERPL-MCNC: 2.9 G/DL (ref 3.5–5)
ALBUMIN/GLOB SERPL: 0.7 (ref 1.1–2.2)
ALP SERPL-CCNC: 166 U/L (ref 45–117)
ALT SERPL-CCNC: 20 U/L (ref 12–78)
ANION GAP SERPL CALC-SCNC: 10 MMOL/L (ref 5–15)
AST SERPL-CCNC: 8 U/L (ref 15–37)
BASOPHILS # BLD: 0 K/UL (ref 0–0.1)
BASOPHILS NFR BLD: 0 % (ref 0–1)
BILIRUB SERPL-MCNC: 0.9 MG/DL (ref 0.2–1)
BUN SERPL-MCNC: 22 MG/DL (ref 6–20)
BUN/CREAT SERPL: 17 (ref 12–20)
CALCIUM SERPL-MCNC: 9.2 MG/DL (ref 8.5–10.1)
CHLORIDE SERPL-SCNC: 101 MMOL/L (ref 97–108)
CO2 SERPL-SCNC: 24 MMOL/L (ref 21–32)
CREAT SERPL-MCNC: 1.26 MG/DL (ref 0.7–1.3)
DIFFERENTIAL METHOD BLD: ABNORMAL
EOSINOPHIL # BLD: 0 K/UL (ref 0–0.4)
EOSINOPHIL NFR BLD: 0 % (ref 0–7)
ERYTHROCYTE [DISTWIDTH] IN BLOOD BY AUTOMATED COUNT: 20.7 % (ref 11.5–14.5)
GLOBULIN SER CALC-MCNC: 4.1 G/DL (ref 2–4)
GLUCOSE SERPL-MCNC: 281 MG/DL (ref 65–100)
HBV SURFACE AB SER QL: NONREACTIVE
HBV SURFACE AB SER-ACNC: <3.1 MIU/ML
HBV SURFACE AG SER QL: <0.1 INDEX
HBV SURFACE AG SER QL: NEGATIVE
HCT VFR BLD AUTO: 39.3 % (ref 36.6–50.3)
HGB BLD-MCNC: 12.6 G/DL (ref 12.1–17)
IMM GRANULOCYTES # BLD AUTO: 0.1 K/UL (ref 0–0.04)
IMM GRANULOCYTES NFR BLD AUTO: 1 % (ref 0–0.5)
LYMPHOCYTES # BLD: 0.7 K/UL (ref 0.8–3.5)
LYMPHOCYTES NFR BLD: 11 % (ref 12–49)
MCH RBC QN AUTO: 26.9 PG (ref 26–34)
MCHC RBC AUTO-ENTMCNC: 32.1 G/DL (ref 30–36.5)
MCV RBC AUTO: 84 FL (ref 80–99)
MONOCYTES # BLD: 0.4 K/UL (ref 0–1)
MONOCYTES NFR BLD: 6 % (ref 5–13)
NEUTS SEG # BLD: 5.6 K/UL (ref 1.8–8)
NEUTS SEG NFR BLD: 82 % (ref 32–75)
NRBC # BLD: 0 K/UL (ref 0–0.01)
NRBC BLD-RTO: 0 PER 100 WBC
PLATELET # BLD AUTO: 350 K/UL (ref 150–400)
PMV BLD AUTO: 10.8 FL (ref 8.9–12.9)
POTASSIUM SERPL-SCNC: 3.1 MMOL/L (ref 3.5–5.1)
PROT SERPL-MCNC: 7 G/DL (ref 6.4–8.2)
RBC # BLD AUTO: 4.68 M/UL (ref 4.1–5.7)
RBC MORPH BLD: ABNORMAL
SODIUM SERPL-SCNC: 135 MMOL/L (ref 136–145)
WBC # BLD AUTO: 6.8 K/UL (ref 4.1–11.1)

## 2024-08-22 PROCEDURE — 6370000000 HC RX 637 (ALT 250 FOR IP): Performed by: NURSE PRACTITIONER

## 2024-08-22 PROCEDURE — 87340 HEPATITIS B SURFACE AG IA: CPT

## 2024-08-22 PROCEDURE — 99215 OFFICE O/P EST HI 40 MIN: CPT | Performed by: STUDENT IN AN ORGANIZED HEALTH CARE EDUCATION/TRAINING PROGRAM

## 2024-08-22 PROCEDURE — 96413 CHEMO IV INFUSION 1 HR: CPT

## 2024-08-22 PROCEDURE — 6360000002 HC RX W HCPCS: Performed by: STUDENT IN AN ORGANIZED HEALTH CARE EDUCATION/TRAINING PROGRAM

## 2024-08-22 PROCEDURE — 96367 TX/PROPH/DG ADDL SEQ IV INF: CPT

## 2024-08-22 PROCEDURE — 85025 COMPLETE CBC W/AUTO DIFF WBC: CPT

## 2024-08-22 PROCEDURE — 96375 TX/PRO/DX INJ NEW DRUG ADDON: CPT

## 2024-08-22 PROCEDURE — 86704 HEP B CORE ANTIBODY TOTAL: CPT

## 2024-08-22 PROCEDURE — 80053 COMPREHEN METABOLIC PANEL: CPT

## 2024-08-22 PROCEDURE — 96372 THER/PROPH/DIAG INJ SC/IM: CPT

## 2024-08-22 PROCEDURE — 6360000002 HC RX W HCPCS: Performed by: NURSE PRACTITIONER

## 2024-08-22 PROCEDURE — 36415 COLL VENOUS BLD VENIPUNCTURE: CPT

## 2024-08-22 PROCEDURE — 96417 CHEMO IV INFUS EACH ADDL SEQ: CPT

## 2024-08-22 PROCEDURE — 2580000003 HC RX 258: Performed by: STUDENT IN AN ORGANIZED HEALTH CARE EDUCATION/TRAINING PROGRAM

## 2024-08-22 PROCEDURE — 86706 HEP B SURFACE ANTIBODY: CPT

## 2024-08-22 RX ORDER — EPINEPHRINE 1 MG/ML
0.3 INJECTION, SOLUTION, CONCENTRATE INTRAVENOUS PRN
Status: CANCELLED | OUTPATIENT
Start: 2024-08-22

## 2024-08-22 RX ORDER — POTASSIUM CHLORIDE 7.45 MG/ML
10 INJECTION INTRAVENOUS ONCE
Status: COMPLETED | OUTPATIENT
Start: 2024-08-22 | End: 2024-08-22

## 2024-08-22 RX ORDER — EPINEPHRINE 1 MG/ML
0.3 INJECTION, SOLUTION INTRAMUSCULAR; SUBCUTANEOUS PRN
Status: DISCONTINUED | OUTPATIENT
Start: 2024-08-22 | End: 2024-08-23 | Stop reason: HOSPADM

## 2024-08-22 RX ORDER — DIPHENHYDRAMINE HYDROCHLORIDE 50 MG/ML
50 INJECTION INTRAMUSCULAR; INTRAVENOUS
Status: DISCONTINUED | OUTPATIENT
Start: 2024-08-22 | End: 2024-08-23 | Stop reason: HOSPADM

## 2024-08-22 RX ORDER — SODIUM CHLORIDE 9 MG/ML
5-250 INJECTION, SOLUTION INTRAVENOUS PRN
Status: DISCONTINUED | OUTPATIENT
Start: 2024-08-22 | End: 2024-08-23 | Stop reason: HOSPADM

## 2024-08-22 RX ORDER — POTASSIUM CHLORIDE 750 MG/1
40 TABLET, EXTENDED RELEASE ORAL ONCE
Status: COMPLETED | OUTPATIENT
Start: 2024-08-22 | End: 2024-08-22

## 2024-08-22 RX ORDER — SODIUM CHLORIDE 9 MG/ML
5-250 INJECTION, SOLUTION INTRAVENOUS PRN
Status: CANCELLED | OUTPATIENT
Start: 2024-08-22

## 2024-08-22 RX ORDER — MEPERIDINE HYDROCHLORIDE 50 MG/ML
12.5 INJECTION INTRAMUSCULAR; INTRAVENOUS; SUBCUTANEOUS PRN
Status: CANCELLED | OUTPATIENT
Start: 2024-08-22

## 2024-08-22 RX ORDER — HEPARIN 100 UNIT/ML
500 SYRINGE INTRAVENOUS PRN
Status: DISCONTINUED | OUTPATIENT
Start: 2024-08-22 | End: 2024-08-23 | Stop reason: HOSPADM

## 2024-08-22 RX ORDER — HEPARIN SODIUM (PORCINE) LOCK FLUSH IV SOLN 100 UNIT/ML 100 UNIT/ML
500 SOLUTION INTRAVENOUS PRN
Status: CANCELLED | OUTPATIENT
Start: 2024-08-22

## 2024-08-22 RX ORDER — FAMOTIDINE 10 MG/ML
20 INJECTION, SOLUTION INTRAVENOUS
Status: CANCELLED | OUTPATIENT
Start: 2024-08-22

## 2024-08-22 RX ORDER — SODIUM CHLORIDE 9 MG/ML
INJECTION, SOLUTION INTRAVENOUS CONTINUOUS
Status: CANCELLED | OUTPATIENT
Start: 2024-08-22

## 2024-08-22 RX ORDER — ONDANSETRON 2 MG/ML
8 INJECTION INTRAMUSCULAR; INTRAVENOUS
Status: CANCELLED | OUTPATIENT
Start: 2024-08-22

## 2024-08-22 RX ORDER — DEXAMETHASONE SODIUM PHOSPHATE 10 MG/ML
8 INJECTION, SOLUTION INTRAMUSCULAR; INTRAVENOUS ONCE
Status: COMPLETED | OUTPATIENT
Start: 2024-08-22 | End: 2024-08-22

## 2024-08-22 RX ORDER — ALBUTEROL SULFATE 90 UG/1
4 AEROSOL, METERED RESPIRATORY (INHALATION) PRN
Status: DISCONTINUED | OUTPATIENT
Start: 2024-08-22 | End: 2024-08-23 | Stop reason: HOSPADM

## 2024-08-22 RX ORDER — MEPERIDINE HYDROCHLORIDE 25 MG/ML
12.5 INJECTION INTRAMUSCULAR; INTRAVENOUS; SUBCUTANEOUS PRN
Status: DISCONTINUED | OUTPATIENT
Start: 2024-08-22 | End: 2024-08-23 | Stop reason: HOSPADM

## 2024-08-22 RX ORDER — SODIUM CHLORIDE 0.9 % (FLUSH) 0.9 %
5-40 SYRINGE (ML) INJECTION PRN
Status: DISCONTINUED | OUTPATIENT
Start: 2024-08-22 | End: 2024-08-23 | Stop reason: HOSPADM

## 2024-08-22 RX ORDER — SODIUM CHLORIDE 9 MG/ML
INJECTION, SOLUTION INTRAVENOUS CONTINUOUS
Status: DISCONTINUED | OUTPATIENT
Start: 2024-08-22 | End: 2024-08-23 | Stop reason: HOSPADM

## 2024-08-22 RX ORDER — DIPHENHYDRAMINE HYDROCHLORIDE 50 MG/ML
50 INJECTION INTRAMUSCULAR; INTRAVENOUS
Status: CANCELLED | OUTPATIENT
Start: 2024-08-22

## 2024-08-22 RX ORDER — ALBUTEROL SULFATE 90 UG/1
4 AEROSOL, METERED RESPIRATORY (INHALATION) PRN
Status: CANCELLED | OUTPATIENT
Start: 2024-08-22

## 2024-08-22 RX ORDER — SODIUM CHLORIDE 0.9 % (FLUSH) 0.9 %
5-40 SYRINGE (ML) INJECTION PRN
Status: CANCELLED | OUTPATIENT
Start: 2024-08-22

## 2024-08-22 RX ORDER — ACETAMINOPHEN 325 MG/1
650 TABLET ORAL
Status: DISCONTINUED | OUTPATIENT
Start: 2024-08-22 | End: 2024-08-23 | Stop reason: HOSPADM

## 2024-08-22 RX ORDER — ONDANSETRON 2 MG/ML
8 INJECTION INTRAMUSCULAR; INTRAVENOUS
Status: DISCONTINUED | OUTPATIENT
Start: 2024-08-22 | End: 2024-08-23 | Stop reason: HOSPADM

## 2024-08-22 RX ORDER — ACETAMINOPHEN 325 MG/1
650 TABLET ORAL
Status: CANCELLED | OUTPATIENT
Start: 2024-08-22

## 2024-08-22 RX ADMIN — SODIUM CHLORIDE 100 ML/HR: 9 INJECTION, SOLUTION INTRAVENOUS at 13:45

## 2024-08-22 RX ADMIN — DEXAMETHASONE SODIUM PHOSPHATE 8 MG: 10 INJECTION, SOLUTION INTRAMUSCULAR; INTRAVENOUS at 11:57

## 2024-08-22 RX ADMIN — DOCETAXEL 120 MG: 10 INJECTION, SOLUTION INTRAVENOUS at 12:35

## 2024-08-22 RX ADMIN — POTASSIUM CHLORIDE 40 MEQ: 750 TABLET, FILM COATED, EXTENDED RELEASE ORAL at 11:55

## 2024-08-22 RX ADMIN — SODIUM CHLORIDE 25 ML/HR: 9 INJECTION, SOLUTION INTRAVENOUS at 11:48

## 2024-08-22 RX ADMIN — POTASSIUM CHLORIDE 10 MEQ: 7.46 INJECTION, SOLUTION INTRAVENOUS at 13:47

## 2024-08-22 RX ADMIN — PEGFILGRASTIM 6 MG: KIT SUBCUTANEOUS at 15:00

## 2024-08-22 NOTE — PROGRESS NOTES
Fortunato Kowalski is a 59 y.o. male who presents for follow up of   Chief Complaint   Patient presents with    Follow-up     Squamous cell carcinoma of lung, stage IV, unspecified laterality       The patient reports no new clinical symptoms or new complaints since last clinic evaluation. He reports his granddaughter was killed and her  is Saturday. Pt is dealing emotional with that.       No interval hospitalizations reported    No interval surgery or procedures reported    No reported new medication changes reported       Medications reviewed with the patient, and chart updated to reflect changes.        
right-sided pleural effusion. He had an irregular area of pleural thickening on the medial aspect of his right upper lobe measuring about 5.1 cm x 3.8 cm x 3.4 cm that projected over the anterior mediastinum. He also had a large right-sided, pleural effusion with associated collapse of the right lower lobe and right middle lobe. He underwent thoracentesis on 04/07/2024 and had a biopsy of the right-sided pleural-based lung mass on 04/10/2024. Biopsy returned recently showing squamous cell carcinoma. Additional imaging includes CT abdomen and pelvis that shows no evidence of distant metastatic disease. His functional status improved after therapeutic and diagnostic thoracentesis. He was also suspected to have pneumonia and was treated empirically with antibiotics. He was discharged from the hospital on 04/12/2024. He presents to my clinic today for discussion of results and treatment options for management of metastatic non-small cell lung cancer. Relevant social history includes 20-pack-year history of smoking. He currently smokes 2.5 packs per day. He was offered a nicotine patch as an inpatient. The patient works as a long-. He is accompanied by an adult female.    The patient is planning a trip to [place] tomorrow, which will take approximately 8 days. He has expressed curiosity about the stage of his cancer and whether his disability status will necessitate this process. He has a long-standing history of smoking, dating back to his childhood, and is contemplating whether his smoking habit could be exacerbating his condition. He has attempted to quit smoking using nicotine patches from [employer], but found them ineffective. He has not yet tried nicotine gums.    Patient presents to the office with his wife.   Pleurex catheter is still in place, drainage varies. Yesterday there was no output, but varies from 300-700 mL usually.   Patient's biggest complaint today is cough, reports that he has has 3

## 2024-08-22 NOTE — PROGRESS NOTES
\[facility]\"" Chemotherapy Progress Note  Date: 2024  Name: Fortunato Kowalski  MRN: [id number]       : 1965    Pt arrived ambulatory to \[facility]\"" for C1 Taxotere   Assessment and screening completed. Port accessed with +BR labs drawn and sent for processing. Pt proceeded with KATHARINA Rosado. K+ 3.1 today 40 po 10 IV K ordered and administered.         Mr. Kowalski's vitals were reviewed.  Patient Vitals for the past 12 hrs:   Temp Pulse Resp BP SpO2   24 1015 98.2 °F (36.8 °C) (!) 102 20 106/71 93 %       Lab results were obtained and reviewed.  Recent Results (from the past 12 hour(s))   Comprehensive metabolic panel    Collection Time: 24 10:17 AM   Result Value Ref Range    Sodium 135 (L) 136 - 145 mmol/L    Potassium 3.1 (L) 3.5 - 5.1 mmol/L    Chloride 101 97 - 108 mmol/L    CO2 24 21 - 32 mmol/L    Anion Gap 10 5 - 15 mmol/L    Glucose 281 (H) 65 - 100 mg/dL    BUN 22 (H) 6 - 20 MG/DL    Creatinine 1.26 0.70 - 1.30 MG/DL    BUN/Creatinine Ratio 17 12 - 20      Est, Glom Filt Rate 67 >60 ml/min/1.73m2    Calcium 9.2 8.5 - 10.1 MG/DL    Total Bilirubin 0.9 0.2 - 1.0 MG/DL    ALT 20 12 - 78 U/L    AST 8 (L) 15 - 37 U/L    Alk Phosphatase 166 (H) 45 - 117 U/L    Total Protein 7.0 6.4 - 8.2 g/dL    Albumin 2.9 (L) 3.5 - 5.0 g/dL    Globulin 4.1 (H) 2.0 - 4.0 g/dL    Albumin/Globulin Ratio 0.7 (L) 1.1 - 2.2     CBC With Auto Differential    Collection Time: 24 10:17 AM   Result Value Ref Range    WBC 6.8 4.1 - 11.1 K/uL    RBC 4.68 4.10 - 5.70 M/uL    Hemoglobin 12.6 12.1 - 17.0 g/dL    Hematocrit 39.3 36.6 - 50.3 %    MCV 84.0 80.0 - 99.0 FL    MCH 26.9 26.0 - 34.0 PG    MCHC 32.1 30.0 - 36.5 g/dL    RDW 20.7 (H) 11.5 - 14.5 %    Platelets 350 150 - 400 K/uL    MPV 10.8 8.9 - 12.9 FL    Nucleated RBCs 0.0 0  WBC    nRBC 0.00 0.00 - 0.01 K/uL    Neutrophils % 82 (H) 32 - 75 %    Lymphocytes % 11 (L) 12 - 49 %    Monocytes % 6 5 - 13 %    Eosinophils % 0 0 - 7 %    Basophils % 0 0 - 1 %

## 2024-08-23 ENCOUNTER — TELEPHONE (OUTPATIENT)
Age: 59
End: 2024-08-23

## 2024-08-23 DIAGNOSIS — C34.90 METASTATIC NON-SMALL CELL LUNG CANCER (HCC): ICD-10-CM

## 2024-08-23 LAB
DNA RANGE(S) EXAMINED NAR: NORMAL
GENE DIS ANL INTERP-IMP: POSITIVE
GENE DIS ASSESSED: NORMAL
GENE MUT TESTED BLD/T: 3.2 M/MB
HBV CORE AB SERPL QL IA: NEGATIVE
MSI CA SPEC-IMP: NORMAL
REASON FOR STUDY: NORMAL
TEMPUS GERMLINE NOTE: NORMAL
TEMPUS PERTINENTNEGATIVES: NORMAL
TEMPUS PORTAL: NORMAL
TEMPUS TREATMENT IMPLICATIONS NOTE: NORMAL
TEMPUS TRIALCOUNT: 3
TEMPUS TRIALMATCHES1: NORMAL
TEMPUS TRIALMATCHES2: NORMAL
TEMPUS TRIALMATCHES3: NORMAL
TEMPUS XR RESULT 1: NORMAL

## 2024-08-23 RX ORDER — BENZONATATE 100 MG/1
100-200 CAPSULE ORAL 3 TIMES DAILY PRN
Qty: 180 CAPSULE | Refills: 1 | Status: SHIPPED | OUTPATIENT
Start: 2024-08-23

## 2024-08-23 NOTE — TELEPHONE ENCOUNTER
Palliative Medicine Clinic   [place]: [phone] ([phone])    Patient Name: Fortunato Kowalski  YOB: 1965    Medication Refill Request Triage    Requested by:    []  Patient  [x]  Support person:  wife    Last Pall Med Clinic Visit:  8/20/2024    Next Pall Med Clinic Visit: 9/24/2024     Preferred Pharmacy: [facility]   Backup Pharmacy:  *    Medications requested:  Abuterol and \"cough medicine\"    Is patient completely out?  [x]   YES  []   NO  If NO, how many pills does patient have left?  __    Other pertinent information shared:   wife does not remember the name of the cough medicine

## 2024-08-24 ENCOUNTER — TELEPHONE (OUTPATIENT)
Age: 59
End: 2024-08-24

## 2024-08-24 NOTE — TELEPHONE ENCOUNTER
Pt's wife called, pt having nausea and hives.  Advised to take benadryl and anti-nausea medications

## 2024-08-26 ENCOUNTER — TELEPHONE (OUTPATIENT)
Age: 59
End: 2024-08-26

## 2024-08-26 NOTE — TELEPHONE ENCOUNTER
Palliative Medicine Clinic   [place]: [phone] ([phone])    Patient Name: Fortunato Kowalski  YOB: 1965    Medication Refill Request Triage    Requested by:    []  Patient  [x]  Support person: Yolanda Dex    Last Pall Med Clinic Visit:  8/20/2024    Next Pall Med Clinic Visit: 09/24/2024     Preferred Pharmacy: [facility] [place]   Backup Pharmacy:  *    Medications requested:  Inhaler and Cough Pearls    Is patient completely out?  [x]   YES  []   NO  If NO, how many pills does patient have left?  __    Other pertinent information shared:

## 2024-08-26 NOTE — TELEPHONE ENCOUNTER
Tessalon sent to local pharmacy on 8/23/202      Inhaler has refills , patient to contact pharmacy for fill

## 2024-08-26 NOTE — TELEPHONE ENCOUNTER
Call placed to pt for social work check in and to follow up regarding his recent office visit.  A brief voicemail was left requesting a call back at his convenience/desire.    PATSY Feliciano  [facility] Palliative outpatient   [phone]

## 2024-08-26 NOTE — TELEPHONE ENCOUNTER
This  received a return call from Mr. Kowalski.  He shared that he has not been doing well stating that he started a new chemo regiment last week and has been incredibly fatigued and not feeling well this weekend.  He noted that his oxygen is helping- he confirmed that he is not smoking with the oxygen on and not smoking in the house.  He also reported that he has cut back from 1-2 packs/day to 1-2 cigarettes a day.  This  validated how impressive of a change this is and encouraged him to praise himself as he has been smoking for almost 50 years.  He feels that the Chantix is helping and stated he is just \"ready to give it up.\"  This  inquired about whether he has noticed any increases in anxiety since cutting back smoking.  He denied any increase in anxiety but shared that anxiety continues to be an issue- particularly at night.  He reported that he wakes up at night \"every hour, on the hour.\"  We discussed sleep hygiene techniques.   Mr. Kowalski endorsed feeling more short of breath when anxious.  We discussed 4-7-8 breathing and box breathing as techniques to try to slow his breathing and help him relax at night.  He is agreeable to trying this and is agreeable to this  following up with him next week to see how this technique is working for him.      PATSY Feliciano  [facility] Palliative outpatient   [phone]   Referred To Otolaryngology For Closure Text (Leave Blank If You Do Not Want): After obtaining clear surgical margins the patient was sent to otolaryngology for surgical repair.  The patient understands they will receive post-surgical care and follow-up from the Otolaryngologyist's and referring physician's office, and may follow up in our surgical clinic as needed.

## 2024-08-27 DIAGNOSIS — F41.9 ANXIETY: ICD-10-CM

## 2024-08-27 RX ORDER — ALPRAZOLAM 1 MG
1 TABLET ORAL 2 TIMES DAILY PRN
Qty: 60 TABLET | Refills: 2 | Status: SHIPPED | OUTPATIENT
Start: 2024-08-31 | End: 2024-11-29

## 2024-08-27 NOTE — TELEPHONE ENCOUNTER
Palliative Medicine Clinic   [place]: [phone] ([phone])    Patient Name: Fortunato Kowalski  YOB: 1965    Medication Refill Request    Patient is scheduled for follow up:  [x]  YES  []   NO  Next [facility] Med Clinic Visit:     PDMP reviewed:  [x] YES   []  System down / Unable  []  NO- Patient fills out of state    Medication:ALPRAZolam (XANAX) 1 MG t   Dose and directions:Take 1 tablet by mouth 2 times daily a   Number dispensed:60  Date filled (PDMP or Pharmacy):8/1/2024  # left:        Appropriate for refill:  [x]  YES  []  Early Request - Requires MD/NP Review      Other pertinent information for prescriber:

## 2024-08-27 NOTE — TELEPHONE ENCOUNTER
Palliative Medicine Clinic   [place]: [phone] ([phone])    Patient Name: Fortunato Kowalski  YOB: 1965    Medication Refill Request Triage    Requested by:    []  Patient  [x]  Support person:  Yolanda Kowalski    Last Pall Med Clinic Visit:  Visit date not found    Next Pall Med Clinic Visit: 09/24/2024     Preferred Pharmacy: [facility] [place]  Backup Pharmacy:  *    Medications requested:  Anxiety Medication    Is patient completely out?  []   YES  [x]   NO  If NO, how many pills does patient have left?  unknown    Other pertinent information shared:

## 2024-08-28 ENCOUNTER — TELEPHONE (OUTPATIENT)
Age: 59
End: 2024-08-28

## 2024-08-28 ENCOUNTER — APPOINTMENT (OUTPATIENT)
Facility: HOSPITAL | Age: 59
End: 2024-08-28
Payer: COMMERCIAL

## 2024-08-28 ENCOUNTER — HOSPITAL ENCOUNTER (INPATIENT)
Facility: HOSPITAL | Age: 59
LOS: 3 days | Discharge: HOME | End: 2024-08-31
Attending: EMERGENCY MEDICINE | Admitting: STUDENT IN AN ORGANIZED HEALTH CARE EDUCATION/TRAINING PROGRAM
Payer: COMMERCIAL

## 2024-08-28 DIAGNOSIS — J96.01 ACUTE RESPIRATORY FAILURE WITH HYPOXIA (HCC): Primary | ICD-10-CM

## 2024-08-28 DIAGNOSIS — J81.0 ACUTE PULMONARY EDEMA (HCC): ICD-10-CM

## 2024-08-28 DIAGNOSIS — I50.9 CONGESTIVE HEART FAILURE, UNSPECIFIED HF CHRONICITY, UNSPECIFIED HEART FAILURE TYPE (HCC): ICD-10-CM

## 2024-08-28 DIAGNOSIS — J06.9 URI WITH COUGH AND CONGESTION: Primary | ICD-10-CM

## 2024-08-28 DIAGNOSIS — C34.90 SQUAMOUS CELL CARCINOMA OF LUNG, STAGE IV, UNSPECIFIED LATERALITY (HCC): ICD-10-CM

## 2024-08-28 DIAGNOSIS — R18.8 OTHER ASCITES: ICD-10-CM

## 2024-08-28 PROBLEM — E87.70 VOLUME OVERLOAD: Status: ACTIVE | Noted: 2024-08-28

## 2024-08-28 LAB
ALBUMIN SERPL-MCNC: 2.7 G/DL (ref 3.5–5)
ALBUMIN/GLOB SERPL: 0.7 (ref 1.1–2.2)
ALP SERPL-CCNC: 163 U/L (ref 45–117)
ALT SERPL-CCNC: 23 U/L (ref 12–78)
ANION GAP SERPL CALC-SCNC: 10 MMOL/L (ref 5–15)
AST SERPL-CCNC: 18 U/L (ref 15–37)
BASOPHILS # BLD: 0 K/UL (ref 0–0.1)
BASOPHILS NFR BLD: 0 % (ref 0–1)
BILIRUB SERPL-MCNC: 1.4 MG/DL (ref 0.2–1)
BUN SERPL-MCNC: 20 MG/DL (ref 6–20)
BUN/CREAT SERPL: 19 (ref 12–20)
CALCIUM SERPL-MCNC: 8.4 MG/DL (ref 8.5–10.1)
CHLORIDE SERPL-SCNC: 94 MMOL/L (ref 97–108)
CO2 SERPL-SCNC: 25 MMOL/L (ref 21–32)
CREAT SERPL-MCNC: 1.07 MG/DL (ref 0.7–1.3)
DIFFERENTIAL METHOD BLD: ABNORMAL
EOSINOPHIL # BLD: 0 K/UL (ref 0–0.4)
EOSINOPHIL NFR BLD: 2 % (ref 0–7)
ERYTHROCYTE [DISTWIDTH] IN BLOOD BY AUTOMATED COUNT: 19.4 % (ref 11.5–14.5)
GLOBULIN SER CALC-MCNC: 3.7 G/DL (ref 2–4)
GLUCOSE SERPL-MCNC: 127 MG/DL (ref 65–100)
HCT VFR BLD AUTO: 36.4 % (ref 36.6–50.3)
HGB BLD-MCNC: 11.8 G/DL (ref 12.1–17)
IMM GRANULOCYTES # BLD AUTO: 0 K/UL (ref 0–0.04)
IMM GRANULOCYTES NFR BLD AUTO: 0 % (ref 0–0.5)
LACTATE BLD-SCNC: 0.96 MMOL/L (ref 0.4–2)
LYMPHOCYTES # BLD: 1 K/UL (ref 0.8–3.5)
LYMPHOCYTES NFR BLD: 52 % (ref 12–49)
MCH RBC QN AUTO: 27 PG (ref 26–34)
MCHC RBC AUTO-ENTMCNC: 32.4 G/DL (ref 30–36.5)
MCV RBC AUTO: 83.3 FL (ref 80–99)
MONOCYTES # BLD: 0.3 K/UL (ref 0–1)
MONOCYTES NFR BLD: 18 % (ref 5–13)
NEUTS BAND NFR BLD MANUAL: 2 %
NEUTS SEG # BLD: 0.5 K/UL (ref 1.8–8)
NEUTS SEG NFR BLD: 26 % (ref 32–75)
NRBC # BLD: 0.03 K/UL (ref 0–0.01)
NRBC BLD-RTO: 1.7 PER 100 WBC
NT PRO BNP: 1744 PG/ML
PLATELET # BLD AUTO: 220 K/UL (ref 150–400)
PLATELET COMMENT: ABNORMAL
PMV BLD AUTO: 11.7 FL (ref 8.9–12.9)
POTASSIUM SERPL-SCNC: 3.1 MMOL/L (ref 3.5–5.1)
PROCALCITONIN SERPL-MCNC: 0.22 NG/ML
PROT SERPL-MCNC: 6.4 G/DL (ref 6.4–8.2)
RBC # BLD AUTO: 4.37 M/UL (ref 4.1–5.7)
RBC MORPH BLD: ABNORMAL
SARS-COV-2 RNA RESP QL NAA+PROBE: NOT DETECTED
SODIUM SERPL-SCNC: 129 MMOL/L (ref 136–145)
SOURCE: NORMAL
TOTAL CELLS COUNTED SPEC: 50
TROPONIN I SERPL HS-MCNC: 17 NG/L (ref 0–76)
WBC # BLD AUTO: 1.8 K/UL (ref 4.1–11.1)
WBC MORPH BLD: ABNORMAL

## 2024-08-28 PROCEDURE — 83605 ASSAY OF LACTIC ACID: CPT

## 2024-08-28 PROCEDURE — 87635 SARS-COV-2 COVID-19 AMP PRB: CPT

## 2024-08-28 PROCEDURE — 84145 PROCALCITONIN (PCT): CPT

## 2024-08-28 PROCEDURE — 6360000002 HC RX W HCPCS: Performed by: EMERGENCY MEDICINE

## 2024-08-28 PROCEDURE — 93005 ELECTROCARDIOGRAM TRACING: CPT | Performed by: EMERGENCY MEDICINE

## 2024-08-28 PROCEDURE — 83880 ASSAY OF NATRIURETIC PEPTIDE: CPT

## 2024-08-28 PROCEDURE — 87040 BLOOD CULTURE FOR BACTERIA: CPT

## 2024-08-28 PROCEDURE — 94760 N-INVAS EAR/PLS OXIMETRY 1: CPT

## 2024-08-28 PROCEDURE — 2580000003 HC RX 258: Performed by: STUDENT IN AN ORGANIZED HEALTH CARE EDUCATION/TRAINING PROGRAM

## 2024-08-28 PROCEDURE — 80053 COMPREHEN METABOLIC PANEL: CPT

## 2024-08-28 PROCEDURE — 1100000000 HC RM PRIVATE

## 2024-08-28 PROCEDURE — 6360000002 HC RX W HCPCS: Performed by: STUDENT IN AN ORGANIZED HEALTH CARE EDUCATION/TRAINING PROGRAM

## 2024-08-28 PROCEDURE — 36415 COLL VENOUS BLD VENIPUNCTURE: CPT

## 2024-08-28 PROCEDURE — 0202U NFCT DS 22 TRGT SARS-COV-2: CPT

## 2024-08-28 PROCEDURE — 84484 ASSAY OF TROPONIN QUANT: CPT

## 2024-08-28 PROCEDURE — 99285 EMERGENCY DEPT VISIT HI MDM: CPT

## 2024-08-28 PROCEDURE — 85025 COMPLETE CBC W/AUTO DIFF WBC: CPT

## 2024-08-28 PROCEDURE — 74176 CT ABD & PELVIS W/O CONTRAST: CPT

## 2024-08-28 PROCEDURE — 6370000000 HC RX 637 (ALT 250 FOR IP): Performed by: STUDENT IN AN ORGANIZED HEALTH CARE EDUCATION/TRAINING PROGRAM

## 2024-08-28 PROCEDURE — 96374 THER/PROPH/DIAG INJ IV PUSH: CPT

## 2024-08-28 PROCEDURE — 71045 X-RAY EXAM CHEST 1 VIEW: CPT

## 2024-08-28 RX ORDER — IPRATROPIUM BROMIDE AND ALBUTEROL SULFATE 2.5; .5 MG/3ML; MG/3ML
1 SOLUTION RESPIRATORY (INHALATION) EVERY 4 HOURS PRN
Status: DISCONTINUED | OUTPATIENT
Start: 2024-08-28 | End: 2024-08-31 | Stop reason: HOSPADM

## 2024-08-28 RX ORDER — OXYCODONE HCL 10 MG/1
20 TABLET, FILM COATED, EXTENDED RELEASE ORAL 2 TIMES DAILY
Status: DISCONTINUED | OUTPATIENT
Start: 2024-08-28 | End: 2024-08-31 | Stop reason: HOSPADM

## 2024-08-28 RX ORDER — LANOLIN ALCOHOL/MO/W.PET/CERES
3 CREAM (GRAM) TOPICAL NIGHTLY PRN
Status: DISCONTINUED | OUTPATIENT
Start: 2024-08-28 | End: 2024-08-31 | Stop reason: HOSPADM

## 2024-08-28 RX ORDER — SODIUM CHLORIDE 9 MG/ML
INJECTION, SOLUTION INTRAVENOUS PRN
Status: DISCONTINUED | OUTPATIENT
Start: 2024-08-28 | End: 2024-08-31 | Stop reason: HOSPADM

## 2024-08-28 RX ORDER — MIDODRINE HYDROCHLORIDE 5 MG/1
5 TABLET ORAL ONCE
Status: COMPLETED | OUTPATIENT
Start: 2024-08-28 | End: 2024-08-28

## 2024-08-28 RX ORDER — ONDANSETRON 2 MG/ML
4 INJECTION INTRAMUSCULAR; INTRAVENOUS ONCE
Status: COMPLETED | OUTPATIENT
Start: 2024-08-28 | End: 2024-08-28

## 2024-08-28 RX ORDER — ACETAMINOPHEN 325 MG/1
650 TABLET ORAL EVERY 6 HOURS PRN
Status: DISCONTINUED | OUTPATIENT
Start: 2024-08-28 | End: 2024-08-31 | Stop reason: HOSPADM

## 2024-08-28 RX ORDER — ACETAMINOPHEN 325 MG/1
650 TABLET ORAL EVERY 4 HOURS PRN
Status: DISCONTINUED | OUTPATIENT
Start: 2024-08-28 | End: 2024-08-28

## 2024-08-28 RX ORDER — FUROSEMIDE 10 MG/ML
40 INJECTION INTRAMUSCULAR; INTRAVENOUS
Status: COMPLETED | OUTPATIENT
Start: 2024-08-28 | End: 2024-08-28

## 2024-08-28 RX ORDER — ONDANSETRON 2 MG/ML
4 INJECTION INTRAMUSCULAR; INTRAVENOUS EVERY 6 HOURS PRN
Status: DISCONTINUED | OUTPATIENT
Start: 2024-08-28 | End: 2024-08-31 | Stop reason: HOSPADM

## 2024-08-28 RX ORDER — ALPRAZOLAM 0.5 MG
1 TABLET ORAL 2 TIMES DAILY PRN
Status: DISCONTINUED | OUTPATIENT
Start: 2024-08-28 | End: 2024-08-31 | Stop reason: HOSPADM

## 2024-08-28 RX ORDER — POLYETHYLENE GLYCOL 3350 17 G/17G
17 POWDER, FOR SOLUTION ORAL DAILY PRN
Status: DISCONTINUED | OUTPATIENT
Start: 2024-08-28 | End: 2024-08-31 | Stop reason: HOSPADM

## 2024-08-28 RX ORDER — FUROSEMIDE 10 MG/ML
40 INJECTION INTRAMUSCULAR; INTRAVENOUS 2 TIMES DAILY
Status: DISCONTINUED | OUTPATIENT
Start: 2024-08-29 | End: 2024-08-31 | Stop reason: HOSPADM

## 2024-08-28 RX ORDER — VARENICLINE TARTRATE 1 MG/1
1 TABLET, FILM COATED ORAL 2 TIMES DAILY
Status: DISCONTINUED | OUTPATIENT
Start: 2024-08-28 | End: 2024-08-30

## 2024-08-28 RX ORDER — OXYCODONE HYDROCHLORIDE 5 MG/1
5 TABLET ORAL EVERY 4 HOURS PRN
Status: DISCONTINUED | OUTPATIENT
Start: 2024-08-28 | End: 2024-08-29

## 2024-08-28 RX ORDER — ALBUMIN (HUMAN) 12.5 G/50ML
25 SOLUTION INTRAVENOUS EVERY 6 HOURS PRN
Status: DISCONTINUED | OUTPATIENT
Start: 2024-08-28 | End: 2024-08-31 | Stop reason: HOSPADM

## 2024-08-28 RX ORDER — ACETAMINOPHEN 650 MG/1
650 SUPPOSITORY RECTAL EVERY 6 HOURS PRN
Status: DISCONTINUED | OUTPATIENT
Start: 2024-08-28 | End: 2024-08-31 | Stop reason: HOSPADM

## 2024-08-28 RX ORDER — NICOTINE 21 MG/24HR
1 PATCH, TRANSDERMAL 24 HOURS TRANSDERMAL DAILY
Status: DISCONTINUED | OUTPATIENT
Start: 2024-08-29 | End: 2024-08-31 | Stop reason: HOSPADM

## 2024-08-28 RX ORDER — SODIUM CHLORIDE 0.9 % (FLUSH) 0.9 %
5-40 SYRINGE (ML) INJECTION PRN
Status: DISCONTINUED | OUTPATIENT
Start: 2024-08-28 | End: 2024-08-31 | Stop reason: HOSPADM

## 2024-08-28 RX ORDER — SODIUM CHLORIDE 0.9 % (FLUSH) 0.9 %
5-40 SYRINGE (ML) INJECTION EVERY 12 HOURS SCHEDULED
Status: DISCONTINUED | OUTPATIENT
Start: 2024-08-28 | End: 2024-08-31 | Stop reason: HOSPADM

## 2024-08-28 RX ORDER — MIDODRINE HYDROCHLORIDE 5 MG/1
5 TABLET ORAL
Status: DISCONTINUED | OUTPATIENT
Start: 2024-08-29 | End: 2024-08-28

## 2024-08-28 RX ORDER — 0.9 % SODIUM CHLORIDE 0.9 %
30 INTRAVENOUS SOLUTION INTRAVENOUS ONCE
Status: DISCONTINUED | OUTPATIENT
Start: 2024-08-28 | End: 2024-08-28

## 2024-08-28 RX ORDER — CITALOPRAM HYDROBROMIDE 20 MG/1
20 TABLET ORAL DAILY
Status: DISCONTINUED | OUTPATIENT
Start: 2024-08-29 | End: 2024-08-31 | Stop reason: HOSPADM

## 2024-08-28 RX ORDER — POTASSIUM CHLORIDE 1500 MG/1
40 TABLET, EXTENDED RELEASE ORAL ONCE
Status: COMPLETED | OUTPATIENT
Start: 2024-08-28 | End: 2024-08-28

## 2024-08-28 RX ORDER — ONDANSETRON 4 MG/1
4 TABLET, ORALLY DISINTEGRATING ORAL EVERY 8 HOURS PRN
Status: DISCONTINUED | OUTPATIENT
Start: 2024-08-28 | End: 2024-08-31 | Stop reason: HOSPADM

## 2024-08-28 RX ADMIN — ONDANSETRON 4 MG: 2 INJECTION INTRAMUSCULAR; INTRAVENOUS at 20:50

## 2024-08-28 RX ADMIN — MIDODRINE HYDROCHLORIDE 5 MG: 5 TABLET ORAL at 22:18

## 2024-08-28 RX ADMIN — OXYCODONE HYDROCHLORIDE 20 MG: 10 TABLET, FILM COATED, EXTENDED RELEASE ORAL at 23:12

## 2024-08-28 RX ADMIN — SODIUM CHLORIDE, PRESERVATIVE FREE 10 ML: 5 INJECTION INTRAVENOUS at 23:13

## 2024-08-28 RX ADMIN — POTASSIUM CHLORIDE 40 MEQ: 1500 TABLET, EXTENDED RELEASE ORAL at 23:13

## 2024-08-28 RX ADMIN — FUROSEMIDE 40 MG: 10 INJECTION, SOLUTION INTRAMUSCULAR; INTRAVENOUS at 23:12

## 2024-08-28 ASSESSMENT — LIFESTYLE VARIABLES
HOW MANY STANDARD DRINKS CONTAINING ALCOHOL DO YOU HAVE ON A TYPICAL DAY: PATIENT DOES NOT DRINK
HOW OFTEN DO YOU HAVE A DRINK CONTAINING ALCOHOL: NEVER

## 2024-08-28 ASSESSMENT — PAIN SCALES - GENERAL: PAINLEVEL_OUTOF10: 8

## 2024-08-28 ASSESSMENT — PAIN - FUNCTIONAL ASSESSMENT: PAIN_FUNCTIONAL_ASSESSMENT: NONE - DENIES PAIN

## 2024-08-28 NOTE — PROGRESS NOTES
Patient called the office earlier with what sounds to be a viral illness. Cough, URI congestion. No fever. Some shortness of breath and fatigue. He is on oxygen at his current baseline of 2 L/min. Took an at home covid test which was negative.   Plans to come in tomorrow for CXR and labs, wife told RN they cannot come today. Orders entered.

## 2024-08-28 NOTE — TELEPHONE ENCOUNTER
Pt spouse called because pt is congested, and has a running nose. Stated that she gave him a tylenol, but inquired about a possible antibiotic.

## 2024-08-28 NOTE — TELEPHONE ENCOUNTER
PHI verified and HIPPA verified by two patient identifiers.    No temperature today. SOB, O2 sats 94% on 2LPM via N/C. Cough and congestion. Pt feels weak, fatigue and drained since his last infusion. Pt denies chest pain and discomfort. Pt negative for COVID.    Pt's spouse advised NP will order chest x-ray and have labs drawn in [facility] on 8/29. Spouse give appt time at date.

## 2024-08-29 ENCOUNTER — APPOINTMENT (OUTPATIENT)
Facility: HOSPITAL | Age: 59
End: 2024-08-29
Attending: STUDENT IN AN ORGANIZED HEALTH CARE EDUCATION/TRAINING PROGRAM
Payer: COMMERCIAL

## 2024-08-29 ENCOUNTER — APPOINTMENT (OUTPATIENT)
Facility: HOSPITAL | Age: 59
End: 2024-08-29
Payer: COMMERCIAL

## 2024-08-29 PROBLEM — R18.0 MALIGNANT ASCITES: Status: ACTIVE | Noted: 2024-08-29

## 2024-08-29 PROBLEM — B34.8 RHINOVIRUS: Status: ACTIVE | Noted: 2024-08-29

## 2024-08-29 PROBLEM — G89.3 CANCER ASSOCIATED PAIN: Status: ACTIVE | Noted: 2024-08-29

## 2024-08-29 PROBLEM — J96.01 ACUTE RESPIRATORY FAILURE WITH HYPOXIA (HCC): Status: ACTIVE | Noted: 2024-08-29

## 2024-08-29 PROBLEM — C34.90 PRIMARY MALIGNANT NEOPLASM OF LUNG METASTATIC TO OTHER SITE (HCC): Status: ACTIVE | Noted: 2024-08-29

## 2024-08-29 PROBLEM — J81.0 ACUTE PULMONARY EDEMA (HCC): Status: ACTIVE | Noted: 2024-08-29

## 2024-08-29 LAB
ARTERIAL PATENCY WRIST A: POSITIVE
ARTERIAL PATENCY WRIST A: POSITIVE
B PERT DNA SPEC QL NAA+PROBE: NOT DETECTED
BASE DEFICIT BLD-SCNC: 1.1 MMOL/L
BASE DEFICIT BLD-SCNC: 2.1 MMOL/L
BASE DEFICIT BLDV-SCNC: 2.8 MMOL/L
BASOPHILS # BLD: 0 K/UL (ref 0–0.1)
BASOPHILS # BLD: 0 K/UL (ref 0–0.1)
BASOPHILS NFR BLD: 0 % (ref 0–1)
BASOPHILS NFR BLD: 2 % (ref 0–1)
BDY SITE: ABNORMAL
BORDETELLA PARAPERTUSSIS BY PCR: NOT DETECTED
C PNEUM DNA SPEC QL NAA+PROBE: NOT DETECTED
DIFFERENTIAL METHOD BLD: ABNORMAL
DIFFERENTIAL METHOD BLD: ABNORMAL
EKG ATRIAL RATE: 112 BPM
EKG DIAGNOSIS: NORMAL
EKG P AXIS: 57 DEGREES
EKG P-R INTERVAL: 138 MS
EKG Q-T INTERVAL: 330 MS
EKG QRS DURATION: 70 MS
EKG QTC CALCULATION (BAZETT): 450 MS
EKG R AXIS: 13 DEGREES
EKG T AXIS: 187 DEGREES
EKG VENTRICULAR RATE: 112 BPM
EOSINOPHIL # BLD: 0.1 K/UL (ref 0–0.4)
EOSINOPHIL # BLD: 0.2 K/UL (ref 0–0.4)
EOSINOPHIL NFR BLD: 2 % (ref 0–7)
EOSINOPHIL NFR BLD: 6 % (ref 0–7)
ERYTHROCYTE [DISTWIDTH] IN BLOOD BY AUTOMATED COUNT: 19.7 % (ref 11.5–14.5)
ERYTHROCYTE [DISTWIDTH] IN BLOOD BY AUTOMATED COUNT: 20 % (ref 11.5–14.5)
FLUAV SUBTYP SPEC NAA+PROBE: NOT DETECTED
FLUBV RNA SPEC QL NAA+PROBE: NOT DETECTED
GAS FLOW.O2 O2 DELIVERY SYS: ABNORMAL
GAS FLOW.O2 O2 DELIVERY SYS: ABNORMAL
GAS FLOW.O2 SETTING OXYMISER: 14 BPM
GAS FLOW.O2 SETTING OXYMISER: 14 BPM
GLUCOSE BLD STRIP.AUTO-MCNC: 145 MG/DL (ref 65–117)
HADV DNA SPEC QL NAA+PROBE: NOT DETECTED
HCO3 BLD-SCNC: 27.9 MMOL/L (ref 22–26)
HCO3 BLD-SCNC: 29.8 MMOL/L (ref 22–26)
HCO3 BLDV-SCNC: 27.4 MMOL/L (ref 23–28)
HCOV 229E RNA SPEC QL NAA+PROBE: NOT DETECTED
HCOV HKU1 RNA SPEC QL NAA+PROBE: NOT DETECTED
HCOV NL63 RNA SPEC QL NAA+PROBE: NOT DETECTED
HCOV OC43 RNA SPEC QL NAA+PROBE: NOT DETECTED
HCT VFR BLD AUTO: 39.3 % (ref 36.6–50.3)
HCT VFR BLD AUTO: 40.5 % (ref 36.6–50.3)
HGB BLD-MCNC: 12.4 G/DL (ref 12.1–17)
HGB BLD-MCNC: 12.4 G/DL (ref 12.1–17)
HMPV RNA SPEC QL NAA+PROBE: NOT DETECTED
HPIV1 RNA SPEC QL NAA+PROBE: NOT DETECTED
HPIV2 RNA SPEC QL NAA+PROBE: NOT DETECTED
HPIV3 RNA SPEC QL NAA+PROBE: NOT DETECTED
HPIV4 RNA SPEC QL NAA+PROBE: NOT DETECTED
IMM GRANULOCYTES # BLD AUTO: 0 K/UL (ref 0–0.04)
IMM GRANULOCYTES # BLD AUTO: 0 K/UL (ref 0–0.04)
IMM GRANULOCYTES NFR BLD AUTO: 0 % (ref 0–0.5)
IMM GRANULOCYTES NFR BLD AUTO: 0 % (ref 0–0.5)
IPAP/PIP/HIGH PEEP: 15
IPAP/PIP/HIGH PEEP: 15
LYMPHOCYTES # BLD: 0.8 K/UL (ref 0.8–3.5)
LYMPHOCYTES # BLD: 1.1 K/UL (ref 0.8–3.5)
LYMPHOCYTES NFR BLD: 14 % (ref 12–49)
LYMPHOCYTES NFR BLD: 34 % (ref 12–49)
M PNEUMO DNA SPEC QL NAA+PROBE: NOT DETECTED
MCH RBC QN AUTO: 26.8 PG (ref 26–34)
MCH RBC QN AUTO: 27 PG (ref 26–34)
MCHC RBC AUTO-ENTMCNC: 30.6 G/DL (ref 30–36.5)
MCHC RBC AUTO-ENTMCNC: 31.6 G/DL (ref 30–36.5)
MCV RBC AUTO: 84.9 FL (ref 80–99)
MCV RBC AUTO: 88 FL (ref 80–99)
METAMYELOCYTES NFR BLD MANUAL: 1 %
MONOCYTES # BLD: 0.6 K/UL (ref 0–1)
MONOCYTES # BLD: 0.9 K/UL (ref 0–1)
MONOCYTES NFR BLD: 12 % (ref 5–13)
MONOCYTES NFR BLD: 25 % (ref 5–13)
MYELOCYTES NFR BLD MANUAL: 1 %
NEUTS BAND NFR BLD MANUAL: 3 %
NEUTS BAND NFR BLD MANUAL: 5 %
NEUTS SEG # BLD: 0.7 K/UL (ref 1.8–8)
NEUTS SEG # BLD: 5.4 K/UL (ref 1.8–8)
NEUTS SEG NFR BLD: 29 % (ref 32–75)
NEUTS SEG NFR BLD: 65 % (ref 32–75)
NRBC # BLD: 0.03 K/UL (ref 0–0.01)
NRBC # BLD: 0.03 K/UL (ref 0–0.01)
NRBC BLD-RTO: 0.4 PER 100 WBC
NRBC BLD-RTO: 1.3 PER 100 WBC
O2/TOTAL GAS SETTING VFR VENT: 6 %
O2/TOTAL GAS SETTING VFR VENT: 80 %
O2/TOTAL GAS SETTING VFR VENT: 80 %
OTHER CELLS NFR BLD MANUAL: 1
PCO2 BLD: 65.5 MMHG (ref 35–45)
PCO2 BLD: 86 MMHG (ref 35–45)
PCO2 BLDV: 71.7 MMHG (ref 41–51)
PEEP RESPIRATORY: 5 CMH2O
PEEP RESPIRATORY: 5 CMH2O
PH BLD: 7.15 (ref 7.35–7.45)
PH BLD: 7.24 (ref 7.35–7.45)
PH BLDV: 7.19 (ref 7.32–7.42)
PLATELET # BLD AUTO: 233 K/UL (ref 150–400)
PLATELET # BLD AUTO: 251 K/UL (ref 150–400)
PLATELET COMMENT: ABNORMAL
PMV BLD AUTO: 11.8 FL (ref 8.9–12.9)
PMV BLD AUTO: 12.2 FL (ref 8.9–12.9)
PO2 BLD: 206 MMHG (ref 80–100)
PO2 BLD: 218 MMHG (ref 80–100)
PO2 BLDV: 51 MMHG (ref 25–40)
RBC # BLD AUTO: 4.6 M/UL (ref 4.1–5.7)
RBC # BLD AUTO: 4.63 M/UL (ref 4.1–5.7)
RBC MORPH BLD: ABNORMAL
RBC MORPH BLD: ABNORMAL
RSV RNA SPEC QL NAA+PROBE: NOT DETECTED
RV+EV RNA SPEC QL NAA+PROBE: DETECTED
SAO2 % BLD: 99.5 % (ref 92–97)
SAO2 % BLD: 99.5 % (ref 92–97)
SAO2 % BLDV: 75 % (ref 65–88)
SARS-COV-2 RNA RESP QL NAA+PROBE: NOT DETECTED
SERVICE CMNT-IMP: ABNORMAL
SPECIMEN TYPE: ABNORMAL
VENTILATION MODE VENT: ABNORMAL
WBC # BLD AUTO: 2.3 K/UL (ref 4.1–11.1)
WBC # BLD AUTO: 7.7 K/UL (ref 4.1–11.1)
WBC MORPH BLD: ABNORMAL
WBC MORPH BLD: ABNORMAL

## 2024-08-29 PROCEDURE — 51798 US URINE CAPACITY MEASURE: CPT

## 2024-08-29 PROCEDURE — 2580000003 HC RX 258: Performed by: STUDENT IN AN ORGANIZED HEALTH CARE EDUCATION/TRAINING PROGRAM

## 2024-08-29 PROCEDURE — 6360000002 HC RX W HCPCS: Performed by: STUDENT IN AN ORGANIZED HEALTH CARE EDUCATION/TRAINING PROGRAM

## 2024-08-29 PROCEDURE — 85025 COMPLETE CBC W/AUTO DIFF WBC: CPT

## 2024-08-29 PROCEDURE — 94660 CPAP INITIATION&MGMT: CPT

## 2024-08-29 PROCEDURE — 6370000000 HC RX 637 (ALT 250 FOR IP): Performed by: NURSE PRACTITIONER

## 2024-08-29 PROCEDURE — 82962 GLUCOSE BLOOD TEST: CPT

## 2024-08-29 PROCEDURE — 71045 X-RAY EXAM CHEST 1 VIEW: CPT

## 2024-08-29 PROCEDURE — 94762 N-INVAS EAR/PLS OXIMTRY CONT: CPT

## 2024-08-29 PROCEDURE — 6370000000 HC RX 637 (ALT 250 FOR IP): Performed by: STUDENT IN AN ORGANIZED HEALTH CARE EDUCATION/TRAINING PROGRAM

## 2024-08-29 PROCEDURE — 82803 BLOOD GASES ANY COMBINATION: CPT

## 2024-08-29 PROCEDURE — 36600 WITHDRAWAL OF ARTERIAL BLOOD: CPT

## 2024-08-29 PROCEDURE — 6360000002 HC RX W HCPCS: Performed by: NURSE PRACTITIONER

## 2024-08-29 PROCEDURE — 36415 COLL VENOUS BLD VENIPUNCTURE: CPT

## 2024-08-29 PROCEDURE — 2700000000 HC OXYGEN THERAPY PER DAY

## 2024-08-29 PROCEDURE — 2060000000 HC ICU INTERMEDIATE R&B

## 2024-08-29 PROCEDURE — 99222 1ST HOSP IP/OBS MODERATE 55: CPT | Performed by: NURSE PRACTITIONER

## 2024-08-29 PROCEDURE — 99232 SBSQ HOSP IP/OBS MODERATE 35: CPT | Performed by: STUDENT IN AN ORGANIZED HEALTH CARE EDUCATION/TRAINING PROGRAM

## 2024-08-29 RX ORDER — SENNOSIDES A AND B 8.6 MG/1
1 TABLET, FILM COATED ORAL DAILY PRN
Status: DISCONTINUED | OUTPATIENT
Start: 2024-08-29 | End: 2024-08-31 | Stop reason: HOSPADM

## 2024-08-29 RX ORDER — FUROSEMIDE 10 MG/ML
40 INJECTION INTRAMUSCULAR; INTRAVENOUS ONCE
Status: DISCONTINUED | OUTPATIENT
Start: 2024-08-29 | End: 2024-08-29

## 2024-08-29 RX ORDER — FUROSEMIDE 10 MG/ML
40 INJECTION INTRAMUSCULAR; INTRAVENOUS ONCE
Status: COMPLETED | OUTPATIENT
Start: 2024-08-29 | End: 2024-08-29

## 2024-08-29 RX ORDER — OXYCODONE HYDROCHLORIDE 5 MG/1
5 TABLET ORAL EVERY 4 HOURS PRN
Status: DISCONTINUED | OUTPATIENT
Start: 2024-08-29 | End: 2024-08-31 | Stop reason: HOSPADM

## 2024-08-29 RX ADMIN — SODIUM CHLORIDE, PRESERVATIVE FREE 10 ML: 5 INJECTION INTRAVENOUS at 21:20

## 2024-08-29 RX ADMIN — FUROSEMIDE 40 MG: 10 INJECTION, SOLUTION INTRAMUSCULAR; INTRAVENOUS at 18:23

## 2024-08-29 RX ADMIN — ALPRAZOLAM 1 MG: 0.5 TABLET ORAL at 21:53

## 2024-08-29 RX ADMIN — PIPERACILLIN AND TAZOBACTAM 3375 MG: 3; .375 INJECTION, POWDER, LYOPHILIZED, FOR SOLUTION INTRAVENOUS at 05:46

## 2024-08-29 RX ADMIN — ONDANSETRON 4 MG: 2 INJECTION INTRAMUSCULAR; INTRAVENOUS at 21:18

## 2024-08-29 RX ADMIN — SODIUM CHLORIDE: 9 INJECTION, SOLUTION INTRAVENOUS at 12:52

## 2024-08-29 RX ADMIN — FUROSEMIDE 40 MG: 10 INJECTION, SOLUTION INTRAMUSCULAR; INTRAVENOUS at 12:49

## 2024-08-29 RX ADMIN — SODIUM CHLORIDE, PRESERVATIVE FREE 10 ML: 5 INJECTION INTRAVENOUS at 14:08

## 2024-08-29 RX ADMIN — OXYCODONE HYDROCHLORIDE 20 MG: 10 TABLET, FILM COATED, EXTENDED RELEASE ORAL at 21:17

## 2024-08-29 RX ADMIN — PIPERACILLIN AND TAZOBACTAM 3375 MG: 3; .375 INJECTION, POWDER, LYOPHILIZED, FOR SOLUTION INTRAVENOUS at 12:53

## 2024-08-29 RX ADMIN — PIPERACILLIN AND TAZOBACTAM 3375 MG: 3; .375 INJECTION, POWDER, LYOPHILIZED, FOR SOLUTION INTRAVENOUS at 22:01

## 2024-08-29 RX ADMIN — SENNOSIDES 8.6 MG: 8.6 TABLET, FILM COATED ORAL at 22:38

## 2024-08-29 RX ADMIN — SODIUM CHLORIDE 5 ML/HR: 9 INJECTION, SOLUTION INTRAVENOUS at 21:59

## 2024-08-29 RX ADMIN — FUROSEMIDE 40 MG: 10 INJECTION, SOLUTION INTRAMUSCULAR; INTRAVENOUS at 04:00

## 2024-08-29 RX ADMIN — PIPERACILLIN AND TAZOBACTAM 4500 MG: 4; .5 INJECTION, POWDER, LYOPHILIZED, FOR SOLUTION INTRAVENOUS at 01:04

## 2024-08-29 ASSESSMENT — PAIN SCALES - GENERAL
PAINLEVEL_OUTOF10: 0
PAINLEVEL_OUTOF10: 5
PAINLEVEL_OUTOF10: 9
PAINLEVEL_OUTOF10: 6

## 2024-08-29 ASSESSMENT — PAIN DESCRIPTION - ORIENTATION
ORIENTATION: RIGHT
ORIENTATION: RIGHT

## 2024-08-29 ASSESSMENT — PAIN DESCRIPTION - LOCATION
LOCATION: CHEST
LOCATION: CHEST

## 2024-08-29 NOTE — PROGRESS NOTES
Nursing contacted Nocturnist/cross cover provider via non-urgent messaging system [employer] and notified patient course rales R>L, states cxr and abg ordered per protocol and are pending, RT getting ready to give neb now. States pt on 2lpm o2 at home, was on 4lpm here and now up to 5lpm room 3408, states pt o2 repeatedly dropping to the 80s w/o coughing or movement, states when occurs appears a good pleth, and pt appears asymptomatic, no inc work of breathing, no inc o2 consumption, remains on 5lpm, pt denying c/o discomfort, nurse states seems like the o2 sat takes a bit to recover- given pt present lung disease would expect this to certain extent. No other concerns reported. No acute distress reported. No other information provided by nurse. VSS. RT reported abg results: Ph 7.19, co2 72, p02 50, bicarb 27,     Ordered palliative care consult for in the am, lasix 40mg iv x1 now, had dose around 2300, per pt \"a lot of urine out\" since last dose. I called and d/w pt and he states he is comfortable, he doesn't want to escalate to more o2, he declines to have a bipap or HHFNC at this time, confirms he is DNR/DNI and would not want intubation, he is aware risk vs benefits for tx vs non-treatments as d/w him including debility, worsening condition, and up to cardio/pulmonary arrest, states he would be ok with that and he is presently feeling asymptomatic and not presently interested in escalating the care. Cxr pending results presently. All questions answered to his satisfaction. Will defer further evaluation/management to the day shift primary attending care team. Patient denies any further complaints or concerns.     Nursing to notify Hospitalist for further/continued concerns. Will remain available overnight for further concerns if nursing/patient needs. Please note, there are RRT systems in this hospital in place that if nursing has acute or critical patient condition change or concern, this is to help facilitate  and notify that patient needs immediate bedside evaluation by a provider. John Villa aware of my d/w pt and plan as above.    Non-billable note.

## 2024-08-29 NOTE — H&P
Differential Type MANUAL      Platelet Comment Large Platelets      RBC Comment ANISOCYTOSIS  1+        WBC Comment ATYPICAL LYMPHOCYTES PRESENT     Comprehensive Metabolic Panel    Collection Time: 08/28/24  8:25 PM   Result Value Ref Range    Sodium 129 (L) 136 - 145 mmol/L    Potassium 3.1 (L) 3.5 - 5.1 mmol/L    Chloride 94 (L) 97 - 108 mmol/L    CO2 25 21 - 32 mmol/L    Anion Gap 10 5 - 15 mmol/L    Glucose 127 (H) 65 - 100 mg/dL    BUN 20 6 - 20 MG/DL    Creatinine 1.07 0.70 - 1.30 MG/DL    BUN/Creatinine Ratio 19 12 - 20      Est, Glom Filt Rate 81 >60 ml/min/1.73m2    Calcium 8.4 (L) 8.5 - 10.1 MG/DL    Total Bilirubin 1.4 (H) 0.2 - 1.0 MG/DL    ALT 23 12 - 78 U/L    AST 18 15 - 37 U/L    Alk Phosphatase 163 (H) 45 - 117 U/L    Total Protein 6.4 6.4 - 8.2 g/dL    Albumin 2.7 (L) 3.5 - 5.0 g/dL    Globulin 3.7 2.0 - 4.0 g/dL    Albumin/Globulin Ratio 0.7 (L) 1.1 - 2.2     Procalcitonin    Collection Time: 08/28/24  8:25 PM   Result Value Ref Range    Procalcitonin 0.22 ng/mL   Troponin    Collection Time: 08/28/24  8:25 PM   Result Value Ref Range    Troponin, High Sensitivity 17 0 - 76 ng/L   COVID-19, Rapid    Collection Time: 08/28/24  8:25 PM    Specimen: Nasopharyngeal   Result Value Ref Range    Source Nasopharyngeal      SARS-CoV-2, PCR Not detected NOTD     Brain Natriuretic Peptide    Collection Time: 08/28/24  8:25 PM   Result Value Ref Range    NT Pro-BNP 1,744 (H) <125 PG/ML   POC Lactic Acid    Collection Time: 08/28/24  9:54 PM   Result Value Ref Range    POC Lactic Acid 0.96 0.40 - 2.00 mmol/L         CT Result (most recent):  CT ABDOMEN PELVIS WO IV CONTRAST 08/28/2024    Narrative  EXAM: CT ABDOMEN PELVIS WO CONTRAST    INDICATION: Abdominal distension, hc of Stage IV Lung Ca    COMPARISON: 2/10/2013    IV CONTRAST: None.    ORAL CONTRAST: None    TECHNIQUE:  Thin axial images were obtained through the abdomen and pelvis. Coronal and  sagittal reformats were generated. CT dose reduction was  achieved through use of  a standardized protocol tailored for this examination and automatic exposure  control for dose modulation.    The absence of intravenous contrast material reduces the sensitivity for  evaluation of the vasculature and solid organs.    FINDINGS:  LOWER THORAX: Right pleural-based catheter in place. Markedly thickened right  pleural with persistent right effusion. Moderate left effusion.  LIVER: No mass.  BILIARY TREE: Gallbladder is within normal limits. CBD is not dilated.  SPLEEN: within normal limits.  PANCREAS: No focal abnormality.  ADRENALS: Bilateral renal lesions unchanged.  KIDNEYS/URETERS: No calculus or hydronephrosis.  STOMACH: Unremarkable.  SMALL BOWEL: No dilatation or wall thickening.  COLON: No dilatation or wall thickening.  APPENDIX: Unremarkable  PERITONEUM: Free fluid in the pelvis.  RETROPERITONEUM: Atherosclerotic disease.  REPRODUCTIVE ORGANS: Unremarkable  URINARY BLADDER: No mass or calculus.  BONES: No destructive bone lesion.  ABDOMINAL WALL: No mass or hernia.  ADDITIONAL COMMENTS: N/A    Impression  1. Mild ascites.    2. No evidence of mechanical bowel obstruction.    3. Bilateral adrenal nodule suspicious for metastatic disease.    4. Stable findings in the right chest. Stable left effusion.    Electronically signed by Jevon Tatum        Xray Result (most recent):  XR CHEST PORTABLE 08/28/2024    Narrative  EXAM: XR CHEST PORTABLE    INDICATION: Sepsis    COMPARISON: 5/22/2024    FINDINGS: A portable AP radiograph of the chest was obtained at 2107 hours.  Right IJ Port-A-Cath.. Moderate to severe pulmonary edema. Right effusion..  Cardiomegaly..  The bones and soft tissues are grossly within normal limits.    Impression  Moderate to severe pulmonary edema with moderate right effusion.    Electronically signed by Jevon Tatum        _______________________________________________________________________    TOTAL TIME:  75 Minutes   TOBACCO CESSATION  COUNSELING: Yes    Critical Care Provided: N/A  (Minutes non procedure based)        Signed:Bernardo Orantes DO  [facility] - Internal Medicine Hospitalist/ Nocturnist  8/28/2024 10:41 PM    Please do not hesitate to reach out to myself or assigned provider on-call via [employer] paging system with questions or concerns.     Procedures: see electronic medical records for all procedures/Xrays and details which were not copied into this note but were reviewed prior to creation of Plan.

## 2024-08-29 NOTE — PROGRESS NOTES
End of Shift Note    Bedside shift change report given to Anneliese  (oncoming nurse) by Mariposa Serrano RN (offgoing nurse).  Report included the following information SBAR, Kardex, MAR, and Recent Results    Shift worked:  1900-700   Shift summary and any significant changes:    Admission and dual assessment complete. Pt received scheduled medications per MAR except Chantix which was not yet verified by pharmacy. Pt standby assist to bathroom. Urinalysis needs to be completed. Port care complete. Pt family members arrived to bedside. Pt safety and caring rounds complete.      Concerns for physician to address: Pt began to desat during the middle of the night. Pt on 4L NC and went up to 5L NC with SPO2 between 77-83%. RRT and RT called and arrived to bedside. Pt received one dose of IV Lasix 40mg, ABGs drawn, CXR ordered and pending. Pt continued to remain in the mid 80's and RT instructed to put pt on 10LPM NRB. Pt denied BiPAP and hi-anthony, MD aware. MD [place] placed palliative consult for pt this morning. Day shift YOLA Coy made aware about pt current status.      Zone phone for oncoming shift:   [phone]             Mariposa Serrano, YOLA

## 2024-08-29 NOTE — PROGRESS NOTES
0715: received report from YOLA Monge   0800: Vital signs taken and assessment completed. Patient noted to have increased work of breathing and minimally responsive. Deterioration Index noted to be 55.  0820: Rapid response called. Respiratory therapist, rapid response nurse and MD at bedside. Pt placed on bipap, ABG drawn and transferred to PCU.   0900: Report called to YOLA Smith on PCU

## 2024-08-29 NOTE — CONSULTS
[facility] [place] [facility]  [address] [place] [address] Medical Office Building [address] [address]  W: [phone] F: [phone]    Hematology/Oncology Office Note:     Reason for Visit:     Fortunato Kowalski is a 59 y.o. male who is seen in consultation at the request of Dr. Dyson for evaluation of lung cancer.  Patient is now admitted to [facility] with respiratory failure and altered mental status.  He has been receiving systemic therapy in the outpatient setting for metastatic non-small cell carcinoma.    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    Core biopsy shows similar features.     General Categorization   Malignant cells present     Specimen Adequacy   Satisfactory for evaluation.     Comment   Immunohistochemical staining on block 1A shows that the malignant cells   stain as follows:   CK 7: Positive   CK 20: Negative   TTF-1: Negative   P40: Positive   P63: Positive   CA-9: Negative   PAX8: Negative   Napsin A: Negative   NKX 3.1: Negative   CDX2: Negative   WT1: Negative   Claudin-4: Weakly positive   Calretinin: Negative     PDL1 (Keytruda) immunohistochemical testing is ordered on tissue block 1A   with the results to be reported in an addendum.     Initial Presentation  (HPI):     History of Present Illness  The patient is a  Patient is acutely ill and is positive for rhinovirus.  Family is hopeful for recovery but also acknowledges patient may not survive admission.  If patient condition worsens they verbalized they would pursue comfort measures as patient does not wish to be intubated.    Discussed with Shadia Nova NP and Dr. Fitzgerald    Will continue to follow closely.  Thank you for your excellent care of Mr. Kowalski.    Signed By: ASHANTI Day - LUCY       Addendum:     I have personally reviewed the nurse practitioner's documentation and have evaluated the patient independently.  I agree with her plan.  Additionally, I have included the following further information about my medical decision making and plan below.     MDM Elements:   1) Data Complexity -  I reviewed the patients most recent hematology labs, as well as relevant labs from prior encounters.   2) Complexity of problems addressed - high-   3) Risk of complications, morbidity, and mortality of treatment of management - high    # Metastatic lung cancer  # Severe pulmonary edema  # Malignant pleural effusion  # Pericardial window  # Heart failure  # Neutropenic fever    Fortunato Kowalski was seen today at bedside.  Please see above documentation for subjective findings.  On my evaluation I noted a confused patient on bipap.  Oncology/Hematology was consulted for goals discussion and management of cancer related problems.  I had a goals of care discussion with the patient and his wife at bedside.  They are in agreement that should he worsen on BIPAP, care should be taken to avoid suffering and prolonged mechanical ventilation.  He is already DNR.  I discussed comfort measures should he worsen, his wife verbalized agreement.  Will consult palliative care.  Prognosis guarded.            Shlomo Veronica MD    Attending Medical Oncologist   [facility]

## 2024-08-29 NOTE — PROGRESS NOTES
Chart reviewed for pending IR procedure. Informed by primary nurse that procedure needs to be placed on hold for today d/t pt clinical status.

## 2024-08-29 NOTE — PROGRESS NOTES
Palliative Medicine  Per Shadia Nova NP: Fortunato Kowalski is a 59 y.o. male with a past medical history of DM2, NSCLC of the right lung on chemotherapy, who was admitted on 2024 from home with a diagnosis of volume overload, and hypoxemic respiratory failure in the setting of stage IV lung cancer.     Palliative consult is for [facility].     Code Status: DNR DDNR on file is signed by patient and Dr. Ramirez on 24.    [facility]/29/2024     2:30 PM   Demographics   Marital Status    AMD on file names Yolanda, spouse as primary and Iris, stepdaughter, as secondary HCDM. He did not complete the Health Care Directions section.     Patient / Family Encounter Documentation    Participants (names): Fortunato Kowalski, spouse Yolanda, Kimberly Graf LCSW    Narrative: Palliative SW reviewed chart after update from Shadia Nova NP. Patient was received in bed on a phone call with his friend and with his sister at bedside. This writer exited the room to allow him privacy. Spouse had stepped out and as she was returning to the room, ANGEL introduced self and provided empathetic listening and condolences as she shared that her granddaughter (Her daughter Kathia's child) had been murdered earlier this month. ANGEL gave her a Palliative Medicine business card, encouraging her to us this writer for support through this hospitalization. Gave her a brochure for [email or web address] and contact for [facility] for herself and the family. She expressed appreciation and said she thinks she has spoken with [facility] at [facility] regarding help with truck payments.     Psychosocial Issues Identified/ Resilience Factors: Lives with his wife Yolanda, (recently re- after  years ago), and did have custody of their granddaughter, who recently passed away tragically earlier this month. Patient is a long distance  and has filed for SSDI, which, according to his spouse, is due to be approved

## 2024-08-29 NOTE — SIGNIFICANT EVENT
Rapid Called at 0821    Responded to RRT at 0821 for Altered mental status    Provider at bedside: YES  Interventions ordered: ABG, bipap  Sepsis Suspected: No  Transfer to Higher Level of Care: YES    Pt started to have ALOC, with increased WOB. RRT was called. MD, primary RN, RR were at bedside assessed pt. Bipap, ABG, and transfer order is in. Pt family was updated with new POC by MD. Writer helped pt transferred to PCU. Pt care and report were given to PCU. End of rapid.   Visit Vitals  /79   Pulse (!) 103   Temp 97.3 °F (36.3 °C) (Axillary)   Resp 19   Ht 1.702 m (5' 7\")   Wt 107.9 kg (237 lb 12.8 oz)   SpO2 97%   BMI 37.24 kg/m²        Rapid Ended at 0900  RRT RN assisted with transport to accepting unit REMI Birch RN

## 2024-08-29 NOTE — PROGRESS NOTES
Hospitalist Progress Note    NAME:   Fortunato Kowalski   : 1965   MRN: [id number]     Date/Time: 2024 1:48 PM  Patient PCP: Shlomo Veronica MD    Estimated discharge date: 2024  Barriers: Needs improvement in respiration, oncology clearance, oncology clearance  Assessment / Plan:  Volume overload  Acute hypoxemic respiratory failure secondary to above  Hypercapnic respiratory failure-likely due to COPD   Stage IV lung cancer on chemotherapy  Recurrent malignant right pleural effusion status post aspirate drain  Malignant pericardial effusion status post pericardial window  Moderate volume ascites-worsened from prior  Patient admitted to telemetry monitoring  Chest x-ray done on 2024-moderate to severe pulmonary edema with moderate right pleural effusion  Repeat x-ray done on 2024 no significant change  CT abdomen pelvis-mild ascites, bilateral adrenal nodules suspicious of metastatic disease.  Check TTE  Diagnostic/therapeutic paracentesis-if needed  Fluid studies ordered: Culture/Gram stain, cell count, albumin  As needed albumin for systolic less than 90  Continue twice daily IV diuresis  Trend renal function and electrolytes-replace as needed  Strict intake and output with daily weights  Apply compression stockings    This a.m. an RRT was called for change in mental status and desaturation episode.  Blood gas done at 9:06 AM-pH is 7.15 pCO2 of 86  Patient was placed on BiPAP  His mentation improved  Repeat blood gas on 12:15 PM-pH 7.24 pCO2 65.5    Continue patient on BiPAP to the later afternoon.  Can transition to oxygen via nasal cannula  He may need to remain on BiPAP overnight  Oncology evaluated the patient will follow up recommendation  Palliative was consulted will follow up recommendation  Had discussion with family myself.  They would like to continue patient on current care.    Concern for URI  Neutropenia-chemotherapy induced  WBC on arrival 1.8, neutrophils 26%  WBC today  awake.    Objective:     VITALS:   Last 24hrs VS reviewed since prior progress note. Most recent are:  Patient Vitals for the past 24 hrs:   BP Temp Temp src Pulse Resp SpO2 Height Weight   08/29/24 1227 -- -- -- 93 15 98 % -- --   08/29/24 0930 104/79 -- -- (!) 103 19 97 % -- --   08/29/24 0906 -- -- -- (!) 189 18 99 % -- --   08/29/24 0830 (!) 137/101 -- -- (!) 110 -- (!) 88 % -- --   08/29/24 0800 (!) 137/106 97.3 °F (36.3 °C) Axillary (!) 103 (!) 32 94 % -- --   08/29/24 0559 -- -- -- -- -- -- -- 107.9 kg (237 lb 12.8 oz)   08/29/24 0306 102/75 98.1 °F (36.7 °C) Oral (!) 109 22 (!) 85 % -- --   08/29/24 0010 109/82 97.9 °F (36.6 °C) Oral (!) 110 20 94 % -- --   08/28/24 2322 103/75 -- -- (!) 116 22 93 % -- --   08/28/24 2315 113/72 -- -- (!) 119 -- -- -- --   08/28/24 2217 116/77 -- -- (!) 115 19 92 % -- --   08/28/24 2045 99/75 -- -- (!) 111 23 92 % -- --   08/28/24 2007 98/68 97.7 °F (36.5 °C) Oral (!) 112 25 93 % 1.702 m (5' 7\") 93.7 kg (206 lb 9.6 oz)       No intake or output data in the 24 hours ending 08/29/24 1348     I had a face to face encounter and independently examined this patient on 8/29/2024, as outlined below:  PHYSICAL EXAM:  General: Alert, cooperative, using BiPAP  EENT:  EOMI. Anicteric sclerae.  Resp:  Bilateral equal air entry.  No accessory muscle use.  Aspirate drain in  place.  Chemo-Port on right upper chest  CV:  Regular  rhythm, pitting edema  GI:  Soft, mildly distended distended, Non tender.  +Bowel sounds  Neurologic:  Alert and oriented X 3, normal speech  Psych:   Good insight. Not anxious nor agitated  Skin:  No rashes.  No jaundice    Reviewed most current lab test results and cultures  YES  Reviewed most current radiology test results   YES  Review and summation of old records today    NO  Reviewed patient's current orders and MAR    YES  PMH/SH reviewed - no change compared to H&P    Procedures: see electronic medical records for all procedures/Xrays and details which were  not copied into this note but were reviewed prior to creation of Plan.      LABS:  I reviewed today's most current labs and imaging studies.  Pertinent labs include:  Recent Labs     08/28/24 2025 08/29/24  0103   WBC 1.8* 2.3*   HGB 11.8* 12.4   HCT 36.4* 39.3    233     Recent Labs     08/28/24 2025   *   K 3.1*   CL 94*   CO2 25   GLUCOSE 127*   BUN 20   CREATININE 1.07   CALCIUM 8.4*   BILITOT 1.4*   AST 18   ALT 23       Signed: Sully Fitzgerald MD

## 2024-08-29 NOTE — CARE COORDINATION
Care Management Initial Assessment       RUR: 18% Moderate RUR  Readmission? No  1st IM letter given? No  1st  letter given: No       08/29/24 4354   Service Assessment   Patient Orientation Unable to Assess  (The patient is not alert and the family are in emotional distress.)     Yessi Seals, RN  Case Management  [phone]

## 2024-08-29 NOTE — ED PROVIDER NOTES
[facility] EMERGENCY DEPT  EMERGENCY DEPARTMENT ENCOUNTER       Pt Name: Fortunato Kowalski  MRN: [id number]  Birthdate 1965  Date of evaluation: 8/28/2024  Provider: Fortunato Cowart DO   PCP: Shlomo Veronica MD  Note Started: 8:05 PM EDT 8/28/24     CHIEF COMPLAINT       Chief Complaint   Patient presents with    Shortness of Breath     Patient wheeled into wheelchair by sister; c/o SOB; congestion and low garde fever 99.9 starting x3 days ago relieved by tylenol. Pt has stage 4 lung cancer last treatment ; Pt  abd distention  and         HISTORY OF PRESENT ILLNESS: 1 or more elements      History From: patient, History limited by: none     Fortunato Kowalski is a 59 y.o. male who presents to the emergency department by private vehicle for evaluation of shortness of breath.       Please See [facility] for Additional Details of the HPI/PMH  Nursing Notes were all reviewed and agreed with or any disagreements were addressed in the HPI.     REVIEW OF SYSTEMS        Positives and Pertinent negatives as per HPI.    PAST HISTORY     Past Medical History:  No past medical history on file.    Past Surgical History:  Past Surgical History:   Procedure Laterality Date    CARDIAC SURGERY N/A 5/16/2024    DARIO BY DR CHILDRESS - PERICARDIAL WINDOW performed by Kun Flores MD at [facility] OPEN HEART    COLONOSCOPY      CT BIOPSY SOFT TISSUE NECK  04/10/2024    CT BIOPSY LUNG/MEDIASTINUM PERC 4/10/2024 [facility] RAD CT    IR INSERT TUNNELED PLEURA CATH W CUFF  5/21/2024    IR INSERT TUNNELED PLEURA CATH W CUFF 5/21/2024 Justa Iglesias PA [facility] RAD ANGIO IR    IR PERC CATH PLEURAL DRAIN W/IMAG  04/08/2024    IR PERC CATH PLEURAL DRAIN W/IMAG 4/8/2024 Adi Freitas MD [facility] RAD ANGIO IR    IR PERC CATH PLEURAL DRAIN W/IMAG  5/14/2024    IR PERC CATH PLEURAL DRAIN W/IMAG 5/14/2024 Joseph Diaz MD [facility] RAD ANGIO IR    IR PORT PLACEMENT EQUAL OR GREATER THAN 5 YEARS  5/6/2024    IR PORT PLACEMENT EQUAL OR GREATER THAN 5 YEARS 5/6/2024 Adi Freitas MD [facility] RAD ANGIO IR  Cardiovascular  ASSOCIATED RISK FACTORS - Hypoxia and Dysrhythmia  MANAGEMENT- Bedside Assessment and Supervision of Care  INTERPRETATION -  Xrays, ECG, Blood Pressure, Cardiac Output Measures , and labs  INTERVENTIONS - respiratory mgmt  CASE REVIEW - Hospitalist/Intensivist, Nursing, and Family  TREATMENT RESPONSE -Stable  PERFORMED BY - Self    NOTES:  I have spent 40 minutes of critical care time involved in lab review, consultations with specialist, family decision- making, bedside attention and documentation. Time is exclusive of EKG interpretation, imaging interpretation and separately billed procedures.  During this entire length of time I was immediately available to the patient.  Fortunato Cowart, DO      EMERGENCY DEPARTMENT COURSE and DIFFERENTIAL DIAGNOSIS/MDM   Vitals:    Vitals:    08/28/24 2217 08/28/24 2315 08/28/24 2322 08/29/24 0010   BP: 116/77 113/72 103/75 109/82   Pulse: (!) 115 (!) 119 (!) 116 (!) 110   Resp: 19 22 20   Temp:    97.9 °F (36.6 °C)   TempSrc:    Oral   SpO2: 92%  93% 94%   Weight:       Height:            Patient was given the following medications:  Medications   sodium chloride 0.9 % bolus 2,952 mL (has no administration in time range)   ondansetron (ZOFRAN) injection 4 mg (has no administration in time range)       Medical Decision Making  Amount and/or Complexity of Data Reviewed  Labs: ordered.  Radiology: ordered.  ECG/medicine tests: ordered.    Risk  Prescription drug management.  Decision regarding hospitalization.    Patient with a history of stage IV lung CA followed by Dr. Veronica.  Last chemo on 8/22/2024.      Patient presents to the emergency department for evaluation of shortness of breath.  Patient states over the past several days he has had increased shortness of breath with nasal and chest congestion.  He states he has been running low-grade temperatures at home.  Patient states he has had a increased cough.  He denies any chest pain.  He states he has had some  conveys that all of his questions have been answered.     CLINICAL IMPRESSION    Admit Note: Pt is being admitted by hospitalist. The results of their tests and reason(s) for their admission have been discussed with pt and/or available family. They convey agreement and understanding for the need to be admitted and for the admission diagnosis.       I am the Primary Clinician of Record.   Fortunato Cowart DO (electronically signed)    (Please note that parts of this dictation were completed with voice recognition software. Quite often unanticipated grammatical, syntax, homophones, and other interpretive errors are inadvertently transcribed by the computer software. Please disregards these errors. Please excuse any errors that have escaped final proofreading.)          Fortunato Cowart DO  08/29/24 0143

## 2024-08-29 NOTE — PROGRESS NOTES
RAPID RESPONSE TEAM    Received phone call from primary RN at 0302 regarding patient desaturation.    Initial assessment:   Patient is lethargic but answers orientation questions correctly. Right lung sounds are diminished and coarse, left lung sounds are coarse and diminished with rhonchi.    Interventions:   Stat CXR  ABG  1 x Lasix 40mg IV  Continuous pulse oximetry monitoring  Palliative consult placed by LUCY Alas    ABG reults: pH 7.19  CO2 71.7  pO2 50.8  HCO3 27.4  Discussed use of BIPAP with patient and LUCY Alas. Patient refusing BIPAP at this time.     Outcome:   pt to remain in room # 3408/01     Please call with any questions or concerns    Radha Gutierrez RN  Rapid Response Team  Ext [phone]     Recent Results (from the past 8 hour(s))   EKG 12 Lead    Collection Time: 08/28/24  8:02 PM   Result Value Ref Range    Ventricular Rate 112 BPM    Atrial Rate 112 BPM    P-R Interval 138 ms    QRS Duration 70 ms    Q-T Interval 330 ms    QTc Calculation (Bazett) 450 ms    P Axis 57 degrees    R Axis 13 degrees    T Axis 187 degrees    Diagnosis       Sinus tachycardia  Possible Left atrial enlargement  Inferior infarct , age undetermined  ST & T wave abnormality, consider lateral ischemia  When compared with ECG of 13-MAY-2024 13:30,  premature ventricular complexes are no longer present  Inferior infarct is now present     Blood Culture 1    Collection Time: 08/28/24  8:25 PM    Specimen: Blood   Result Value Ref Range    Special Requests NO SPECIAL REQUESTS  RIGHT  SINGLE PORT        Culture NO GROWTH <24 HRS     CBC with Auto Differential    Collection Time: 08/28/24  8:25 PM   Result Value Ref Range    WBC 1.8 (L) 4.1 - 11.1 K/uL    RBC 4.37 4.10 - 5.70 M/uL    Hemoglobin 11.8 (L) 12.1 - 17.0 g/dL    Hematocrit 36.4 (L) 36.6 - 50.3 %    MCV 83.3 80.0 - 99.0 FL    MCH 27.0 26.0 - 34.0 PG    MCHC 32.4 30.0 - 36.5 g/dL    RDW 19.4 (H) 11.5 - 14.5 %    Platelets 220 150 - 400 K/uL    MPV 11.7 8.9 -  12.9 FL    Nucleated RBCs 1.7 (H) 0  WBC    nRBC 0.03 (H) 0.00 - 0.01 K/uL    Neutrophils % 26 (L) 32 - 75 %    Band Neutrophils 2 %    Lymphocytes % 52 (H) 12 - 49 %    Monocytes % 18 (H) 5 - 13 %    Eosinophils % 2 0 - 7 %    Basophils % 0 0 - 1 %    Immature Granulocytes % 0 0.0 - 0.5 %    Total cells counted 50      Neutrophils Absolute 0.5 (L) 1.8 - 8.0 K/UL    Lymphocytes Absolute 1.0 0.8 - 3.5 K/UL    Monocytes Absolute 0.3 0.0 - 1.0 K/UL    Eosinophils Absolute 0.0 0.0 - 0.4 K/UL    Basophils Absolute 0.0 0.0 - 0.1 K/UL    Immature Granulocytes Absolute 0.0 0.00 - 0.04 K/UL    Differential Type MANUAL      Platelet Comment Large Platelets      RBC Comment ANISOCYTOSIS  1+        WBC Comment ATYPICAL LYMPHOCYTES PRESENT     Comprehensive Metabolic Panel    Collection Time: 08/28/24  8:25 PM   Result Value Ref Range    Sodium 129 (L) 136 - 145 mmol/L    Potassium 3.1 (L) 3.5 - 5.1 mmol/L    Chloride 94 (L) 97 - 108 mmol/L    CO2 25 21 - 32 mmol/L    Anion Gap 10 5 - 15 mmol/L    Glucose 127 (H) 65 - 100 mg/dL    BUN 20 6 - 20 MG/DL    Creatinine 1.07 0.70 - 1.30 MG/DL    BUN/Creatinine Ratio 19 12 - 20      Est, Glom Filt Rate 81 >60 ml/min/1.73m2    Calcium 8.4 (L) 8.5 - 10.1 MG/DL    Total Bilirubin 1.4 (H) 0.2 - 1.0 MG/DL    ALT 23 12 - 78 U/L    AST 18 15 - 37 U/L    Alk Phosphatase 163 (H) 45 - 117 U/L    Total Protein 6.4 6.4 - 8.2 g/dL    Albumin 2.7 (L) 3.5 - 5.0 g/dL    Globulin 3.7 2.0 - 4.0 g/dL    Albumin/Globulin Ratio 0.7 (L) 1.1 - 2.2     Procalcitonin    Collection Time: 08/28/24  8:25 PM   Result Value Ref Range    Procalcitonin 0.22 ng/mL   Troponin    Collection Time: 08/28/24  8:25 PM   Result Value Ref Range    Troponin, High Sensitivity 17 0 - 76 ng/L   COVID-19, Rapid    Collection Time: 08/28/24  8:25 PM    Specimen: Nasopharyngeal   Result Value Ref Range    Source Nasopharyngeal      SARS-CoV-2, PCR Not detected NOTD     Brain Natriuretic Peptide    Collection Time: 08/28/24   8:25 PM   Result Value Ref Range    NT Pro-BNP 1,744 (H) <125 PG/ML   Blood Culture 2    Collection Time: 08/28/24  8:40 PM    Specimen: Blood   Result Value Ref Range    Special Requests NO SPECIAL REQUESTS  LEFT  Antecubital        Culture NO GROWTH <24 HRS     POC Lactic Acid    Collection Time: 08/28/24  9:54 PM   Result Value Ref Range    POC Lactic Acid 0.96 0.40 - 2.00 mmol/L   CBC with Auto Differential    Collection Time: 08/29/24  1:03 AM   Result Value Ref Range    WBC 2.3 (L) 4.1 - 11.1 K/uL    RBC 4.63 4.10 - 5.70 M/uL    Hemoglobin 12.4 12.1 - 17.0 g/dL    Hematocrit 39.3 36.6 - 50.3 %    MCV 84.9 80.0 - 99.0 FL    MCH 26.8 26.0 - 34.0 PG    MCHC 31.6 30.0 - 36.5 g/dL    RDW 20.0 (H) 11.5 - 14.5 %    Platelets 233 150 - 400 K/uL    MPV 12.2 8.9 - 12.9 FL    Nucleated RBCs 1.3 (H) 0  WBC    nRBC 0.03 (H) 0.00 - 0.01 K/uL    Neutrophils % 29 (L) 32 - 75 %    Band Neutrophils 3 %    Lymphocytes % 34 12 - 49 %    Monocytes % 25 (H) 5 - 13 %    Eosinophils % 6 0 - 7 %    Basophils % 2 (H) 0 - 1 %    Other cells, fluid 1      Immature Granulocytes % 0 0.0 - 0.5 %    Neutrophils Absolute 0.7 (L) 1.8 - 8.0 K/UL    Lymphocytes Absolute 0.8 0.8 - 3.5 K/UL    Monocytes Absolute 0.6 0.0 - 1.0 K/UL    Eosinophils Absolute 0.1 0.0 - 0.4 K/UL    Basophils Absolute 0.0 0.0 - 0.1 K/UL    Immature Granulocytes Absolute 0.0 0.00 - 0.04 K/UL    Differential Type MANUAL      Platelet Comment Large Platelets      RBC Comment ANISOCYTOSIS  1+        WBC Comment ATYPICAL LYMPHOCYTES PRESENT

## 2024-08-29 NOTE — CONSULTS
Palliative Medicine  Patient Name: Fortunato Kowalski  YOB: 1965  MRN: [id number]  Age: 59 y.o.  Gender: male    Date of Initial Consult: 8/29/2024  Date of Service: 8/29/2024  Time: 3:18 PM  Provider: ASHANTI Beasley NP  Hospital Day: 2  Admit Date: 8/28/2024  Referring Provider:  Uday Alas APRN - CNP    Reasons for Consultation:  Goals of Care    HISTORY OF PRESENT ILLNESS (HPI):   Fortunato Kowalski is a 59 y.o. male with a past medical history of DM2, NSCLC of the right lung on chemotherapy , who was admitted on 8/28/2024 from home with a diagnosis of volume overload, and hypoxemic respiratory failure in the setting of stage IV lung cancer.     Lives with his wife (recently re- after  years ago), and did have custody of their granddaughter, but she recently passed away tragically earlier this month.     PALLIATIVE DIAGNOSES:    Acute hypoxic respiratory failure  Moderate large volume ascites  Chronic cancer related pain  Rhinovirus  Goals of Care    ASSESSMENT AND PLAN:   Met with  and Mrs Kowalski along with two other family member and Bal Delgado NP with oncology  He seems to have improved some from this morning, still appears quite sick, but awake, and alert, and able to answer my questions- but very tired and appears to feel terrible.   He tells me his breathing is fine, and he doesn't mind his bipap.  His anxiety is controlled at the moment and his chronic pain is not bothering him either.  He just wants to be aloud to sleep.  I discussed with his family that we cannot have him unresponsive on bipap, but if he wants to sleep that is OK, we just may wake him periodically for assessments.  Talked at length with Yolanda and her family- She shared with me that she knows he is dying, and is getting frustrated that people keep telling her this.  She understands that he may never leave this hospital, but as he is currently comfortable, and improved from this morning- they want to

## 2024-08-30 ENCOUNTER — APPOINTMENT (OUTPATIENT)
Facility: HOSPITAL | Age: 59
End: 2024-08-30
Payer: COMMERCIAL

## 2024-08-30 LAB
ANION GAP SERPL CALC-SCNC: 9 MMOL/L (ref 5–15)
BASOPHILS # BLD: 0 K/UL (ref 0–0.1)
BASOPHILS NFR BLD: 0 % (ref 0–1)
BUN SERPL-MCNC: 26 MG/DL (ref 6–20)
BUN/CREAT SERPL: 18 (ref 12–20)
CALCIUM SERPL-MCNC: 8.9 MG/DL (ref 8.5–10.1)
CHLORIDE SERPL-SCNC: 94 MMOL/L (ref 97–108)
CO2 SERPL-SCNC: 27 MMOL/L (ref 21–32)
CREAT SERPL-MCNC: 1.46 MG/DL (ref 0.7–1.3)
DIFFERENTIAL METHOD BLD: ABNORMAL
EOSINOPHIL # BLD: 0 K/UL (ref 0–0.4)
EOSINOPHIL NFR BLD: 0 % (ref 0–7)
ERYTHROCYTE [DISTWIDTH] IN BLOOD BY AUTOMATED COUNT: 19.8 % (ref 11.5–14.5)
GLUCOSE SERPL-MCNC: 110 MG/DL (ref 65–100)
HCT VFR BLD AUTO: 40.6 % (ref 36.6–50.3)
HGB BLD-MCNC: 12.4 G/DL (ref 12.1–17)
IMM GRANULOCYTES # BLD AUTO: 0 K/UL (ref 0–0.04)
IMM GRANULOCYTES NFR BLD AUTO: 0 % (ref 0–0.5)
LYMPHOCYTES # BLD: 2.1 K/UL (ref 0.8–3.5)
LYMPHOCYTES NFR BLD: 15 % (ref 12–49)
MCH RBC QN AUTO: 26.8 PG (ref 26–34)
MCHC RBC AUTO-ENTMCNC: 30.5 G/DL (ref 30–36.5)
MCV RBC AUTO: 87.9 FL (ref 80–99)
METAMYELOCYTES NFR BLD MANUAL: 1 %
MONOCYTES # BLD: 1.8 K/UL (ref 0–1)
MONOCYTES NFR BLD: 13 % (ref 5–13)
NEUTS SEG # BLD: 9.9 K/UL (ref 1.8–8)
NEUTS SEG NFR BLD: 71 % (ref 32–75)
NRBC # BLD: 0.04 K/UL (ref 0–0.01)
NRBC BLD-RTO: 0.3 PER 100 WBC
PLATELET # BLD AUTO: 262 K/UL (ref 150–400)
PMV BLD AUTO: 11.6 FL (ref 8.9–12.9)
POTASSIUM SERPL-SCNC: 3.7 MMOL/L (ref 3.5–5.1)
RBC # BLD AUTO: 4.62 M/UL (ref 4.1–5.7)
RBC MORPH BLD: ABNORMAL
SODIUM SERPL-SCNC: 130 MMOL/L (ref 136–145)
WBC # BLD AUTO: 13.9 K/UL (ref 4.1–11.1)
WBC MORPH BLD: ABNORMAL

## 2024-08-30 PROCEDURE — 80048 BASIC METABOLIC PNL TOTAL CA: CPT

## 2024-08-30 PROCEDURE — 6360000002 HC RX W HCPCS: Performed by: STUDENT IN AN ORGANIZED HEALTH CARE EDUCATION/TRAINING PROGRAM

## 2024-08-30 PROCEDURE — 94660 CPAP INITIATION&MGMT: CPT

## 2024-08-30 PROCEDURE — 36415 COLL VENOUS BLD VENIPUNCTURE: CPT

## 2024-08-30 PROCEDURE — 2060000000 HC ICU INTERMEDIATE R&B

## 2024-08-30 PROCEDURE — 85025 COMPLETE CBC W/AUTO DIFF WBC: CPT

## 2024-08-30 PROCEDURE — 6370000000 HC RX 637 (ALT 250 FOR IP): Performed by: NURSE PRACTITIONER

## 2024-08-30 PROCEDURE — 99232 SBSQ HOSP IP/OBS MODERATE 35: CPT | Performed by: NURSE PRACTITIONER

## 2024-08-30 PROCEDURE — 76705 ECHO EXAM OF ABDOMEN: CPT

## 2024-08-30 PROCEDURE — 2580000003 HC RX 258: Performed by: STUDENT IN AN ORGANIZED HEALTH CARE EDUCATION/TRAINING PROGRAM

## 2024-08-30 PROCEDURE — 6370000000 HC RX 637 (ALT 250 FOR IP): Performed by: STUDENT IN AN ORGANIZED HEALTH CARE EDUCATION/TRAINING PROGRAM

## 2024-08-30 RX ADMIN — FUROSEMIDE 40 MG: 10 INJECTION, SOLUTION INTRAMUSCULAR; INTRAVENOUS at 16:33

## 2024-08-30 RX ADMIN — FUROSEMIDE 40 MG: 10 INJECTION, SOLUTION INTRAMUSCULAR; INTRAVENOUS at 10:16

## 2024-08-30 RX ADMIN — SODIUM CHLORIDE: 9 INJECTION, SOLUTION INTRAVENOUS at 22:04

## 2024-08-30 RX ADMIN — SODIUM CHLORIDE, PRESERVATIVE FREE 10 ML: 5 INJECTION INTRAVENOUS at 10:16

## 2024-08-30 RX ADMIN — CITALOPRAM HYDROBROMIDE 20 MG: 20 TABLET ORAL at 10:16

## 2024-08-30 RX ADMIN — PIPERACILLIN AND TAZOBACTAM 3375 MG: 3; .375 INJECTION, POWDER, LYOPHILIZED, FOR SOLUTION INTRAVENOUS at 13:27

## 2024-08-30 RX ADMIN — ONDANSETRON 4 MG: 2 INJECTION INTRAMUSCULAR; INTRAVENOUS at 15:06

## 2024-08-30 RX ADMIN — PIPERACILLIN AND TAZOBACTAM 3375 MG: 3; .375 INJECTION, POWDER, LYOPHILIZED, FOR SOLUTION INTRAVENOUS at 05:56

## 2024-08-30 RX ADMIN — PIPERACILLIN AND TAZOBACTAM 3375 MG: 3; .375 INJECTION, POWDER, LYOPHILIZED, FOR SOLUTION INTRAVENOUS at 22:06

## 2024-08-30 RX ADMIN — SODIUM CHLORIDE, PRESERVATIVE FREE 10 ML: 5 INJECTION INTRAVENOUS at 20:30

## 2024-08-30 RX ADMIN — ONDANSETRON 4 MG: 2 INJECTION INTRAMUSCULAR; INTRAVENOUS at 20:29

## 2024-08-30 RX ADMIN — SENNOSIDES 8.6 MG: 8.6 TABLET, FILM COATED ORAL at 20:29

## 2024-08-30 ASSESSMENT — PAIN DESCRIPTION - LOCATION: LOCATION: OTHER (COMMENT)

## 2024-08-30 ASSESSMENT — PAIN - FUNCTIONAL ASSESSMENT: PAIN_FUNCTIONAL_ASSESSMENT: ACTIVITIES ARE NOT PREVENTED

## 2024-08-30 ASSESSMENT — PAIN SCALES - GENERAL
PAINLEVEL_OUTOF10: 0

## 2024-08-30 ASSESSMENT — PAIN DESCRIPTION - DESCRIPTORS: DESCRIPTORS: OTHER (COMMENT)

## 2024-08-30 ASSESSMENT — PAIN DESCRIPTION - ORIENTATION: ORIENTATION: OTHER (COMMENT)

## 2024-08-30 NOTE — PROGRESS NOTES
Physician Progress Note      PATIENT:               FIORELLA MACHADO  CSN #:                  [id number]  :                       1965  ADMIT DATE:       2024 7:56 PM  DISCH DATE:  RESPONDING  PROVIDER #:        Sully Fitzgerald MD          QUERY TEXT:    59yoM pt admitted with Acute respiratory failure, volume overload and   Rhinovirus + and has malnutrition documented in . Please further specify   type of malnutrition with documentation in the medical record.    The medical record reflects the following:  Risk Factors: COPD, metastatic lung CA Stage 4, weight loss  Clinical Indicators: noted for reports a decreased appetite currently but   states he was eating fair PTA;  Malnutrition Status:  Moderate malnutrition (24 1046)  Context:  Acute Illness  Findings of the 6 clinical characteristics of malnutrition:  Energy Intake:  75% or less of estimated energy requirements for 7 or more   days  Weight Loss:  Greater than 7.5% over 3 months  Body Fat Loss:  Unable to assess  Muscle Mass Loss:  Unable to assess  Fluid Accumulation:  Mild Extremities    Treatment: Oral Nutrition Supplement, continue current diet and RD evaluation.    ASPEN Criteria:    https://aspenjournals.onlinelibrary.michaels.com/doi/full/10.1177/619840748661707  5    Thank you,  Nellie Dowling RN, cDI  Options provided:  -- Moderate Malnutrition  -- Mild Malnutrition  -- Other - I will add my own diagnosis  -- Disagree - Not applicable / Not valid  -- Disagree - Clinically unable to determine / Unknown  -- Refer to Clinical Documentation Reviewer    PROVIDER RESPONSE TEXT:    This patient has moderate malnutrition.    Query created by: Nellie Dowling on 2024 12:03 PM      Electronically signed by:  Sully Fitzgerald MD 2024 3:40 PM

## 2024-08-30 NOTE — PROGRESS NOTES
Spoke with Ray ACEVES about order for paracentesis, informed we will send ultrasound up to check for fluid. Also asked if she needed any Aspira bags for his right pleural Aspira.

## 2024-08-30 NOTE — PLAN OF CARE
Problem: Safety - Adult  Goal: Free from fall injury  Outcome: Progressing     Problem: Discharge Planning  Goal: Discharge to home or other facility with appropriate resources  Outcome: Progressing     Problem: Pain  Goal: Verbalizes/displays adequate comfort level or baseline comfort level  Outcome: Progressing     Problem: Chronic Conditions and Co-morbidities  Goal: Patient's chronic conditions and co-morbidity symptoms are monitored and maintained or improved  Outcome: Progressing  Flowsheets (Taken 8/29/2024 1931)  Care Plan - Patient's Chronic Conditions and Co-Morbidity Symptoms are Monitored and Maintained or Improved: Monitor and assess patient's chronic conditions and comorbid symptoms for stability, deterioration, or improvement     Problem: Skin/Tissue Integrity  Goal: Absence of new skin breakdown  Description: 1.  Monitor for areas of redness and/or skin breakdown  2.  Assess vascular access sites hourly  3.  Every 4-6 hours minimum:  Change oxygen saturation probe site  4.  Every 4-6 hours:  If on nasal continuous positive airway pressure, respiratory therapy assess nares and determine need for appliance change or resting period.  Outcome: Progressing

## 2024-08-30 NOTE — PROGRESS NOTES
Palliative Medicine  Patient Name: Fortunato Kowalski  YOB: 1965  MRN: [id number]  Age: 59 y.o.  Gender: male    Date of Initial Consult: 8/29/2024  Date of Service: 8/30/2024  Time: 1:10 PM  Provider: ASHANTI Beasley NP  Hospital Day: 3  Admit Date: 8/28/2024  Referring Provider:  Uday Alas APRN - CNP    Reasons for Consultation:  Goals of Care    HISTORY OF PRESENT ILLNESS (HPI):   Fortunato Kowalski is a 59 y.o. male with a past medical history of DM2, NSCLC of the right lung on chemotherapy , who was admitted on 8/28/2024 from home with a diagnosis of volume overload, and hypoxemic respiratory failure in the setting of stage IV lung cancer.     Lives with his wife (recently re- after  years ago), and did have custody of their granddaughter, but she recently passed away tragically earlier this month.     PALLIATIVE DIAGNOSES:    Acute hypoxic respiratory failure  Moderate large volume ascites  Chronic cancer related pain  Rhinovirus  Goals of Care    ASSESSMENT AND PLAN:   Mr Kowalski looks so much better today- off bipap, up and around the room, slowly, but doing it.  Was getting into the recliner when I came to bedside.  He is exhausted, but able to have a full conversation with me  He has no overt symptoms today, and given his marked improvement from yesterday we did not discuss de-escalating care at all  Will check back next week if he is still here  Please call with any palliative questions or concerns.  Palliative Care Team is available via perfect serve or via phone.    Referrals to:   [] Outpatient Palliative Care  [] Home Based Palliative Care  [] Home Based Primary Care  [] Hospice     ADVANCE CARE PLANNING:   [x] [facility] Interdisciplinary Team has updated the ACP Navigator with Health Care Decision Maker and Patient Capacity      Primary Decision Maker: Yolanda Kowalski - Spouse - [phone]    Secondary Decision Maker: Iris Iglesias - Step Child -

## 2024-08-30 NOTE — PROGRESS NOTES
Hospitalist Progress Note    NAME:   Fortunato Kowalski   : 1965   MRN: [id number]     Date/Time: 2024 8:01 AM  Patient PCP: Shlomo Veronica MD    Estimated discharge date: 2024  Barriers: Needs improvement in respiration, oncology clearance, paracentesis, PT OT    Assessment / Plan:  Volume overload  Acute hypoxemic respiratory failure secondary to above  Hypercapnic respiratory failure-likely due to COPD   Stage IV lung cancer on chemotherapy  Recurrent malignant right pleural effusion status post aspirate drain  Malignant pericardial effusion status post pericardial window  Moderate volume ascites-worsened from prior  Patient admitted to telemetry monitoring  Chest x-ray done on 2024-moderate to severe pulmonary edema with moderate right pleural effusion  Repeat x-ray done on 2024 no significant change  CT abdomen pelvis-mild ascites, bilateral adrenal nodules suspicious of metastatic disease.  Check TTE  Diagnostic/therapeutic paracentesis-likely today.Fluid studies ordered: Culture/Gram stain, cell count, albumin  As needed albumin for systolic less than 90  Continue twice daily IV diuresis  Trend renal function and electrolytes-replace as needed  Strict intake and output with daily weights  Apply compression stockings  He may need to remain on BiPAP overnight    Oncology evaluated-advised to continue managing resp her symptoms at this moment.  Patient will need to follow-up with oncology to discuss about further chemotherapy.    Palliative was consulted -discussed with family and the patient about goals of care.  Family would like to continue treatment at this moment.  If patient continues to deteriorate-eligible follow-up the patient and discussed about hospice.    Patient had improvement in his breathing today morning and was awake and alert and using oxygen by nasal cannula at 3 L/min.    Discussed with nursing to allow wife to continue aspiration of the pleural fluid through the  patient on 8/30/2024, as outlined below:  PHYSICAL EXAM:  General: Alert, cooperative oxygen by nasal cannula at  3 L/min  EENT:  EOMI. Anicteric sclerae.  Resp:  Bilateral equal air entry.  No accessory muscle use.  Aspira drain in  place. Chemo-Port on right upper chest  CV:  Regular  rhythm, edema bilateral lower extremity  GI:  Soft, mildly distended distended, Non tender.  +Bowel sounds  Neurologic:  Alert and oriented X 3, normal speech  Psych:   Good insight. Not anxious nor agitated  Skin:  No rashes.  No jaundice    Reviewed most current lab test results and cultures  YES  Reviewed most current radiology test results   YES  Review and summation of old records today    NO  Reviewed patient's current orders and MAR    YES  PMH/SH reviewed - no change compared to H&P    Procedures: see electronic medical records for all procedures/Xrays and details which were not copied into this note but were reviewed prior to creation of Plan.      LABS:  I reviewed today's most current labs and imaging studies.  Pertinent labs include:  Recent Labs     08/29/24  0103 08/29/24  1817 08/30/24  0413   WBC 2.3* 7.7 13.9*   HGB 12.4 12.4 12.4   HCT 39.3 40.5 40.6    251 262     Recent Labs     08/28/24 2025 08/30/24  0413   * 130*   K 3.1* 3.7   CL 94* 94*   CO2 25 27   GLUCOSE 127* 110*   BUN 20 26*   CREATININE 1.07 1.46*   CALCIUM 8.4* 8.9   BILITOT 1.4*  --    AST 18  --    ALT 23  --        Signed: Sully Fitzgerald MD

## 2024-08-30 NOTE — PROGRESS NOTES
Nursing contacted Nocturnist/cross cover provider via non-urgent messaging system [employer] and notified patient asking for his right pleural drain to be drained, states does this daily at home and done last yesterday, also states constipation with narcs and asking for elias instead of miralax. No other concerns reported. No acute distress reported. No other information provided by nurse. VSS.     Ordered senna daily po prn per pr request, ordered daily right pleural drains per protocol nurse may do per pt request as does this at home chronically. Nurse to msg dayshift md for further clarification regarding this in the am. Will defer further evaluation/management to the day shift primary attending care team. Patient denies any further complaints or concerns.     Nursing to notify Hospitalist for further/continued concerns. Will remain available overnight for further concerns if nursing/patient needs. Please note, there are RRT systems in this hospital in place that if nursing has acute or critical patient condition change or concern, this is to help facilitate and notify that patient needs immediate bedside evaluation by a provider.     Non-billable note.

## 2024-08-30 NOTE — PROGRESS NOTES
End of Shift Note    Bedside shift change report given to YOLA Joseph  (oncoming nurse) by Sarah Garnett RN (offgoing nurse).  Report included the following information SBAR, Intake/Output, Recent Results, and Cardiac Rhythm Normal Sinus/ Sinus Tach    Shift worked:  3032-5303       Shift summary and any significant changes:     Patient to PCU from oncology on Continuous BiPap, patient is lethargic and minimally responsive. Sitter at bedside    1100: sitter DC d.t patient condition improving     @ 1530 Patient called out to go to bathroom, BiPap removed and Patient placed on 6L Nasal Cannula.     350 mL urine out standing with urinal at bedside     Patient O2 saturation has remained above 90% on 6-3L nasal cannula.      Concerns for physician to address:  See above    CBC sent (see results)      Zone phone for oncoming shift:          Activity:     Number times ambulated in hallways past shift: 0  Number of times OOB to chair past shift: 0    Cardiac:   Cardiac Monitoring: Yes           Access:  Current line(s): port     Genitourinary:   Urinary status: voiding    Respiratory:      Chronic home O2 use?: YES  Incentive spirometer at bedside: NO       GI:     Current diet:  ADULT DIET; Regular; Low Fat/Low Chol/High Fiber/ASTON  Passing flatus: YES  Tolerating current diet: YES       Pain Management:   Patient states pain is manageable on current regimen: YES    Skin:     Interventions: increase time out of bed and PT/OT consult    Patient Safety:  Fall Score:    Interventions: bed/chair alarm, pt to call before getting OOB, and stay with me (per policy)       Length of Stay:  Expected LOS: 3  Actual LOS: 1      Sarah Garnett RN

## 2024-08-30 NOTE — CARE COORDINATION
Care Management Initial Assessment       RUR: 19%  Readmission? No  1st IM letter given? Yes   1st  letter given: No    CM introduced self and role to wifeYolanda. Pt lives with wife on one story apartment. Per wife pt is independent with ADL's and IADL's, ambulates with rollator ( that is borrowed) and family provides transportation.    Open with - [facility]  DME- Borrowed rollator, has home oxygen- 2-3 L- company unknown  SNF- none in the past.   Uses [facility] pharmacy  ACP on file    Disposition:  Home with HH - [facility]- referral sent  Need PT/OT eval   Wife instructed to bring O2 at discharge  Wife to provide transportation    CM will continue to follow pt for D/C planning and arrange services as deemed neccessary       08/30/24 8592   Service Assessment   Patient Orientation Alert and Oriented   Cognition Alert   History Provided By Spouse  (Yolanda Kowalski)   Primary Caregiver Self   Accompanied By/Relationship Wife- Yolanda   Support Systems Spouse/Significant Other   Patient's Healthcare Decision Maker is: Named in Scanned ACP Document   PCP Verified by CM Yes   Last Visit to PCP Within last 3 months   Prior Functional Level Independent in ADLs/IADLs   Current Functional Level Independent in ADLs/IADLs   Can patient return to prior living arrangement Yes   Ability to make needs known: Good   Family able to assist with home care needs: Yes   Would you like for me to discuss the discharge plan with any other family members/significant others, and if so, who? Yes  (Wife- Yolanda)   Community Resources None   Social/Functional History   Lives With Spouse   Type of Home Apartment   Home Layout One level   Home Access Stairs to enter with rails   Entrance Stairs - Number of Steps 5   Home Equipment Rollator;Oxygen  (company - unknown- 2-3 L)   Receives Help From Family   ADL Assistance Independent   Homemaking Assistance Independent   Ambulation Assistance Independent   Transfer Assistance Independent

## 2024-08-30 NOTE — PROGRESS NOTES
Comprehensive Nutrition Assessment    Type and Reason for Visit:  Initial, Positive Nutrition Screen    Nutrition Recommendations/Plan:   Regular/ASTON diet  Ensure plus high protein BID     Malnutrition Assessment:  Malnutrition Status:  Moderate malnutrition (08/30/24 1046)    Context:  Acute Illness     Findings of the 6 clinical characteristics of malnutrition:  Energy Intake:  75% or less of estimated energy requirements for 7 or more days  Weight Loss:  Greater than 7.5% over 3 months     Body Fat Loss:  Unable to assess     Muscle Mass Loss:  Unable to assess    Fluid Accumulation:  Mild Extremities   Strength:  Not Performed    Nutrition Assessment:    Patient medically noted for Rhinovirus, acute respiratory failure, volume overload, COPD, and stage IV metastatic non-small cell lung cancer. MST referral received. Patient receiving a cardiac/ASTON diet. He reports a decreased appetite currently but states he was eating fair PTA; family at bedside states he's been eating poorly for several days. He reports a fairly stable weight recently; fluctuations in weights noted over the last few months. Current weight indicates a significant 11% weight loss x 3 months. Patient reports drinking ensure shakes at home but really doesn't enjoy them; okay with us sending them in the hospital. NP entered room so NFPE deferred for now. Encouraged intake of meals and provided menu. Will liberalize diet to allow more options and help encourage PO intake.     Nutrition Related Findings:    Na 130, , K+ 3.7   BM 8/28   1+ edema BUE   Celexa, Lasix, Zosyn   Wound Type: None       Current Nutrition Intake & Therapies:    Average Meal Intake: 26-50%  Average Supplements Intake: None Ordered  ADULT DIET; Regular; Low Fat/Low Chol/High Fiber/ASTON    Anthropometric Measures:  Height: 170.2 cm (5' 7\")  Ideal Body Weight (IBW): 148 lbs (67 kg)       Current Body Weight: 95.6 kg (210 lb 12.2 oz),   IBW.    Current BMI (kg/m2): 33                           BMI Categories: Obese Class 1 (BMI 30.0-34.9)    Estimated Daily Nutrient Needs:  Energy Requirements Based On: Formula  Weight Used for Energy Requirements: Current  Energy (kcal/day): 2262 kcals (BMR x 1.3AF)  Weight Used for Protein Requirements: Current  Protein (g/day): 96-115g (1.0-1.2 g/kg bw)  Method Used for Fluid Requirements: 1 ml/kcal  Fluid (ml/day): 2200 mL or per MD    Nutrition Diagnosis:   Moderate malnutrition related to inadequate protein-energy intake (decreased appetite) as evidenced by Criteria as identified in malnutrition assessment    Nutrition Interventions:   Food and/or Nutrient Delivery: Modify Current Diet, Start Oral Nutrition Supplement  Nutrition Education/Counseling: No recommendation at this time  Coordination of Nutrition Care: Continue to monitor while inpatient       Goals:     Goals: PO intake 50% or greater, PO intake 75% or greater, by next RD assessment       Nutrition Monitoring and Evaluation:   Behavioral-Environmental Outcomes: None Identified  Food/Nutrient Intake Outcomes: Food and Nutrient Intake, Supplement Intake  Physical Signs/Symptoms Outcomes: Biochemical Data, Nutrition Focused Physical Findings, Weight, Fluid Status or Edema    Discharge Planning:    Continue current diet, Continue Oral Nutrition Supplement     Allyssa Daniel RD  Contact: ext [phone]

## 2024-08-30 NOTE — PROGRESS NOTES
End of Shift Note    Bedside shift change report given to YOLA Bell (oncoming nurse) by Lori Caceres RN (offgoing nurse).  Report included the following information SBAR, MAR, and Cardiac Rhythm sinus/sinus tachycardia    Shift worked:  7p-7a     Shift summary and any significant changes:     Pt refused to wear gripper socks   Pleural drain - 400ml (done by wife)   BIPAP tolerated last night   Wife and sister at bedside   Concerns for physician to address:       Zone phone for oncoming shift:          Activity:     Number times ambulated in hallways past shift: 0  Number of times OOB to chair past shift: 0    Cardiac:   Cardiac Monitoring: Yes           Access:  Current line(s): PIV     Genitourinary:   Urinary status: voiding    Respiratory:      Chronic home O2 use?: YES  Incentive spirometer at bedside: YES       GI:     Current diet:  ADULT DIET; Regular; Low Fat/Low Chol/High Fiber/ASTON  Passing flatus: YES  Tolerating current diet: YES       Pain Management:   Patient states pain is manageable on current regimen: YES    Skin:     Interventions: float heels, increase time out of bed, PT/OT consult, limit briefs, internal/external urinary devices, and nutritional support    Patient Safety:  Fall Score:    Interventions: bed/chair alarm, assistive device (walker, cane. etc), gripper socks, and pt to call before getting OOB       Length of Stay:  Expected LOS: 3  Actual LOS: 2      Lori Caceres RN

## 2024-08-30 NOTE — PLAN OF CARE
Problem: Safety - Adult  Goal: Free from fall injury  8/30/2024 0843 by Ray Felton, RN  Outcome: Progressing  8/30/2024 0009 by Lori Caceres RN  Outcome: Progressing     Problem: Discharge Planning  Goal: Discharge to home or other facility with appropriate resources  8/30/2024 0843 by Ray Felton, RN  Outcome: Progressing  8/30/2024 0009 by Lori Caceres RN  Outcome: Progressing     Problem: Pain  Goal: Verbalizes/displays adequate comfort level or baseline comfort level  8/30/2024 0843 by Ray Felton, RN  Outcome: Progressing  8/30/2024 0009 by Lori Caceres RN  Outcome: Progressing     Problem: Chronic Conditions and Co-morbidities  Goal: Patient's chronic conditions and co-morbidity symptoms are monitored and maintained or improved  8/30/2024 0843 by Ray Felton RN  Outcome: Progressing  8/30/2024 0009 by Lori Caceres RN  Outcome: Progressing  Flowsheets (Taken 8/29/2024 1931)  Care Plan - Patient's Chronic Conditions and Co-Morbidity Symptoms are Monitored and Maintained or Improved: Monitor and assess patient's chronic conditions and comorbid symptoms for stability, deterioration, or improvement     Problem: Skin/Tissue Integrity  Goal: Absence of new skin breakdown  Description: 1.  Monitor for areas of redness and/or skin breakdown  2.  Assess vascular access sites hourly  3.  Every 4-6 hours minimum:  Change oxygen saturation probe site  4.  Every 4-6 hours:  If on nasal continuous positive airway pressure, respiratory therapy assess nares and determine need for appliance change or resting period.  8/30/2024 0009 by Lori Caceres RN  Outcome: Progressing

## 2024-08-30 NOTE — PROGRESS NOTES
[facility] [place] [facility]  [address] [place] [address] Medical Office Building 3 [address] [address]  W: [phone] F: [phone]    Hematology/Oncology Office Note:     Reason for Visit:     Fortunato Kowalski is a 59 y.o. male who is seen in consultation at the request of Dr. Dyson for evaluation of lung cancer.  Patient is now admitted to [facility] with respiratory failure and altered mental status.  He has been receiving systemic therapy in the outpatient setting for metastatic non-small cell carcinoma.    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    Core biopsy shows similar features.     General Categorization   Malignant cells present     Specimen Adequacy   Satisfactory for evaluation.     Comment   Immunohistochemical staining on block 1A shows that the malignant cells   stain as follows:   CK 7: Positive   CK 20: Negative   TTF-1: Negative   P40: Positive   P63: Positive   CA-9: Negative   PAX8: Negative   Napsin A: Negative   NKX 3.1: Negative   CDX2: Negative   WT1: Negative   Claudin-4: Weakly positive   Calretinin: Negative     PDL1 (Keytruda) immunohistochemical testing is ordered on tissue block 1A   with the results to be reported in an addendum.     Initial Presentation  (HPI):     History of Present Illness  The patient is a  Sign     Worried About Running Out of Food in the Last Year: Never true     Ran Out of Food in the Last Year: Never true   Transportation Needs: No Transportation Needs (8/29/2024)    PRAPARE - Transportation     Lack of Transportation (Medical): No     Lack of Transportation (Non-Medical): No    Received from [facility]   Intimate Partner Violence: Not At Risk (4/5/2024)    Received from [facility]     Over the last 12 months how often did your partner physically hurt you?: 1     Over the last 12 months how often did your partner insult you or talk down to you?: 1     Over the last 12 months how often did your partner threaten you with physical harm?: 1     Over the last 12 months how often did your partner scream or curse at you?: 1   Housing Stability: Low Risk  (8/29/2024)    Housing Stability Vital Sign     Unable to Pay for Housing in the Last Year: No     Number of Times Moved in the Last Year: 1     Homeless in the Last Year: No     Current Facility-Administered Medications   Medication Dose Route Frequency Provider Last Rate Last Admin    oxyCODONE (ROXICODONE) immediate release tablet 5 mg  5 mg Oral Q4H PRN Shadia Nova APRN - NP        senna (SENOKOT) tablet 8.6 mg  1 tablet Oral Daily PRN Uday Alas APRN - CNP   8.6 mg at 08/29/24 2238    albumin human 25% IV solution 25 g  25 g IntraVENous Q6H PRN Bernardo Orantes DO        ALPRAZolam (XANAX) tablet 1 mg  1 mg Oral BID PRN Bernardo Orantes DO   1 mg at 08/29/24 2153    citalopram (CELEXA) tablet 20 mg  20 mg Oral Daily Bernardo Orantes DO   20 mg at 08/30/24 1016    ipratropium 0.5 mg-albuterol 2.5 mg (DUONEB) nebulizer solution 1 Dose  1 Dose Inhalation Q4H PRN Bernardo Orantes DO        oxyCODONE (OXYCONTIN) extended release tablet 20 mg  20 mg Oral BID Bernardo Orantes DO   20 mg at 08/29/24 2117    nicotine (NICODERM CQ) 14 MG/24HR 1 patch  1 patch TransDERmal Daily  to trend CBC  > Patient is now tolerating nasal cannula and feeling much better, he is hopeful to go home in the next several days  > Appreciate palliative care input    Discussed with Shadia Nova, NP    Will see as needed over the weekend, please call with questions or concerns.    Signed By: ASHANTI Day NP

## 2024-08-31 VITALS
TEMPERATURE: 97.3 F | DIASTOLIC BLOOD PRESSURE: 72 MMHG | RESPIRATION RATE: 16 BRPM | BODY MASS INDEX: 33.08 KG/M2 | HEIGHT: 67 IN | WEIGHT: 210.76 LBS | HEART RATE: 90 BPM | OXYGEN SATURATION: 98 % | SYSTOLIC BLOOD PRESSURE: 104 MMHG

## 2024-08-31 LAB
BASOPHILS # BLD: 0 K/UL (ref 0–0.1)
BASOPHILS NFR BLD: 0 % (ref 0–1)
DIFFERENTIAL METHOD BLD: ABNORMAL
EOSINOPHIL # BLD: 0 K/UL (ref 0–0.4)
EOSINOPHIL NFR BLD: 0 % (ref 0–7)
ERYTHROCYTE [DISTWIDTH] IN BLOOD BY AUTOMATED COUNT: 19.4 % (ref 11.5–14.5)
HCT VFR BLD AUTO: 39.1 % (ref 36.6–50.3)
HGB BLD-MCNC: 12.2 G/DL (ref 12.1–17)
IMM GRANULOCYTES # BLD AUTO: 0 K/UL (ref 0–0.04)
IMM GRANULOCYTES NFR BLD AUTO: 0 % (ref 0–0.5)
LYMPHOCYTES # BLD: 3.8 K/UL (ref 0.8–3.5)
LYMPHOCYTES NFR BLD: 13 % (ref 12–49)
MCH RBC QN AUTO: 26.6 PG (ref 26–34)
MCHC RBC AUTO-ENTMCNC: 31.2 G/DL (ref 30–36.5)
MCV RBC AUTO: 85.2 FL (ref 80–99)
MONOCYTES # BLD: 3.8 K/UL (ref 0–1)
MONOCYTES NFR BLD: 13 % (ref 5–13)
NEUTS SEG # BLD: 21.8 K/UL (ref 1.8–8)
NEUTS SEG NFR BLD: 74 % (ref 32–75)
NRBC # BLD: 0.06 K/UL (ref 0–0.01)
NRBC BLD-RTO: 0.2 PER 100 WBC
PLATELET # BLD AUTO: 258 K/UL (ref 150–400)
PMV BLD AUTO: 11.9 FL (ref 8.9–12.9)
RBC # BLD AUTO: 4.59 M/UL (ref 4.1–5.7)
RBC MORPH BLD: ABNORMAL
WBC # BLD AUTO: 29.4 K/UL (ref 4.1–11.1)

## 2024-08-31 PROCEDURE — 36415 COLL VENOUS BLD VENIPUNCTURE: CPT

## 2024-08-31 PROCEDURE — 6360000002 HC RX W HCPCS: Performed by: STUDENT IN AN ORGANIZED HEALTH CARE EDUCATION/TRAINING PROGRAM

## 2024-08-31 PROCEDURE — 2580000003 HC RX 258: Performed by: STUDENT IN AN ORGANIZED HEALTH CARE EDUCATION/TRAINING PROGRAM

## 2024-08-31 PROCEDURE — 85025 COMPLETE CBC W/AUTO DIFF WBC: CPT

## 2024-08-31 PROCEDURE — 6370000000 HC RX 637 (ALT 250 FOR IP): Performed by: STUDENT IN AN ORGANIZED HEALTH CARE EDUCATION/TRAINING PROGRAM

## 2024-08-31 PROCEDURE — 94660 CPAP INITIATION&MGMT: CPT

## 2024-08-31 RX ORDER — LEVOFLOXACIN 750 MG/1
750 TABLET, FILM COATED ORAL DAILY
Qty: 4 TABLET | Refills: 0 | Status: SHIPPED | OUTPATIENT
Start: 2024-08-31 | End: 2024-09-04

## 2024-08-31 RX ORDER — HEPARIN 100 UNIT/ML
300 SYRINGE INTRAVENOUS PRN
Status: DISCONTINUED | OUTPATIENT
Start: 2024-08-31 | End: 2024-08-31 | Stop reason: HOSPADM

## 2024-08-31 RX ADMIN — SODIUM CHLORIDE, PRESERVATIVE FREE 10 ML: 5 INJECTION INTRAVENOUS at 10:27

## 2024-08-31 RX ADMIN — CITALOPRAM HYDROBROMIDE 20 MG: 20 TABLET ORAL at 10:24

## 2024-08-31 RX ADMIN — FUROSEMIDE 40 MG: 10 INJECTION, SOLUTION INTRAMUSCULAR; INTRAVENOUS at 10:24

## 2024-08-31 RX ADMIN — POLYETHYLENE GLYCOL 3350 17 G: 17 POWDER, FOR SOLUTION ORAL at 00:24

## 2024-08-31 RX ADMIN — PIPERACILLIN AND TAZOBACTAM 3375 MG: 3; .375 INJECTION, POWDER, LYOPHILIZED, FOR SOLUTION INTRAVENOUS at 05:23

## 2024-08-31 ASSESSMENT — PAIN SCALES - GENERAL: PAINLEVEL_OUTOF10: 0

## 2024-08-31 NOTE — CARE COORDINATION
Transition of Care Plan:    RUR: 19% \"medium risk\"  Prior Level of Functioning: Independent with ADL's   Disposition: Home with [facility] services  If SNF or IPR: Date FOC offered: N/A  Follow up appointments: PCP/Specialists as indicated  DME needed: None - pt has a rollator and home O2  Transportation at discharge: Pt spouse to transport   IM/IMM Medicare/ letter given: N/A  Is patient a  and connected with VA? No  Caregiver Contact: Yolanda Kowalski (spouse), [phone]  Discharge Caregiver contacted prior to discharge? To be contacted  Care Conference needed? Not at this time   Barriers to discharge: None     1231 PM: Chart reviewed. CM acknowledged d/c order. Pt to return home with family support; pt spouse to transport pt home with portable O2. CM sent [facility] order to [facility].      08/31/24 1232   Services At/After Discharge   Transition of Care Consult (CM Consult) Discharge Planning   Services At/After Discharge Home Health    Resource Information Provided? No   Mode of Transport at Discharge Other (see comment)  (Spouse)   Hospital Transport Time of Discharge 1400   Confirm Follow Up Transport Family   Condition of Participation: Discharge Planning   The Plan for Transition of Care is related to the following treatment goals: Return home with resumption of HH   The Patient and/or Patient Representative was provided with a Choice of Provider? Patient   The Patient and/Or Patient Representative agree with the Discharge Plan? Yes     PATSY Ku  Care Management  [facility]   [id number]

## 2024-08-31 NOTE — PLAN OF CARE
Problem: Safety - Adult  Goal: Free from fall injury  Outcome: Progressing     Problem: Discharge Planning  Goal: Discharge to home or other facility with appropriate resources  Outcome: Progressing     Problem: Pain  Goal: Verbalizes/displays adequate comfort level or baseline comfort level  Outcome: Progressing     Problem: Chronic Conditions and Co-morbidities  Goal: Patient's chronic conditions and co-morbidity symptoms are monitored and maintained or improved  Outcome: Progressing     Problem: Skin/Tissue Integrity  Goal: Absence of new skin breakdown  Description: 1.  Monitor for areas of redness and/or skin breakdown  2.  Assess vascular access sites hourly  3.  Every 4-6 hours minimum:  Change oxygen saturation probe site  4.  Every 4-6 hours:  If on nasal continuous positive airway pressure, respiratory therapy assess nares and determine need for appliance change or resting period.  Outcome: Progressing     Problem: Nutrition Deficit:  Goal: Optimize nutritional status  Outcome: Progressing      30

## 2024-08-31 NOTE — DISCHARGE SUMMARY
DISCHARGE SUMMARY              Name: Fortunato Kowalski  [id number]  YOB: 1965 (Age: 59 y.o.)   Date of Admission: 8/28/2024  Date of Discharge: 8/31/2024  Attending Physician: Jered Lovelace MD    DISCHARGE DIAGNOSIS:    Acute on chronic hypoxemic respiratory failure secondary with to above  Acute hypercapnic respiratory failure-likely due to volume overload and acute Rhinovirus infection  Underlying COPD, not in acute exacerbation  Stage IV lung cancer on chemotherapy  Recurrent malignant right pleural effusion status post aspirate drain  Malignant pericardial effusion status post pericardial window  Hx Ascites  Neutropenia-chemotherapy induced  Moderate hyponatremia  Hypokalemia-better  Tobacco abuse  Chronic cancer related pain      CONSULTATIONS:  IP CONSULT TO HOSPITALIST  IP CONSULT TO ONCOLOGY  IP CONSULT TO PALLIATIVE CARE  IP CONSULT HOME HEALTH    Excerpted HPI from H&P of Bernardo Orantes, DO:  CHIEF COMPLAINT: Swelling, shortness of breath, malaise     HISTORY OF PRESENT ILLNESS:     Fortunato Kowalski is a 59 y.o.  male with PMHx as listed below presenting to the emergency department with 3-4 days progressively worsening shortness of breath/dyspnea on exertion, orthopnea, bilateral lower extremity swelling, abdominal distention/fullness, generalized malaise, congestion in setting of stage IV metastatic lung cancer recently started on new chemotherapeutic (Docetaxel) with first infusion last week.  Reports exposure to multiple school-aged children with sister voicing concern for viruses, but denies sick children at present.  ROS otherwise negative.  Continues to smoke-evasive regarding quantity of cigarettes.  Denies alcohol or illicit drugs.     In the ED, patient afebrile, tachycardic in the 110s, normotensive, saturating low 90s on 2 L/min via nasal cannula (home oxygen used as needed rather than continuously).  ECG demonstrates sinus tachycardia without definitive  [facility] [id number]  [phone]    Schedule an appointment as soon as possible for a visit in 1 week(s)            Total time in minutes spent coordinating this discharge (includes going over instructions, follow-up, prescriptions, and preparing report for sign off to her PCP) :  35 minutes    Signed:  Jered Lovelace MD

## 2024-08-31 NOTE — PLAN OF CARE
Predictive Model Details          35 (Caution)  Factor Value    Calculated 8/31/2024 10:08 32% Supplemental oxygen Nasal cannula    Deterioration Index Model 27% Age 59 years old     11% Cardiac rhythm Sinus tachy     11% WBC count abnormal (29.4 K/uL)     9% Sodium abnormal (130 mmol/L)     4% Systolic 104     3% BUN abnormal (26 MG/DL)     2% Pulse 90     1% Potassium 3.7 mmol/L     0% Pulse oximetry 98 %     0% Temperature 97.3 °F (36.3 °C)     0% Respiratory rate 16     0% Hematocrit 39.1 %        Problem: Safety - Adult  Goal: Free from fall injury  8/31/2024 1007 by Tatum Montes  Outcome: Progressing  8/31/2024 0210 by Herve Lozano RN  Outcome: Progressing     Problem: Discharge Planning  Goal: Discharge to home or other facility with appropriate resources  8/31/2024 1007 by Tatum Montes  Outcome: Progressing  8/31/2024 0210 by Herve Lozano RN  Outcome: Progressing     Problem: Pain  Goal: Verbalizes/displays adequate comfort level or baseline comfort level  8/31/2024 1007 by Tatum Montes  Outcome: Progressing  8/31/2024 0210 by Herve Lozano RN  Outcome: Progressing     Problem: Chronic Conditions and Co-morbidities  Goal: Patient's chronic conditions and co-morbidity symptoms are monitored and maintained or improved  8/31/2024 1007 by Tatum Montes  Outcome: Progressing  8/31/2024 0210 by Herve Lozano RN  Outcome: Progressing     Problem: Skin/Tissue Integrity  Goal: Absence of new skin breakdown  Description: 1.  Monitor for areas of redness and/or skin breakdown  2.  Assess vascular access sites hourly  3.  Every 4-6 hours minimum:  Change oxygen saturation probe site  4.  Every 4-6 hours:  If on nasal continuous positive airway pressure, respiratory therapy assess nares and determine need for appliance change or resting period.  8/31/2024 1007 by Tatum Montes  Outcome: Progressing  8/31/2024 0210 by Herve Lozano RN  Outcome: Progressing     Problem: Nutrition

## 2024-08-31 NOTE — PROGRESS NOTES
End of Shift Note    Bedside shift change report given to YOLA Arriola (oncoming nurse) by ANJELICA JEAN BAPTISTE RN (offgoing nurse).  Report included the following information SBAR, ED Summary, Procedure Summary, Intake/Output, MAR, Accordion, Recent Results, Med Rec Status, Cardiac Rhythm NS, Alarm Parameters , and Quality Measures    Shift worked:  1900 - 0700     Shift summary and any significant changes:     Patient tried multiple time to have a bowel movement but was unsuccessful after the third trial. He requested for cherelle and YOLA gaiii     Concerns for physician to address:  Concern of constipation after [place] and  Cherelle has been    Zone phone for oncoming shift:   1555       Activity:     Number times ambulated in hallways past shift: 4  Number of times OOB to chair past shift: 4    Cardiac:   Cardiac Monitoring: Yes           Access:  Current line(s): PICC     Genitourinary:   Urinary status: voiding    Respiratory:      Chronic home O2 use?: YES  Incentive spirometer at bedside: NO       GI:     Current diet:  ADULT DIET; Regular; No Added Salt (3-4 gm); Fruit with all meals  ADULT ORAL NUTRITION SUPPLEMENT; Breakfast, Dinner; Standard High Calorie/High Protein Oral Supplement  Passing flatus: YES  Tolerating current diet: YES       Pain Management:   Patient states pain is manageable on current regimen: YES    Skin:     Interventions: turn team, foam dressing, limit briefs, and nutritional support    Patient Safety:  Fall Score:    Interventions: bed/chair alarm, gripper socks, and stay with me (per policy)       Length of Stay:  Expected LOS: 3  Actual LOS: 3      ANJELICA JEAN BAPTISTE RN

## 2024-09-01 LAB
BACTERIA SPEC CULT: NORMAL
BACTERIA SPEC CULT: NORMAL
SERVICE CMNT-IMP: NORMAL
SERVICE CMNT-IMP: NORMAL

## 2024-09-03 ENCOUNTER — TELEPHONE (OUTPATIENT)
Age: 59
End: 2024-09-03

## 2024-09-03 DIAGNOSIS — C34.90 METASTATIC NON-SMALL CELL LUNG CANCER (HCC): Primary | ICD-10-CM

## 2024-09-03 RX ORDER — OXYCODONE HYDROCHLORIDE 10 MG/1
10 TABLET ORAL EVERY 4 HOURS PRN
Qty: 90 TABLET | Refills: 0 | Status: SHIPPED | OUTPATIENT
Start: 2024-09-03 | End: 2024-09-18

## 2024-09-03 RX ORDER — LACTULOSE 10 G/15ML
10 SOLUTION ORAL EVERY EVENING
Qty: 237 ML | Refills: 1 | Status: SHIPPED | OUTPATIENT
Start: 2024-09-03

## 2024-09-03 NOTE — TELEPHONE ENCOUNTER
Palliative Medicine Clinic   [place]: [phone] ([phone])    Patient Name: Fortunato Kowalski  YOB: 1965    Medication Refill Request    Patient is scheduled for follow up:  [x]  YES  []   NO  Next [facility] Visit:     PDMP reviewed:  [x] YES   []  System down / Unable  []  NO- Patient fills out of state    Medication:oxyCODONE HCl (OXY-IR) 10 MG   Dose and directions:Take 1 tablet by mouth every 4 hours   Number dispensed:90  Date filled (PDMP or Pharmacy):8/20/2024  # left:    Medication:lactulose (CHRONULAC) 10 GM/15ML   Dose and directions:karen 15 mLs by mouth every evening   Number dispensed:237  Date filled (PDMP or Pharmacy):  #left:    Appropriate for refill:  [x]  YES  []  Early Request - Requires MD/NP Review      Other pertinent information for prescriber:    Xanax has refills ,

## 2024-09-03 NOTE — TELEPHONE ENCOUNTER
Palliative Medicine Clinic   [place]: [phone] ([phone])    Patient Name: Fortunato Kowalski  YOB: 1965    Medication Refill Request Triage    Requested by:    []  Patient  [x]  Support person:  Yolanda Kowalski    Last Pall Med Clinic Visit:  8/20/2024    Next Pall Med Clinic Visit: 09/24/2024     Preferred Pharmacy: [facility]  Backup Pharmacy:  *    Medications requested:  Anxiety medication, Laxative medication and Oxycodone    Is patient completely out?  []   YES  [x]   NO  If NO, how many pills does patient have left?  unclear    Other pertinent information shared:

## 2024-09-03 NOTE — TELEPHONE ENCOUNTER
Joann, nurse with home health called and states that the pt is now home from the hospital and they have resumed the pts home health visits and will be seeing the pt 2 times weekly.       Joann can be reached at [phone] if you have any questions.

## 2024-09-04 RX ORDER — EPINEPHRINE 1 MG/ML
0.3 INJECTION, SOLUTION INTRAMUSCULAR; SUBCUTANEOUS PRN
OUTPATIENT
Start: 2024-09-12

## 2024-09-04 RX ORDER — SODIUM CHLORIDE 9 MG/ML
INJECTION, SOLUTION INTRAVENOUS CONTINUOUS
OUTPATIENT
Start: 2024-09-12

## 2024-09-04 RX ORDER — ONDANSETRON 2 MG/ML
8 INJECTION INTRAMUSCULAR; INTRAVENOUS
OUTPATIENT
Start: 2024-09-12

## 2024-09-04 RX ORDER — ALBUTEROL SULFATE 90 UG/1
4 INHALANT RESPIRATORY (INHALATION) PRN
OUTPATIENT
Start: 2024-09-12

## 2024-09-04 RX ORDER — DIPHENHYDRAMINE HYDROCHLORIDE 50 MG/ML
50 INJECTION INTRAMUSCULAR; INTRAVENOUS
OUTPATIENT
Start: 2024-09-12

## 2024-09-04 RX ORDER — SODIUM CHLORIDE 9 MG/ML
5-250 INJECTION, SOLUTION INTRAVENOUS PRN
OUTPATIENT
Start: 2024-09-12

## 2024-09-04 RX ORDER — HEPARIN 100 UNIT/ML
500 SYRINGE INTRAVENOUS PRN
OUTPATIENT
Start: 2024-09-12

## 2024-09-04 RX ORDER — ACETAMINOPHEN 325 MG/1
650 TABLET ORAL
OUTPATIENT
Start: 2024-09-12

## 2024-09-06 ENCOUNTER — TELEPHONE (OUTPATIENT)
Age: 59
End: 2024-09-06

## 2024-09-06 NOTE — TELEPHONE ENCOUNTER
Kristyn Collins from [facility] [phone] would like permission to treat this mutual patient of doctor Veronica for the next 2 weeks then for the next 8 weeks certification.

## 2024-09-10 ENCOUNTER — TELEPHONE (OUTPATIENT)
Age: 59
End: 2024-09-10

## 2024-09-12 ENCOUNTER — OFFICE VISIT (OUTPATIENT)
Age: 59
End: 2024-09-12
Payer: COMMERCIAL

## 2024-09-12 ENCOUNTER — HOSPITAL ENCOUNTER (OUTPATIENT)
Facility: HOSPITAL | Age: 59
Setting detail: INFUSION SERIES
Discharge: HOME | End: 2024-09-12
Payer: COMMERCIAL

## 2024-09-12 VITALS
BODY MASS INDEX: 34.37 KG/M2 | DIASTOLIC BLOOD PRESSURE: 70 MMHG | OXYGEN SATURATION: 94 % | HEART RATE: 101 BPM | RESPIRATION RATE: 20 BRPM | TEMPERATURE: 97.9 F | WEIGHT: 219 LBS | SYSTOLIC BLOOD PRESSURE: 102 MMHG | HEIGHT: 67 IN

## 2024-09-12 VITALS
SYSTOLIC BLOOD PRESSURE: 98 MMHG | TEMPERATURE: 97.8 F | HEART RATE: 104 BPM | BODY MASS INDEX: 34.5 KG/M2 | DIASTOLIC BLOOD PRESSURE: 73 MMHG | RESPIRATION RATE: 24 BRPM | OXYGEN SATURATION: 94 % | WEIGHT: 219.8 LBS | HEIGHT: 67 IN

## 2024-09-12 DIAGNOSIS — C34.90 SQUAMOUS CELL CARCINOMA OF LUNG, STAGE IV, UNSPECIFIED LATERALITY (HCC): Primary | ICD-10-CM

## 2024-09-12 DIAGNOSIS — J91.0 MALIGNANT PLEURAL EFFUSION: ICD-10-CM

## 2024-09-12 DIAGNOSIS — F41.9 ANXIETY IN CANCER PATIENT: ICD-10-CM

## 2024-09-12 DIAGNOSIS — Z51.11 ENCOUNTER FOR ANTINEOPLASTIC CHEMOTHERAPY: ICD-10-CM

## 2024-09-12 DIAGNOSIS — G89.3 CANCER ASSOCIATED PAIN: ICD-10-CM

## 2024-09-12 LAB
ALBUMIN SERPL-MCNC: 2.6 G/DL (ref 3.5–5)
ALBUMIN/GLOB SERPL: 0.6 (ref 1.1–2.2)
ALP SERPL-CCNC: 249 U/L (ref 45–117)
ALT SERPL-CCNC: 20 U/L (ref 12–78)
ANION GAP SERPL CALC-SCNC: 6 MMOL/L (ref 2–12)
AST SERPL-CCNC: 15 U/L (ref 15–37)
BASOPHILS # BLD: 0.1 K/UL (ref 0–0.1)
BASOPHILS NFR BLD: 1 % (ref 0–1)
BILIRUB SERPL-MCNC: 1 MG/DL (ref 0.2–1)
BUN SERPL-MCNC: 17 MG/DL (ref 6–20)
BUN/CREAT SERPL: 16 (ref 12–20)
CALCIUM SERPL-MCNC: 9 MG/DL (ref 8.5–10.1)
CHLORIDE SERPL-SCNC: 98 MMOL/L (ref 97–108)
CO2 SERPL-SCNC: 31 MMOL/L (ref 21–32)
CREAT SERPL-MCNC: 1.08 MG/DL (ref 0.7–1.3)
DIFFERENTIAL METHOD BLD: ABNORMAL
EOSINOPHIL # BLD: 0 K/UL (ref 0–0.4)
EOSINOPHIL NFR BLD: 0 % (ref 0–7)
ERYTHROCYTE [DISTWIDTH] IN BLOOD BY AUTOMATED COUNT: 20 % (ref 11.5–14.5)
GLOBULIN SER CALC-MCNC: 4.2 G/DL (ref 2–4)
GLUCOSE SERPL-MCNC: 266 MG/DL (ref 65–100)
HCT VFR BLD AUTO: 35.8 % (ref 36.6–50.3)
HGB BLD-MCNC: 11.5 G/DL (ref 12.1–17)
IMM GRANULOCYTES # BLD AUTO: 0 K/UL (ref 0–0.04)
IMM GRANULOCYTES NFR BLD AUTO: 0 % (ref 0–0.5)
LYMPHOCYTES # BLD: 0.6 K/UL (ref 0.8–3.5)
LYMPHOCYTES NFR BLD: 5 % (ref 12–49)
MCH RBC QN AUTO: 26.9 PG (ref 26–34)
MCHC RBC AUTO-ENTMCNC: 32.1 G/DL (ref 30–36.5)
MCV RBC AUTO: 83.8 FL (ref 80–99)
MONOCYTES # BLD: 0.1 K/UL (ref 0–1)
MONOCYTES NFR BLD: 1 % (ref 5–13)
NEUTS SEG # BLD: 11.4 K/UL (ref 1.8–8)
NEUTS SEG NFR BLD: 93 % (ref 32–75)
NRBC # BLD: 0 K/UL (ref 0–0.01)
NRBC BLD-RTO: 0 PER 100 WBC
PLATELET # BLD AUTO: 230 K/UL (ref 150–400)
PMV BLD AUTO: 11.2 FL (ref 8.9–12.9)
POTASSIUM SERPL-SCNC: 3.7 MMOL/L (ref 3.5–5.1)
PROT SERPL-MCNC: 6.8 G/DL (ref 6.4–8.2)
RBC # BLD AUTO: 4.27 M/UL (ref 4.1–5.7)
RBC MORPH BLD: ABNORMAL
SODIUM SERPL-SCNC: 135 MMOL/L (ref 136–145)
WBC # BLD AUTO: 12.2 K/UL (ref 4.1–11.1)

## 2024-09-12 PROCEDURE — 99215 OFFICE O/P EST HI 40 MIN: CPT | Performed by: STUDENT IN AN ORGANIZED HEALTH CARE EDUCATION/TRAINING PROGRAM

## 2024-09-12 PROCEDURE — 36415 COLL VENOUS BLD VENIPUNCTURE: CPT

## 2024-09-12 PROCEDURE — 96375 TX/PRO/DX INJ NEW DRUG ADDON: CPT

## 2024-09-12 PROCEDURE — 80053 COMPREHEN METABOLIC PANEL: CPT

## 2024-09-12 PROCEDURE — 2580000003 HC RX 258: Performed by: STUDENT IN AN ORGANIZED HEALTH CARE EDUCATION/TRAINING PROGRAM

## 2024-09-12 PROCEDURE — 85025 COMPLETE CBC W/AUTO DIFF WBC: CPT

## 2024-09-12 PROCEDURE — 96413 CHEMO IV INFUSION 1 HR: CPT

## 2024-09-12 PROCEDURE — 96523 IRRIG DRUG DELIVERY DEVICE: CPT

## 2024-09-12 PROCEDURE — 96377 APPLICATON ON-BODY INJECTOR: CPT

## 2024-09-12 PROCEDURE — 6360000002 HC RX W HCPCS: Performed by: STUDENT IN AN ORGANIZED HEALTH CARE EDUCATION/TRAINING PROGRAM

## 2024-09-12 RX ORDER — SODIUM CHLORIDE 0.9 % (FLUSH) 0.9 %
5-40 SYRINGE (ML) INJECTION PRN
Status: DISCONTINUED | OUTPATIENT
Start: 2024-09-12 | End: 2024-09-13 | Stop reason: HOSPADM

## 2024-09-12 RX ORDER — DIPHENHYDRAMINE HCL 25 MG
25 CAPSULE ORAL EVERY 6 HOURS PRN
Qty: 14 CAPSULE | Refills: 0 | Status: SHIPPED | OUTPATIENT
Start: 2024-09-12 | End: 2024-09-26

## 2024-09-12 RX ORDER — SODIUM CHLORIDE 9 MG/ML
5-250 INJECTION, SOLUTION INTRAVENOUS PRN
Status: DISCONTINUED | OUTPATIENT
Start: 2024-09-12 | End: 2024-09-13 | Stop reason: HOSPADM

## 2024-09-12 RX ORDER — DEXAMETHASONE SODIUM PHOSPHATE 10 MG/ML
8 INJECTION, SOLUTION INTRAMUSCULAR; INTRAVENOUS ONCE
Status: COMPLETED | OUTPATIENT
Start: 2024-09-12 | End: 2024-09-12

## 2024-09-12 RX ADMIN — PEGFILGRASTIM 6 MG: KIT SUBCUTANEOUS at 12:40

## 2024-09-12 RX ADMIN — SODIUM CHLORIDE 25 ML/HR: 9 INJECTION, SOLUTION INTRAVENOUS at 10:50

## 2024-09-12 RX ADMIN — DEXAMETHASONE SODIUM PHOSPHATE 8 MG: 10 INJECTION, SOLUTION INTRAMUSCULAR; INTRAVENOUS at 10:51

## 2024-09-12 RX ADMIN — DOCETAXEL ANHYDROUS 80 MG: 10 INJECTION, SOLUTION INTRAVENOUS at 11:38

## 2024-09-12 RX ADMIN — SODIUM CHLORIDE, PRESERVATIVE FREE 30 ML: 5 INJECTION INTRAVENOUS at 12:46

## 2024-09-12 ASSESSMENT — PAIN DESCRIPTION - ORIENTATION: ORIENTATION: RIGHT

## 2024-09-13 ENCOUNTER — TELEPHONE (OUTPATIENT)
Age: 59
End: 2024-09-13

## 2024-09-16 ENCOUNTER — TELEPHONE (OUTPATIENT)
Age: 59
End: 2024-09-16

## 2024-09-17 NOTE — TELEPHONE ENCOUNTER
Call received from Merari; home health RN from [facility]. Nurse report right flank pain 9/10 where drain's located; pt drained 900cc today, RR27, B/P 98/72,O2 sats 95% on 3LPM, nurse also report pt's lungs sound like they are filled with fluid. Home health nurse listened to pt's lungs after fluid was drained and reports lungs sound better. Home health nurse advised pt to go to the ER; pt refused.    Dr. Veronica nurse and NP advised pt and pt's spouse that pt needs to go to the ER to be evaluated; pt states he's fine. Pt advised to monitor symptoms and V/S and if symptoms worsen he needs to go to the ER. Pt verbalized understanding.     Nurse will call pt tomorrow for an update.

## 2024-09-18 ENCOUNTER — HOSPITAL ENCOUNTER (EMERGENCY)
Facility: HOSPITAL | Age: 59
Discharge: HOME | End: 2024-09-19
Attending: EMERGENCY MEDICINE
Payer: COMMERCIAL

## 2024-09-18 DIAGNOSIS — J90 RECURRENT PLEURAL EFFUSION: ICD-10-CM

## 2024-09-18 DIAGNOSIS — C34.90 MALIGNANT NEOPLASM OF LUNG, UNSPECIFIED LATERALITY, UNSPECIFIED PART OF LUNG (HCC): ICD-10-CM

## 2024-09-18 DIAGNOSIS — J91.0 PLEURAL EFFUSION, MALIGNANT: Primary | ICD-10-CM

## 2024-09-18 PROCEDURE — 93005 ELECTROCARDIOGRAM TRACING: CPT | Performed by: EMERGENCY MEDICINE

## 2024-09-18 PROCEDURE — 99285 EMERGENCY DEPT VISIT HI MDM: CPT

## 2024-09-19 ENCOUNTER — TELEPHONE (OUTPATIENT)
Age: 59
End: 2024-09-19

## 2024-09-19 ENCOUNTER — APPOINTMENT (OUTPATIENT)
Facility: HOSPITAL | Age: 59
End: 2024-09-19
Payer: COMMERCIAL

## 2024-09-19 VITALS
DIASTOLIC BLOOD PRESSURE: 81 MMHG | OXYGEN SATURATION: 92 % | WEIGHT: 218.92 LBS | SYSTOLIC BLOOD PRESSURE: 109 MMHG | HEART RATE: 98 BPM | BODY MASS INDEX: 34.36 KG/M2 | RESPIRATION RATE: 32 BRPM | HEIGHT: 67 IN | TEMPERATURE: 98.7 F

## 2024-09-19 LAB
ALBUMIN SERPL-MCNC: 2.8 G/DL (ref 3.5–5)
ALBUMIN/GLOB SERPL: 0.7 (ref 1.1–2.2)
ALP SERPL-CCNC: 284 U/L (ref 45–117)
ALT SERPL-CCNC: 22 U/L (ref 12–78)
ANION GAP SERPL CALC-SCNC: 5 MMOL/L (ref 2–12)
AST SERPL-CCNC: 36 U/L (ref 15–37)
BASOPHILS # BLD: 0 K/UL (ref 0–0.1)
BASOPHILS NFR BLD: 0 % (ref 0–1)
BILIRUB SERPL-MCNC: 1.3 MG/DL (ref 0.2–1)
BUN SERPL-MCNC: 16 MG/DL (ref 6–20)
BUN/CREAT SERPL: 13 (ref 12–20)
CALCIUM SERPL-MCNC: 9 MG/DL (ref 8.5–10.1)
CHLORIDE SERPL-SCNC: 95 MMOL/L (ref 97–108)
CO2 SERPL-SCNC: 34 MMOL/L (ref 21–32)
COMMENT:: NORMAL
CREAT SERPL-MCNC: 1.2 MG/DL (ref 0.7–1.3)
D DIMER PPP FEU-MCNC: 1.06 MG/L FEU (ref 0–0.65)
DIFFERENTIAL METHOD BLD: ABNORMAL
EOSINOPHIL # BLD: 0 K/UL (ref 0–0.4)
EOSINOPHIL NFR BLD: 0 % (ref 0–7)
ERYTHROCYTE [DISTWIDTH] IN BLOOD BY AUTOMATED COUNT: 20.2 % (ref 11.5–14.5)
GLOBULIN SER CALC-MCNC: 3.9 G/DL (ref 2–4)
GLUCOSE SERPL-MCNC: 134 MG/DL (ref 65–100)
HCT VFR BLD AUTO: 38.7 % (ref 36.6–50.3)
HGB BLD-MCNC: 12.2 G/DL (ref 12.1–17)
IMM GRANULOCYTES # BLD AUTO: 0 K/UL (ref 0–0.04)
IMM GRANULOCYTES NFR BLD AUTO: 0 % (ref 0–0.5)
LIPASE SERPL-CCNC: 10 U/L (ref 13–75)
LYMPHOCYTES # BLD: 1.3 K/UL (ref 0.8–3.5)
LYMPHOCYTES NFR BLD: 15 % (ref 12–49)
MCH RBC QN AUTO: 26.9 PG (ref 26–34)
MCHC RBC AUTO-ENTMCNC: 31.5 G/DL (ref 30–36.5)
MCV RBC AUTO: 85.2 FL (ref 80–99)
MONOCYTES # BLD: 0.9 K/UL (ref 0–1)
MONOCYTES NFR BLD: 11 % (ref 5–13)
NEUTS BAND NFR BLD MANUAL: 2 %
NEUTS SEG # BLD: 6.3 K/UL (ref 1.8–8)
NEUTS SEG NFR BLD: 72 % (ref 32–75)
NRBC # BLD: 0.02 K/UL (ref 0–0.01)
NRBC BLD-RTO: 0.2 PER 100 WBC
NT PRO BNP: 1404 PG/ML
PLATELET # BLD AUTO: 234 K/UL (ref 150–400)
PMV BLD AUTO: 13 FL (ref 8.9–12.9)
POTASSIUM SERPL-SCNC: 4.6 MMOL/L (ref 3.5–5.1)
PROT SERPL-MCNC: 6.7 G/DL (ref 6.4–8.2)
RBC # BLD AUTO: 4.54 M/UL (ref 4.1–5.7)
RBC MORPH BLD: ABNORMAL
RBC MORPH BLD: ABNORMAL
SARS-COV-2 RNA RESP QL NAA+PROBE: NOT DETECTED
SODIUM SERPL-SCNC: 134 MMOL/L (ref 136–145)
SOURCE: NORMAL
SPECIMEN HOLD: NORMAL
TROPONIN I SERPL HS-MCNC: 9 NG/L (ref 0–76)
WBC # BLD AUTO: 8.5 K/UL (ref 4.1–11.1)
WBC MORPH BLD: ABNORMAL

## 2024-09-19 PROCEDURE — 6360000004 HC RX CONTRAST MEDICATION: Performed by: RADIOLOGY

## 2024-09-19 PROCEDURE — 84484 ASSAY OF TROPONIN QUANT: CPT

## 2024-09-19 PROCEDURE — 71045 X-RAY EXAM CHEST 1 VIEW: CPT

## 2024-09-19 PROCEDURE — 85025 COMPLETE CBC W/AUTO DIFF WBC: CPT

## 2024-09-19 PROCEDURE — 6370000000 HC RX 637 (ALT 250 FOR IP): Performed by: EMERGENCY MEDICINE

## 2024-09-19 PROCEDURE — 71275 CT ANGIOGRAPHY CHEST: CPT

## 2024-09-19 PROCEDURE — 85379 FIBRIN DEGRADATION QUANT: CPT

## 2024-09-19 PROCEDURE — 83690 ASSAY OF LIPASE: CPT

## 2024-09-19 PROCEDURE — 83880 ASSAY OF NATRIURETIC PEPTIDE: CPT

## 2024-09-19 PROCEDURE — 80053 COMPREHEN METABOLIC PANEL: CPT

## 2024-09-19 PROCEDURE — 36415 COLL VENOUS BLD VENIPUNCTURE: CPT

## 2024-09-19 PROCEDURE — 87635 SARS-COV-2 COVID-19 AMP PRB: CPT

## 2024-09-19 RX ORDER — DEXTROMETHORPHAN HYDROBROMIDE AND PROMETHAZINE HYDROCHLORIDE 15; 6.25 MG/5ML; MG/5ML
5 SYRUP ORAL
Status: COMPLETED | OUTPATIENT
Start: 2024-09-19 | End: 2024-09-19

## 2024-09-19 RX ORDER — GUAIFENESIN/DEXTROMETHORPHAN 100-10MG/5
5 SYRUP ORAL 3 TIMES DAILY PRN
Qty: 120 ML | Refills: 0 | Status: SHIPPED | OUTPATIENT
Start: 2024-09-19 | End: 2024-09-29

## 2024-09-19 RX ORDER — PROMETHAZINE HYDROCHLORIDE 6.25 MG/5ML
6.25 SYRUP ORAL EVERY 6 HOURS PRN
Qty: 100 ML | Refills: 0 | Status: SHIPPED | OUTPATIENT
Start: 2024-09-19 | End: 2024-09-22

## 2024-09-19 RX ORDER — IOPAMIDOL 755 MG/ML
100 INJECTION, SOLUTION INTRAVASCULAR
Status: COMPLETED | OUTPATIENT
Start: 2024-09-19 | End: 2024-09-19

## 2024-09-19 RX ADMIN — PROMETHAZINE HYDROCHLORIDE AND DEXTROMETHORPHAN HYDROBROMIDE ORAL SOLUTION 5 ML: 15; 6.25 SOLUTION ORAL at 05:04

## 2024-09-19 RX ADMIN — IOPAMIDOL 80 ML: 755 INJECTION, SOLUTION INTRAVENOUS at 03:21

## 2024-09-19 RX ADMIN — PROMETHAZINE HYDROCHLORIDE AND DEXTROMETHORPHAN HYDROBROMIDE ORAL SOLUTION 5 ML: 15; 6.25 SOLUTION ORAL at 02:13

## 2024-09-20 LAB
EKG ATRIAL RATE: 97 BPM
EKG DIAGNOSIS: NORMAL
EKG P AXIS: 48 DEGREES
EKG P-R INTERVAL: 122 MS
EKG Q-T INTERVAL: 364 MS
EKG QRS DURATION: 70 MS
EKG QTC CALCULATION (BAZETT): 462 MS
EKG R AXIS: 6 DEGREES
EKG T AXIS: 194 DEGREES
EKG VENTRICULAR RATE: 97 BPM

## 2024-09-20 ASSESSMENT — ENCOUNTER SYMPTOMS
ABDOMINAL PAIN: 0
SHORTNESS OF BREATH: 1
DIARRHEA: 0
COUGH: 1
VOMITING: 0
NAUSEA: 0

## 2024-09-27 ENCOUNTER — TELEPHONE (OUTPATIENT)
Age: 59
End: 2024-09-27

## 2024-09-30 ENCOUNTER — CLINICAL DOCUMENTATION (OUTPATIENT)
Facility: HOSPITAL | Age: 59
End: 2024-09-30

## 2024-09-30 ENCOUNTER — TELEPHONE (OUTPATIENT)
Age: 59
End: 2024-09-30

## 2024-09-30 NOTE — PROGRESS NOTES
Spiritual Health History and Assessment/Progress Note      Grief, Loss, and Adjustments,  , Death,      Name: Fortunato Kowalski MRN: [id number]    Age: 59 y.o.     Sex: male   Language: English   Religious: @Restorationism@   [unfilled]     Date: 9/30/2024            Total Time Calculated: 15 min              Spiritual Assessment continued in [facility]        Referral/Consult From: Palliative Care   Encounter Overview/Reason: Grief, Loss, and Adjustments  Service Provided For: Family    Wendy, Belief, Meaning:   Patient unable to assess at this time  Family/Friends have beliefs or practices that help with coping during difficult times      Importance and Influence:  Patient unable to assess at this time  Family/Friends have no beliefs influential to healthcare decision-making identified during this visit    Community:  Patient Other: NA  Family/Friends feel well-supported. Support system includes: Extended family    Assessment and Plan of Care:     Patient Interventions include: Other: NA  Family/Friends Interventions include: Affirmed coping skills/support systems and Facilitated life review and/or legacy    Patient Plan of Care: Spiritual Care available upon further referral  Family/Friends Plan of Care: Spiritual Care available upon further referral    Narrative: Initiated follow up visit via telephone to Mr. Kowalski's wife Marah Kowalski upon notification of Mr. Kowalski's death.  Provide words of comfort and acknowledgment of Mr. Kowalski's legacy.  Offered grief resources, which Ms. Kowalski said she already had. Ms. Kowalski affirmed appreciation for the phone call, naming that it brought her comfort.  Spiritual health services remain available to the family upon further referral.    Electronically signed by Hattie Lai, Chaplain Resident on 9/30/2024 at 10:15 AM

## 2024-10-03 ENCOUNTER — HOSPITAL ENCOUNTER (OUTPATIENT)
Facility: HOSPITAL | Age: 59
Setting detail: INFUSION SERIES
End: 2024-10-03

## 2024-10-03 ENCOUNTER — APPOINTMENT (OUTPATIENT)
Facility: HOSPITAL | Age: 59
End: 2024-10-03
Payer: COMMERCIAL

## 2024-10-03 DIAGNOSIS — C34.90 SQUAMOUS CELL CARCINOMA OF LUNG, STAGE IV, UNSPECIFIED LATERALITY (HCC): Primary | ICD-10-CM

## 2024-10-14 ENCOUNTER — APPOINTMENT (OUTPATIENT)
Dept: GENERAL RADIOLOGY | Facility: HOSPITAL | Age: 59
End: 2024-10-14
Payer: MEDICARE

## 2024-10-14 ENCOUNTER — HOSPITAL ENCOUNTER (EMERGENCY)
Facility: HOSPITAL | Age: 59
Discharge: HOME | End: 2024-10-14
Attending: EMERGENCY MEDICINE | Admitting: EMERGENCY MEDICINE
Payer: MEDICARE

## 2024-10-14 VITALS
TEMPERATURE: 97.6 F | WEIGHT: 200 LBS | RESPIRATION RATE: 18 BRPM | SYSTOLIC BLOOD PRESSURE: 146 MMHG | OXYGEN SATURATION: 100 % | HEIGHT: 72 IN | DIASTOLIC BLOOD PRESSURE: 92 MMHG | BODY MASS INDEX: 27.09 KG/M2 | HEART RATE: 61 BPM

## 2024-10-14 DIAGNOSIS — R07.9 CHEST PAIN, UNSPECIFIED TYPE: Primary | ICD-10-CM

## 2024-10-14 LAB
ALBUMIN SERPL-MCNC: 4.2 G/DL (ref 3.5–5.2)
ALBUMIN/GLOB SERPL: 2.1 G/DL
ALP SERPL-CCNC: 72 U/L (ref 39–117)
ALT SERPL W P-5'-P-CCNC: 22 U/L (ref 1–41)
ANION GAP SERPL CALCULATED.3IONS-SCNC: 9 MMOL/L (ref 5–15)
AST SERPL-CCNC: 21 U/L (ref 1–40)
BASOPHILS # BLD AUTO: 0.01 10*3/MM3 (ref 0–0.2)
BASOPHILS NFR BLD AUTO: 0.3 % (ref 0–1.5)
BILIRUB SERPL-MCNC: 0.4 MG/DL (ref 0–1.2)
BUN SERPL-MCNC: 14 MG/DL (ref 6–20)
BUN/CREAT SERPL: 10.4 (ref 7–25)
CALCIUM SPEC-SCNC: 9.2 MG/DL (ref 8.6–10.5)
CHLORIDE SERPL-SCNC: 108 MMOL/L (ref 98–107)
CO2 SERPL-SCNC: 25 MMOL/L (ref 22–29)
CREAT SERPL-MCNC: 1.35 MG/DL (ref 0.76–1.27)
D DIMER PPP FEU-MCNC: <0.27 MCGFEU/ML (ref 0–0.57)
DEPRECATED RDW RBC AUTO: 39.9 FL (ref 37–54)
EGFRCR SERPLBLD CKD-EPI 2021: 61.2 ML/MIN/1.73
EOSINOPHIL # BLD AUTO: 0.13 10*3/MM3 (ref 0–0.4)
EOSINOPHIL NFR BLD AUTO: 3.7 % (ref 0.3–6.2)
ERYTHROCYTE [DISTWIDTH] IN BLOOD BY AUTOMATED COUNT: 12.7 % (ref 12.3–15.4)
GEN 5 2HR TROPONIN T REFLEX: <6 NG/L
GLOBULIN UR ELPH-MCNC: 2 GM/DL
GLUCOSE SERPL-MCNC: 88 MG/DL (ref 65–99)
HCT VFR BLD AUTO: 39.7 % (ref 37.5–51)
HGB BLD-MCNC: 13.7 G/DL (ref 13–17.7)
HOLD SPECIMEN: NORMAL
HOLD SPECIMEN: NORMAL
IMM GRANULOCYTES # BLD AUTO: 0.02 10*3/MM3 (ref 0–0.05)
IMM GRANULOCYTES NFR BLD AUTO: 0.6 % (ref 0–0.5)
LYMPHOCYTES # BLD AUTO: 1.11 10*3/MM3 (ref 0.7–3.1)
LYMPHOCYTES NFR BLD AUTO: 31.4 % (ref 19.6–45.3)
MAGNESIUM SERPL-MCNC: 2 MG/DL (ref 1.6–2.6)
MCH RBC QN AUTO: 29.8 PG (ref 26.6–33)
MCHC RBC AUTO-ENTMCNC: 34.5 G/DL (ref 31.5–35.7)
MCV RBC AUTO: 86.5 FL (ref 79–97)
MONOCYTES # BLD AUTO: 0.49 10*3/MM3 (ref 0.1–0.9)
MONOCYTES NFR BLD AUTO: 13.8 % (ref 5–12)
NEUTROPHILS NFR BLD AUTO: 1.78 10*3/MM3 (ref 1.7–7)
NEUTROPHILS NFR BLD AUTO: 50.2 % (ref 42.7–76)
NRBC BLD AUTO-RTO: 0 /100 WBC (ref 0–0.2)
NT-PROBNP SERPL-MCNC: 177.1 PG/ML (ref 0–900)
PLATELET # BLD AUTO: 153 10*3/MM3 (ref 140–450)
PMV BLD AUTO: 8.5 FL (ref 6–12)
POTASSIUM SERPL-SCNC: 3.8 MMOL/L (ref 3.5–5.2)
PROT SERPL-MCNC: 6.2 G/DL (ref 6–8.5)
RBC # BLD AUTO: 4.59 10*6/MM3 (ref 4.14–5.8)
SODIUM SERPL-SCNC: 142 MMOL/L (ref 136–145)
TROPONIN T DELTA: NORMAL
TROPONIN T SERPL HS-MCNC: <6 NG/L
WBC NRBC COR # BLD AUTO: 3.54 10*3/MM3 (ref 3.4–10.8)
WHOLE BLOOD HOLD COAG: NORMAL
WHOLE BLOOD HOLD SPECIMEN: NORMAL

## 2024-10-14 PROCEDURE — 85025 COMPLETE CBC W/AUTO DIFF WBC: CPT | Performed by: EMERGENCY MEDICINE

## 2024-10-14 PROCEDURE — 25010000002 ONDANSETRON PER 1 MG: Performed by: EMERGENCY MEDICINE

## 2024-10-14 PROCEDURE — 71045 X-RAY EXAM CHEST 1 VIEW: CPT

## 2024-10-14 PROCEDURE — 80053 COMPREHEN METABOLIC PANEL: CPT | Performed by: EMERGENCY MEDICINE

## 2024-10-14 PROCEDURE — 93005 ELECTROCARDIOGRAM TRACING: CPT | Performed by: EMERGENCY MEDICINE

## 2024-10-14 PROCEDURE — 85379 FIBRIN DEGRADATION QUANT: CPT | Performed by: EMERGENCY MEDICINE

## 2024-10-14 PROCEDURE — 96374 THER/PROPH/DIAG INJ IV PUSH: CPT

## 2024-10-14 PROCEDURE — 83735 ASSAY OF MAGNESIUM: CPT | Performed by: EMERGENCY MEDICINE

## 2024-10-14 PROCEDURE — 99284 EMERGENCY DEPT VISIT MOD MDM: CPT

## 2024-10-14 PROCEDURE — 83880 ASSAY OF NATRIURETIC PEPTIDE: CPT | Performed by: EMERGENCY MEDICINE

## 2024-10-14 PROCEDURE — 84484 ASSAY OF TROPONIN QUANT: CPT | Performed by: EMERGENCY MEDICINE

## 2024-10-14 PROCEDURE — 36415 COLL VENOUS BLD VENIPUNCTURE: CPT

## 2024-10-14 RX ORDER — ONDANSETRON 2 MG/ML
4 INJECTION INTRAMUSCULAR; INTRAVENOUS ONCE
Status: COMPLETED | OUTPATIENT
Start: 2024-10-14 | End: 2024-10-14

## 2024-10-14 RX ORDER — NITROGLYCERIN 0.4 MG/1
0.4 TABLET SUBLINGUAL
Status: DISCONTINUED | OUTPATIENT
Start: 2024-10-14 | End: 2024-10-14 | Stop reason: HOSPADM

## 2024-10-14 RX ORDER — SODIUM CHLORIDE 0.9 % (FLUSH) 0.9 %
10 SYRINGE (ML) INJECTION AS NEEDED
Status: DISCONTINUED | OUTPATIENT
Start: 2024-10-14 | End: 2024-10-14 | Stop reason: HOSPADM

## 2024-10-14 RX ORDER — ASPIRIN 81 MG/1
324 TABLET, CHEWABLE ORAL ONCE
Status: COMPLETED | OUTPATIENT
Start: 2024-10-14 | End: 2024-10-14

## 2024-10-14 RX ADMIN — ONDANSETRON 4 MG: 2 INJECTION INTRAMUSCULAR; INTRAVENOUS at 08:04

## 2024-10-14 RX ADMIN — NITROGLYCERIN 0.4 MG: 0.4 TABLET SUBLINGUAL at 08:04

## 2024-10-14 RX ADMIN — ASPIRIN 81 MG CHEWABLE TABLET 324 MG: 81 TABLET CHEWABLE at 07:25

## 2024-10-14 NOTE — ED PROVIDER NOTES
EMERGENCY DEPARTMENT ENCOUNTER    Pt Name: Vinnie Whitley III  MRN: [id number]  Pt :   1965  Room Number:    Date of encounter:  10/14/2024  PCP: Marlen Richards MD  ED Provider: Guerrero Yuen MD    Historian: Patient      HPI:  Chief Complaint   Patient presents with    Chest Pain    Shortness of Breath          Context: Vinnie Whitley III is a 59 y.o. male who presents to the ED c/o chest pain, started last night at 10 PM, pressure-like sensations in his chest, associate with shortness of breath.  Denies nausea, vomiting, sweating.  Patient reports is not as bad as when he had a heart attack in the past.  Denies leg swelling, recent travel, fevers, cough.  Patient does follow-up with cardiology, last cardiac cath was in  which showed normal stent.  Denies any other complaints.      PAST MEDICAL HISTORY  Past Medical History:   Diagnosis Date    Essential hypertension 2020    Infection of kidney     H/o    Kidney stones     Multiple sclerosis     Nephrolithiasis     H/o    Pneumonia     Pneumothorax     H/o         PAST SURGICAL HISTORY  Past Surgical History:   Procedure Laterality Date    BRONCHOSCOPY N/A 2022    Procedure: BRONCHOSCOPY;  Surgeon: Julio Costa MD;  Location:  [facility];  Service: Pulmonary;  Laterality: N/A;    CARDIAC CATHETERIZATION N/A 10/26/2020    Procedure: Coronary angiography;  Surgeon: Sukh Rushing MD;  Location: [facility] CATH INVASIVE LOCATION;  Service: Cardiology;  Laterality: N/A;    CARDIAC CATHETERIZATION N/A 10/26/2020    Procedure: Optical Coherent Tomography;  Surgeon: Sukh Rushing MD;  Location: [facility] CATH INVASIVE LOCATION;  Service: Cardiology;  Laterality: N/A;    CARDIAC CATHETERIZATION N/A 10/26/2020    Procedure: Percutaneous Coronary Intervention;  Surgeon: Sukh Rushing MD;  Location: [facility] CATH INVASIVE LOCATION;  Service: Cardiology;  Laterality: N/A;    CARDIAC CATHETERIZATION N/A  10/26/2020    Procedure: Stent PEPPER coronary;  Surgeon: Sukh Rushing MD;  Location:  [facility] CATH INVASIVE LOCATION;  Service: Cardiology;  Laterality: N/A;    CARDIAC CATHETERIZATION N/A 10/26/2020    Procedure: Left Heart Cath;  Surgeon: Sukh Rushing MD;  Location: [facility] CATH INVASIVE LOCATION;  Service: Cardiology;  Laterality: N/A;    CARDIAC CATHETERIZATION N/A 3/3/2021    Procedure: Coronary angiography;  Surgeon: Sukh Rushing MD;  Location:  [facility] CATH INVASIVE LOCATION;  Service: Cardiology;  Laterality: N/A;    CARDIAC CATHETERIZATION N/A 3/3/2021    Procedure: Left Heart Cath;  Surgeon: Sukh Rushing MD;  Location:  [facility] CATH INVASIVE LOCATION;  Service: Cardiology;  Laterality: N/A;    CORONARY STENT PLACEMENT      KIDNEY STONE SURGERY      Lithotomy    LUNG SURGERY           FAMILY HISTORY  Family History   Problem Relation Age of Onset    Polycystic kidney disease Mother     Dementia Mother     Kidney disease Mother     No Known Problems Sister     No Known Problems Maternal Uncle     Kidney disease Maternal Grandmother     Polycystic kidney disease Maternal Grandmother     Early death Maternal Grandfather          SOCIAL HISTORY  Social History     Socioeconomic History    Marital status:    Tobacco Use    Smoking status: Former     Current packs/day: 0.00     Average packs/day: 0.5 packs/day for 20.0 years (10.0 ttl pk-yrs)     Types: Cigarettes     Start date:      Quit date: 2001     Years since quittin.8     Passive exposure: Past    Smokeless tobacco: Never   Vaping Use    Vaping status: Never Used   Substance and Sexual Activity    Alcohol use: No    Drug use: No    Sexual activity: Yes     Partners: Female         ALLERGIES  Contrast dye (echo or unknown ct/mr)        REVIEW OF SYSTEMS  Review of Systems   Constitutional:  Negative for chills and fever.   HENT:  Negative for sore throat and trouble swallowing.    Eyes:  Negative for pain and redness.    Respiratory:  Positive for shortness of breath. Negative for cough.    Cardiovascular:  Positive for chest pain. Negative for leg swelling.   Gastrointestinal:  Negative for abdominal pain, nausea and vomiting.   Genitourinary:  Negative for dysuria and urgency.   Musculoskeletal:  Negative for back pain and neck pain.   Skin:  Negative for rash and wound.   Neurological:  Negative for dizziness and weakness.        All systems reviewed and negative except for those discussed in HPI.       PHYSICAL EXAM    I have reviewed the triage vital signs and nursing notes.    ED Triage Vitals [10/14/24 0654]   Temp Heart Rate Resp BP SpO2   97.6 °F (36.4 °C) 81 18 160/99 99 %      Temp src Heart Rate Source Patient Position BP Location FiO2 (%)   Oral Monitor Sitting Left arm --       Physical Exam  Constitutional:       Appearance: Normal appearance. He is not ill-appearing.   HENT:      Head: Normocephalic and atraumatic.      Right Ear: External ear normal.      Left Ear: External ear normal.      Nose: Nose normal.      Mouth/Throat:      Mouth: Mucous membranes are moist.      Pharynx: Oropharynx is clear.   Eyes:      Extraocular Movements: Extraocular movements intact.      Conjunctiva/sclera: Conjunctivae normal.      Pupils: Pupils are equal, round, and reactive to light.   Cardiovascular:      Rate and Rhythm: Normal rate and regular rhythm.      Pulses:           Radial pulses are 2+ on the right side and 2+ on the left side.      Heart sounds: No murmur heard.  Pulmonary:      Effort: Pulmonary effort is normal.      Breath sounds: Normal breath sounds.   Abdominal:      General: There is no distension.      Tenderness: There is no abdominal tenderness. There is no guarding.   Musculoskeletal:         General: No swelling or deformity.      Cervical back: Normal range of motion and neck supple.   Skin:     General: Skin is warm and dry.      Capillary Refill: Capillary refill takes less than 2 seconds.       Findings: No rash.   Neurological:      General: No focal deficit present.      Mental Status: He is alert and oriented to person, place, and time.            LAB RESULTS  Recent Results (from the past 24 hours)   Green Top (Gel)    Collection Time: 10/14/24  7:00 AM   Result Value Ref Range    Extra Tube Hold for add-ons.    Lavender Top    Collection Time: 10/14/24  7:00 AM   Result Value Ref Range    Extra Tube hold for add-on    Gold Top - SST    Collection Time: 10/14/24  7:00 AM   Result Value Ref Range    Extra Tube Hold for add-ons.    Light Blue Top    Collection Time: 10/14/24  7:00 AM   Result Value Ref Range    Extra Tube Hold for add-ons.    Comprehensive Metabolic Panel    Collection Time: 10/14/24  7:00 AM    Specimen: Blood   Result Value Ref Range    Glucose 88 65 - 99 mg/dL    BUN 14 6 - 20 mg/dL    Creatinine 1.35 (H) 0.76 - 1.27 mg/dL    Sodium 142 136 - 145 mmol/L    Potassium 3.8 3.5 - 5.2 mmol/L    Chloride 108 (H) 98 - 107 mmol/L    CO2 25.0 22.0 - 29.0 mmol/L    Calcium 9.2 8.6 - 10.5 mg/dL    Total Protein 6.2 6.0 - 8.5 g/dL    Albumin 4.2 3.5 - 5.2 g/dL    ALT (SGPT) 22 1 - 41 U/L    AST (SGOT) 21 1 - 40 U/L    Alkaline Phosphatase 72 39 - 117 U/L    Total Bilirubin 0.4 0.0 - 1.2 mg/dL    Globulin 2.0 gm/dL    A/G Ratio 2.1 g/dL    BUN/Creatinine Ratio 10.4 7.0 - 25.0    Anion Gap 9.0 5.0 - 15.0 mmol/L    eGFR 61.2 >60.0 mL/min/1.73   High Sensitivity Troponin T    Collection Time: 10/14/24  7:00 AM    Specimen: Blood   Result Value Ref Range    HS Troponin T <6 <22 ng/L   BNP    Collection Time: 10/14/24  7:00 AM    Specimen: Blood   Result Value Ref Range    proBNP 177.1 0.0 - 900.0 pg/mL   D-dimer, Quantitative    Collection Time: 10/14/24  7:00 AM    Specimen: Blood   Result Value Ref Range    D-Dimer, Quantitative <0.27 0.00 - 0.57 MCGFEU/mL   Magnesium    Collection Time: 10/14/24  7:00 AM    Specimen: Blood   Result Value Ref Range    Magnesium 2.0 1.6 - 2.6 mg/dL   CBC Auto  Differential    Collection Time: 10/14/24  7:00 AM    Specimen: Blood   Result Value Ref Range    WBC 3.54 3.40 - 10.80 10*3/mm3    RBC 4.59 4.14 - 5.80 10*6/mm3    Hemoglobin 13.7 13.0 - 17.7 g/dL    Hematocrit 39.7 37.5 - 51.0 %    MCV 86.5 79.0 - 97.0 fL    MCH 29.8 26.6 - 33.0 pg    MCHC 34.5 31.5 - 35.7 g/dL    RDW 12.7 12.3 - 15.4 %    RDW-SD 39.9 37.0 - 54.0 fl    MPV 8.5 6.0 - 12.0 fL    Platelets 153 140 - 450 10*3/mm3    Neutrophil % 50.2 42.7 - 76.0 %    Lymphocyte % 31.4 19.6 - 45.3 %    Monocyte % 13.8 (H) 5.0 - 12.0 %    Eosinophil % 3.7 0.3 - 6.2 %    Basophil % 0.3 0.0 - 1.5 %    Immature Grans % 0.6 (H) 0.0 - 0.5 %    Neutrophils, Absolute 1.78 1.70 - 7.00 10*3/mm3    Lymphocytes, Absolute 1.11 0.70 - 3.10 10*3/mm3    Monocytes, Absolute 0.49 0.10 - 0.90 10*3/mm3    Eosinophils, Absolute 0.13 0.00 - 0.40 10*3/mm3    Basophils, Absolute 0.01 0.00 - 0.20 10*3/mm3    Immature Grans, Absolute 0.02 0.00 - 0.05 10*3/mm3    nRBC 0.0 0.0 - 0.2 /100 WBC   High Sensitivity Troponin T 2Hr    Collection Time: 10/14/24  9:10 AM    Specimen: Blood   Result Value Ref Range    HS Troponin T <6 <22 ng/L    Troponin T Delta         If labs were ordered, I independently reviewed the results and considered them in treating the patient.        RADIOLOGY  XR Chest 1 View    Result Date: 10/14/2024  PROCEDURE: XR CHEST 1 VW-  HISTORY: chest pain  COMPARISON: 5/13/2023  FINDINGS:  Portable view of the chest demonstrates the lungs to be grossly clear. There is no evidence of effusion, pneumothorax or other significant pleural disease. The mediastinum is unremarkable.  The heart size is normal.      Unremarkable portable chest.    This report was signed and finalized on 10/14/2024 8:51 AM by Elías Willett MD.           PROCEDURES    Procedures    Interpretations    O2 Sat: The patients oxygen saturation was 99% on Room Air.  This was independently interpreted by me as Normal    EKG: I reviewed and independently interpreted  the EKG as sinus rhythm at 71 bpm, normal axis, normal intervals, no ST elevation, T wave inversions in lead III    Cardiac Monitoring: I reviewed and independently interpreted the Rhythm Strip as Normal Sinus rhythm rate of 81    Radiology: I ordered and independently reviewed the above noted radiographic studies.  I viewed images of Chest Xray which showed No pulmonary process per my independent interpretation. See radiologist's dictation for official interpretation.     HEART Score: 3       MEDICATIONS GIVEN IN ER    Medications   sodium chloride 0.9 % flush 10 mL (has no administration in time range)   nitroglycerin (NITROSTAT) SL tablet 0.4 mg (0.4 mg Sublingual Given 10/14/24 0804)   aspirin chewable tablet 324 mg (324 mg Oral Given 10/14/24 0725)   ondansetron (ZOFRAN) injection 4 mg (4 mg Intravenous Given 10/14/24 0804)         MEDICAL DECISION MAKING, PROGRESS, and CONSULTS    All labs, if obtained, have been independently reviewed by me.  All radiology studies, if obtained, have been reviewed by me and the radiologist dictating the report.  All EKG's, if obtained, have been independently viewed and interpreted by me      Discussion below represents my analysis of pertinent findings related to patient's condition, differential diagnosis, treatment plan and final disposition.      Differential diagnosis:    59-year-old male with history of CAD with a stent presents to the ED with complaint of chest pain, has been going on since last night, fairly atypical, patient states it is not the same as when he had an NSTEMI in the past, certainly given his history concern for ACS, versus pulmonary embolism given shortness of breath, heart failure, will obtain laboratory workup including troponin, BNP, D-dimer, chest x-ray.    Additional Sources:  External (non-ED) record review:  Normal nuclear medicine stress test 5/3/2023, cardiac cath 3/3/2021, as well as 10/26/2020      Orders placed during this visit:  Orders  Placed This Encounter   Procedures    XR Chest 1 View    [place] Draw    Comprehensive Metabolic Panel    High Sensitivity Troponin T    BNP    D-dimer, Quantitative    Magnesium    CBC Auto Differential    High Sensitivity Troponin T 2Hr    ECG 12 Lead Chest Pain    Insert Peripheral IV    Green Top (Gel)    Lavender Top    Gold Top - SST    Light Blue Top    CBC & Differential         Additional orders considered but not ordered:  None    ED Course:    Consultants:  None    ED Course as of 10/14/24 0945   Mon Oct 14, 2024   0730 D-dimer, Quantitative  D dimer is negative, rules out PE in this low risk patient [CS]   0735 High Sensitivity Troponin T  First troponin is negative will repeat per protocol [CS]   0807 BNP  BNP is negative [CS]   0807 Comprehensive Metabolic Panel(!)  Chemistries reveal baseline creatinine elevation [CS]   0938 High Sensitivity Troponin T 2Hr  Repeat troponin is negative [CS]   0940 Patient is chest pain free, has two negative troponins, negative d dimer, normal EKG. As such will discharge the patient home, he has already made follow up appointment with his cardiologist.   [CS]      ED Course User Index  [CS] Guerrero Yuen MD           After my consideration of clinical presentation and any laboratory/radiology studies obtained, I discussed the findings with the patient/patient representative who is in agreement with the treatment plan and the final disposition. Risks and benefits of discharge were discussed.     AS OF 09:45 EDT VITALS:    BP - 146/92  HR - 61  TEMP - 97.6 °F (36.4 °C) (Oral)  O2 SATS - 100%    I reviewed the patients prescription monitoring report if available prior to discharge    DIAGNOSIS  Final diagnoses:   Chest pain, unspecified type         DISPOSITION  ED Disposition       ED Disposition   Discharge    Condition   Stable    Comment   --                   Please note that portions of this document were completed with voice recognition software.         Guerrero Yuen MD  10/14/24 0964

## 2024-10-15 ENCOUNTER — OFFICE VISIT (OUTPATIENT)
Dept: CARDIOLOGY | Facility: CLINIC | Age: 59
End: 2024-10-15
Payer: MEDICARE

## 2024-10-15 VITALS
OXYGEN SATURATION: 98 % | DIASTOLIC BLOOD PRESSURE: 92 MMHG | SYSTOLIC BLOOD PRESSURE: 138 MMHG | HEART RATE: 82 BPM | RESPIRATION RATE: 19 BRPM | HEIGHT: 72 IN | BODY MASS INDEX: 27.74 KG/M2 | WEIGHT: 204.8 LBS

## 2024-10-15 DIAGNOSIS — I10 ESSENTIAL HYPERTENSION: ICD-10-CM

## 2024-10-15 DIAGNOSIS — E78.2 MIXED HYPERLIPIDEMIA: ICD-10-CM

## 2024-10-15 DIAGNOSIS — R07.9 CHEST PAIN, UNSPECIFIED TYPE: Primary | ICD-10-CM

## 2024-10-15 DIAGNOSIS — I25.5 ISCHEMIC CARDIOMYOPATHY: ICD-10-CM

## 2024-10-15 DIAGNOSIS — I25.119 CORONARY ARTERY DISEASE INVOLVING NATIVE CORONARY ARTERY OF NATIVE HEART WITH ANGINA PECTORIS: ICD-10-CM

## 2024-10-15 PROCEDURE — 3075F SYST BP GE 130 - 139MM HG: CPT | Performed by: NURSE PRACTITIONER

## 2024-10-15 PROCEDURE — 3080F DIAST BP >= 90 MM HG: CPT | Performed by: NURSE PRACTITIONER

## 2024-10-15 PROCEDURE — 99214 OFFICE O/P EST MOD 30 MIN: CPT | Performed by: NURSE PRACTITIONER

## 2024-10-15 PROCEDURE — 1160F RVW MEDS BY RX/DR IN RCRD: CPT | Performed by: NURSE PRACTITIONER

## 2024-10-15 PROCEDURE — 1159F MED LIST DOCD IN RCRD: CPT | Performed by: NURSE PRACTITIONER

## 2024-10-15 RX ORDER — RANOLAZINE 1000 MG/1
1000 TABLET, EXTENDED RELEASE ORAL 2 TIMES DAILY
Qty: 60 TABLET | Refills: 0 | Status: SHIPPED | OUTPATIENT
Start: 2024-10-15 | End: 2024-11-14

## 2024-10-15 RX ORDER — DONEPEZIL HYDROCHLORIDE 5 MG/1
5 TABLET, FILM COATED ORAL NIGHTLY
COMMUNITY

## 2024-10-15 NOTE — ASSESSMENT & PLAN NOTE
-Intermittent episodes of chest discomfort in the past concerning for potential angina with improvement after initiating Ranexa  -Will repeat pharmacologic MPS (inability to exercise with MS) to further evaluate for ischemia contributing to current symptoms as they are similar to prior heart attack as he has not had any episodes recently  -Will uptitrate Ranexa to 1000mg BID today and monitor for any recurrence of chest pain/ angina  -Patient to follow up with Dr. Rushing after stress test for discussion of results  -Discussed with patient with any further episodes of chest pain to proceed to the hospital for further evaluation.

## 2024-10-15 NOTE — ASSESSMENT & PLAN NOTE
-NSTEMI in 10/2020, found to have severe one-vessel CAD involving the proximal to mid RCA, status post PCI with PEPPER to the proximal to mid RCA  -Repeat coronary angiogram in 3/21 due to recurrent episodes of chest pain, with widely patent RCA stents and no obstructive disease in the left coronary system  -Intermittent episodes of chest discomfort concerning for potential angina with improvement after initiating Ranexa  -Continue ASA 81mg  -Continue statin therapy  -Will uptitrate Ranexa to 1000mg BID today and monitor for any recurrence of chest pain/ angina

## 2024-10-15 NOTE — PROGRESS NOTES
[facility] Cardiology    Office Consult     Vinnie Whitley III  [id number]  10/15/2024    Referred By: No ref. provider found    Chief Complaint   Patient presents with    Coronary Artery Disease       Subjective     History of Present Illness:   Vinnie Whitley III is a 59 y.o. male who presents to the Cardiology Clinic for follow up after a visit to the emergency department last night. The patient has a past medical history significant for multiple sclerosis followed at  , hypertension, hyperlipidemia, and prior tobacco use with cessation in approximately .  Past cardiac history is significant for presentation with an NSTEMI in 10/2020.  Coronary angiography at that time revealed severe one-vessel disease involving the proximal to mid RCA.  He underwent successful PCI with placement of a 3.0 x 28 mm Xience PEPPER.  An echocardiogram at that time showed mildly reduced LV systolic function with an LVEF 45-50%.    His chest pressure started around 10pm the night before with some shortness of breath, nausea, and left arm pain.  He has a history of coronary artery disease with prior stent placed.  He states this episode was similar to his heart attack in the past but not quite as bad.  Patient had normal troponin levels x2, normal BNP, and chest pain resolved after being given Nitroglycerin.  He presents to the clinic today for close follow up.  Last angiography was in  with Dr. Rushing which noted no significant obstructive disease in the left coronary system.  It was recommended to continue with Metoprolol and add Ranexa.  Patient states that his symptoms are tricky to assess with his MS history.  He states he has not had any prior episodes before this one.  Currently without any complaints of chest pain, shortness of breath, or lower extremity swelling.      Past Cardiac Testin. Last Coronary Angio: 3/2021--Noted widely patent stent in the proximal to mid RCA with mild diffuse disease  in the distal posteriolateral system at which point the vessel is small in caliber.  No significant obstructive disease in the left coronary system.  Sluggish, EAMON II flow throughout the LAD.  Recommended continuing Metoprolol and adding Ranexa.    2. Prior Stress Testin2023--Normal MPS with no evidence of ischemia, LVEF-57%  3. Last Echo: 2022--EF-60%, no valve abnormalities  4. Prior Holter Monitor: None    Review of Systems   Constitutional:  Negative for activity change and fatigue.   Respiratory:  Negative for chest tightness and shortness of breath.    Cardiovascular:  Positive for chest pain. Negative for palpitations and leg swelling.   Neurological:  Negative for dizziness.   All other systems reviewed and are negative.       Past Medical History:   Diagnosis Date    Essential hypertension 2020    Infection of kidney     H/o    Kidney stones     Multiple sclerosis     Nephrolithiasis     H/o    Pneumonia     Pneumothorax     H/o       Past Surgical History:   Procedure Laterality Date    BRONCHOSCOPY N/A 2022    Procedure: BRONCHOSCOPY;  Surgeon: Julio Costa MD;  Location:  [facility];  Service: Pulmonary;  Laterality: N/A;    CARDIAC CATHETERIZATION N/A 10/26/2020    Procedure: Coronary angiography;  Surgeon: Sukh Rushing MD;  Location: [facility] CATH INVASIVE LOCATION;  Service: Cardiology;  Laterality: N/A;    CARDIAC CATHETERIZATION N/A 10/26/2020    Procedure: Optical Coherent Tomography;  Surgeon: Sukh Rushing MD;  Location: [facility] CATH INVASIVE LOCATION;  Service: Cardiology;  Laterality: N/A;    CARDIAC CATHETERIZATION N/A 10/26/2020    Procedure: Percutaneous Coronary Intervention;  Surgeon: Sukh Rushing MD;  Location: [facility] CATH INVASIVE LOCATION;  Service: Cardiology;  Laterality: N/A;    CARDIAC CATHETERIZATION N/A 10/26/2020    Procedure: Stent PEPPER coronary;  Surgeon: Sukh Rushing MD;  Location: [facility] CATH INVASIVE LOCATION;  Service:  Cardiology;  Laterality: N/A;    CARDIAC CATHETERIZATION N/A 10/26/2020    Procedure: Left Heart Cath;  Surgeon: Sukh Rushing MD;  Location:  [facility] CATH INVASIVE LOCATION;  Service: Cardiology;  Laterality: N/A;    CARDIAC CATHETERIZATION N/A 3/3/2021    Procedure: Coronary angiography;  Surgeon: Sukh Rushing MD;  Location:  [facility] CATH INVASIVE LOCATION;  Service: Cardiology;  Laterality: N/A;    CARDIAC CATHETERIZATION N/A 3/3/2021    Procedure: Left Heart Cath;  Surgeon: Sukh Rushing MD;  Location:  [facility] CATH INVASIVE LOCATION;  Service: Cardiology;  Laterality: N/A;    CORONARY STENT PLACEMENT      KIDNEY STONE SURGERY      Lithotomy    LUNG SURGERY         Family History   Problem Relation Age of Onset    Polycystic kidney disease Mother     Dementia Mother     Kidney disease Mother     No Known Problems Sister     No Known Problems Maternal Uncle     Kidney disease Maternal Grandmother     Polycystic kidney disease Maternal Grandmother     Early death Maternal Grandfather        Social History     Socioeconomic History    Marital status:    Tobacco Use    Smoking status: Former     Current packs/day: 0.00     Average packs/day: 0.5 packs/day for 20.0 years (10.0 ttl pk-yrs)     Types: Cigarettes     Start date:      Quit date: 2001     Years since quittin.8     Passive exposure: Past    Smokeless tobacco: Never   Vaping Use    Vaping status: Never Used   Substance and Sexual Activity    Alcohol use: No    Drug use: No    Sexual activity: Yes     Partners: Female         Current Outpatient Medications:     Aspirin Low Dose 81 MG EC tablet, TAKE 1 TABLET DAILY, Disp: 90 tablet, Rfl: 3    atorvastatin (LIPITOR) 80 MG tablet, TAKE 1 TABLET EVERY NIGHT, Disp: 90 tablet, Rfl: 3    citalopram (CeleXA) 20 MG tablet, TAKE 1 TABLET DAILY, Disp: 90 tablet, Rfl: 1    clopidogrel (PLAVIX) 75 MG tablet, TAKE 1 TABLET DAILY, Disp: 90 tablet, Rfl: 3    donepezil (ARICEPT) 5 MG tablet,  "Take 1 tablet by mouth Every Night., Disp: , Rfl:     lisinopril (PRINIVIL,ZESTRIL) 5 MG tablet, Take 1 tablet by mouth Daily., Disp: 90 tablet, Rfl: 3    metoprolol succinate XL (TOPROL-XL) 25 MG 24 hr tablet, Take 1 tablet by mouth Daily., Disp: 90 tablet, Rfl: 3    pramipexole (MIRAPEX) 1 MG tablet, Take 1 tablet by mouth 2 (Two) Times a Day., Disp: , Rfl:     donepezil (ARICEPT) 5 MG tablet, Take 5 mg by mouth Every Night. Pt's neurologist would like approval from cardio before starting, Disp: , Rfl:     ranolazine (RANEXA) 1000 MG 12 hr tablet, Take 1 tablet by mouth 2 (Two) Times a Day for 30 days., Disp: 60 tablet, Rfl: 0  No current facility-administered medications for this visit.      Allergies   Allergen Reactions    Contrast Dye (Echo Or Unknown Ct/Mr) Other (See Comments)     Made \"feel funny\"       Objective     Vitals:    10/15/24 0908   BP: 138/92   BP Location: Right leg   Patient Position: Sitting   Cuff Size: Adult   Pulse: 82   Resp: 19   SpO2: 98%   Weight: 92.9 kg (204 lb 12.8 oz)   Height: 182.9 cm (72\")     Body mass index is 27.78 kg/m².    Physical Exam  Vitals and nursing note reviewed.   Constitutional:       General: He is not in acute distress.     Appearance: Normal appearance. He is not toxic-appearing.   HENT:      Head: Normocephalic.      Mouth/Throat:      Mouth: Mucous membranes are moist.   Eyes:      Pupils: Pupils are equal, round, and reactive to light.   Cardiovascular:      Rate and Rhythm: Normal rate and regular rhythm.      Pulses: Normal pulses.      Heart sounds: Normal heart sounds. No murmur heard.  Pulmonary:      Effort: Pulmonary effort is normal.      Breath sounds: Normal breath sounds. No wheezing, rhonchi or rales.   Musculoskeletal:      Right lower leg: No edema.      Left lower leg: No edema.   Skin:     General: Skin is warm and dry.      Capillary Refill: Capillary refill takes less than 2 seconds.   Neurological:      Mental Status: He is alert and " oriented to person, place, and time. Mental status is at baseline.   Psychiatric:         Mood and Affect: Mood normal.         Behavior: Behavior normal.         Thought Content: Thought content normal.         Results Review:   I reviewed the patient's new clinical results.    Procedures    Assessment & Plan  Chest pain, unspecified type  -Intermittent episodes of chest discomfort in the past concerning for potential angina with improvement after initiating Ranexa  -Will repeat pharmacologic MPS (inability to exercise with MS) to further evaluate for ischemia contributing to current symptoms as they are similar to prior heart attack as he has not had any episodes recently  -Will uptitrate Ranexa to 1000mg BID today and monitor for any recurrence of chest pain/ angina  -Patient to follow up with Dr. Rushing after stress test for discussion of results  -Discussed with patient with any further episodes of chest pain to proceed to the hospital for further evaluation.  Essential hypertension  -Blood pressure acceptable today  -Continue current medications  Mixed hyperlipidemia  -Lipid panel 5/2023--triglycerides-215, HDL-32, LDL-86  -Tolerating statin therapy without difficulty  Coronary artery disease involving native coronary artery of native heart with angina pectoris  -NSTEMI in 10/2020, found to have severe one-vessel CAD involving the proximal to mid RCA, status post PCI with PEPPER to the proximal to mid RCA  -Repeat coronary angiogram in 3/21 due to recurrent episodes of chest pain, with widely patent RCA stents and no obstructive disease in the left coronary system  -Intermittent episodes of chest discomfort concerning for potential angina with improvement after initiating Ranexa  -Continue ASA 81mg  -Continue statin therapy  -Will uptitrate Ranexa to 1000mg BID today and monitor for any recurrence of chest pain/ angina  Ischemic cardiomyopathy  -Last echo 5/2022--EF-60%, no valve abnormalities  -Euvolemic on  exam  -Continue Metoprolol and Lisinopril    Orders Placed This Encounter   Procedures    Stress Test With Myocardial Perfusion One Day     New Medications Ordered This Visit   Medications    ranolazine (RANEXA) 1000 MG 12 hr tablet     Sig: Take 1 tablet by mouth 2 (Two) Times a Day for 30 days.     Dispense:  60 tablet     Refill:  0     Preventative Cardiology:   Tobacco Cessation: N/A   Advance Care Planning: ACP discussion was held with the patient during this visit. Patient does not have an advance directive, declines further assistance.     Follow Up:   Return in about 4 weeks (around 11/12/2024) for Next scheduled follow up--Dr. Rushing after stress test.      Thank you for allowing me to participate in the care of your patient. Please to not hesitate to contact me with additional questions or concerns.     Mi Sltaer, APRN

## 2024-10-15 NOTE — ASSESSMENT & PLAN NOTE
-Last echo 5/2022--EF-60%, no valve abnormalities  -Euvolemic on exam  -Continue Metoprolol and Lisinopril

## 2024-10-15 NOTE — ASSESSMENT & PLAN NOTE
-Lipid panel 5/2023--triglycerides-215, HDL-32, LDL-86  -Tolerating statin therapy without difficulty

## 2024-11-22 ENCOUNTER — HOSPITAL ENCOUNTER (OUTPATIENT)
Dept: NUCLEAR MEDICINE | Facility: HOSPITAL | Age: 59
Discharge: HOME | End: 2024-11-22
Payer: MEDICARE

## 2024-11-22 DIAGNOSIS — R07.9 CHEST PAIN, UNSPECIFIED TYPE: ICD-10-CM

## 2024-11-22 LAB
BH CV REST NUCLEAR ISOTOPE DOSE: 9.9 MCI
BH CV STRESS COMMENTS STAGE 1: NORMAL
BH CV STRESS DOSE REGADENOSON STAGE 1: 0.4
BH CV STRESS DURATION MIN STAGE 1: 0
BH CV STRESS DURATION SEC STAGE 1: 10
BH CV STRESS NUCLEAR ISOTOPE DOSE: 31.1 MCI
BH CV STRESS PROTOCOL 1: NORMAL
BH CV STRESS RECOVERY BP: NORMAL MMHG
BH CV STRESS RECOVERY HR: 96 BPM
BH CV STRESS STAGE 1: 1
MAXIMAL PREDICTED HEART RATE: 163 BPM
PERCENT MAX PREDICTED HR: 67.48 %
STRESS BASELINE BP: NORMAL MMHG
STRESS BASELINE HR: 75 BPM
STRESS PERCENT HR: 79 %
STRESS POST PEAK BP: NORMAL MMHG
STRESS POST PEAK HR: 110 BPM
STRESS TARGET HR: 139 BPM

## 2024-11-22 PROCEDURE — A9500 TC99M SESTAMIBI: HCPCS | Performed by: NURSE PRACTITIONER

## 2024-11-22 PROCEDURE — 93017 CV STRESS TEST TRACING ONLY: CPT

## 2024-11-22 PROCEDURE — 34310000005 TECHNETIUM SESTAMIBI: Performed by: NURSE PRACTITIONER

## 2024-11-22 PROCEDURE — 25010000002 REGADENOSON 0.4 MG/5ML SOLUTION: Performed by: NURSE PRACTITIONER

## 2024-11-22 PROCEDURE — 78452 HT MUSCLE IMAGE SPECT MULT: CPT

## 2024-11-22 RX ORDER — REGADENOSON 0.08 MG/ML
0.4 INJECTION, SOLUTION INTRAVENOUS
Status: COMPLETED | OUTPATIENT
Start: 2024-11-22 | End: 2024-11-22

## 2024-11-22 RX ADMIN — TECHNETIUM TC 99M SESTAMIBI 1 DOSE: 1 INJECTION INTRAVENOUS at 07:45

## 2024-11-22 RX ADMIN — TECHNETIUM TC 99M SESTAMIBI 1 DOSE: 1 INJECTION INTRAVENOUS at 09:53

## 2024-11-22 RX ADMIN — REGADENOSON 0.4 MG: 0.08 INJECTION, SOLUTION INTRAVENOUS at 09:53

## 2024-11-25 RX ORDER — ASPIRIN 81 MG/1
TABLET, COATED ORAL
Qty: 90 TABLET | Refills: 0 | Status: SHIPPED | OUTPATIENT
Start: 2024-11-25

## 2024-11-26 LAB
BH CV REST NUCLEAR ISOTOPE DOSE: 9.9 MCI
BH CV STRESS COMMENTS STAGE 1: NORMAL
BH CV STRESS DOSE REGADENOSON STAGE 1: 0.4
BH CV STRESS DURATION MIN STAGE 1: 0
BH CV STRESS DURATION SEC STAGE 1: 10
BH CV STRESS NUCLEAR ISOTOPE DOSE: 31.1 MCI
BH CV STRESS PROTOCOL 1: NORMAL
BH CV STRESS RECOVERY BP: NORMAL MMHG
BH CV STRESS RECOVERY HR: 96 BPM
BH CV STRESS STAGE 1: 1
LV EF NUC BP: 65 %
MAXIMAL PREDICTED HEART RATE: 163 BPM
PERCENT MAX PREDICTED HR: 67.48 %
STRESS BASELINE BP: NORMAL MMHG
STRESS BASELINE HR: 75 BPM
STRESS PERCENT HR: 79 %
STRESS POST PEAK BP: NORMAL MMHG
STRESS POST PEAK HR: 110 BPM
STRESS TARGET HR: 139 BPM

## 2024-12-06 ENCOUNTER — OFFICE VISIT (OUTPATIENT)
Dept: CARDIOLOGY | Facility: CLINIC | Age: 59
End: 2024-12-06
Payer: MEDICARE

## 2024-12-06 VITALS
HEIGHT: 72 IN | BODY MASS INDEX: 27.33 KG/M2 | DIASTOLIC BLOOD PRESSURE: 82 MMHG | RESPIRATION RATE: 18 BRPM | SYSTOLIC BLOOD PRESSURE: 122 MMHG | WEIGHT: 201.8 LBS | OXYGEN SATURATION: 96 % | HEART RATE: 96 BPM

## 2024-12-06 DIAGNOSIS — I25.5 ISCHEMIC CARDIOMYOPATHY: ICD-10-CM

## 2024-12-06 DIAGNOSIS — I10 ESSENTIAL HYPERTENSION: ICD-10-CM

## 2024-12-06 DIAGNOSIS — I25.119 CORONARY ARTERY DISEASE INVOLVING NATIVE CORONARY ARTERY OF NATIVE HEART WITH ANGINA PECTORIS: Primary | ICD-10-CM

## 2024-12-06 PROCEDURE — 3074F SYST BP LT 130 MM HG: CPT | Performed by: INTERNAL MEDICINE

## 2024-12-06 PROCEDURE — 3079F DIAST BP 80-89 MM HG: CPT | Performed by: INTERNAL MEDICINE

## 2024-12-06 PROCEDURE — 99213 OFFICE O/P EST LOW 20 MIN: CPT | Performed by: INTERNAL MEDICINE

## 2024-12-06 RX ORDER — RANOLAZINE 1000 MG/1
1000 TABLET, EXTENDED RELEASE ORAL 2 TIMES DAILY
COMMUNITY

## 2024-12-06 NOTE — PROGRESS NOTES
[facility] Cardiology Office Follow Up Note    Vinnie Whitley III  [id number]  2024    Primary Care Provider: Marlen Richards MD    Chief Complaint: Routine follow-up    History of Present Illness:   Mr. Vinnie Whitley III is a 59 y.o. male who presents to the Cardiology Clinic for routine follow-up.  The patient has a past medical history significant for multiple sclerosis followed at [facility], hypertension, hyperlipidemia, and prior tobacco use with cessation in approximately .  His past cardiac history is significant for presentation with an NSTEMI in 10/2020.  Coronary angiography at that time revealed severe one-vessel disease involving the proximal to mid RCA.  He underwent successful PCI with placement of a 3.0 x 28 mm Xience PEPPER.  He underwent a second angiogram in 3/21 showing a widely patent RCA stent with no significant obstructive disease.  He was noted to have sluggish EAMON II flow throughout the LAD.  Recently, the patient did have occasional episodes of chest discomfort.  A pharmacologic MPS in  subsequently showed no evidence of inducible ischemia.  Today, the patient reports interval improvement in his chest discomfort.  No significant exertional anginal symptoms.  He continues to tolerate his current medications without difficulty.  No other new complaints.     Past Cardiac Testin. Last Coronary Angio:               1.  10/26/2020                          1.  Severe one-vessel coronary artery disease involving the proximal to mid RCA as culprit lesion for NSTEMI                                      -Successful PCI with placement of a 3.0 x 28 mm Xience PEPPER                          2.  No significant disease in the left coronary system                          3.  Normal LVEDP (11 mmHg)              2.  3/3/2021                          1.  Widely patent stent in the proximal to mid RCA with mild diffuse disease in the distal  posteriolateral system at which point the vessel is small in caliber.                          2.  No significant obstructive disease in the left coronary system                          3.  Sluggish, EAMON II flow throughout the LAD.                          4.  Mildly elevated LVEDP, 22 mmHg.  2. Prior Stress Testing: MPS 11/24   1.  No evidence of inducible ischemia  3. Last Echo: 10/26/2020              1.  Normal left ventricular size with mildly reduced LV systolic function, LVEF 45-50%.              2.  Mild inferior wall hypokinesis.              3.  Grade 1 diastolic dysfunction.              4.  Normal right ventricular size and systolic function.              5.  Normal left atrial index.              6.  No significant valvular abnormalities.  4. Prior Holter Monitor: None    Review of Systems:   Review of Systems   Constitutional:  Negative for activity change, appetite change, chills, diaphoresis, fatigue, fever, unexpected weight gain and unexpected weight loss.   Respiratory:  Negative for cough, chest tightness, shortness of breath and wheezing.    Cardiovascular:  Negative for chest pain, palpitations and leg swelling.   Gastrointestinal:  Negative for abdominal pain, anal bleeding, blood in stool and GERD.   Endocrine: Negative for cold intolerance and heat intolerance.   Genitourinary:  Negative for hematuria.   Neurological:  Negative for dizziness, syncope, weakness and light-headedness.   Hematological:  Does not bruise/bleed easily.   Psychiatric/Behavioral:  Negative for depressed mood and stress. The patient is not nervous/anxious.        I have reviewed and/or updated the patient's past medical, past surgical, family, social history, problem list and allergies as appropriate.     Medications:     Current Outpatient Medications:     Aspirin Low Dose 81 MG EC tablet, TAKE 1 TABLET DAILY, Disp: 90 tablet, Rfl: 0    atorvastatin (LIPITOR) 80 MG tablet, TAKE 1 TABLET EVERY NIGHT, Disp: 90  "tablet, Rfl: 3    citalopram (CeleXA) 20 MG tablet, TAKE 1 TABLET DAILY, Disp: 90 tablet, Rfl: 1    donepezil (ARICEPT) 5 MG tablet, Take 1 tablet by mouth Every Night., Disp: , Rfl:     lisinopril (PRINIVIL,ZESTRIL) 5 MG tablet, Take 1 tablet by mouth Daily., Disp: 90 tablet, Rfl: 3    metoprolol succinate XL (TOPROL-XL) 25 MG 24 hr tablet, Take 1 tablet by mouth Daily., Disp: 90 tablet, Rfl: 3    pramipexole (MIRAPEX) 1 MG tablet, Take 1 tablet by mouth 2 (Two) Times a Day., Disp: , Rfl:     ranolazine (RANEXA) 1000 MG 12 hr tablet, Take 1 tablet by mouth 2 (Two) Times a Day., Disp: , Rfl:     donepezil (ARICEPT) 5 MG tablet, Take 5 mg by mouth Every Night. Pt's neurologist would like approval from cardio before starting, Disp: , Rfl:     Physical Exam:  Vital Signs:   Vitals:    12/06/24 0824   BP: 122/82   Pulse: 96   Resp: 18   SpO2: 96%   Weight: 91.5 kg (201 lb 12.8 oz)   Height: 182.9 cm (72\")       Physical Exam  Constitutional:       General: He is not in acute distress.     Appearance: Normal appearance. He is not diaphoretic.   HENT:      Head: Normocephalic and atraumatic.   Cardiovascular:      Rate and Rhythm: Normal rate and regular rhythm.      Heart sounds: No murmur heard.  Pulmonary:      Effort: Pulmonary effort is normal. No respiratory distress.      Breath sounds: Normal breath sounds. No stridor. No wheezing, rhonchi or rales.   Abdominal:      General: Bowel sounds are normal. There is no distension.      Palpations: Abdomen is soft.      Tenderness: There is no abdominal tenderness. There is no guarding or rebound.   Musculoskeletal:         General: No swelling. Normal range of motion.      Cervical back: Neck supple. No tenderness.   Skin:     General: Skin is warm and dry.   Neurological:      General: No focal deficit present.      Mental Status: He is alert and oriented to person, place, and time.   Psychiatric:         Mood and Affect: Mood normal.         Behavior: Behavior normal. "         Results Review:   I reviewed the patient's new clinical results.        Assessment / Plan:     1.  Coronary artery disease  --Initially presented with an NSTEMI in 10/2020, found to have severe one-vessel CAD involving the proximal to mid RCA, status post PCI with PEPPER to the proximal to mid RCA  --Underwent repeat coronary angiogram in 3/21 due to recurrent episodes of chest pain, with widely patent RCA stents and no obstructive disease in the left coronary system  --Recent MPS showed no evidence of inducible ischemia  --Given unremarkable MPS and interval resolution of chest pain, no further ischemic workup needed  --Continue aspirin 81 mg daily  --Continue high intensity statin  --Follow-up in 1 year, sooner if required     2.  Ischemic cardiomyopathy  --LVEF mildly depressed at 45-50% on prior echocardiogram  --Remains well compensated and euvolemic on exam today, NYHA class I  --Continue metoprolol and lisinopril     3.  Hypertension   --BP remains adequately controlled     4. Hyperlipidemia  -- Last lipid profile in 11/21 showed , HDL 37, triglycerides 122  --Continue high intensity statin     5.  Multiple sclerosis  --Follows with  neurology    Follow Up:   Return in about 1 year (around 12/6/2025).      Thank you for allowing me to participate in the care of your patient. Please to not hesitate to contact me with additional questions or concerns.     GERMAIN Rushing MD  Interventional Cardiology   12/06/2024  08:36 EST

## 2025-02-17 ENCOUNTER — APPOINTMENT (OUTPATIENT)
Dept: GENERAL RADIOLOGY | Facility: HOSPITAL | Age: 60
End: 2025-02-17
Payer: MEDICARE

## 2025-02-17 ENCOUNTER — HOSPITAL ENCOUNTER (EMERGENCY)
Facility: HOSPITAL | Age: 60
Discharge: HOME | End: 2025-02-17
Attending: EMERGENCY MEDICINE | Admitting: EMERGENCY MEDICINE
Payer: MEDICARE

## 2025-02-17 VITALS
RESPIRATION RATE: 20 BRPM | BODY MASS INDEX: 27.09 KG/M2 | HEART RATE: 101 BPM | SYSTOLIC BLOOD PRESSURE: 129 MMHG | TEMPERATURE: 99.6 F | DIASTOLIC BLOOD PRESSURE: 67 MMHG | WEIGHT: 200 LBS | OXYGEN SATURATION: 95 % | HEIGHT: 72 IN

## 2025-02-17 DIAGNOSIS — R53.1 GENERALIZED WEAKNESS: ICD-10-CM

## 2025-02-17 DIAGNOSIS — J10.1 INFLUENZA A: Primary | ICD-10-CM

## 2025-02-17 DIAGNOSIS — R50.9 FEBRILE ILLNESS: ICD-10-CM

## 2025-02-17 LAB
ALBUMIN SERPL-MCNC: 4 G/DL (ref 3.5–5.2)
ALBUMIN/GLOB SERPL: 1.7 G/DL
ALP SERPL-CCNC: 75 U/L (ref 39–117)
ALT SERPL W P-5'-P-CCNC: 40 U/L (ref 1–41)
AMMONIA BLD-SCNC: 15 UMOL/L (ref 16–60)
ANION GAP SERPL CALCULATED.3IONS-SCNC: 13 MMOL/L (ref 5–15)
AST SERPL-CCNC: 43 U/L (ref 1–40)
B PARAPERT DNA SPEC QL NAA+PROBE: NOT DETECTED
B PERT DNA SPEC QL NAA+PROBE: NOT DETECTED
BACTERIA UR QL AUTO: ABNORMAL /HPF
BASOPHILS # BLD AUTO: 0.01 10*3/MM3 (ref 0–0.2)
BASOPHILS NFR BLD AUTO: 0.2 % (ref 0–1.5)
BILIRUB SERPL-MCNC: 0.5 MG/DL (ref 0–1.2)
BILIRUB UR QL STRIP: NEGATIVE
BUN SERPL-MCNC: 14 MG/DL (ref 6–20)
BUN/CREAT SERPL: 9.9 (ref 7–25)
C PNEUM DNA NPH QL NAA+NON-PROBE: NOT DETECTED
CALCIUM SPEC-SCNC: 8.7 MG/DL (ref 8.6–10.5)
CHLORIDE SERPL-SCNC: 102 MMOL/L (ref 98–107)
CLARITY UR: CLEAR
CO2 SERPL-SCNC: 23 MMOL/L (ref 22–29)
COLOR UR: YELLOW
CREAT SERPL-MCNC: 1.41 MG/DL (ref 0.76–1.27)
D-LACTATE SERPL-SCNC: 1.1 MMOL/L (ref 0.5–2)
DEPRECATED RDW RBC AUTO: 42.5 FL (ref 37–54)
EGFRCR SERPLBLD CKD-EPI 2021: 58.1 ML/MIN/1.73
EOSINOPHIL # BLD AUTO: 0 10*3/MM3 (ref 0–0.4)
EOSINOPHIL NFR BLD AUTO: 0 % (ref 0.3–6.2)
ERYTHROCYTE [DISTWIDTH] IN BLOOD BY AUTOMATED COUNT: 13.1 % (ref 12.3–15.4)
FLUAV H1 2009 PAND RNA NPH QL NAA+PROBE: DETECTED
FLUBV RNA ISLT QL NAA+PROBE: NOT DETECTED
GEN 5 1HR TROPONIN T REFLEX: 16 NG/L
GLOBULIN UR ELPH-MCNC: 2.3 GM/DL
GLUCOSE SERPL-MCNC: 118 MG/DL (ref 65–99)
GLUCOSE UR STRIP-MCNC: NEGATIVE MG/DL
GRAN CASTS URNS QL MICRO: ABNORMAL /LPF
HADV DNA SPEC NAA+PROBE: NOT DETECTED
HCOV 229E RNA SPEC QL NAA+PROBE: NOT DETECTED
HCOV HKU1 RNA SPEC QL NAA+PROBE: NOT DETECTED
HCOV NL63 RNA SPEC QL NAA+PROBE: NOT DETECTED
HCOV OC43 RNA SPEC QL NAA+PROBE: NOT DETECTED
HCT VFR BLD AUTO: 41.5 % (ref 37.5–51)
HGB BLD-MCNC: 13.9 G/DL (ref 13–17.7)
HGB UR QL STRIP.AUTO: NEGATIVE
HMPV RNA NPH QL NAA+NON-PROBE: NOT DETECTED
HOLD SPECIMEN: NORMAL
HPIV1 RNA ISLT QL NAA+PROBE: NOT DETECTED
HPIV2 RNA SPEC QL NAA+PROBE: NOT DETECTED
HPIV3 RNA NPH QL NAA+PROBE: NOT DETECTED
HPIV4 P GENE NPH QL NAA+PROBE: NOT DETECTED
HYALINE CASTS UR QL AUTO: ABNORMAL /LPF
IMM GRANULOCYTES # BLD AUTO: 0.01 10*3/MM3 (ref 0–0.05)
IMM GRANULOCYTES NFR BLD AUTO: 0.2 % (ref 0–0.5)
KETONES UR QL STRIP: ABNORMAL
LEUKOCYTE ESTERASE UR QL STRIP.AUTO: ABNORMAL
LYMPHOCYTES # BLD AUTO: 0.73 10*3/MM3 (ref 0.7–3.1)
LYMPHOCYTES NFR BLD AUTO: 18.2 % (ref 19.6–45.3)
M PNEUMO IGG SER IA-ACNC: NOT DETECTED
MAGNESIUM SERPL-MCNC: 1.6 MG/DL (ref 1.6–2.6)
MCH RBC QN AUTO: 29.7 PG (ref 26.6–33)
MCHC RBC AUTO-ENTMCNC: 33.5 G/DL (ref 31.5–35.7)
MCV RBC AUTO: 88.7 FL (ref 79–97)
MONOCYTES # BLD AUTO: 0.3 10*3/MM3 (ref 0.1–0.9)
MONOCYTES NFR BLD AUTO: 7.5 % (ref 5–12)
NEUTROPHILS NFR BLD AUTO: 2.96 10*3/MM3 (ref 1.7–7)
NEUTROPHILS NFR BLD AUTO: 73.9 % (ref 42.7–76)
NITRITE UR QL STRIP: NEGATIVE
NRBC BLD AUTO-RTO: 0 /100 WBC (ref 0–0.2)
PH UR STRIP.AUTO: 5.5 [PH] (ref 5–8)
PLATELET # BLD AUTO: 131 10*3/MM3 (ref 140–450)
PMV BLD AUTO: 9.1 FL (ref 6–12)
POTASSIUM SERPL-SCNC: 3.6 MMOL/L (ref 3.5–5.2)
PROCALCITONIN SERPL-MCNC: 2 NG/ML (ref 0–0.25)
PROT SERPL-MCNC: 6.3 G/DL (ref 6–8.5)
PROT UR QL STRIP: ABNORMAL
QT INTERVAL: 334 MS
QTC INTERVAL: 449 MS
RBC # BLD AUTO: 4.68 10*6/MM3 (ref 4.14–5.8)
RBC # UR STRIP: ABNORMAL /HPF
REF LAB TEST METHOD: ABNORMAL
RHINOVIRUS RNA SPEC NAA+PROBE: NOT DETECTED
RSV RNA NPH QL NAA+NON-PROBE: NOT DETECTED
SARS-COV-2 RNA NPH QL NAA+NON-PROBE: NOT DETECTED
SODIUM SERPL-SCNC: 138 MMOL/L (ref 136–145)
SP GR UR STRIP: 1.01 (ref 1–1.03)
SQUAMOUS #/AREA URNS HPF: ABNORMAL /HPF
TROPONIN T NUMERIC DELTA: -2 NG/L
TROPONIN T SERPL HS-MCNC: 18 NG/L
UROBILINOGEN UR QL STRIP: ABNORMAL
WBC # UR STRIP: ABNORMAL /HPF
WBC NRBC COR # BLD AUTO: 4.01 10*3/MM3 (ref 3.4–10.8)
WHOLE BLOOD HOLD COAG: NORMAL
WHOLE BLOOD HOLD SPECIMEN: NORMAL

## 2025-02-17 PROCEDURE — 25810000003 SODIUM CHLORIDE 0.9 % SOLUTION: Performed by: EMERGENCY MEDICINE

## 2025-02-17 PROCEDURE — 80053 COMPREHEN METABOLIC PANEL: CPT | Performed by: EMERGENCY MEDICINE

## 2025-02-17 PROCEDURE — 83735 ASSAY OF MAGNESIUM: CPT | Performed by: EMERGENCY MEDICINE

## 2025-02-17 PROCEDURE — 87040 BLOOD CULTURE FOR BACTERIA: CPT | Performed by: EMERGENCY MEDICINE

## 2025-02-17 PROCEDURE — 99284 EMERGENCY DEPT VISIT MOD MDM: CPT

## 2025-02-17 PROCEDURE — 93005 ELECTROCARDIOGRAM TRACING: CPT | Performed by: EMERGENCY MEDICINE

## 2025-02-17 PROCEDURE — 81001 URINALYSIS AUTO W/SCOPE: CPT | Performed by: EMERGENCY MEDICINE

## 2025-02-17 PROCEDURE — 83605 ASSAY OF LACTIC ACID: CPT | Performed by: EMERGENCY MEDICINE

## 2025-02-17 PROCEDURE — 85025 COMPLETE CBC W/AUTO DIFF WBC: CPT | Performed by: EMERGENCY MEDICINE

## 2025-02-17 PROCEDURE — 0202U NFCT DS 22 TRGT SARS-COV-2: CPT | Performed by: EMERGENCY MEDICINE

## 2025-02-17 PROCEDURE — 84145 PROCALCITONIN (PCT): CPT | Performed by: EMERGENCY MEDICINE

## 2025-02-17 PROCEDURE — 71045 X-RAY EXAM CHEST 1 VIEW: CPT

## 2025-02-17 PROCEDURE — 36415 COLL VENOUS BLD VENIPUNCTURE: CPT

## 2025-02-17 PROCEDURE — 82140 ASSAY OF AMMONIA: CPT | Performed by: EMERGENCY MEDICINE

## 2025-02-17 PROCEDURE — 84484 ASSAY OF TROPONIN QUANT: CPT | Performed by: EMERGENCY MEDICINE

## 2025-02-17 RX ORDER — ONDANSETRON 4 MG/1
4 TABLET, ORALLY DISINTEGRATING ORAL 4 TIMES DAILY PRN
Qty: 15 TABLET | Refills: 0 | Status: SHIPPED | OUTPATIENT
Start: 2025-02-17

## 2025-02-17 RX ORDER — SODIUM CHLORIDE 0.9 % (FLUSH) 0.9 %
10 SYRINGE (ML) INJECTION AS NEEDED
Status: DISCONTINUED | OUTPATIENT
Start: 2025-02-17 | End: 2025-02-17 | Stop reason: HOSPADM

## 2025-02-17 RX ORDER — ACETAMINOPHEN 500 MG
1000 TABLET ORAL ONCE
Status: COMPLETED | OUTPATIENT
Start: 2025-02-17 | End: 2025-02-17

## 2025-02-17 RX ORDER — BENZONATATE 100 MG/1
100 CAPSULE ORAL 3 TIMES DAILY PRN
Qty: 20 CAPSULE | Refills: 0 | Status: SHIPPED | OUTPATIENT
Start: 2025-02-17

## 2025-02-17 RX ORDER — NAPROXEN 500 MG/1
500 TABLET ORAL 2 TIMES DAILY WITH MEALS
Qty: 20 TABLET | Refills: 0 | Status: SHIPPED | OUTPATIENT
Start: 2025-02-17

## 2025-02-17 RX ADMIN — SODIUM CHLORIDE 2000 ML: 9 INJECTION, SOLUTION INTRAVENOUS at 14:23

## 2025-02-17 RX ADMIN — ACETAMINOPHEN 1000 MG: 500 TABLET ORAL at 14:21

## 2025-02-17 NOTE — ED PROVIDER NOTES
[place]    EMERGENCY DEPARTMENT ENCOUNTER      Pt Name: Vinnie Whitley III  MRN: [id number]  YOB: 1965  Date of evaluation: 2/17/2025  Provider: José Manuel Martins DO    CHIEF COMPLAINT       Chief Complaint   Patient presents with    Fever    Weakness - Generalized     HPI  Stated Reason for Visit: PT PRESENTS TO ED FROM HOME FOR FEVER, NON-PRODUCTIVE COUGH, AND GENERALIZED WEAKNESS SINCE THIS PAST SATURDAY. EMS REPORTS TYMPANIC TEMP , RECEIVED 300 ML SALINE. HX OF MI AND MS. History Obtained From: patient;EMS     HISTORY OF PRESENT ILLNESS  (Location/Symptom, Timing/Onset, Context/Setting, Quality, Duration, Modifying Factors, Severity.)   Vinnie Whitley III is a 59 y.o. male who presents to the emergency department via [facility] with a concern for fever, chills, cough and weakness which has been progressing over the last few days.  EMS noted a tympanic temperature of 105 °F.  He received normal saline and route, family notes he has had some productive cough, when EMS arrived today family noted that the patient just acting off, slightly altered.  He has had some sick contacts with family members at home with recent viral and flu type illness.  Patient has a history of coronary disease, prior stent placements, is followed with Dr. Rushing.  Patient has history of MS, kidney stones, recurrent pneumonia.  Prior pneumothorax and has undergone pleurodesis many years ago.      Nursing notes were reviewed.      PAST MEDICAL HISTORY     Past Medical History:   Diagnosis Date    Essential hypertension 11/07/2020    Infection of kidney     H/o    Kidney stones     Multiple sclerosis     Nephrolithiasis     H/o    Pneumonia     Pneumothorax     H/o         SURGICAL HISTORY       Past Surgical History:   Procedure Laterality Date    BRONCHOSCOPY N/A 5/25/2022    Procedure: BRONCHOSCOPY;  Surgeon: Julio Costa MD;  Location: [facility] ENDOSCOPY;  Service: Pulmonary;  Laterality: N/A;     CARDIAC CATHETERIZATION N/A 10/26/2020    Procedure: Coronary angiography;  Surgeon: Sukh Rushing MD;  Location:  [facility] CATH INVASIVE [facility]ATION;  Service: Cardiology;  Laterality: N/A;    CARDIAC CATHETERIZATION N/A 10/26/2020    Procedure: Optical Coherent Tomography;  Surgeon: Sukh Rushing MD;  Location:  [facility];  Service: Cardiology;  Laterality: N/A;    CARDIAC CATHETERIZATION N/A 10/26/2020    Procedure: Percutaneous Coronary Intervention;  Surgeon: Sukh Rushing MD;  Location:  [facility];  Service: Cardiology;  Laterality: N/A;    CARDIAC CATHETERIZATION N/A 10/26/2020    Procedure: Stent PEPPER coronary;  Surgeon: Sukh Rushing MD;  Location:  [facility];  Service: Cardiology;  Laterality: N/A;    CARDIAC CATHETERIZATION N/A 10/26/2020    Procedure: Left Heart Cath;  Surgeon: Sukh Rushing MD;  Location:  [facility];  Service: Cardiology;  Laterality: N/A;    CARDIAC CATHETERIZATION N/A 3/3/2021    Procedure: Coronary angiography;  Surgeon: Sukh Rushing MD;  Location:  [facility];  Service: Cardiology;  Laterality: N/A;    CARDIAC CATHETERIZATION N/A 3/3/2021    Procedure: Left Heart Cath;  Surgeon: Sukh Rushing MD;  Location: [facility] CATH INVASIVE LOCATION;  Service: Cardiology;  Laterality: N/A;    CORONARY STENT PLACEMENT      KIDNEY STONE SURGERY  2005    Lithotomy    LUNG SURGERY           CURRENT MEDICATIONS       Current Facility-Administered Medications:     sodium chloride 0.9 % flush 10 mL, 10 mL, Intravenous, PRN, José Manuel Martins, DO    Current Outpatient Medications:     Aspirin Low Dose 81 MG EC tablet, TAKE 1 TABLET DAILY, Disp: 90 tablet, Rfl: 0    atorvastatin (LIPITOR) 80 MG tablet, TAKE 1 TABLET EVERY NIGHT, Disp: 90 tablet, Rfl: 3    benzonatate (TESSALON) 100 MG capsule, Take 1 capsule by mouth 3 (Three) Times a Day As Needed for Cough., Disp: 20  capsule, Rfl: 0    citalopram (CeleXA) 20 MG tablet, TAKE 1 TABLET DAILY, Disp: 90 tablet, Rfl: 1    donepezil (ARICEPT) 5 MG tablet, Take 5 mg by mouth Every Night. Pt's neurologist would like approval from cardio before starting, Disp: , Rfl:     donepezil (ARICEPT) 5 MG tablet, Take 1 tablet by mouth Every Night., Disp: , Rfl:     lisinopril (PRINIVIL,ZESTRIL) 5 MG tablet, Take 1 tablet by mouth Daily., Disp: 90 tablet, Rfl: 3    metoprolol succinate XL (TOPROL-XL) 25 MG 24 hr tablet, Take 1 tablet by mouth Daily., Disp: 90 tablet, Rfl: 3    naproxen (Naprosyn) 500 MG tablet, Take 1 tablet by mouth 2 (Two) Times a Day With Meals., Disp: 20 tablet, Rfl: 0    ondansetron ODT (ZOFRAN-ODT) 4 MG disintegrating tablet, Take 1 tablet by mouth 4 (Four) Times a Day As Needed for Nausea or Vomiting., Disp: 15 tablet, Rfl: 0    pramipexole (MIRAPEX) 1 MG tablet, Take 1 tablet by mouth 2 (Two) Times a Day., Disp: , Rfl:     ranolazine (RANEXA) 1000 MG 12 hr tablet, Take 1 tablet by mouth 2 (Two) Times a Day., Disp: , Rfl:     ALLERGIES     Contrast dye (echo or unknown ct/mr)    FAMILY HISTORY       Family History   Problem Relation Age of Onset    Polycystic kidney disease Mother     Dementia Mother     Kidney disease Mother     No Known Problems Sister     No Known Problems Maternal Uncle     Kidney disease Maternal Grandmother     Polycystic kidney disease Maternal Grandmother     Early death Maternal Grandfather           SOCIAL HISTORY       Social History     Socioeconomic History    Marital status:    Tobacco Use    Smoking status: Former     Current packs/day: 0.00     Average packs/day: 0.5 packs/day for 20.0 years (10.0 ttl pk-yrs)     Types: Cigarettes     Start date:      Quit date: 2001     Years since quittin.1     Passive exposure: Past    Smokeless tobacco: Never   Vaping Use    Vaping status: Never Used   Substance and Sexual Activity    Alcohol use: No    Drug use: No    Sexual activity:  Yes     Partners: Female         PHYSICAL EXAM       Vitals:    02/17/25 1514 02/17/25 1515 02/17/25 1530 02/17/25 1630   BP:  135/76 129/67    BP Location:       Patient Position:       Pulse:  108 106 101   Resp:       Temp: 99.6 °F (37.6 °C)      TempSrc: Oral      SpO2:  97% 95% 95%   Weight:       Height:           Physical Exam  General : Patient is awake, alert, oriented, in no acute distress, slightly altered but smiling and answering questions appropriately.  Warm to touch  HEENT: Pupils are equally round, EOMI, conjunctivae clear  Neck: Neck is supple, full range of motion, trachea midline  Cardiac: Heart tachycardic rate with regular rhythm, no murmurs, rubs, or gallops  Lungs: Lungs are clear to auscultation, there is no wheezing, rhonchi, or rales. There is no use of accessory muscles  Abdomen: Abdomen is soft, nontender, nondistended. There are no firm or pulsatile masses, no rebound rigidity or guarding  Musculoskeletal: 5 out of 5 strength in all 4 extremities.  No focal muscle deficits are appreciated  Neuro: Patient is awake and alert, he is somewhat slow to respond but is appropriate in his responses, is following commands  Dermatology: Skin is warm and dry        DIAGNOSTIC RESULTS     EKG:  All EKGs are interpreted by the Emergency Department Physician who either signs or Co-signs this chart in the absence of a cardiologist.    ECG 12 Lead ED Triage Standing Order; Weak / Dizzy / AMS   Final Result   Test Reason : ED Triage Standing Order~   Blood Pressure :   */*   mmHG   Vent. Rate : 109 BPM     Atrial Rate : 109 BPM      P-R Int : 136 ms          QRS Dur : 100 ms       QT Int : 334 ms       P-R-T Axes :  46 -34  67 degrees     QTcB Int : 449 ms      Sinus tachycardia with occasional premature ventricular complexes   Possible Left atrial enlargement   Left axis deviation   Abnormal ECG   When compared with ECG of 22-Nov-2024 10:04, (Unconfirmed)   QRS axis shifted left   Criteria for Septal  infarct are no longer present   T wave inversion no longer evident in Inferior leads   T wave inversion no longer evident in Lateral leads   Confirmed by LOUISA REYES MD ([id number]) on 2/17/2025 2:19:31 PM      Referred By: RONIT           Confirmed By: LOUISA REYES MD          RADIOLOGY:     [x] Radiologist's Report Reviewed:  XR Chest 1 View   Final Result   Impression:   No active disease         Electronically Signed: Vinnie Thomas MD     2/17/2025 2:49 PM EST     Workstation ID: [id number]          I ordered and independently reviewed the above noted radiographic studies.      I viewed images of chest x-ray which showed no acute cardiopulmonary process per my independent interpretation.    See radiologist's dictation for official interpretation.        LABS:    I have reviewed and interpreted all of the currently available lab results from this visit (if applicable):  Results for orders placed or performed during the hospital encounter of 02/17/25   Comprehensive Metabolic Panel    Collection Time: 02/17/25  2:07 PM    Specimen: Blood   Result Value Ref Range    Glucose 118 (H) 65 - 99 mg/dL    BUN 14 6 - 20 mg/dL    Creatinine 1.41 (H) 0.76 - 1.27 mg/dL    Sodium 138 136 - 145 mmol/L    Potassium 3.6 3.5 - 5.2 mmol/L    Chloride 102 98 - 107 mmol/L    CO2 23.0 22.0 - 29.0 mmol/L    Calcium 8.7 8.6 - 10.5 mg/dL    Total Protein 6.3 6.0 - 8.5 g/dL    Albumin 4.0 3.5 - 5.2 g/dL    ALT (SGPT) 40 1 - 41 U/L    AST (SGOT) 43 (H) 1 - 40 U/L    Alkaline Phosphatase 75 39 - 117 U/L    Total Bilirubin 0.5 0.0 - 1.2 mg/dL    Globulin 2.3 gm/dL    A/G Ratio 1.7 g/dL    BUN/Creatinine Ratio 9.9 7.0 - 25.0    Anion Gap 13.0 5.0 - 15.0 mmol/L    eGFR 58.1 (L) >60.0 mL/min/1.73   High Sensitivity Troponin T    Collection Time: 02/17/25  2:07 PM    Specimen: Blood   Result Value Ref Range    HS Troponin T 18 <22 ng/L   Magnesium    Collection Time: 02/17/25  2:07 PM    Specimen: Blood   Result Value Ref Range    Magnesium 1.6 1.6  - 2.6 mg/dL   CBC Auto Differential    Collection Time: 02/17/25  2:07 PM    Specimen: Blood   Result Value Ref Range    WBC 4.01 3.40 - 10.80 10*3/mm3    RBC 4.68 4.14 - 5.80 10*6/mm3    Hemoglobin 13.9 13.0 - 17.7 g/dL    Hematocrit 41.5 37.5 - 51.0 %    MCV 88.7 79.0 - 97.0 fL    MCH 29.7 26.6 - 33.0 pg    MCHC 33.5 31.5 - 35.7 g/dL    RDW 13.1 12.3 - 15.4 %    RDW-SD 42.5 37.0 - 54.0 fl    MPV 9.1 6.0 - 12.0 fL    Platelets 131 (L) 140 - 450 10*3/mm3    Neutrophil % 73.9 42.7 - 76.0 %    Lymphocyte % 18.2 (L) 19.6 - 45.3 %    Monocyte % 7.5 5.0 - 12.0 %    Eosinophil % 0.0 (L) 0.3 - 6.2 %    Basophil % 0.2 0.0 - 1.5 %    Immature Grans % 0.2 0.0 - 0.5 %    Neutrophils, Absolute 2.96 1.70 - 7.00 10*3/mm3    Lymphocytes, Absolute 0.73 0.70 - 3.10 10*3/mm3    Monocytes, Absolute 0.30 0.10 - 0.90 10*3/mm3    Eosinophils, Absolute 0.00 0.00 - 0.40 10*3/mm3    Basophils, Absolute 0.01 0.00 - 0.20 10*3/mm3    Immature Grans, Absolute 0.01 0.00 - 0.05 10*3/mm3    nRBC 0.0 0.0 - 0.2 /100 WBC   Procalcitonin    Collection Time: 02/17/25  2:07 PM    Specimen: Blood   Result Value Ref Range    Procalcitonin 2.00 (H) 0.00 - 0.25 ng/mL   Green Top (Gel)    Collection Time: 02/17/25  2:07 PM   Result Value Ref Range    Extra Tube Hold for add-ons.    Lavender Top    Collection Time: 02/17/25  2:07 PM   Result Value Ref Range    Extra Tube hold for add-on    Gold Top - SST    Collection Time: 02/17/25  2:07 PM   Result Value Ref Range    Extra Tube Hold for add-ons.    Light Blue Top    Collection Time: 02/17/25  2:07 PM   Result Value Ref Range    Extra Tube Hold for add-ons.    Lactic Acid, Plasma    Collection Time: 02/17/25  2:08 PM    Specimen: Blood   Result Value Ref Range    Lactate 1.1 0.5 - 2.0 mmol/L   Gray Top    Collection Time: 02/17/25  2:08 PM   Result Value Ref Range    Extra Tube Hold for add-ons.    ECG 12 Lead ED Triage Standing Order; Weak / Dizzy / AMS    Collection Time: 02/17/25  2:20 PM   Result Value  Ref Range    QT Interval 334 ms    QTC Interval 449 ms   Respiratory Panel PCR w/COVID-19(SARS-CoV-2) [facility] In-House, NP Swab in UTM/VTM, 2 HR TAT - Swab, Nasopharynx    Collection Time: 02/17/25  2:31 PM    Specimen: Nasopharynx; Swab   Result Value Ref Range    ADENOVIRUS, PCR Not Detected Not Detected    Coronavirus 229E Not Detected Not Detected    Coronavirus HKU1 Not Detected Not Detected    Coronavirus NL63 Not Detected Not Detected    Coronavirus OC43 Not Detected Not Detected    COVID19 Not Detected Not Detected - Ref. Range    Human Metapneumovirus Not Detected Not Detected    Human Rhinovirus/Enterovirus Not Detected Not Detected    Influenza A H1 2009 PCR Detected (A) Not Detected    Influenza B PCR Not Detected Not Detected    Parainfluenza Virus 1 Not Detected Not Detected    Parainfluenza Virus 2 Not Detected Not Detected    Parainfluenza Virus 3 Not Detected Not Detected    Parainfluenza Virus 4 Not Detected Not Detected    RSV, PCR Not Detected Not Detected    Bordetella pertussis pcr Not Detected Not Detected    Bordetella parapertussis PCR Not Detected Not Detected    Chlamydophila pneumoniae PCR Not Detected Not Detected    Mycoplasma pneumo by PCR Not Detected Not Detected   Ammonia    Collection Time: 02/17/25  2:39 PM    Specimen: Blood   Result Value Ref Range    Ammonia 15 (L) 16 - 60 umol/L   Urinalysis With Microscopic If Indicated (No Culture) - Urine, Clean Catch    Collection Time: 02/17/25  3:39 PM    Specimen: Urine, Clean Catch   Result Value Ref Range    Color, UA Yellow Yellow, Straw    Appearance, UA Clear Clear    pH, UA 5.5 5.0 - 8.0    Specific Gravity, UA 1.014 1.001 - 1.030    Glucose, UA Negative Negative    Ketones, UA Trace (A) Negative    Bilirubin, UA Negative Negative    Blood, UA Negative Negative    Protein, UA Trace (A) Negative    Leuk Esterase, UA Moderate (2+) (A) Negative    Nitrite, UA Negative Negative    Urobilinogen, UA 0.2 E.U./dL 0.2 - 1.0  E.U./dL   Urinalysis, Microscopic Only - Urine, Clean Catch    Collection Time: 02/17/25  3:39 PM    Specimen: Urine, Clean Catch   Result Value Ref Range    RBC, UA 0-2 None Seen, 0-2 /HPF    WBC, UA 6-10 (A) None Seen, 0-2 /HPF    Bacteria, UA Trace None Seen, Trace /HPF    Squamous Epithelial Cells, UA 13-20 (A) None Seen, 0-2 /HPF    Hyaline Casts, UA 0-6 0 - 6 /LPF    Granular Casts, UA 0-2 None Seen /LPF    Methodology Manual Light Microscopy    High Sensitivity Troponin T 1Hr    Collection Time: 02/17/25  3:40 PM    Specimen: Blood   Result Value Ref Range    HS Troponin T 16 <22 ng/L    Troponin T Numeric Delta -2 Abnormal if >/=3 ng/L        If labs were ordered, I independently reviewed the results and considered them in treating the patient.      EMERGENCY DEPARTMENT COURSE and DIFFERENTIAL DIAGNOSIS/MDM:   Vitals:  AS OF 16:59 EST    BP - 129/67  HR - 101  TEMP - 99.6 °F (37.6 °C) (Oral)  O2 SATS - 95%      Orders placed during this visit:  Orders Placed This Encounter   Procedures    Blood Culture - Blood,    Blood Culture - Blood,    COVID PRE-OP / PRE-PROCEDURE SCREENING ORDER (NO ISOLATION) - Swab, Nasopharynx    Respiratory Panel PCR w/COVID-19(SARS-CoV-2) [facility], NP Swab in UTM/VTM, 2 HR TAT - Swab, Nasopharynx    XR Chest 1 View    [place] Draw    Comprehensive Metabolic Panel    High Sensitivity Troponin T    Magnesium    Urinalysis With Microscopic If Indicated (No Culture) - Urine, Clean Catch    CBC Auto Differential    Lactic Acid, Plasma    Procalcitonin    Ammonia    High Sensitivity Troponin T 1Hr    Urinalysis, Microscopic Only - Urine, Clean Catch    NPO Diet NPO Type: Strict NPO    Undress & Gown    Continuous Pulse Oximetry    Vital Signs    Oxygen Therapy- Nasal Cannula; Titrate 1-6 LPM Per SpO2; 90 - 95%    ECG 12 Lead ED Triage Standing Order; Weak / Dizzy / AMS    Insert Peripheral IV    Fall Precautions    CBC & Differential    Green Top (Gel)    Lavender  Top    Gold Top - SST    Gray Top    Light Blue Top       All labs have been independently reviewed by me.  All radiology studies have been reviewed by me and the radiologist dictating the report.  All EKG's have been independently viewed and interpreted by me.      Discussion below represents my analysis of pertinent findings related to patient's condition, differential diagnosis, treatment plan and final disposition.    Differential diagnosis:  The differential diagnosis associated with the patient's presentation includes: Altered mental status, encephalopathy, viral illness, recurrent pneumonia    Additional sources  Discussed/ obtained information from independent historians:   [] Spouse  [] Parent  [x] Family member  [] Friend  [x] EMS   [] Other:    External (non-ED) record review:   [] Inpatient record:   [x] Office record: Note from December 2024 with patient's cardiologist who is here for routine follow-up, noted for his MS he follows with neurologist to [facility]   [] Outpatient record:   [] Prior Outpatient labs:   [] Prior Outpatient radiology:   [] Primary Care record:   [] Outside ED record:   [] Other:     Patient's care impacted by:   [] Diabetes  [x] Hypertension  [] CHF  [] Hyperlipidemia  [x] Coronary Artery Disease   [] COPD   [] Cancer   [] Tobacco Abuse   [] Substance Abuse    [x] Other: MS    Care significantly affected by Social Determinants of Health (housing and economic circumstances, unemployment)    [] Yes     [x] No   If yes, Patient's care significantly limited by Social Determinants of Health including:   [] Inadequate housing   [] Low income   [] Alcoholism and drug addiction in family   [] Problems related to primary support group   [] Unemployment   [] Problems related to employment   [] Other Social Determinants of Health:       MEDICATIONS ADMINISTERED IN ED:  Medications   sodium chloride 0.9 % flush 10 mL (has no administration in time range)   sodium chloride 0.9 %  bolus 2,721 mL (2,000 mL Intravenous New Bag 2/17/25 1423)   acetaminophen (TYLENOL) tablet 1,000 mg (1,000 mg Oral Given 2/17/25 1421)              This a pleasant 59-year-old male who was had cough congestion fever over the last 2 days today was noted to be very weak, altered was encephalopathic.  He does have a history of MS and follows with [facility] specialist.  He was found to have a temperature of 105 and route with EMS, on arrival he is tachycardic, temperature 99.2 °F, stable blood pressure, ox saturations 96%.  There is a concern about possible sick contacts with a viral type illness or influenza type illness.  We did perform blood work labs imaging, septic workup for further assistance.  Patient positive for influenza A, white count of 4.0 with a stable H&H, lactic acid is 1.1, ammonia levels are normal, urinalysis without acute infectious etiology.  Chest x-ray is also stable.  On reassessment for IV fluids the patient is resting comfortably, states he is feeling better with reduction in his fever, heart rate has improved.  We discussed likely underlying viral type process which would benefit from good fluid hydration, symptomatic treatments, anti-inflammatories, nausea medication, close follow-up with his PCP in a couple days for reevaluation.  Return to the ED in the meantime with any worsening symptoms or any further concerns.    PROCEDURES:  Procedures    CRITICAL CARE TIME    Total Critical Care time was 0 minutes, excluding separately reportable procedures.   There was a high probability of clinically significant/life threatening deterioration in the patient's condition which required my urgent intervention.      FINAL IMPRESSION      1. Influenza A    2. Generalized weakness    3. Febrile illness          DISPOSITION/PLAN     ED Disposition       ED Disposition   Discharge    Condition   Stable    Comment   --               Comment: Please note this report has been produced using speech recognition  software.      José Manuel Martins DO  Attending Emergency Physician         José Manuel Martins DO  02/17/25 2059

## 2025-02-22 LAB
BACTERIA SPEC AEROBE CULT: NORMAL
BACTERIA SPEC AEROBE CULT: NORMAL

## 2025-02-24 RX ORDER — RANOLAZINE 500 MG/1
500 TABLET, EXTENDED RELEASE ORAL 2 TIMES DAILY
Qty: 180 TABLET | Refills: 3 | OUTPATIENT
Start: 2025-02-24

## 2025-02-24 RX ORDER — CLOPIDOGREL BISULFATE 75 MG/1
75 TABLET ORAL DAILY
Qty: 90 TABLET | Refills: 3 | OUTPATIENT
Start: 2025-02-24

## 2025-02-24 RX ORDER — ASPIRIN 81 MG/1
TABLET, COATED ORAL
Qty: 90 TABLET | Refills: 3 | Status: SHIPPED | OUTPATIENT
Start: 2025-02-24

## 2025-03-12 ENCOUNTER — HOSPITAL ENCOUNTER (EMERGENCY)
Facility: HOSPITAL | Age: 60
Discharge: HOME | End: 2025-03-13
Attending: STUDENT IN AN ORGANIZED HEALTH CARE EDUCATION/TRAINING PROGRAM
Payer: MEDICARE

## 2025-03-12 VITALS
RESPIRATION RATE: 20 BRPM | TEMPERATURE: 97.3 F | OXYGEN SATURATION: 99 % | HEIGHT: 72 IN | HEART RATE: 91 BPM | SYSTOLIC BLOOD PRESSURE: 120 MMHG | BODY MASS INDEX: 27.09 KG/M2 | WEIGHT: 200 LBS | DIASTOLIC BLOOD PRESSURE: 88 MMHG

## 2025-03-12 DIAGNOSIS — M62.838 MUSCLE SPASM: Primary | ICD-10-CM

## 2025-03-12 PROCEDURE — 99284 EMERGENCY DEPT VISIT MOD MDM: CPT | Performed by: STUDENT IN AN ORGANIZED HEALTH CARE EDUCATION/TRAINING PROGRAM

## 2025-03-13 ENCOUNTER — APPOINTMENT (OUTPATIENT)
Dept: CT IMAGING | Facility: HOSPITAL | Age: 60
End: 2025-03-13
Payer: MEDICARE

## 2025-03-13 LAB
ANION GAP SERPL CALCULATED.3IONS-SCNC: 10.9 MMOL/L (ref 5–15)
BASOPHILS # BLD AUTO: 0.01 10*3/MM3 (ref 0–0.2)
BASOPHILS NFR BLD AUTO: 0.3 % (ref 0–1.5)
BUN SERPL-MCNC: 17 MG/DL (ref 6–20)
BUN/CREAT SERPL: 13.6 (ref 7–25)
CALCIUM SPEC-SCNC: 9.3 MG/DL (ref 8.6–10.5)
CHLORIDE SERPL-SCNC: 106 MMOL/L (ref 98–107)
CO2 SERPL-SCNC: 24.1 MMOL/L (ref 22–29)
CREAT SERPL-MCNC: 1.25 MG/DL (ref 0.76–1.27)
D DIMER PPP FEU-MCNC: <0.27 MCGFEU/ML (ref 0–0.57)
DEPRECATED RDW RBC AUTO: 44 FL (ref 37–54)
EGFRCR SERPLBLD CKD-EPI 2021: 67.2 ML/MIN/1.73
EOSINOPHIL # BLD AUTO: 0.05 10*3/MM3 (ref 0–0.4)
EOSINOPHIL NFR BLD AUTO: 1.3 % (ref 0.3–6.2)
ERYTHROCYTE [DISTWIDTH] IN BLOOD BY AUTOMATED COUNT: 13.3 % (ref 12.3–15.4)
GLUCOSE SERPL-MCNC: 115 MG/DL (ref 65–99)
HCT VFR BLD AUTO: 36.8 % (ref 37.5–51)
HGB BLD-MCNC: 12.1 G/DL (ref 13–17.7)
IMM GRANULOCYTES # BLD AUTO: 0.02 10*3/MM3 (ref 0–0.05)
IMM GRANULOCYTES NFR BLD AUTO: 0.5 % (ref 0–0.5)
LYMPHOCYTES # BLD AUTO: 1.23 10*3/MM3 (ref 0.7–3.1)
LYMPHOCYTES NFR BLD AUTO: 32.9 % (ref 19.6–45.3)
MCH RBC QN AUTO: 29.7 PG (ref 26.6–33)
MCHC RBC AUTO-ENTMCNC: 32.9 G/DL (ref 31.5–35.7)
MCV RBC AUTO: 90.4 FL (ref 79–97)
MONOCYTES # BLD AUTO: 0.64 10*3/MM3 (ref 0.1–0.9)
MONOCYTES NFR BLD AUTO: 17.1 % (ref 5–12)
NEUTROPHILS NFR BLD AUTO: 1.79 10*3/MM3 (ref 1.7–7)
NEUTROPHILS NFR BLD AUTO: 47.9 % (ref 42.7–76)
NRBC BLD AUTO-RTO: 0 /100 WBC (ref 0–0.2)
PLATELET # BLD AUTO: 139 10*3/MM3 (ref 140–450)
PMV BLD AUTO: 8.6 FL (ref 6–12)
POTASSIUM SERPL-SCNC: 3.9 MMOL/L (ref 3.5–5.2)
RBC # BLD AUTO: 4.07 10*6/MM3 (ref 4.14–5.8)
SODIUM SERPL-SCNC: 141 MMOL/L (ref 136–145)
WBC NRBC COR # BLD AUTO: 3.74 10*3/MM3 (ref 3.4–10.8)

## 2025-03-13 PROCEDURE — 96375 TX/PRO/DX INJ NEW DRUG ADDON: CPT

## 2025-03-13 PROCEDURE — 85025 COMPLETE CBC W/AUTO DIFF WBC: CPT | Performed by: STUDENT IN AN ORGANIZED HEALTH CARE EDUCATION/TRAINING PROGRAM

## 2025-03-13 PROCEDURE — 25010000002 ORPHENADRINE CITRATE PER 60 MG: Performed by: STUDENT IN AN ORGANIZED HEALTH CARE EDUCATION/TRAINING PROGRAM

## 2025-03-13 PROCEDURE — 96374 THER/PROPH/DIAG INJ IV PUSH: CPT

## 2025-03-13 PROCEDURE — 93005 ELECTROCARDIOGRAM TRACING: CPT | Performed by: STUDENT IN AN ORGANIZED HEALTH CARE EDUCATION/TRAINING PROGRAM

## 2025-03-13 PROCEDURE — 80048 BASIC METABOLIC PNL TOTAL CA: CPT | Performed by: STUDENT IN AN ORGANIZED HEALTH CARE EDUCATION/TRAINING PROGRAM

## 2025-03-13 PROCEDURE — 85379 FIBRIN DEGRADATION QUANT: CPT | Performed by: STUDENT IN AN ORGANIZED HEALTH CARE EDUCATION/TRAINING PROGRAM

## 2025-03-13 PROCEDURE — 71250 CT THORAX DX C-: CPT

## 2025-03-13 PROCEDURE — 25010000002 KETOROLAC TROMETHAMINE PER 15 MG: Performed by: STUDENT IN AN ORGANIZED HEALTH CARE EDUCATION/TRAINING PROGRAM

## 2025-03-13 PROCEDURE — 25010000002 MORPHINE PER 10 MG: Performed by: STUDENT IN AN ORGANIZED HEALTH CARE EDUCATION/TRAINING PROGRAM

## 2025-03-13 RX ORDER — DIAZEPAM 5 MG/1
5 TABLET ORAL ONCE
Status: COMPLETED | OUTPATIENT
Start: 2025-03-13 | End: 2025-03-13

## 2025-03-13 RX ORDER — MORPHINE SULFATE 2 MG/ML
2 INJECTION, SOLUTION INTRAMUSCULAR; INTRAVENOUS ONCE
Status: COMPLETED | OUTPATIENT
Start: 2025-03-13 | End: 2025-03-13

## 2025-03-13 RX ORDER — ORPHENADRINE CITRATE 30 MG/ML
60 INJECTION INTRAMUSCULAR; INTRAVENOUS ONCE
Status: COMPLETED | OUTPATIENT
Start: 2025-03-13 | End: 2025-03-13

## 2025-03-13 RX ORDER — KETOROLAC TROMETHAMINE 30 MG/ML
15 INJECTION, SOLUTION INTRAMUSCULAR; INTRAVENOUS ONCE
Status: COMPLETED | OUTPATIENT
Start: 2025-03-13 | End: 2025-03-13

## 2025-03-13 RX ORDER — DIAZEPAM 2 MG/1
2 TABLET ORAL EVERY 6 HOURS PRN
Qty: 8 TABLET | Refills: 0 | Status: SHIPPED | OUTPATIENT
Start: 2025-03-13

## 2025-03-13 RX ADMIN — MORPHINE SULFATE 2 MG: 2 INJECTION, SOLUTION INTRAMUSCULAR; INTRAVENOUS at 02:28

## 2025-03-13 RX ADMIN — KETOROLAC TROMETHAMINE 15 MG: 30 INJECTION, SOLUTION INTRAMUSCULAR; INTRAVENOUS at 00:45

## 2025-03-13 RX ADMIN — DIAZEPAM 5 MG: 5 TABLET ORAL at 03:24

## 2025-03-13 RX ADMIN — ORPHENADRINE CITRATE 60 MG: 60 INJECTION INTRAMUSCULAR; INTRAVENOUS at 00:45

## 2025-03-13 NOTE — DISCHARGE INSTRUCTIONS
Take Valium as needed, as prescribed  Please follow-up with your primary care provider if symptoms persist otherwise do not hesitate to return if symptoms worsen in any way or if you develop other concerning signs or symptoms including chest pain, shortness of breath, numbness or weakness, etc.

## 2025-03-13 NOTE — ED PROVIDER NOTES
[facility] EMERGENCY DEPARTMENT  Emergency Department Encounter  Emergency Medicine Physician Note       Pt Name: Vinnie Whitley III  MRN: [id number]  Pt :   1965  Room Number:  01SF01  Date of encounter:  3/12/2025  PCP: Marlen Richards MD  ED Provider: Saravanan Rosenberg MD    Historian: Patient      HPI:  Chief Complaint: Right shoulder and scapular pain        Context: Vinnie Whitley III is a 59 y.o. male who presents to the ED for right shoulder/neck pain.  Patient states symptoms awoke him from sleep.  States it is sharp stabbing mostly right upper thoracic back and shoulder.  It radiates to the shoulder itself.  No numbness or weakness.  No chest pain.  He states he was throwing a softball earlier in the day which may have triggered his symptoms tonight. He denies chest pain. No fevers.      PAST MEDICAL HISTORY  Past Medical History:   Diagnosis Date    Essential hypertension 2020    Infection of kidney     H/o    Kidney stones     Multiple sclerosis     Nephrolithiasis     H/o    Pneumonia     Pneumothorax     H/o         PAST SURGICAL HISTORY  Past Surgical History:   Procedure Laterality Date    BRONCHOSCOPY N/A 2022    Procedure: BRONCHOSCOPY;  Surgeon: Julio Costa MD;  Location:  [facility];  Service: Pulmonary;  Laterality: N/A;    CARDIAC CATHETERIZATION N/A 10/26/2020    Procedure: Coronary angiography;  Surgeon: Sukh Rushing MD;  Location: [facility] CATH INVASIVE LOCATION;  Service: Cardiology;  Laterality: N/A;    CARDIAC CATHETERIZATION N/A 10/26/2020    Procedure: Optical Coherent Tomography;  Surgeon: Sukh Rushing MD;  Location: [facility] CATH INVASIVE LOCATION;  Service: Cardiology;  Laterality: N/A;    CARDIAC CATHETERIZATION N/A 10/26/2020    Procedure: Percutaneous Coronary Intervention;  Surgeon: Sukh Rushing MD;  Location: [facility] CATH INVASIVE LOCATION;  Service: Cardiology;  Laterality: N/A;    CARDIAC  CATHETERIZATION N/A 10/26/2020    Procedure: Stent PEPPER coronary;  Surgeon: Sukh Rushing MD;  Location: [facility] CATH INVASIVE LOCATION;  Service: Cardiology;  Laterality: N/A;    CARDIAC CATHETERIZATION N/A 10/26/2020    Procedure: Left Heart Cath;  Surgeon: Sukh Rushing MD;  Location: [facility] CATH INVASIVE LOCATION;  Service: Cardiology;  Laterality: N/A;    CARDIAC CATHETERIZATION N/A 3/3/2021    Procedure: Coronary angiography;  Surgeon: Sukh Rushing MD;  Location:  [facility] CATH INVASIVE LOCATION;  Service: Cardiology;  Laterality: N/A;    CARDIAC CATHETERIZATION N/A 3/3/2021    Procedure: Left Heart Cath;  Surgeon: Sukh Rushing MD;  Location: [facility] CATH INVASIVE LOCATION;  Service: Cardiology;  Laterality: N/A;    CORONARY STENT PLACEMENT      KIDNEY STONE SURGERY      Lithotomy    LUNG SURGERY           FAMILY HISTORY  Family History   Problem Relation Age of Onset    Polycystic kidney disease Mother     Dementia Mother     Kidney disease Mother     No Known Problems Sister     No Known Problems Maternal Uncle     Kidney disease Maternal Grandmother     Polycystic kidney disease Maternal Grandmother     Early death Maternal Grandfather          SOCIAL HISTORY  Social History     Socioeconomic History    Marital status:    Tobacco Use    Smoking status: Former     Current packs/day: 0.00     Average packs/day: 0.5 packs/day for 20.0 years (10.0 ttl pk-yrs)     Types: Cigarettes     Start date:      Quit date: 2001     Years since quittin.2     Passive exposure: Past    Smokeless tobacco: Never   Vaping Use    Vaping status: Never Used   Substance and Sexual Activity    Alcohol use: No    Drug use: No    Sexual activity: Yes     Partners: Female         ALLERGIES  Contrast dye (echo or unknown ct/mr)        REVIEW OF SYSTEMS  As noted in HPI      PHYSICAL EXAM    I have reviewed the triage vital signs and nursing notes.    ED Triage Vitals [[id number]]   Temp Heart  Rate Resp BP SpO2   97.3 °F (36.3 °C) 91 20 120/88 99 %      Temp src Heart Rate Source Patient Position BP Location FiO2 (%)   Oral Monitor Sitting Left arm --       Physical Exam  Constitutional:       General: He is not in acute distress.  Cardiovascular:      Rate and Rhythm: Normal rate and regular rhythm.      Pulses: Normal pulses.   Pulmonary:      Effort: Pulmonary effort is normal. No respiratory distress.   Abdominal:      General: There is no distension.      Palpations: Abdomen is soft.   Musculoskeletal:      Cervical back: Neck supple.      Comments: Tenderness to palpation mostly in the mid thoracic right paraspinals and rhomboid musculature no overlying rash or lesions tolerates full range of motion of the right upper extremity neurovascularly intact in all extremities.   Skin:     General: Skin is warm.   Neurological:      General: No focal deficit present.      Mental Status: He is alert.       LAB RESULTS  No results found for this or any previous visit (from the past 24 hours).    If labs were ordered, I independently reviewed the results and considered them in treating the patient.        RADIOLOGY  No Radiology Exams Resulted Within Past 24 Hours    PROCEDURES    Procedures    ECG 12 Lead Chest Pain   Final Result          MEDICATIONS GIVEN IN ER    Medications   ketorolac (TORADOL) injection 15 mg (15 mg Intravenous Given 3/13/25 0045)   orphenadrine (NORFLEX) injection 60 mg (60 mg Intravenous Given 3/13/25 0045)   Morphine sulfate (PF) injection 2 mg (2 mg Intravenous Given 3/13/25 0228)   diazePAM (VALIUM) tablet 5 mg (5 mg Oral Given 3/13/25 0324)         MEDICAL DECISION MAKING, PROGRESS, and CONSULTS    All labs, if obtained, have been independently reviewed by me.  All radiology studies, if obtained, have been reviewed by me and the radiologist dictating the report.  All EKG's, if obtained, have been independently viewed and interpreted by me.      Discussion below represents my  analysis of pertinent findings related to patient's condition, differential diagnosis, treatment plan and final disposition.                         Differential diagnosis:    Rib fracture, PE, musculoskeletal strain, pneumothorax, others.      Additional sources:    - Discussed/ obtained information from independent historians:      - External (non-ED) record review:  Cardiology progress note 12/06/2024    - Chronic or social conditions impacting care:      - Shared decision making:        Orders placed during this visit:  Orders Placed This Encounter   Procedures    CT Chest Without Contrast Diagnostic    D-dimer, Quantitative    Basic Metabolic Panel    CBC Auto Differential    ECG 12 Lead Chest Pain    CBC & Differential         Additional orders considered but not ordered:      ED Course/MDM Discussion:    Patient is a 59 year old male who presented with right upper neck/back and scapular pain. This was acute onset waking him from sleep. He does mention throwing softball earlier in the day. No anterior chest pain.    Patient is afebrile with reassuring vital signs. He is neurovascularly intact. He has reproducible muscle tenderness and spasm over the right upper back and neck around the trapezius, paraspinals, and rhomboid musculature. Right shoulder is intact without dislocation or pain.     Labs were reassuring. EKG demonstrated sinus rhythm with borderline IVCD, PRWP no acute ST elevation and morphology similar to EKG 02/17/25 on my interpretation. He is not having anginal symptoms such as chest pain, diaphoresis, or radiation to jaw and ACS is not suspected. VTE was considered however D dimer negative. Dissection felt unlikely given reassuring exam and not radiating. Additionally his BP is reassuring. CT chest obtained to evaluate for occult fracture or injury showed no obvious pneumothorax on my interpretation. No acute findings on radiology interpretation. He has reproducible tenderness on exam and is  most consistent with muscular strain and spasm. He was given multimodal analgesia to good effect. After Valium he reported significant improvement in symptoms on reassessment. Given this along with benign work up feel patient is appropriate for continued outpatient management with strict return precautions and he is agreeable to plan.                    Consultants:      Shared Decision Making:  After my consideration of clinical presentation and any laboratory/radiology studies obtained, I discussed the findings with the patient/patient representative who is in agreement with the treatment plan and the final disposition.   Risks and benefits of discharge and/or observation/admission were discussed.       AS OF 17:52 EDT VITALS:    BP - 120/88  HR - 91  TEMP - 97.3 °F (36.3 °C) (Oral)  O2 SATS - 99%                  DIAGNOSIS  Final diagnoses:   Muscle spasm         DISPOSITION  ED Disposition       ED Disposition   Discharge    Condition   Stable    Comment   --                   Please note that portions of this document were completed with voice recognition software.        Saravanan Rosenberg MD  03/16/25 9250

## 2025-04-28 RX ORDER — ATORVASTATIN CALCIUM 80 MG/1
80 TABLET, FILM COATED ORAL NIGHTLY
Qty: 90 TABLET | Refills: 3 | Status: SHIPPED | OUTPATIENT
Start: 2025-04-28

## 2025-05-02 DIAGNOSIS — I10 ESSENTIAL HYPERTENSION: Primary | ICD-10-CM

## 2025-05-05 RX ORDER — LISINOPRIL 5 MG/1
5 TABLET ORAL DAILY
Qty: 90 TABLET | Refills: 3 | Status: SHIPPED | OUTPATIENT
Start: 2025-05-05 | End: 2025-05-05

## 2025-05-05 RX ORDER — LISINOPRIL 5 MG/1
5 TABLET ORAL DAILY
Qty: 90 TABLET | Refills: 3 | Status: SHIPPED | OUTPATIENT
Start: 2025-05-05

## 2025-05-05 NOTE — TELEPHONE ENCOUNTER
Yes.  He should continue lisinopril for decreased heart function, but discontinue naproxen as this could decrease kidney function, elevate blood pressure, and increase risk for bleeding.      Follow-up with Dr. Rushing in December as scheduled unless he has other cardiac concerns and needs to be seen before then.

## 2025-07-28 ENCOUNTER — LAB (OUTPATIENT)
Dept: LAB | Facility: HOSPITAL | Age: 60
End: 2025-07-28
Payer: MEDICARE

## 2025-07-28 ENCOUNTER — TRANSCRIBE ORDERS (OUTPATIENT)
Dept: LAB | Facility: HOSPITAL | Age: 60
End: 2025-07-28
Payer: MEDICARE

## 2025-07-28 DIAGNOSIS — E55.9 AVITAMINOSIS D: ICD-10-CM

## 2025-07-28 DIAGNOSIS — D50.0 IRON DEFICIENCY ANEMIA SECONDARY TO BLOOD LOSS (CHRONIC): ICD-10-CM

## 2025-07-28 DIAGNOSIS — R73.9 BLOOD GLUCOSE ELEVATED: ICD-10-CM

## 2025-07-28 DIAGNOSIS — I10 ESSENTIAL HYPERTENSION, BENIGN: ICD-10-CM

## 2025-07-28 DIAGNOSIS — E29.1 3-OXO-5 ALPHA-STEROID DELTA 4-DEHYDROGENASE DEFICIENCY: ICD-10-CM

## 2025-07-28 DIAGNOSIS — E78.2 MULTIPLE-TYPE HYPERLIPIDEMIA: ICD-10-CM

## 2025-07-28 DIAGNOSIS — I10 ESSENTIAL HYPERTENSION, BENIGN: Primary | ICD-10-CM

## 2025-07-28 DIAGNOSIS — Z12.5 SCREENING FOR PROSTATE CANCER: ICD-10-CM

## 2025-07-28 DIAGNOSIS — E53.8 BIOTIN-(PROPIONYL-COA-CARBOXYLASE) LIGASE DEFICIENCY: ICD-10-CM

## 2025-07-28 LAB
25(OH)D3 SERPL-MCNC: 44.6 NG/ML (ref 30–100)
ALBUMIN SERPL-MCNC: 4.2 G/DL (ref 3.5–5.2)
ALBUMIN/GLOB SERPL: 2.1 G/DL
ALP SERPL-CCNC: 59 U/L (ref 39–117)
ALT SERPL W P-5'-P-CCNC: 17 U/L (ref 1–41)
ANION GAP SERPL CALCULATED.3IONS-SCNC: 9.9 MMOL/L (ref 5–15)
AST SERPL-CCNC: 16 U/L (ref 1–40)
BILIRUB SERPL-MCNC: 0.5 MG/DL (ref 0–1.2)
BUN SERPL-MCNC: 17 MG/DL (ref 6–20)
BUN/CREAT SERPL: 13.6 (ref 7–25)
CALCIUM SPEC-SCNC: 9.3 MG/DL (ref 8.6–10.5)
CHLORIDE SERPL-SCNC: 107 MMOL/L (ref 98–107)
CHOLEST SERPL-MCNC: 200 MG/DL (ref 0–200)
CO2 SERPL-SCNC: 22.1 MMOL/L (ref 22–29)
CREAT SERPL-MCNC: 1.25 MG/DL (ref 0.76–1.27)
DEPRECATED RDW RBC AUTO: 40 FL (ref 37–54)
EGFRCR SERPLBLD CKD-EPI 2021: 67.2 ML/MIN/1.73
ERYTHROCYTE [DISTWIDTH] IN BLOOD BY AUTOMATED COUNT: 12.2 % (ref 12.3–15.4)
ESTRADIOL SERPL HS-MCNC: 22.7 PG/ML
GLOBULIN UR ELPH-MCNC: 2 GM/DL
GLUCOSE SERPL-MCNC: 104 MG/DL (ref 65–99)
HBA1C MFR BLD: 5.2 % (ref 4.8–5.6)
HCT VFR BLD AUTO: 40.2 % (ref 37.5–51)
HDLC SERPL-MCNC: 39 MG/DL (ref 40–60)
HGB BLD-MCNC: 13.6 G/DL (ref 13–17.7)
IRON 24H UR-MRATE: 121 MCG/DL (ref 59–158)
IRON SATN MFR SERPL: 34 % (ref 20–50)
LDLC SERPL CALC-MCNC: 134 MG/DL (ref 0–100)
LDLC/HDLC SERPL: 3.36 {RATIO}
MCH RBC QN AUTO: 30.3 PG (ref 26.6–33)
MCHC RBC AUTO-ENTMCNC: 33.8 G/DL (ref 31.5–35.7)
MCV RBC AUTO: 89.5 FL (ref 79–97)
PLATELET # BLD AUTO: 159 10*3/MM3 (ref 140–450)
PMV BLD AUTO: 8.6 FL (ref 6–12)
POTASSIUM SERPL-SCNC: 4.5 MMOL/L (ref 3.5–5.2)
PROT SERPL-MCNC: 6.2 G/DL (ref 6–8.5)
PSA SERPL-MCNC: 1.23 NG/ML (ref 0–4)
RBC # BLD AUTO: 4.49 10*6/MM3 (ref 4.14–5.8)
SODIUM SERPL-SCNC: 139 MMOL/L (ref 136–145)
TIBC SERPL-MCNC: 355 MCG/DL (ref 298–536)
TRANSFERRIN SERPL-MCNC: 238 MG/DL (ref 200–360)
TRIGL SERPL-MCNC: 149 MG/DL (ref 0–150)
VIT B12 BLD-MCNC: 372 PG/ML (ref 211–946)
VLDLC SERPL-MCNC: 27 MG/DL (ref 5–40)
WBC NRBC COR # BLD AUTO: 3.92 10*3/MM3 (ref 3.4–10.8)

## 2025-07-28 PROCEDURE — 80053 COMPREHEN METABOLIC PANEL: CPT

## 2025-07-28 PROCEDURE — 82306 VITAMIN D 25 HYDROXY: CPT

## 2025-07-28 PROCEDURE — 83036 HEMOGLOBIN GLYCOSYLATED A1C: CPT

## 2025-07-28 PROCEDURE — 36415 COLL VENOUS BLD VENIPUNCTURE: CPT

## 2025-07-28 PROCEDURE — 84402 ASSAY OF FREE TESTOSTERONE: CPT

## 2025-07-28 PROCEDURE — G0103 PSA SCREENING: HCPCS

## 2025-07-28 PROCEDURE — 80061 LIPID PANEL: CPT

## 2025-07-28 PROCEDURE — 84466 ASSAY OF TRANSFERRIN: CPT

## 2025-07-28 PROCEDURE — 82670 ASSAY OF TOTAL ESTRADIOL: CPT

## 2025-07-28 PROCEDURE — 84403 ASSAY OF TOTAL TESTOSTERONE: CPT

## 2025-07-28 PROCEDURE — 82607 VITAMIN B-12: CPT

## 2025-07-28 PROCEDURE — 83540 ASSAY OF IRON: CPT

## 2025-07-28 PROCEDURE — 85027 COMPLETE CBC AUTOMATED: CPT

## 2025-08-01 LAB
TESTOST FREE SERPL-MCNC: 9.6 PG/ML (ref 7.2–24)
TESTOST SERPL-MCNC: 585.1 NG/DL (ref 264–916)

## (undated) DEVICE — APPL COTN TP PLSTC 6IN STRL LF PK/2

## (undated) DEVICE — NC TREK CORONARY DILATATION CATHETER 3.0 MM X 15 MM / RAPID-EXCHANGE: Brand: NC TREK

## (undated) DEVICE — SYR SLP TP 10ML DISP

## (undated) DEVICE — CATH F6 ST JL 3.5 100CM: Brand: SUPERTORQUE

## (undated) DEVICE — HI-TORQUE BALANCE MIDDLEWEIGHT UNIVERSAL II GUIDE WIRE J TIP PAK 190 CM: Brand: HI-TORQUE BALANCE MIDDLEWEIGHT UNIVERSAL II

## (undated) DEVICE — BOWL UTIL STRL 32OZ

## (undated) DEVICE — SINGLE USE BIOPSY VALVE MAJ-210: Brand: SINGLE USE BIOPSY VALVE (STERILE)

## (undated) DEVICE — TRAP,MUCUS SPECIMEN, 80CC: Brand: MEDLINE

## (undated) DEVICE — CATH F6 ST JR 4 100CM: Brand: SUPERTORQUE

## (undated) DEVICE — GLIDESHEATH SLENDER STAINLESS STEEL KIT: Brand: GLIDESHEATH SLENDER

## (undated) DEVICE — SKIN PREP TRAY W/CHG: Brand: MEDLINE INDUSTRIES, INC.

## (undated) DEVICE — SIDESTREAM™ HIGH-EFFICIENCY NEBULIZER: Brand: AIRLIFE™

## (undated) DEVICE — CATH IMG DRAGONFLY OPTIS 2.7F 135CM

## (undated) DEVICE — SUTURE VICRYL SZ 0 L36IN ABSRB UD L36MM CT-1 1/2 CIR J946H

## (undated) DEVICE — DRAIN SURG SGL COLL PT TB FOR ATS BG OASIS

## (undated) DEVICE — SUTURE MONOCRYL SZ 3-0 L27IN ABSRB UD L24MM PS-1 3/8 CIR PRIM Y936H

## (undated) DEVICE — DEVICE ES L3M EDGE COAT HEX LOK BLDE ELECTRD HOLSTER FORC

## (undated) DEVICE — SPEC CONT WIDE MOUTH 3OZ 400/CS 20 CS/SK: Brand: MEDEGEN MEDICAL PRODUCTS, LLC

## (undated) DEVICE — 12CC CONTROL SYRINGE – FR/TR/RA W/RESERVOIR: Brand: CONTROL SYRINGE

## (undated) DEVICE — MAJOR LAPAROTOMY-MRMC: Brand: MEDLINE INDUSTRIES, INC.

## (undated) DEVICE — DRAIN SURG L3/8-1/2IN DIA3/16IN SIL CARD CONN 1:1 BLAK

## (undated) DEVICE — GOWN,SIRUS,NONRNF,SETINSLV,XL,20/CS: Brand: MEDLINE

## (undated) DEVICE — SOLUTION IRRIG 1000ML 0.9% SOD CHL USP POUR PLAS BTL

## (undated) DEVICE — GLOVE SURG SZ 7 CRM LTX FREE POLYISOPRENE POLYMER BEAD ANTI

## (undated) DEVICE — LIQUIBAND RAPID ADHESIVE 36/CS 0.8ML: Brand: MEDLINE

## (undated) DEVICE — 3M™ IOBAN™ 2 ANTIMICROBIAL INCISE DRAPE 6648EZ: Brand: IOBAN™ 2

## (undated) DEVICE — BLANKET WRM W25XL64IN NONWOVEN SFT LTWT PLIABLE HYPR

## (undated) DEVICE — DRAIN,WOUND,ROUND,24FR,5/16",FULL-FLUTED: Brand: MEDLINE

## (undated) DEVICE — MEDI-TRACE CADENCE ADULT, DEFIBRILLATION ELECTRODE -RTS  (10 PR/PK) - PHYSIO-CONTROL: Brand: MEDI-TRACE CADENCE

## (undated) DEVICE — TREK CORONARY DILATATION CATHETER 2.50 MM X 12 MM / RAPID-EXCHANGE: Brand: TREK

## (undated) DEVICE — GW INQWIRE FC PTFE STD J/1.5 .035 260

## (undated) DEVICE — COPILOT BLEEDBACK CONTROL VALVE: Brand: COPILOT

## (undated) DEVICE — GW EMR FIX EXCHG J STD .035 3MM 260CM

## (undated) DEVICE — 3M™ MEDIPORE™ H SOFT CLOTH SURGICAL TAPE 2864, 4 INCH X 10 YARD (10CM X 9,14M), 12 ROLLS/CASE: Brand: 3M™ MEDIPORE™

## (undated) DEVICE — STERNUM BLADE, OFFSET (32.0 X 0.8 X 6.3MM)

## (undated) DEVICE — SUTURE VICRYL + SZ 3-0 L27IN ABSRB UD L24MM PS-1 3/8 CIR PRIM VCP936H

## (undated) DEVICE — SUTURE PERMA-HAND SZ 0 L30IN NONABSORBABLE BLK L36MM CT-1 424H

## (undated) DEVICE — SPONGE LAP W18XL18IN WHT COT 4 PLY FLD STRUNG RADPQ DISP ST 2 PER PACK

## (undated) DEVICE — TUBING,OXYGEN,CRUSH RES,7',CLEAR,UC: Brand: MEDLINE INDUSTRIES, INC.

## (undated) DEVICE — ST ACC MICROPUNCTURE STFF .018 ECHO/PLDM/TP 4F/10CM 21G/7CM

## (undated) DEVICE — ANGIOGRAPHIC CATHETER: Brand: EXPO™

## (undated) DEVICE — CVR PROB ULTRASND GLS STRL

## (undated) DEVICE — TR BAND RADIAL ARTERY COMPRESSION DEVICE: Brand: TR BAND

## (undated) DEVICE — SYR SLPTP 20CC

## (undated) DEVICE — AEGIS 1" DISK 4MM HOLE, PEEL OPEN: Brand: MEDLINE

## (undated) DEVICE — SPONGE GZ W4XL4IN COT 12 PLY TYP VII WVN C FLD DSGN STERILE

## (undated) DEVICE — TRAP,MUCUS SPECIMEN,40CC: Brand: MEDLINE

## (undated) DEVICE — DRESSING FOAM W8.7XL9.1IN SAFETAC LAYR SELF ADH MEPILEX

## (undated) DEVICE — INFLATION DEVICE KIT: Brand: ENCORE™ 26 ADVANTAGE KIT

## (undated) DEVICE — GUIDE CATHETER: Brand: MACH1™

## (undated) DEVICE — SINGLE USE SUCTION VALVE MAJ-209: Brand: SINGLE USE SUCTION VALVE (STERILE)

## (undated) DEVICE — 3M™ TEGADERM™ TRANSPARENT FILM DRESSING FRAME STYLE, 1626W, 4 IN X 4-3/4 IN (10 CM X 12 CM), 50/CT 4CT/CASE: Brand: 3M™ TEGADERM™

## (undated) DEVICE — SUTURE VICRYL + SZ 0 L18IN ABSRB VLT CT L40MM 1 2 CIR TAPR PNT